# Patient Record
Sex: MALE | Race: WHITE | NOT HISPANIC OR LATINO | Employment: FULL TIME | URBAN - METROPOLITAN AREA
[De-identification: names, ages, dates, MRNs, and addresses within clinical notes are randomized per-mention and may not be internally consistent; named-entity substitution may affect disease eponyms.]

---

## 2017-08-17 ENCOUNTER — APPOINTMENT (OUTPATIENT)
Dept: LAB | Age: 72
End: 2017-08-17
Payer: COMMERCIAL

## 2017-08-17 ENCOUNTER — TRANSCRIBE ORDERS (OUTPATIENT)
Dept: ADMINISTRATIVE | Age: 72
End: 2017-08-17

## 2017-08-17 DIAGNOSIS — N40.0 ENLARGED PROSTATE WITHOUT LOWER URINARY TRACT SYMPTOMS (LUTS): ICD-10-CM

## 2017-08-17 DIAGNOSIS — Z12.5 ENCOUNTER FOR SCREENING FOR MALIGNANT NEOPLASM OF PROSTATE: ICD-10-CM

## 2017-08-17 DIAGNOSIS — E78.5 HYPERLIPIDEMIA: ICD-10-CM

## 2017-08-17 DIAGNOSIS — K21.9 GASTRO-ESOPHAGEAL REFLUX DISEASE WITHOUT ESOPHAGITIS: ICD-10-CM

## 2017-08-17 LAB
ALBUMIN SERPL BCP-MCNC: 3.6 G/DL (ref 3.5–5)
ALP SERPL-CCNC: 56 U/L (ref 46–116)
ALT SERPL W P-5'-P-CCNC: 19 U/L (ref 12–78)
ANION GAP SERPL CALCULATED.3IONS-SCNC: 6 MMOL/L (ref 4–13)
AST SERPL W P-5'-P-CCNC: 11 U/L (ref 5–45)
BASOPHILS # BLD AUTO: 0.06 THOUSANDS/ΜL (ref 0–0.1)
BASOPHILS NFR BLD AUTO: 1 % (ref 0–1)
BILIRUB SERPL-MCNC: 0.54 MG/DL (ref 0.2–1)
BUN SERPL-MCNC: 18 MG/DL (ref 5–25)
CALCIUM SERPL-MCNC: 9 MG/DL (ref 8.3–10.1)
CHLORIDE SERPL-SCNC: 107 MMOL/L (ref 100–108)
CHOLEST SERPL-MCNC: 153 MG/DL (ref 50–200)
CO2 SERPL-SCNC: 29 MMOL/L (ref 21–32)
CREAT SERPL-MCNC: 0.83 MG/DL (ref 0.6–1.3)
EOSINOPHIL # BLD AUTO: 0.48 THOUSAND/ΜL (ref 0–0.61)
EOSINOPHIL NFR BLD AUTO: 7 % (ref 0–6)
ERYTHROCYTE [DISTWIDTH] IN BLOOD BY AUTOMATED COUNT: 14.3 % (ref 11.6–15.1)
GFR SERPL CREATININE-BSD FRML MDRD: 89 ML/MIN/1.73SQ M
GLUCOSE P FAST SERPL-MCNC: 94 MG/DL (ref 65–99)
HCT VFR BLD AUTO: 44.4 % (ref 36.5–49.3)
HDLC SERPL-MCNC: 60 MG/DL (ref 40–60)
HGB BLD-MCNC: 15.1 G/DL (ref 12–17)
LDLC SERPL DIRECT ASSAY-MCNC: 79 MG/DL (ref 0–100)
LYMPHOCYTES # BLD AUTO: 1.81 THOUSANDS/ΜL (ref 0.6–4.47)
LYMPHOCYTES NFR BLD AUTO: 25 % (ref 14–44)
MCH RBC QN AUTO: 31.5 PG (ref 26.8–34.3)
MCHC RBC AUTO-ENTMCNC: 34 G/DL (ref 31.4–37.4)
MCV RBC AUTO: 93 FL (ref 82–98)
MONOCYTES # BLD AUTO: 0.57 THOUSAND/ΜL (ref 0.17–1.22)
MONOCYTES NFR BLD AUTO: 8 % (ref 4–12)
NEUTROPHILS # BLD AUTO: 4.43 THOUSANDS/ΜL (ref 1.85–7.62)
NEUTS SEG NFR BLD AUTO: 59 % (ref 43–75)
NRBC BLD AUTO-RTO: 0 /100 WBCS
PLATELET # BLD AUTO: 200 THOUSANDS/UL (ref 149–390)
PMV BLD AUTO: 10.6 FL (ref 8.9–12.7)
POTASSIUM SERPL-SCNC: 4 MMOL/L (ref 3.5–5.3)
PROT SERPL-MCNC: 6.4 G/DL (ref 6.4–8.2)
PSA SERPL-MCNC: 3.3 NG/ML (ref 0–4)
RBC # BLD AUTO: 4.79 MILLION/UL (ref 3.88–5.62)
SODIUM SERPL-SCNC: 142 MMOL/L (ref 136–145)
TRIGL SERPL-MCNC: 65 MG/DL
WBC # BLD AUTO: 7.36 THOUSAND/UL (ref 4.31–10.16)

## 2017-08-17 PROCEDURE — 83721 ASSAY OF BLOOD LIPOPROTEIN: CPT

## 2017-08-17 PROCEDURE — 80053 COMPREHEN METABOLIC PANEL: CPT

## 2017-08-17 PROCEDURE — 85025 COMPLETE CBC W/AUTO DIFF WBC: CPT

## 2017-08-17 PROCEDURE — 36415 COLL VENOUS BLD VENIPUNCTURE: CPT

## 2017-08-17 PROCEDURE — G0103 PSA SCREENING: HCPCS

## 2017-08-17 PROCEDURE — 80061 LIPID PANEL: CPT

## 2017-08-28 ENCOUNTER — ALLSCRIPTS OFFICE VISIT (OUTPATIENT)
Dept: OTHER | Facility: OTHER | Age: 72
End: 2017-08-28

## 2017-09-20 ENCOUNTER — GENERIC CONVERSION - ENCOUNTER (OUTPATIENT)
Dept: OTHER | Facility: OTHER | Age: 72
End: 2017-09-20

## 2017-10-24 NOTE — PROGRESS NOTES
Assessment   1  Esophageal reflux (530 81) (K21 9)  2  Benign enlargement of prostate (600 00) (N40 0)  3  Lower urinary tract symptoms (LUTS) (788 99) (R39 9)  4  Skin lesion (709 9) (L98 9)  5  Laryngopharyngeal reflux (478 79) (K21 9)  6  Colon cancer screening (V76 51) (Z12 11)  7  Cyst of kidney, acquired (593 2) (N28 1)  8  Diverticulosis (562 10) (K57 90)       #1 health maintenance-given pneumococcal 23 vaccine today     #2 health maintenance-he defers on influenza vaccine     #3 BPH with lower urinary tract symptoms-he will avoid excess fluids after dinner  He defers on medical therapy at present and will call he wants to proceed with treatment with an alpha-blocker     #4 skin lesion the right leg-probable seborrheic keratosis-rule out squamous cell-referral made to dermatology for removal     #5 esophageal reflux-endoscopy December 2016 shows grade 1 esophagitis and hiatal hernia  H  pylori at [facility] we are tracking down the results of this -Endoscopy a decade ago showed one niacin antral erosions felt related to NSAID use  At this point he we will be using Pepcid 20 mg over-the-counter twice a day when necessary and follow appropriate diet     #6 general care-had colonoscopy done in [facility] asked me about follow-up due in 10 years as a study showed no polyp  He has no family history of cancer of the colon or personal history of polyps  He did have diverticular disease which is asymptomatic     #7 renal cyst-had an MRI showing an abnormal area the kidney  Ultrasound on the [facility]'s in December read as benign cyst with additional studies not needed     #8 status post rotator cuff surgery of the right shoulder     #9 prior symptom of feeling poorly-felt to be from postop state with some depressive symptoms plus related to narcotics-all resolved  All labs stable at time     #10 left lower quadrant pain-prior CAT scan showed diverticular disease but otherwise benign   Colonoscopy showed diverticular disease  Local colorectal physician wanted to consider sigmoid resection  He went to [place] and had a repeat scan showing a hernia and had successful surgery with pain resolved     #11 LP are-has occasional symptoms          All other problems as per note of August 2014          MEDICAL REGIMEN: Pepcid 20 mg twice a day when necessary          Appointment in 12 months with prior labs  Addendum-track down the results of biopsy done at [facility] biopsy shows mild chronic inflammation and staining negative for H  pylori  EG junction shows no pathologic change  - ADDENDUM-patient called the office in January this 19 with sore throat with persistent cough with worsening  Symptoms present for nearly 2 weeks  Placed on doxycycline 100 milligrams b i d  for 1 week                Pneumococcal 23 vaccine given use over-the-counter Pepcid 20 mg as needed for reflux symptoms  Avoid excess fluids after dinner  Do not eat right before bed  History of Present Illness   HPI: Reviewed multiple issues talked about his reflux  He has occasional symptoms but not frequent  Endoscopy done in December 2016 at [facility] showing grade 1 esophagitis but otherwise benign  He had a hiatal hernia  He was tested for H  pylori  He has occasional symptoms for which he uses Tums  Said he felt poorly on a PPI in the past  We reviewed the recent literature regarding PPIs long-term use  He will use over-the-counter Pepcid 20 mg as needed  denies frequent hoarseness  He denies difficulty swallowing   has BPH with some lower urinary tract symptoms  This patient has a known history of benign prostatic hypertrophy  He was counseled on various medications for treatment of this including alpha reductase inhibitors and alpha blockers  He denies dysuria, hematuria  He has some degree of urinary frequency as before  He has some nocturia as before   His declining forcefulness of his stream is similar to prior visit  He understands that this can be exacerbated by use of medications with anticholinergic side effects  At this point he wants to continue current therapy for this  I made a drawing of the anatomy today  He said he usually gets up once per night but occasionally it is moreâespecially ninth of alcohol  He says at this point does not want any medicine    had a follow-up ultrasound the kidney done in December which was read as a normal study by radiology  Specifically he has a cyst in the mid pole of the right kidney and a simple cyst of the left kidney  He was told additional radiographic studies are not needed  denies any hematuria  This patient denies any systemic symptoms  Specifically there has been no evidence of fever, night sweats, significant weight loss or significant decrease in appetite  full skin examination was done today with the special dermatologic light  There was evidence of several benign keratoses  There were no worrisome lesions  I did not see anything to suggest basal cell or squamous cell carcinoma  There was no evidence of malignant melanoma  I reviewed the concept of benign hemangiomas of the skin  Skin lesion of the right legâprobable seborrheic keratosisârule out squamous cell carcinomaâreferral made to dermatology for removal   testing done prior to this visit shows normal CBC and SMA  PSA 3 3  Cholesterol profile normal with a cholesterol 153, triglycerides 65 and an LDL 79  Chemistry profile normal      Review of Systems   Complete-Male:      Constitutional: feeling poorly-- and-- feeling tired, but-- no fever-- and-- no chills  Eyes: No complaints of eye pain, no red eyes, no discharge from eyes, no itchy eyes        ENT: Itching of the ears, but-- no complaints of earache, no hearing loss, no nosebleeds, no nasal discharge, no sore throat, no hoarseness,-- no earache,-- no nosebleeds,-- no sore throat,-- no hearing loss-- and-- no nasal discharge  Cardiovascular: No complaints of slow heart rate, no fast heart rate, no chest pain, no palpitations, no leg claudication, no lower extremity  Respiratory: cough, but-- No complaints of shortness of breath, no wheezing, no cough, no SOB on exertion, no orthopnea or PND,-- no shortness of breath,-- no orthopnea,-- no wheezing,-- no shortness of breath during exertion-- and-- no PND  Gastrointestinal: constipation, but-- no abdominal pain,-- no nausea,-- no vomiting,-- no diarrhea-- and-- no blood in stools  Genitourinary: urinary hesitancy-- and-- nocturia, but-- no dysuria-- and-- no incontinence  Musculoskeletal: No complaints of arthralgia, no myalgias, no joint swelling or stiffness, no limb pain or swelling  Integumentary: No complaints of skin rash or skin lesions, no itching, no skin wound, no dry skin  Neurological: No compliants of headache, no confusion, no convulsions, no numbness or tingling, no dizziness or fainting, no limb weakness, no difficulty walking  Psychiatric: Is not suicidal, no sleep disturbances, no anxiety or depression, no change in personality, no emotional problems  Endocrine: No complaints of proptosis, no hot flashes, no muscle weakness, no erectile dysfunction, no deepening of the voice, no feelings of weakness  Hematologic/Lymphatic: No complaints of swollen glands, no swollen glands in the neck, does not bleed easily, no easy bruising  Active Problems   1  Abdominal pain (789 00) (R10 9)  2  Abnormal ultrasound (793 99) (R93 8)  3  Arthritis (716 90) (M19 90)  4  Benign enlargement of prostate (600 00) (N40 0)  5  Cervical radiculopathy (723 4) (M54 12)  6  Colon cancer screening (V76 51) (Z12 11)  7  Constipation (564 00) (K59 00)  8  Cough (786 2) (R05)  9  Cyst of kidney, acquired (593 2) (N28 1)  10  Esophageal reflux (530 81) (K21 9)  11  Fatigue (780 79) (R53 83)  12  Headache (784 0) (R51)  13  Hoarseness (784 42) (R49 0)  14  Kidney cyst, acquired (593 2) (N28 1)  15  Laryngopharyngeal reflux (478 79) (K21 9)  16  Left knee pain (719 46) (M25 562)  17  Lower back pain (724 2) (M54 5)  18  Lower urinary tract symptoms (LUTS) (788 99) (R39 9)  19  Shoulder pain (719 41) (M25 519)  20  Skin lesion (709 9) (L98 9)  21  Thrombosed external hemorrhoids (455 4) (K64 5)    Past Medical History   1  History of Atrophy of prostate (602 2) (N42 89)  2  History of Basal cell carcinoma of shoulder (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) (C44 611)  3  History of acute pharyngitis (V12 69) (Z87 09)  4  History of depression (V11 8) (Z86 59)  5  History of Skin rash (782 1) (R21)  6  History of Splinter of foot, left, initial encounter (917 6) (S12 611S)  7  History of Subconjunctival hemorrhage of left eye (372 72) (H11 32)  Active Problems And Past Medical History Reviewed: The active problems and past medical history were reviewed and updated today  Surgical History   1  History of Hernia Repair  Surgical History Reviewed: The surgical history was reviewed and updated today  Family History   Mother   1  Family history of cardiac disorder (V17 49) (Z82 49)  2  Family history of kidney disease (V18 69) (Z84 1)  Father   3  Family history of Prostate cancer    Social History    · Being A Social Drinker   · Denied: History of Drug Use   · Former smoker (B55 75) (T46 373)  Social History Reviewed: The social history was reviewed and updated today  The social history was reviewed and is unchanged  Current Meds   1  Acetaminophen 500 MG Oral Tablet; TAKE 1 TABLET EVERY 4 TO 6 HOURS AS NEEDED; Therapy: 28SBG1535 to Recorded  2  Citrucel Oral Powder; USE AS DIRECTED; Therapy: 94GIX0395 to Recorded  3  Colace 100 MG Oral Capsule; take 1 capsule daily; Therapy: 84QAO9450 to Recorded  4  MiraLax Oral Powder (Polyethylene Glycol 3350); use as directed; Therapy: 60SRW2180 to Recorded  Medication List Reviewed:     The medication list was reviewed and updated today  Allergies   1  No Known Drug Allergies    Vitals   Vital Signs    Recorded: 28Aug2017 08:19AM Recorded: 88Pnx0785 07:58AM   Heart Rate 72    Respiration 14    Systolic 757    Diastolic 76    Height  5 ft 9 in   Weight  174 lb 2 oz   BMI Calculated  25 71   BSA Calculated  1 95     Physical Exam        Constitutional      General appearance: No acute distress, well appearing and well nourished  Head and Face      Head and face: Normal        Palpation of the face and sinuses: No sinus tenderness  Eyes      Conjunctiva and lids: No erythema, swelling or discharge  Pupils and irises: Equal, round, reactive to light  Ears, Nose, Mouth, and Throat      External inspection of ears and nose: Normal        Otoscopic examination: Tympanic membranes translucent with normal light reflex  Canals patent without erythema  Hearing: Normal        Nasal mucosa, septum, and turbinates: Normal without edema or erythema  Lips, teeth, and gums: Normal, good dentition  Oropharynx: Abnormal  -- Prominent erythematous oropharynx with questional exudate  Neck      Neck: Supple, symmetric, trachea midline, no masses  Thyroid: Normal, no thyromegaly  Pulmonary      Respiratory effort: No increased work of breathing or signs of respiratory distress  Percussion of chest: Normal        Palpation of chest: Normal        Auscultation of lungs: Clear to auscultation  Cardiovascular      Palpation of heart: Normal PMI, no thrills  Auscultation of heart: Normal rate and rhythm, normal S1 and S2, no murmurs  Carotid pulses: 2+ bilaterally  Abdominal aorta: Normal        Femoral pulses: 2+ bilaterally  Pedal pulses: 2+ bilaterally         Peripheral vascular exam: Normal        Examination of extremities for edema and/or varicosities: Normal        Chest      Breasts: Normal, no dimpling or skin changes appreciated  Palpation of breasts and axillae: Normal, no masses palpated  Chest: Normal        Abdomen      Abdomen: Non-tender, no masses  Liver and spleen: No hepatomegaly or splenomegaly  Examination for hernias: No hernias appreciated  Anus, perineum, and rectum: Normal sphincter tone, no masses, no prolapse  Stool sample for occult blood: Negative  Genitourinary      Scrotal contents: Normal testes, no masses  Penis: Normal, no lesions  Digital rectal exam of prostate: Abnormal  -- Enlarged prostate without nodules  Lymphatic      Palpation of lymph nodes in neck: No lymphadenopathy  Palpation of lymph nodes in axillae: No lymphadenopathy  Palpation of lymph nodes in groin: No lymphadenopathy  Palpation of lymph nodes in other areas: No lymphadenopathy  Musculoskeletal      Gait and station: Normal        Inspection/palpation of digits and nails: Abnormal  -- Pain on range of motion of right shoulder  Inspection/palpation of joints, bones, and muscles: Normal        Range of motion: Normal        Stability: Normal        Muscle strength/tone: Normal        Skin      Skin and subcutaneous tissue: Normal without rashes or lesions  Examination of the skin for lesions: Abnormal  -- Benign keratosesânodular-like area in the right leg  Palpation of skin and subcutaneous tissue: Normal turgor  Neurologic      Cranial nerves: Cranial nerves 2-12 intact  Reflexes: 2+ and symmetric  Sensation: No sensory loss  Psychiatric      Judgment and insight: Normal        Orientation to person, place and time: Normal        Recent and remote memory: Intact  Mood and affect: Normal        Health Management   Colon cancer screening   COLONOSCOPY; every 10 years; Last 40TXX6599; Next Due: 53Wiz6525;  Active    Signatures    Electronically signed by : YEE Castro ; Aug 28 2017  8:31AM EST (Author)     Electronically signed by : YEE Nugent ; Aug 31 2017 12:58PM EST                       (Author)     Electronically signed by : YEE Nugent ; Jan 18 2018  1:28PM EST                       (Author)

## 2017-10-26 ENCOUNTER — ALLSCRIPTS OFFICE VISIT (OUTPATIENT)
Dept: OTHER | Facility: OTHER | Age: 72
End: 2017-10-26

## 2017-10-27 NOTE — PROGRESS NOTES
Assessment  1  Elbow pain, unspecified laterality (429 42) (M26 004)    1  Health maintenance-patient given influenza vaccine today  2  Elbow pain-clinically at this point suggest low-grade cellulitis-rule out other-placed on cephalexin 500 milligrams t i d  for 1 week  He will call in several days regarding status  If he notes swelling of the area worsening he will call may need repeat evaluation and potential x-ray/MRI of the joint but at this point clinically appears to be a cellulitis    All other problems as per prior records-  This patient will be seen at previously scheduled appointment with previously scheduled labs  Plan  Cephalexin 500 milligrams t i d  for 1 week     History of Present Illness  HPI: patient is seen today as an emergency appointment  Began with elbow pain over the past 2-3 days  At 1 point he said he was trying to open a jar and had some difficulty doing that  He denies any definite trauma to the area  On exam he is erythema with a questionable entrance site  He denies fever chills  He did not have any ecchymosis  He denies any trauma to the area  vaccine recommended in the patient received today  We had a discussion about influenza vaccine      Review of Systems  Complete-Male:   Constitutional: feeling poorly-- and-- feeling tired, but-- no fever-- and-- no chills  Eyes: No complaints of eye pain, no red eyes, no discharge from eyes, no itchy eyes  ENT: Itching of the ears, but-- no complaints of earache, no hearing loss, no nosebleeds, no nasal discharge, no sore throat, no hoarseness,-- no earache,-- no nosebleeds,-- no sore throat,-- no hearing loss-- and-- no nasal discharge  Cardiovascular: No complaints of slow heart rate, no fast heart rate, no chest pain, no palpitations, no leg claudication, no lower extremity     Respiratory: No complaints of shortness of breath, no wheezing, no cough, no SOB on exertion, no orthopnea or PND,-- no shortness of breath,-- no cough,-- no orthopnea,-- no wheezing,-- no shortness of breath during exertion-- and-- no PND  Gastrointestinal: No complaints of abdominal pain, no constipation, no nausea or vomiting, no diarrhea or bloody stools,-- no abdominal pain,-- no nausea,-- no vomiting,-- no constipation,-- no diarrhea-- and-- no blood in stools  Genitourinary: urinary hesitancy-- and-- nocturia, but-- no dysuria-- and-- no incontinence  Musculoskeletal: limb pain-- and-- Right elbow pain, but-- no arthralgias,-- no joint swelling,-- no myalgias,-- no joint stiffness-- and-- no limb swelling  Integumentary: No complaints of skin rash or skin lesions, no itching, no skin wound, no dry skin  Neurological: No compliants of headache, no confusion, no convulsions, no numbness or tingling, no dizziness or fainting, no limb weakness, no difficulty walking  Psychiatric: Is not suicidal, no sleep disturbances, no anxiety or depression, no change in personality, no emotional problems  Endocrine: No complaints of proptosis, no hot flashes, no muscle weakness, no erectile dysfunction, no deepening of the voice, no feelings of weakness  Hematologic/Lymphatic: No complaints of swollen glands, no swollen glands in the neck, does not bleed easily, no easy bruising  Active Problems  1  Abdominal pain (789 00) (R10 9)   2  Abnormal ultrasound (793 99) (R93 8)   3  Arthritis (716 90) (M19 90)   4  Benign enlargement of prostate (600 00) (N40 0)   5  Cervical radiculopathy (723 4) (M54 12)   6  Colon cancer screening (V76 51) (Z12 11)   7  Constipation (564 00) (K59 00)   8  Cough (786 2) (R05)   9  Cyst of kidney, acquired (593 2) (N28 1)   10  Diverticulosis (562 10) (K57 90)   11  Encounter for prostate cancer screening (V76 44) (Z12 5)   12  Esophageal reflux (530 81) (K21 9)   13  Fatigue (780 79) (R53 83)   14  Headache (784 0) (R51)   15  Hoarseness (784 42) (R49 0)   16  Kidney cyst, acquired (593 2) (N28 1)   17  Laryngopharyngeal reflux (478 79) (K21 9)   18  Left knee pain (719 46) (M25 562)   19  Lower back pain (724 2) (M54 5)   20  Lower urinary tract symptoms (LUTS) (788 99) (R39 9)   21  Need for pneumococcal vaccination (V03 82) (Z23)   22  Shoulder pain (719 41) (M25 519)   23  Skin lesion (709 9) (L98 9)   24  Thrombosed external hemorrhoids (455 4) (K64 5)    Past Medical History  1  History of Atrophy of prostate (602 2) (N42 89)   2  History of Basal cell carcinoma of shoulder (173 61) (C44 611)   3  History of acute pharyngitis (V12 69) (Z87 09)   4  History of depression (V11 8) (Z86 59)   5  History of Skin rash (782 1) (R21)   6  History of Splinter of foot, left, initial encounter (917 6) (B12 983X)   7  History of Subconjunctival hemorrhage of left eye (372 72) (H11 32)  Active Problems And Past Medical History Reviewed: The active problems and past medical history were reviewed and updated today  Surgical History  1  History of Hernia Repair    Family History  Mother    1  Family history of cardiac disorder (V17 49) (Z82 49)   2  Family history of kidney disease (V18 69) (Z84 1)  Father    3  Family history of Prostate cancer    Social History   · Being A Social Drinker   · Denied: History of Drug Use   · Former smoker (O41 69) (G19 349)  Social History Reviewed: The social history was reviewed and updated today  The social history was reviewed and is unchanged  Current Meds   1  Acetaminophen 500 MG Oral Tablet; TAKE 1 TABLET EVERY 4 TO 6 HOURS AS NEEDED; Therapy: 23GPE1103 to Recorded   2  Citrucel Oral Powder; USE AS DIRECTED; Therapy: 44QEL0566 to Recorded   3  Colace 100 MG Oral Capsule; take 1 capsule daily; Therapy: 83VRK8423 to Recorded   4  MiraLax Oral Powder (Polyethylene Glycol 3350); use as directed; Therapy: 00VNN2571 to Recorded  Medication List Reviewed: The medication list was reviewed and updated today  Allergies  1   No Known Drug Allergies    Vitals  Vital Signs    Recorded: 42BIW2758 06:58AM Recorded: 42EZD6030 06:50AM   Heart Rate 72    Respiration 14    Systolic 221, RUE    Diastolic 80, RUE    Height  5 ft 9 in   Weight  179 lb    BMI Calculated  26 43   BSA Calculated  1 97     Physical Exam    Constitutional   General appearance: No acute distress, well appearing and well nourished  Lymphatic   Palpation of lymph nodes in neck: No lymphadenopathy  Musculoskeletal   Gait and station: Normal     Digits and nails: Normal without clubbing or cyanosis  Inspection/palpation of joints, bones, and muscles: Normal     Skin   Skin and subcutaneous tissue: Abnormal  -- Erythema over the right elbow with questionable entrance site  Psychiatric   Orientation to person, place and time: Normal     Mood and affect: Normal          Health Management  Colon cancer screening   COLONOSCOPY; every 10 years; Last 76LUF0579; Next Due: 49Euj3882; Active    Future Appointments    Date/Time Provider Specialty Site   08/28/2018 08:00 AM YEE Neves   Internal Medicine Cornelius Lin MD     Signatures   Electronically signed by : YEE Marin ; Oct 26 2017  7:02AM EST                       (Author)

## 2018-01-13 VITALS
SYSTOLIC BLOOD PRESSURE: 120 MMHG | RESPIRATION RATE: 14 BRPM | BODY MASS INDEX: 25.79 KG/M2 | HEIGHT: 69 IN | DIASTOLIC BLOOD PRESSURE: 76 MMHG | WEIGHT: 174.13 LBS | HEART RATE: 72 BPM

## 2018-01-14 VITALS
HEART RATE: 72 BPM | DIASTOLIC BLOOD PRESSURE: 80 MMHG | RESPIRATION RATE: 14 BRPM | SYSTOLIC BLOOD PRESSURE: 130 MMHG | WEIGHT: 179 LBS | BODY MASS INDEX: 26.51 KG/M2 | HEIGHT: 69 IN

## 2018-05-24 ENCOUNTER — TELEPHONE (OUTPATIENT)
Dept: INTERNAL MEDICINE CLINIC | Facility: CLINIC | Age: 73
End: 2018-05-24

## 2018-05-24 ENCOUNTER — OFFICE VISIT (OUTPATIENT)
Dept: INTERNAL MEDICINE CLINIC | Facility: CLINIC | Age: 73
End: 2018-05-24
Payer: COMMERCIAL

## 2018-05-24 VITALS
DIASTOLIC BLOOD PRESSURE: 68 MMHG | OXYGEN SATURATION: 96 % | BODY MASS INDEX: 25.75 KG/M2 | HEART RATE: 83 BPM | SYSTOLIC BLOOD PRESSURE: 114 MMHG | TEMPERATURE: 98.7 F | WEIGHT: 174.4 LBS

## 2018-05-24 DIAGNOSIS — M17.12 PRIMARY OSTEOARTHRITIS OF LEFT KNEE: ICD-10-CM

## 2018-05-24 DIAGNOSIS — M67.441 DIGITAL MUCOUS CYST OF FINGER OF RIGHT HAND: Primary | ICD-10-CM

## 2018-05-24 PROCEDURE — 99214 OFFICE O/P EST MOD 30 MIN: CPT | Performed by: INTERNAL MEDICINE

## 2018-05-24 NOTE — PROGRESS NOTES
Assessment/Plan:       Diagnoses and all orders for this visit:    Digital mucous cyst of finger of right hand  Comments:  suspect new onset of symptoms/pain, swelling/lesion is mroe c/w cyst of finger  advised on hand eval and x-ray prior to appt  Orders:  -     Ambulatory referral to Orthopedic Surgery; Future  -     XR finger right third digit-middle; Future    Primary osteoarthritis of left knee  Comments:  topical nsaid for pain prn, prefer to avoid oral nsaids in pt's >70 y/o due to side effects  Orders:  -     diclofenac sodium (VOLTAREN) 1 %; Apply 2 g topically 2 (two) times a day as needed (knee pain)          Subjective:      Patient ID: Edy Holliday is a 72 y o  male  HPI    New to me, here with c/o painful finger swelling/lesion for last 10 days & left knee intermittent pain  No prior finger swelling or problem before 10 days ago and no injury to finger  Is avid swimmer and takes no medication on regular basis except of OTC nsaids for left knee pain prn  Had x-rays of left knee in 2014 which showed advanced osteoarthritis  Tylenol ineffective for knee pain and pt does get more reflux with oral nsaids(also age>64 y/o)  No knee swelling/injury or other complaints    Past Medical History:   Diagnosis Date    Atrophy of prostate     last assessed: 08/26/2014    Basal cell carcinoma of shoulder     last assessed: 08/28/2014    Depression     last assessed: 08/26/2015    Subconjunctival hemorrhage of left eye     last assessed: 01/14/2016     Vitals:    05/24/18 1409   BP: 114/68   BP Location: Left arm   Patient Position: Sitting   Cuff Size: Large   Pulse: 83   Temp: 98 7 °F (37 1 °C)   TempSrc: Tympanic   SpO2: 96%   Weight: 79 1 kg (174 lb 6 4 oz)     Body mass index is 25 75 kg/m²      Current Outpatient Prescriptions:     diclofenac sodium (VOLTAREN) 1 %, Apply 2 g topically 2 (two) times a day as needed (knee pain), Disp: 1 Tube, Rfl: 0  No Known Allergies      Review of Systems Constitutional: Negative for fever  HENT: Negative for congestion  Eyes: Negative for visual disturbance  Respiratory: Negative for shortness of breath  Cardiovascular: Negative for chest pain  Gastrointestinal: Negative for abdominal distention  Genitourinary: Negative for difficulty urinating  Musculoskeletal: Positive for arthralgias  Negative for joint swelling  Neurological: Negative for headaches  Psychiatric/Behavioral: Negative for dysphoric mood  Objective:      /68 (BP Location: Left arm, Patient Position: Sitting, Cuff Size: Large)   Pulse 83   Temp 98 7 °F (37 1 °C) (Tympanic)   Wt 79 1 kg (174 lb 6 4 oz)   SpO2 96%   BMI 25 75 kg/m²          Physical Exam   Constitutional: He is oriented to person, place, and time  He appears well-developed and well-nourished  HENT:   Head: Normocephalic and atraumatic  Mouth/Throat: Oropharynx is clear and moist    Eyes: Conjunctivae are normal  Pupils are equal, round, and reactive to light  Cardiovascular: Normal rate, regular rhythm and normal heart sounds  No murmur heard  Pulmonary/Chest: Effort normal and breath sounds normal  He has no wheezes  He has no rales  Abdominal: Soft  Bowel sounds are normal  There is no tenderness  Musculoskeletal: He exhibits no edema  Right 3rd finger with small 2mm, raised/subq skin lesion on PIP with pain on flexion of finger  Left knee with mild crepitus on passive ROM but lachman's test negative and no swelling or tenderness   Neurological: He is alert and oriented to person, place, and time  Psychiatric: He has a normal mood and affect  His behavior is normal    Vitals reviewed

## 2018-05-24 NOTE — PATIENT INSTRUCTIONS
1  Hand specialist appointment  2  Consider shingrix (SHINGLES) vaccine  3  voltaren topical as needed for knee pain  4   Continue low impact exercises

## 2018-05-24 NOTE — TELEPHONE ENCOUNTER
PT STATED THAT FOR THE PAST 10 DAYS HE'S HAD A WART OR CORN ON HIS MIDDLE FINGER THAT'S HARD IN THE MIDDLE  HE REQUESTED TO BE SEEN   I GAVE HIM AN APPT WITH DR DASILVA FOR TODAY AT 2PM, HE STATED THAT HE'LL BE HERE

## 2018-06-02 ENCOUNTER — HOSPITAL ENCOUNTER (EMERGENCY)
Facility: HOSPITAL | Age: 73
Discharge: HOME | End: 2018-06-02
Attending: EMERGENCY MEDICINE | Admitting: EMERGENCY MEDICINE
Payer: COMMERCIAL

## 2018-06-02 ENCOUNTER — APPOINTMENT (EMERGENCY)
Dept: RADIOLOGY | Facility: HOSPITAL | Age: 73
End: 2018-06-02
Payer: COMMERCIAL

## 2018-06-02 VITALS
DIASTOLIC BLOOD PRESSURE: 63 MMHG | TEMPERATURE: 98.6 F | BODY MASS INDEX: 25.84 KG/M2 | HEART RATE: 78 BPM | RESPIRATION RATE: 18 BRPM | SYSTOLIC BLOOD PRESSURE: 109 MMHG | OXYGEN SATURATION: 97 % | WEIGHT: 175 LBS

## 2018-06-02 DIAGNOSIS — S82.891A CLOSED FRACTURE OF RIGHT ANKLE, INITIAL ENCOUNTER: Primary | ICD-10-CM

## 2018-06-02 PROCEDURE — 73610 X-RAY EXAM OF ANKLE: CPT

## 2018-06-02 PROCEDURE — 99283 EMERGENCY DEPT VISIT LOW MDM: CPT

## 2018-06-02 RX ORDER — OXYCODONE HYDROCHLORIDE AND ACETAMINOPHEN 5; 325 MG/1; MG/1
1 TABLET ORAL EVERY 6 HOURS PRN
Qty: 12 TABLET | Refills: 0 | Status: SHIPPED | OUTPATIENT
Start: 2018-06-02 | End: 2018-06-02

## 2018-06-02 RX ORDER — OXYCODONE HYDROCHLORIDE 5 MG/1
5 TABLET ORAL EVERY 6 HOURS PRN
Qty: 12 TABLET | Refills: 0 | Status: SHIPPED | OUTPATIENT
Start: 2018-06-02 | End: 2018-06-05

## 2018-06-02 NOTE — ED PROVIDER NOTES
History  Chief Complaint   Patient presents with   [employer] Fall     pt presents s/p fall states fell off a ladder approx 8 feet  denies any other pain or injury besides right ankle       History provided by:  Patient  Fall   Mechanism of injury: fall    Injury location:  Leg  Leg injury location:  R ankle  Incident location:  Home  Time since incident:  1 hour  Arrived directly from scene: yes    Fall:     Fall occurred:  From a ladder    Height of fall:  7 ft    Impact surface:  Grass    Point of impact:  Feet    Entrapped after fall: no    Suspicion of alcohol use: no    Suspicion of drug use: no    Tetanus status:  Up to date  Associated symptoms: no abdominal pain, no back pain, no blindness, no chest pain, no difficulty breathing, no headaches, no hearing loss, no loss of consciousness, no nausea, no neck pain, no seizures and no vomiting    Risk factors: no AICD, no anticoagulation therapy, no CABG, no CAD, no COPD, no diabetes, no hemophilia, no pacemaker and no past MI        Prior to Admission Medications   Prescriptions Last Dose Informant Patient Reported? Taking?   diclofenac sodium (VOLTAREN) 1 %   No No   Sig: Apply 2 g topically 2 (two) times a day as needed (knee pain)      Facility-Administered Medications: None       Past Medical History:   Diagnosis Date    Atrophy of prostate     last assessed: 08/26/2014    Basal cell carcinoma of shoulder     last assessed: 08/28/2014    Depression     last assessed: 08/26/2015    Subconjunctival hemorrhage of left eye     last assessed: 01/14/2016       Past Surgical History:   Procedure Laterality Date    HERNIA REPAIR         Family History   Problem Relation Age of Onset    Heart disease Mother      cardiac disorder    Kidney disease Mother     Prostate cancer Father      I have reviewed and agree with the history as documented      Social History   Substance Use Topics    Smoking status: Former Smoker    Smokeless tobacco: Never Used      Comment: years ago per patient, not any more    Alcohol use Yes      Comment: Social        Review of Systems   Constitutional: Negative for activity change, appetite change, chills, fatigue and fever  HENT: Negative for ear pain, hearing loss, sneezing and sore throat  Eyes: Negative for blindness, pain and visual disturbance  Respiratory: Negative for cough and shortness of breath  Cardiovascular: Negative for chest pain and palpitations  Gastrointestinal: Negative for abdominal pain, blood in stool, constipation, diarrhea, nausea and vomiting  Genitourinary: Negative for dysuria and hematuria  Musculoskeletal: Positive for arthralgias, gait problem and joint swelling  Negative for back pain, neck pain and neck stiffness  Skin: Negative for rash and wound  Neurological: Negative for dizziness, seizures, loss of consciousness, weakness, light-headedness, numbness and headaches  All other systems reviewed and are negative  Physical Exam  Physical Exam   Constitutional: He is oriented to person, place, and time  He appears well-developed and well-nourished  No distress  HENT:   Head: Normocephalic and atraumatic  Right Ear: External ear normal    Left Ear: External ear normal    Nose: Nose normal    Mouth/Throat: Oropharynx is clear and moist    Negative hemotympanum bilaterally  No khan signs or raccoons eyes   Eyes: Conjunctivae and EOM are normal  Pupils are equal, round, and reactive to light  Neck: Normal range of motion  Neck supple  Cardiovascular: Normal rate, regular rhythm, normal heart sounds and intact distal pulses  Exam reveals no gallop and no friction rub  No murmur heard  Pulmonary/Chest: Effort normal and breath sounds normal  No respiratory distress  He has no wheezes  He has no rales  Abdominal: Soft  He exhibits no distension  There is no tenderness  No tenderness to light and deep palpation  Musculoskeletal: Normal range of motion  He exhibits tenderness  He exhibits no edema or deformity  Tenderness to palpation of right medial malleolus  Limited ROM secondary to pain  No proximal fibular tenderness  No other upper or lower extremity tenderness  No tenderness to anterior/posterior torso  No C/T/L spine tenderness  No pelvic instability  Full sensation distal to injury  2+ pulses and cap refill <2 seconds   Lymphadenopathy:     He has no cervical adenopathy  Neurological: He is alert and oriented to person, place, and time  No cranial nerve deficit or sensory deficit  He exhibits normal muscle tone  Coordination normal    Skin: Skin is warm and dry  Capillary refill takes less than 2 seconds  He is not diaphoretic  No erythema  No pallor  Nursing note and vitals reviewed  Vital Signs  ED Triage Vitals [06/02/18 1453]   Temperature Pulse Respirations Blood Pressure SpO2   98 6 °F (37 °C) 78 18 109/63 97 %      Temp Source Heart Rate Source Patient Position - Orthostatic VS BP Location FiO2 (%)   Oral Monitor Sitting Left arm --      Pain Score       5           Vitals:    06/02/18 1453   BP: 109/63   Pulse: 78   Patient Position - Orthostatic VS: Sitting       Visual Acuity      ED Medications  Medications - No data to display    Diagnostic Studies  Results Reviewed     None                 XR ankle 3+ vw right   Final Result by Kang Villar MD (06/02 1546)      There is an acute medial malleolus fracture  Workstation performed: [id number]                    Procedures  Static Splint Application  Date/Time: 6/2/2018 4:54 PM  Performed by: Misti Liz by: Virginia Cheema     Patient location:  ED  Procedure performed by emergency physician: No    Procedure performed by consultant: Nitish Rosales CRNA      Consent:     Consent obtained:  Verbal    Consent given by:  Patient    Risks discussed:  Numbness, pain and swelling  Universal protocol:     Imaging studies available: yes      Patient identity confirmed:  Verbally with patient  Indication:     Indications: fracture    Pre-procedure details:     Sensation:  Normal  Procedure details:     Laterality:  Right    Location:  Ankle    Ankle:  R ankle    Strapping: no      Splint type:  Short leg and sugar tong    Supplies:  Ortho-Glass and cotton padding  Post-procedure details:     Pain:  Improved    Sensation:  Normal    Neurovascular Exam: skin pink      Patient tolerance of procedure: Tolerated well, no immediate complications           Phone Contacts  ED Phone Contact    ED Course                               MDM  Number of Diagnoses or Management Options  Closed fracture of right ankle, initial encounter:   Diagnosis management comments: Patient is otherwise healthy 79-year-old male presents to the emergency department for evaluation of ankle injury  Patient states that he was trimming his headache is when he believes his ladder fell from being on a hill, states he fell onto his right ankle that to his right side  He states he is able to get up immediately  No head injury or loss of consciousness  No other complaints of pain  Patient does mostly complained of right ankle pain  Was able to ambulate initially but then has since been able to not bear weight  Patient denies any other pain in his hip, upper lower extremities, torso, abdomen, chest wall  On exam patient is in no acute distress  He does have tenderness to swollen right medial malleolus  Flexor range of motion of bilateral lower extremities otherwise  No tenderness abdominal palpation or chest wall palpation  No spinal tenderness  No obvious signs of injury anywhere else  Cranial nerves 2-12 fully intact no focal deficits  Patient airways pain, bilateral breath sounds with full chest wall expansion, 2+ pulses throughout, GCS 15 with no focal findings, no other traumatic findings on full exposure  Fracture to right medial malleolus on x-ray    Plan will be to splint nonweightbearing until patient can be seen by Orthopedics/Podiatry  Amount and/or Complexity of Data Reviewed  Tests in the radiology section of CPT®: ordered and reviewed  Independent visualization of images, tracings, or specimens: yes    Risk of Complications, Morbidity, and/or Mortality  Presenting problems: low  Diagnostic procedures: low  Management options: moderate    Patient Progress  Patient progress: improved    CritCare Time    Disposition  Final diagnoses:   Closed fracture of right ankle, initial encounter - medial malleolus     Time reflects when diagnosis was documented in both MDM as applicable and the Disposition within this note     Time User Action Codes Description Comment    6/2/2018  4:48 PM Anjali Medrano Add [Y19 [id number]] Closed fracture of right ankle, initial encounter     6/2/2018  4:48 PM Delpome, Cherlynn Lefort Modify [P63 130Q] Closed fracture of right ankle, initial encounter medial malleolus      ED Disposition     ED Disposition Condition Comment    Discharge  Margarita Davis discharge to home/self care  Condition at discharge: Stable        Follow-up Information     Follow up With Specialties Details Why Contact Info Additional [address] [facility] Orthopedic Surgery Schedule an appointment as soon as possible for a visit in 3 days ankle fracture follow up Jer[facility] [address] [place] [id number]  [phone]     David West DPM Podiatry Schedule an appointment as soon as possible for a visit As needed [address] Emergency Department Emergency Medicine  If symptoms worsen [address] [facility] [address] [phone] [facility] ED,  [address], [place], [place], [address]          Patient's Medications   Discharge Prescriptions    OXYCODONE (ROXICODONE) 5 MG IMMEDIATE RELEASE TABLET    Take 1 tablet (5 mg total) by mouth every 6 (six) hours as needed for moderate pain for up to 3 days Max Daily Amount: 20 mg       Start Date: 6/2/2018  End Date: 6/5/2018       Order Dose: 5 mg       Quantity: 12 tablet    Refills: 0     No discharge procedures on file      ED Provider  Electronically Signed by           Gabe Saldivar PA-C  06/02/18 0086

## 2018-06-02 NOTE — ED NOTES
Patient already has crutches at home, and feels comfortable using them        Gonzalez Calero  06/02/18 3999

## 2018-06-02 NOTE — DISCHARGE INSTRUCTIONS
Ankle Fracture   WHAT YOU NEED TO KNOW:   An ankle fracture is a break in 1 or more of the bones in your ankle  DISCHARGE INSTRUCTIONS:   Call 911 for any of the following:   · You feel lightheaded, short of breath, and have chest pain  · You cough up blood  Return to the emergency department if:   · Your leg feels warm, tender, and painful  It may look swollen and red  · Blood soaks through your bandage  · You have severe pain in your ankle  · Your cast feels too tight  · Your foot or toes are cold or numb  · Your foot or toenails turn blue or gray  Contact your healthcare provider if:   · Your splint feels too tight  · Your swelling has increased or returned  · You have a fever  · Your pain does not go away, even after treatment  · You have questions or concerns about your condition or care  Medicines: You may need any of the following:  · Acetaminophen  decreases pain and fever  It is available without a doctor's order  Ask how much to take and how often to take it  Follow directions  Acetaminophen can cause liver damage if not taken correctly  · NSAIDs , such as ibuprofen, help decrease swelling, pain, and fever  This medicine is available with or without a doctor's order  NSAIDs can cause stomach bleeding or kidney problems in certain people  If you take blood thinner medicine, always ask your healthcare provider if NSAIDs are safe for you  Always read the medicine label and follow directions  · Prescription pain medicine  may be given  Ask your healthcare provider how to take this medicine safely  · Take your medicine as directed  Contact your healthcare provider if you think your medicine is not helping or if you have side effects  Tell him or her if you are allergic to any medicine  Keep a list of the medicines, vitamins, and herbs you take  Include the amounts, and when and why you take them  Bring the list or the pill bottles to follow-up visits   Carry your medicine list with you in case of an emergency  Follow up with your healthcare provider in 1 to 2 days: Your fracture may need to be reduced (bones pushed back into place) or you may need surgery  Write down your questions so you remember to ask them during your visits  Support devices: You will be given a brace, cast, or splint to limit your movement and protect your ankle  You may need to use crutches to protect your ankle and decrease your pain as you move around  Do not remove your device and do not put weight on your injured ankle  Splint and cast care:  Cover the splint or cast before you bathe so it does not get wet  Tape 2 plastic trash bags to your skin above the cast  Try to keep your ankle out of the water as much as possible  Rest:  Rest your ankle so that it can heal  Return to normal activities as directed  Ice:  Apply ice on your ankle for 15 to 20 minutes every hour or as directed  Use an ice pack, or put crushed ice in a plastic bag  Cover it with a towel  Ice helps prevent tissue damage and decreases swelling and pain  Elevate:  Elevate your ankle above the level of your heart as often as you can  This will help decrease swelling and pain  Prop your ankle on pillows or blankets to keep it elevated comfortably  © 2017 [employer]  Information is for End User's use only and may not be sold, redistributed or otherwise used for commercial purposes  All illustrations and images included in CareNotes® are the copyrighted property of A D A M , Inc  or Kemar Dudley  The above information is an  only  It is not intended as medical advice for individual conditions or treatments  Talk to your doctor, nurse or pharmacist before following any medical regimen to see if it is safe and effective for you

## 2018-06-05 ENCOUNTER — OFFICE VISIT (OUTPATIENT)
Dept: INTERNAL MEDICINE CLINIC | Facility: CLINIC | Age: 73
End: 2018-06-05
Payer: COMMERCIAL

## 2018-06-05 VITALS
DIASTOLIC BLOOD PRESSURE: 70 MMHG | HEART RATE: 71 BPM | OXYGEN SATURATION: 97 % | TEMPERATURE: 97.7 F | HEIGHT: 70 IN | SYSTOLIC BLOOD PRESSURE: 110 MMHG

## 2018-06-05 DIAGNOSIS — Z01.818 PRE-OP EXAMINATION: Primary | ICD-10-CM

## 2018-06-05 PROCEDURE — 99243 OFF/OP CNSLTJ NEW/EST LOW 30: CPT | Performed by: INTERNAL MEDICINE

## 2018-06-05 NOTE — PROGRESS NOTES
Assessment/Plan:     Diagnoses and all orders for this visit:    Pre-op examination        Subjective:      Patient ID: Charlie Montano is a 72 y o  male  HPI  Pre-Op Evaluation  Charlie Montano is a 72 y o  male who presents to the office today for a preoperative consultation at the request of podiatric surgeon Dr Jame Cedillo who plans on performing ankle fracture repair(ORIF) on June 6  This consultation is requested for the specific conditions prompting preoperative evaluation (i e  because of potential affect on operative risk)  Planned anesthesia is general  The patient has the following known anesthesia issues: none  Patient has a bleeding risk of: no recent abnormal bleeding  Patient has no history of heart or lung disease in past and is non-smoker  He swims regularly and before this fall and ankle fracture occurred would go to pool for 30 mins each time for 7-8 laps total   He denies CP/SOB/HERNANDEZ with walking 4-5 blocks or 2 flights of steps  He has a history of cervical arthritis per records but otherwise no pain outside of right foot/ankle or any other complaints  Past Medical History:   Diagnosis Date    Atrophy of prostate     last assessed: 08/26/2014    Basal cell carcinoma of shoulder     last assessed: 08/28/2014    Depression     last assessed: 08/26/2015    Subconjunctival hemorrhage of left eye     last assessed: 01/14/2016     Vitals:    06/05/18 0952   BP: 110/70   BP Location: Left arm   Patient Position: Sitting   Cuff Size: Adult   Pulse: 71   Temp: 97 7 °F (36 5 °C)   TempSrc: Tympanic   SpO2: 97%   Height: 5' 10" (1 778 m)     There is no height or weight on file to calculate BMI      Current Outpatient Prescriptions:     diclofenac sodium (VOLTAREN) 1 %, Apply 2 g topically 2 (two) times a day as needed (knee pain), Disp: 1 Tube, Rfl: 0    oxyCODONE (ROXICODONE) 5 mg immediate release tablet, Take 1 tablet (5 mg total) by mouth every 6 (six) hours as needed for moderate pain for up to 3 days Max Daily Amount: 20 mg, Disp: 12 tablet, Rfl: 0  No Known Allergies      Review of Systems   Constitutional: Negative for fever  HENT: Negative for congestion  Eyes: Negative for visual disturbance  Respiratory: Negative for shortness of breath  Cardiovascular: Negative for chest pain  Gastrointestinal: Negative for abdominal pain  Genitourinary: Negative for difficulty urinating  Musculoskeletal: Positive for arthralgias  Negative for neck pain  Neurological: Negative for headaches  Hematological: Does not bruise/bleed easily  Psychiatric/Behavioral: Negative for dysphoric mood  Objective:      /70 (BP Location: Left arm, Patient Position: Sitting, Cuff Size: Adult)   Pulse 71   Temp 97 7 °F (36 5 °C) (Tympanic)   Ht 5' 10" (1 778 m)   SpO2 97%          Physical Exam   Constitutional: He is oriented to person, place, and time  He appears well-developed and well-nourished  HENT:   Head: Normocephalic and atraumatic  Mouth/Throat: Oropharynx is clear and moist    Eyes: Conjunctivae are normal  Pupils are equal, round, and reactive to light  Cardiovascular: Normal rate, regular rhythm and normal heart sounds  No murmur heard  Pulmonary/Chest: Effort normal and breath sounds normal  He has no wheezes  He has no rales  Abdominal: Soft  Bowel sounds are normal  There is no tenderness  Musculoskeletal: He exhibits no edema (left foot, right foot in Boot)  Neurological: He is alert and oriented to person, place, and time  Psychiatric: He has a normal mood and affect  His behavior is normal    Vitals reviewed          CBC WITH DIFF (06/04/2018 5:23 PM)  CBC WITH DIFF (06/04/2018 5:23 PM)   Component Value Ref Range   Hemoglobin 15 0 12 5 - 17 0 g/dL   Hematocrit 44 5 37 0 - 48 0 %   WBC 9 3 4 0 - 10 5 thou/cmm   RBC 4 78 4 00 - 5 40 mill/cmm   Platelet Count 921 060 - 350 thou/cmm   MPV 9 0 7 5 - 11 3 fL   MCV 93 80 - 100 fL   MCH 31 5 27 - 36 pg   MCHC 33 8 32 - 37 g/dL   RDW 14 1 12 0 - 16 0 %   Morphology Not performed     Differential Type AUTO     Absolute Neutrophils 6 0 1 8 - 7 9 thou/cmm   Absolute Lymphocytes 2 1 0 6 - 4 2 thou/cmm   Absolute Monocytes 0 7 0 2 - 1 4 thou/cmm   Absolute Eosinophils 0 3 0 - 0 7 thou/cmm   Absolute Basophils 0 1 0 - 0 2 thou/cmm   Neutrophils 64 45 - 75 %   Lymphocytes 23 15 - 40 %   Monocytes 8 5 - 13 %   Eosinophils 3 0 - 7 %   Basophils 2 0 - 2 %       BASIC METABOLIC PANEL (91/47/1058 5:23 PM)  BASIC METABOLIC PANEL (44/90/9983 5:23 PM)   Component Value Ref Range   Glucose 79 65 - 99 mg/dL   BUN 19 7 - 28 mg/dL   Creatinine 1 07 0 53 - 1 30 mg/dL   Sodium 141 135 - 145 mmol/L   Potassium 4 6 3 5 - 5 2 mmol/L   Chloride 105 100 - 109 mmol/L   Carbon Dioxide 32 (H) 23 - 31 mmol/L   Calcium 9 1 8 5 - 10 1 mg/dL   Anion Gap 4 3 - 11   GFR, Calculated 69  Comment:   mL/min per 1 73 square meters                                            Normal Function or Mild Renal    Disease (if clinically at risk):  >or=60  Moderately Decreased:                30-59  Severely Decreased:                  15-29  Renal Failure:                         <15                                            -American GFR: multiply reported GFR by 1 16    Please note that the eGFR is based on the CKD-EPI calculation, and is not intended to be used for drug dosing                                             Note: Calculated GFR may not be an accurate indicator of renal function if the patient's renal function is not in a steady state  >60 mL/min/1 73m2           EKG(6/4/2018 from [facility] & already reviewed by their physician on duty, poor copy sent via fax): normal sinus rhythm at 67 bpm & no acute ST-T segment changes  Reviewed blood work, EKG & patient is a low to moderate risk for moderate risk surgery  No further cardiac testing is recommended at this time  There is No contraindication to upcoming surgery

## 2018-08-01 DIAGNOSIS — K21.9 GASTRO-ESOPHAGEAL REFLUX DISEASE WITHOUT ESOPHAGITIS: ICD-10-CM

## 2018-08-01 DIAGNOSIS — Z12.5 ENCOUNTER FOR SCREENING FOR MALIGNANT NEOPLASM OF PROSTATE: ICD-10-CM

## 2018-08-01 DIAGNOSIS — E78.5 HYPERLIPIDEMIA: ICD-10-CM

## 2018-08-01 DIAGNOSIS — N40.0 ENLARGED PROSTATE WITHOUT LOWER URINARY TRACT SYMPTOMS (LUTS): ICD-10-CM

## 2018-08-23 ENCOUNTER — APPOINTMENT (OUTPATIENT)
Dept: LAB | Age: 73
End: 2018-08-23
Payer: COMMERCIAL

## 2018-08-23 DIAGNOSIS — N40.0 ENLARGED PROSTATE WITHOUT LOWER URINARY TRACT SYMPTOMS (LUTS): ICD-10-CM

## 2018-08-23 DIAGNOSIS — K21.9 GASTRO-ESOPHAGEAL REFLUX DISEASE WITHOUT ESOPHAGITIS: ICD-10-CM

## 2018-08-23 DIAGNOSIS — Z12.5 ENCOUNTER FOR SCREENING FOR MALIGNANT NEOPLASM OF PROSTATE: ICD-10-CM

## 2018-08-23 DIAGNOSIS — E78.5 HYPERLIPIDEMIA: ICD-10-CM

## 2018-08-23 LAB
ALBUMIN SERPL BCP-MCNC: 3.5 G/DL (ref 3.5–5)
ALP SERPL-CCNC: 64 U/L (ref 46–116)
ALT SERPL W P-5'-P-CCNC: 18 U/L (ref 12–78)
ANION GAP SERPL CALCULATED.3IONS-SCNC: 3 MMOL/L (ref 4–13)
AST SERPL W P-5'-P-CCNC: 15 U/L (ref 5–45)
BASOPHILS # BLD AUTO: 0.07 THOUSANDS/ΜL (ref 0–0.1)
BASOPHILS NFR BLD AUTO: 1 % (ref 0–1)
BILIRUB SERPL-MCNC: 0.88 MG/DL (ref 0.2–1)
BUN SERPL-MCNC: 17 MG/DL (ref 5–25)
CALCIUM SERPL-MCNC: 8.7 MG/DL (ref 8.3–10.1)
CHLORIDE SERPL-SCNC: 106 MMOL/L (ref 100–108)
CHOLEST SERPL-MCNC: 163 MG/DL (ref 50–200)
CO2 SERPL-SCNC: 31 MMOL/L (ref 21–32)
CREAT SERPL-MCNC: 0.97 MG/DL (ref 0.6–1.3)
EOSINOPHIL # BLD AUTO: 0.38 THOUSAND/ΜL (ref 0–0.61)
EOSINOPHIL NFR BLD AUTO: 6 % (ref 0–6)
ERYTHROCYTE [DISTWIDTH] IN BLOOD BY AUTOMATED COUNT: 13.5 % (ref 11.6–15.1)
GFR SERPL CREATININE-BSD FRML MDRD: 78 ML/MIN/1.73SQ M
GLUCOSE P FAST SERPL-MCNC: 94 MG/DL (ref 65–99)
HCT VFR BLD AUTO: 45.5 % (ref 36.5–49.3)
HDLC SERPL-MCNC: 52 MG/DL (ref 40–60)
HGB BLD-MCNC: 14.6 G/DL (ref 12–17)
IMM GRANULOCYTES # BLD AUTO: 0.01 THOUSAND/UL (ref 0–0.2)
IMM GRANULOCYTES NFR BLD AUTO: 0 % (ref 0–2)
LDLC SERPL DIRECT ASSAY-MCNC: 91 MG/DL (ref 0–100)
LYMPHOCYTES # BLD AUTO: 1.67 THOUSANDS/ΜL (ref 0.6–4.47)
LYMPHOCYTES NFR BLD AUTO: 27 % (ref 14–44)
MCH RBC QN AUTO: 30.7 PG (ref 26.8–34.3)
MCHC RBC AUTO-ENTMCNC: 32.1 G/DL (ref 31.4–37.4)
MCV RBC AUTO: 96 FL (ref 82–98)
MONOCYTES # BLD AUTO: 0.48 THOUSAND/ΜL (ref 0.17–1.22)
MONOCYTES NFR BLD AUTO: 8 % (ref 4–12)
NEUTROPHILS # BLD AUTO: 3.65 THOUSANDS/ΜL (ref 1.85–7.62)
NEUTS SEG NFR BLD AUTO: 58 % (ref 43–75)
NRBC BLD AUTO-RTO: 0 /100 WBCS
PLATELET # BLD AUTO: 206 THOUSANDS/UL (ref 149–390)
PMV BLD AUTO: 10.2 FL (ref 8.9–12.7)
POTASSIUM SERPL-SCNC: 3.8 MMOL/L (ref 3.5–5.3)
PROT SERPL-MCNC: 6.6 G/DL (ref 6.4–8.2)
PSA SERPL-MCNC: 3.1 NG/ML (ref 0–4)
RBC # BLD AUTO: 4.76 MILLION/UL (ref 3.88–5.62)
SODIUM SERPL-SCNC: 140 MMOL/L (ref 136–145)
TRIGL SERPL-MCNC: 96 MG/DL
WBC # BLD AUTO: 6.26 THOUSAND/UL (ref 4.31–10.16)

## 2018-08-23 PROCEDURE — 80061 LIPID PANEL: CPT

## 2018-08-23 PROCEDURE — 83721 ASSAY OF BLOOD LIPOPROTEIN: CPT

## 2018-08-23 PROCEDURE — 36415 COLL VENOUS BLD VENIPUNCTURE: CPT

## 2018-08-23 PROCEDURE — 80053 COMPREHEN METABOLIC PANEL: CPT

## 2018-08-23 PROCEDURE — G0103 PSA SCREENING: HCPCS

## 2018-08-23 PROCEDURE — 85025 COMPLETE CBC W/AUTO DIFF WBC: CPT

## 2018-08-28 ENCOUNTER — OFFICE VISIT (OUTPATIENT)
Dept: INTERNAL MEDICINE CLINIC | Facility: CLINIC | Age: 73
End: 2018-08-28
Payer: COMMERCIAL

## 2018-08-28 VITALS
SYSTOLIC BLOOD PRESSURE: 120 MMHG | BODY MASS INDEX: 24.79 KG/M2 | DIASTOLIC BLOOD PRESSURE: 70 MMHG | WEIGHT: 173.2 LBS | HEIGHT: 70 IN | RESPIRATION RATE: 14 BRPM | HEART RATE: 78 BPM

## 2018-08-28 DIAGNOSIS — S82.54XD CLOSED NONDISPLACED FRACTURE OF MEDIAL MALLEOLUS OF RIGHT TIBIA WITH ROUTINE HEALING, SUBSEQUENT ENCOUNTER: Chronic | ICD-10-CM

## 2018-08-28 DIAGNOSIS — N40.0 BENIGN PROSTATIC HYPERPLASIA WITHOUT LOWER URINARY TRACT SYMPTOMS: Primary | ICD-10-CM

## 2018-08-28 DIAGNOSIS — E78.01 FAMILIAL HYPERCHOLESTEROLEMIA: ICD-10-CM

## 2018-08-28 PROBLEM — S82.51XA CLOSED FRACTURE OF MEDIAL MALLEOLUS OF RIGHT TIBIA: Chronic | Status: ACTIVE | Noted: 2018-08-28

## 2018-08-28 PROBLEM — S82.51XA CLOSED FRACTURE OF MEDIAL MALLEOLUS OF RIGHT TIBIA: Status: ACTIVE | Noted: 2018-08-28

## 2018-08-28 PROCEDURE — 99214 OFFICE O/P EST MOD 30 MIN: CPT | Performed by: INTERNAL MEDICINE

## 2018-08-28 PROCEDURE — 3008F BODY MASS INDEX DOCD: CPT | Performed by: INTERNAL MEDICINE

## 2018-08-28 RX ORDER — FAMOTIDINE 20 MG/1
20 TABLET, FILM COATED ORAL AS NEEDED
COMMUNITY
End: 2019-08-07

## 2018-08-28 NOTE — PROGRESS NOTES
Assessment/Plan:   1  Health maintenance-given prescription for Shingrix vaccine  2  Status post fall with fracture of right medial malleolus-underwent successful surgery    #3 BPH with lower urinary tract symptoms-he will avoid excess fluids after dinner  He defers on medical therapy at present and will call he wants to proceed with treatment with an alpha-blocker -as noted exam today unchanged-monitor    #4 skin lesion the right leg-probable seborrheic keratosis-rule out squamous cell-referral made to dermatology for removal     #5 esophageal reflux-endoscopy December 2016 shows grade 1 esophagitis and hiatal hernia  H  pylori at [facility] we are tracking down the results of this -Endoscopy a decade ago showed one niacin antral erosions felt related to NSAID use  At this point he we will be using Pepcid 20 mg over-the-counter twice a day when necessary and follow appropriate diet -has infrequent use of Pepcid at this point    #6 general care-had colonoscopy done in [facility] - [facility] asked me about follow-up due in 10 years as a study showed no polyp  He has no family history of cancer of the colon or personal history of polyps  He did have diverticular disease which is asymptomatic     #7 renal cyst-had an MRI showing an abnormal area the kidney  Ultrasound on the [facility] in December read as benign cyst with additional studies not needed     #8 status post rotator cuff surgery of the right shoulder     #9 prior symptom of feeling poorly-felt to be from postop state with some depressive symptoms plus related to narcotics-all resolved  All labs stable at time     #10 left lower quadrant pain-prior CAT scan showed diverticular disease but otherwise benign  Colonoscopy showed diverticular disease  Local colorectal physician wanted to consider sigmoid resection   He went to [place] and had a repeat scan showing a hernia and had successful surgery with pain resolved     #11 LPR-asymptomatic at present All other problems as per note of August 2014          MEDICAL REGIMEN: Pepcid 20 mg twice a day when necessary          Appointment in 12 months with prior labs  No problem-specific Assessment & Plan notes found for this encounter  Diagnoses and all orders for this visit:    Benign prostatic hyperplasia without lower urinary tract symptoms  -     PSA Total, Diagnostic; Future    Familial hypercholesterolemia  -     CBC and differential; Future  -     Comprehensive metabolic panel; Future  -     Cholesterol, total; Future  -     Triglycerides; Future  -     HDL cholesterol; Future  -     LDL cholesterol, direct; Future    Closed nondisplaced fracture of medial malleolus of right tibia with routine healing, subsequent encounter    Other orders  -     famotidine (PEPCID) 20 mg tablet; Take 20 mg by mouth as needed for heartburn          Subjective:      Patient ID: Alma Damon is a 72 y o  male  Fall off a ladder and sustained fracture of the right medial malleolus  He underwent surgery successfully and is recovering from that  He is completing physical therapy  He still has some pain but is overall improved  His summer was obviously limited by the injury and is required treatments  Previous noted elbow pain is not been a major issue  Labs done prior to this visit show PSA 3 1,  HDL 52, triglycerides 96, total cholesterol 163  Chemistry profile normal with BUN 17 and creatinine 0 97  CBC is normal     We reviewed the new herpes zoster vaccine  He was given a prescription this  He understands this 2 part vaccine  He understands that this has greater efficacy than prior vaccine  He understands that there is a higher incidence of side effects    He has known BP H  Rectal exam today shows mildly enlarged prostate without nodules  He has nocturia times 1-2  He does not have any major obstructive symptoms    He has a history of esophageal reflux  Uses intermittent H2 blocker  He says he only has this about twice per month  He denies frequent pyrosis  He denies difficulty swallowing  This patient denies any systemic symptoms  Specifically there has been no evidence of fever, night sweats, significant weight loss or significant decrease in appetite  Health maintenance measures reviewed in reference to prior colonoscopy  New previously noted left lower quadrant abdominal pain is not been an issue  He was also felt to have some symptoms of laryngeal pharyngeal reflux in the past   He denies frequent pyrosis  He denies hoarseness  Denies frequent clearing of his throat      This patient wanted to know their preferred analgesic agent  Because of their various comorbidities I recommended that this be acetaminophen  This patient has no history of chronic liver disease that would put them at greater risk for use of acetaminophen  This patient may use up to 500-650 mg of acetaminophen at a time and no more than 3 g a day total  Nonsteroidal anti-inflammatory agents have the potential to exacerbate hypertension, hypercoagulability, chronic renal failure, congestive heart failure, and various allergic tendencies  Because of his upper tract symptoms we wanted him to use acetaminophen rather than nonsteroidals-if he does need nonsteroidals with his orthopedic injury he should take simultaneous H2 blocker the day T is using the        The following portions of the patient's history were reviewed and updated as appropriate: current medications, past family history, past medical history, past social history, past surgical history and problem list     Review of Systems   Constitutional: Negative  Respiratory: Negative  Cardiovascular: Negative  Gastrointestinal: Negative  Endocrine: Negative  Genitourinary: Negative  Musculoskeletal: Negative  Ankle pain   Neurological: Negative  Hematological: Negative  Psychiatric/Behavioral: Negative  Objective:      /70   Pulse 78   Resp 14   Ht 5' 10" (1 778 m)   Wt 78 6 kg (173 lb 3 2 oz)   BMI 24 85 kg/m²          Physical Exam   Constitutional: He is oriented to person, place, and time  He appears well-developed and well-nourished  No distress  HENT:   Head: Normocephalic and atraumatic  Right Ear: External ear normal    Left Ear: External ear normal    Nose: Nose normal    Mouth/Throat: Oropharynx is clear and moist  No oropharyngeal exudate  Eyes: Conjunctivae and EOM are normal  Pupils are equal, round, and reactive to light  Right eye exhibits no discharge  Left eye exhibits no discharge  No scleral icterus  Neck: No JVD present  No tracheal deviation present  No thyromegaly present  Cardiovascular: Normal rate, regular rhythm, normal heart sounds and intact distal pulses  Exam reveals no gallop and no friction rub  No murmur heard  Pulmonary/Chest: Effort normal and breath sounds normal  No stridor  No respiratory distress  He has no wheezes  He exhibits no tenderness  Abdominal: Soft  Bowel sounds are normal  He exhibits no distension and no mass  There is no tenderness  There is no rebound and no guarding  Genitourinary: Rectum normal and penis normal  Rectal exam shows guaiac negative stool  Genitourinary Comments: Minimal larger of the prostate   Musculoskeletal: Normal range of motion  He exhibits no edema, tenderness or deformity  Evidence of recent right ankle pain   Lymphadenopathy:     He has no cervical adenopathy  Neurological: He is alert and oriented to person, place, and time  He has normal reflexes  No cranial nerve deficit  He exhibits normal muscle tone  Coordination normal    Skin: Skin is warm and dry  No rash noted  He is not diaphoretic  No erythema  No pallor  Psychiatric: He has a normal mood and affect  His behavior is normal  Judgment and thought content normal    Vitals reviewed

## 2018-11-01 ENCOUNTER — TELEPHONE (OUTPATIENT)
Dept: INTERNAL MEDICINE CLINIC | Facility: CLINIC | Age: 73
End: 2018-11-01

## 2018-11-01 ENCOUNTER — OFFICE VISIT (OUTPATIENT)
Dept: INTERNAL MEDICINE CLINIC | Facility: CLINIC | Age: 73
End: 2018-11-01
Payer: COMMERCIAL

## 2018-11-01 DIAGNOSIS — L73.9 ACUTE FOLLICULITIS: Primary | ICD-10-CM

## 2018-11-01 PROCEDURE — 99213 OFFICE O/P EST LOW 20 MIN: CPT | Performed by: INTERNAL MEDICINE

## 2018-11-01 RX ORDER — DOXYCYCLINE HYCLATE 100 MG/1
100 CAPSULE ORAL EVERY 12 HOURS SCHEDULED
Qty: 28 CAPSULE | Refills: 0 | Status: SHIPPED | OUTPATIENT
Start: 2018-11-01 | End: 2018-11-15

## 2018-11-01 NOTE — PROGRESS NOTES
Assessment/Plan:    #Acute Folliculitis  -rash noted on left cheek  -reports presence for 4 days  -has been applying OTC benzoyl perxodie with mild relief as suggested by his dermatologist  -tenderness noted to palpation  -will start on doxycyline 100mg BID for 14 days  -informed patient regarding photosensitivity to dermis while on antibiotic  -patient to call back within 2 weeks if symptoms worsen or do not improve    For Comprehensive medical record please refer to progress note from 08/20/2018    No problem-specific Assessment & Plan notes found for this encounter  There are no diagnoses linked to this encounter  Current Outpatient Prescriptions:     diclofenac sodium (VOLTAREN) 1 %, Apply 2 g topically 2 (two) times a day as needed (knee pain), Disp: 1 Tube, Rfl: 0    famotidine (PEPCID) 20 mg tablet, Take 20 mg by mouth as needed for heartburn, Disp: , Rfl:     Subjective:      Patient ID: Marivel Argueta is a 72 y o  male  HPI     Patient presents today as an acute visit  Reports that for the past 4 days he has been experiencing left cheek pain and cyst formation over the site  States that he has been keeping the area clean and has been using over-the-counter benzoyl peroxide as suggested by his dermatologist   He called the dermatologist however they were unable to see him and he was advised to come to here for evaluation  He states that he has a history of cyst on his fist however they typically will be around his chin area  He states that in the past he has used clindamycin her oxide topical with significant relief  At this time he reports soreness to the touch however denies any drainage or leakage of the lesion  He also denies any itching  We will start him on doxycycline 100 mg b i d  For 14 days informed him that he should be avoiding the sun and to keep covered up if he is outside    He is aware of this and states that he will call back within 2 weeks this is lesions starts change in color or if there is excessive drainage or if pain increases  We will continue to keep the area clean and will avoid shaving until the folliculitis resolves  The following portions of the patient's history were reviewed and updated as appropriate: allergies, current medications, past family history, past medical history, past social history, past surgical history and problem list     Review of Systems   Constitutional: Negative for appetite change, fatigue and fever  HENT: Negative for congestion, ear pain, hearing loss and tinnitus  Eyes: Negative for photophobia, pain and visual disturbance  Respiratory: Negative for shortness of breath and wheezing  Cardiovascular: Negative for chest pain and leg swelling  Gastrointestinal: Negative for abdominal distention, abdominal pain, nausea and vomiting  Musculoskeletal: Negative for arthralgias, back pain and joint swelling  Skin: Positive for rash (left cheek cyst)  Negative for color change, pallor and wound  Neurological: Negative for dizziness and headaches  Objective: There were no vitals taken for this visit  Physical Exam   Constitutional: He is oriented to person, place, and time  He appears well-developed and well-nourished  HENT:   Head: Normocephalic and atraumatic  Right Ear: External ear normal    Left Ear: External ear normal    Mouth/Throat: Oropharynx is clear and moist    Eyes: Conjunctivae are normal    Neck: Normal range of motion  Neck supple  No JVD present  No thyromegaly present  Cardiovascular: Normal rate, regular rhythm and normal heart sounds  No murmur heard  Pulmonary/Chest: Effort normal and breath sounds normal  No respiratory distress  He has no wheezes  Abdominal: Soft  Bowel sounds are normal  He exhibits no distension  There is no tenderness  There is no rebound and no guarding  Musculoskeletal: Normal range of motion  He exhibits no edema, tenderness or deformity  Lymphadenopathy:     He has no cervical adenopathy  Neurological: He is alert and oriented to person, place, and time  He has normal reflexes  Skin: Skin is warm and dry  No rash (left cheek folliculitis) noted  No erythema  Vitals reviewed

## 2018-11-01 NOTE — TELEPHONE ENCOUNTER
Boil on the left side jaw line near the ear   Started 4 days ago with swelling   Feels like a hard nodule   Dermatologist cannot see him today but he would like to be seen  Pt tele: [phone]

## 2018-12-28 ENCOUNTER — OFFICE VISIT (OUTPATIENT)
Dept: INTERNAL MEDICINE CLINIC | Facility: CLINIC | Age: 73
End: 2018-12-28
Payer: COMMERCIAL

## 2018-12-28 VITALS
BODY MASS INDEX: 25.71 KG/M2 | RESPIRATION RATE: 14 BRPM | DIASTOLIC BLOOD PRESSURE: 78 MMHG | SYSTOLIC BLOOD PRESSURE: 128 MMHG | HEART RATE: 72 BPM | HEIGHT: 70 IN | WEIGHT: 179.6 LBS

## 2018-12-28 DIAGNOSIS — Z01.818 PREOPERATIVE EXAMINATION: ICD-10-CM

## 2018-12-28 DIAGNOSIS — S82.54XD CLOSED NONDISPLACED FRACTURE OF MEDIAL MALLEOLUS OF RIGHT TIBIA WITH ROUTINE HEALING, SUBSEQUENT ENCOUNTER: Chronic | ICD-10-CM

## 2018-12-28 DIAGNOSIS — K21.9 GASTROESOPHAGEAL REFLUX DISEASE, ESOPHAGITIS PRESENCE NOT SPECIFIED: Primary | Chronic | ICD-10-CM

## 2018-12-28 PROCEDURE — 1160F RVW MEDS BY RX/DR IN RCRD: CPT | Performed by: INTERNAL MEDICINE

## 2018-12-28 PROCEDURE — 1036F TOBACCO NON-USER: CPT | Performed by: INTERNAL MEDICINE

## 2018-12-28 PROCEDURE — 99214 OFFICE O/P EST MOD 30 MIN: CPT | Performed by: INTERNAL MEDICINE

## 2018-12-28 NOTE — PROGRESS NOTES
Assessment/Plan:  1  General care-medically clear for surgery pending labs  He knows to hold nonsteroidals for up to week before surgery  2  Prior fall with fracture right medial malleolus  Had successful surgery but now has issues with hardware her scheduled for hardware removal, pre surgery CT scan as well as potential intraoperative can review  3   Esophageal reflux-endoscopy in December 2016 shows grade 1 esophagitis and hilar hernia  Endoscopy a decade ago showed some antral erosions felt related to NS AI use  At this point getting by with Pepcid 20 milligrams b i d  P r n  [facility] -we are attempting to obtain records from when he had his endoscopy regarding H pylori status and biopsy report   4  Health maintenance-given prescription for Shingrix vaccine    #5 BPH with lower urinary tract symptoms-he will avoid excess fluids after dinner  He defers on medical therapy at present and will call he wants to proceed with treatment with an alpha-blocker -watch for exacerbation postoperatively    #6 general care-had colonoscopy done in [facility] [place] asked me about follow-up due in 10 years as a study showed no polyp  He has no family history of cancer of the colon or personal history of polyps  He did have diverticular disease which is asymptomatic     #7 renal cyst-had an MRI showing an abnormal area the kidney  Ultrasound on the [facility] in December read as benign cyst with additional studies not needed     #8 status post rotator cuff surgery of the right shoulder     #9 prior symptom of feeling poorly-felt to be from postop state with some depressive symptoms plus related to narcotics-all resolved  All labs stable at time     #10 left lower quadrant pain-prior CAT scan showed diverticular disease but otherwise benign  Colonoscopy showed diverticular disease  Local colorectal physician wanted to consider sigmoid resection   He went to [place] and had a repeat scan showing a hernia and had successful surgery with pain resolved     #11 LPR-asymptomatic at present       All other problems as per note of August 2014        MEDICAL REGIMEN: Pepcid 20 mg twice a day when necessary        This patient will be seen at previously scheduled appointment with previously scheduled labs  Addendum-obtained biopsy report from when he had his endoscopy he was negative for H pylori and stomach read as gastric antral mucosa with mild chronic inflammation  Esophageal biopsy was negative-also read as cardiac mucosa with mild chronic inflammation negative for intestinal metaplasia or dysplasia-she therefore does not require additional endoscopies at this point    Addendum- seen by covering physician in August 2019 with rash felt to be from plan poison -was treated with oral corticosteroids           No problem-specific Assessment & Plan notes found for this encounter  Diagnoses and all orders for this visit:    Gastroesophageal reflux disease, esophagitis presence not specified    Closed nondisplaced fracture of medial malleolus of right tibia with routine healing, subsequent encounter    Preoperative examination          Subjective:      Patient ID: Main Pinto is a 72 y o  male  He is requiring additional surgery on his right ankle  He has prominent protrusion of 1 screw is scheduled for removal of hardware  He has a preoperative CT scan scheduled was told he may need additional surgery based on that  There is discussion of intraoperative can review  He is having a large amount of pain  He notes numbness of the medial foot and was told by orthopedic physician and maybe nerve compression  He denies pain starting the back and radiating down the leg  He denies chest pain or pressure with activity  He denies calf pain with walking  He is able to exercise without difficulty other than the discomfort associated with his right ankle  This patient denies any systemic symptoms   Specifically there has been no evidence of fever, night sweats, significant weight loss or significant decrease in appetite  He did not have any issues with anesthesia with his prior surgery  He did not have any difficulty with intubation and denied any urinary retention postoperatively  He feels however it takes him longer to recover from surgery with his orthopedic issues  He has yet to have his preadmission testing    He denies any episodes of weakness of 1 arm and leg compared to the other  Denies any episodes of numbness of 1 arm and leg compared to the other  He has known esophageal reflux and he uses Pepcid 20 milligrams b i d  p r n   Labs done earlier this year had shown a BUN of 17 with a creatinine of 0 97 with a hemoglobin of 14 6  He knows to discontinue any nonsteroidals a week before surgery        The following portions of the patient's history were reviewed and updated as appropriate: allergies, current medications, past family history, past medical history, past social history, past surgical history and problem list     Review of Systems   Constitutional: Negative  Respiratory: Negative  Cardiovascular: Negative  Gastrointestinal: Negative  Endocrine: Negative  Genitourinary: Negative  Musculoskeletal: Negative  Right ankle pain   Neurological: Negative  Hematological: Negative  Psychiatric/Behavioral: Negative  Objective:      Ht 5' 10" (1 778 m)   Wt 81 5 kg (179 lb 9 6 oz)   BMI 25 77 kg/m²          Physical Exam   Constitutional: He is oriented to person, place, and time  He appears well-developed and well-nourished  No distress  HENT:   Head: Normocephalic and atraumatic  Right Ear: External ear normal    Left Ear: External ear normal    Nose: Nose normal    Mouth/Throat: Oropharynx is clear and moist  No oropharyngeal exudate  Eyes: Pupils are equal, round, and reactive to light  Conjunctivae and EOM are normal  Right eye exhibits no discharge   Left eye exhibits no discharge  No scleral icterus  Neck: No JVD present  No tracheal deviation present  No thyromegaly present  Cardiovascular: Normal rate, regular rhythm, normal heart sounds and intact distal pulses  Exam reveals no gallop and no friction rub  No murmur heard  Pulmonary/Chest: Effort normal and breath sounds normal  No stridor  No respiratory distress  He has no wheezes  He exhibits no tenderness  Abdominal: Bowel sounds are normal  He exhibits no distension and no mass  There is no tenderness  There is no rebound and no guarding  Genitourinary: Rectum normal, prostate normal and penis normal  Rectal exam shows guaiac negative stool  Musculoskeletal: Normal range of motion  He exhibits no edema, tenderness or deformity  Tenderness of the right ankle with protruding screw   Lymphadenopathy:     He has no cervical adenopathy  Neurological: He is alert and oriented to person, place, and time  He has normal reflexes  No cranial nerve deficit  He exhibits normal muscle tone  Coordination normal    Skin: Skin is warm and dry  No rash noted  He is not diaphoretic  No erythema  No pallor  Psychiatric: He has a normal mood and affect   His behavior is normal  Judgment and thought content normal

## 2018-12-31 ENCOUNTER — TELEPHONE (OUTPATIENT)
Dept: INTERNAL MEDICINE CLINIC | Facility: CLINIC | Age: 73
End: 2018-12-31

## 2018-12-31 NOTE — TELEPHONE ENCOUNTER
He only needs labs  If these are being ordered by surgery-from my viewpoint the not need additional labs-she should call the surgical office and has a if they want to go for any labs for his surgery-if they do please make sure I received a copy of the

## 2018-12-31 NOTE — TELEPHONE ENCOUNTER
Pt's surgery is on Wednesday and he had his pre-op appt with you and your note said he was cleared pending labs  Pt was calling to ask if there was bloodwork he needed to get done that was a requirement for surgery? I saw labs marked active for future from back in august but I wasn't sure if that's what you wanted  Please advise

## 2019-08-14 ENCOUNTER — OFFICE VISIT (OUTPATIENT)
Dept: INTERNAL MEDICINE CLINIC | Facility: CLINIC | Age: 74
End: 2019-08-14
Payer: MEDICARE

## 2019-08-14 VITALS
OXYGEN SATURATION: 97 % | DIASTOLIC BLOOD PRESSURE: 78 MMHG | HEIGHT: 70 IN | SYSTOLIC BLOOD PRESSURE: 126 MMHG | HEART RATE: 65 BPM | WEIGHT: 173.4 LBS | BODY MASS INDEX: 24.82 KG/M2 | TEMPERATURE: 97.9 F

## 2019-08-14 DIAGNOSIS — L23.9 ALLERGIC DERMATITIS: Primary | ICD-10-CM

## 2019-08-14 DIAGNOSIS — L90.5 PAINFUL CUTANEOUS SCAR: ICD-10-CM

## 2019-08-14 DIAGNOSIS — R52 PAINFUL CUTANEOUS SCAR: ICD-10-CM

## 2019-08-14 PROCEDURE — 99214 OFFICE O/P EST MOD 30 MIN: CPT | Performed by: INTERNAL MEDICINE

## 2019-08-14 RX ORDER — PREDNISONE 10 MG/1
TABLET ORAL
Qty: 19 TABLET | Refills: 0 | Status: SHIPPED | OUTPATIENT
Start: 2019-08-14 | End: 2020-12-10

## 2019-08-14 NOTE — PROGRESS NOTES
Assessment/Plan:     Diagnoses and all orders for this visit:    Allergic dermatitis  Comments:  suspect related to poison ivy or similar plant exposure  prednisone taper ordered and d/w pt SE of med(reflux, sun sensitivity, increased appetite)  Orders:  -     predniSONE 10 mg tablet; Take 4 tablets daily for 2 days, then 3 tablets daily for 2 days, then 2 tablets daily for 2 days, then 1 tablet for 1 day, then stop    Painful cutaneous scar  Comments:  no personal or fhx skin cancer  Pt requests skin lesion be removed 2nd to pain and tingling/numbness sx  they will contact their dermatologist for this          Subjective:      Patient ID: Fadumo Alvarez is a 69 y o  male  HPI    Here for follow up and c/o itchy rash on legs and left arm for last few days  Was doing yard work over weekend, pulling weeds, etc  Arabella Yost Noticed itching and rash subsequently after this  No hx of eczema or psoriasis and no rash elsewhere  No fever & no pain with rash & otherwise feeling well  Hx of dermatitis from poison ivy in past   No new meds or creams, detergents/soaps  Had right ankle surgery last year with subsequent removal of hardware and developed a painful scar like lesion on medial malleolus  It hasn't healed but not getting bigger  It does occ feel tingling and discomfort and he would like to have this removed  He sees Dr Gordon Artist) in past and will call to make an appt  No other complaints  Past Medical History:   Diagnosis Date    Atrophy of prostate     last assessed: 08/26/2014    Basal cell carcinoma of shoulder     last assessed: 08/28/2014    Depression     last assessed: 08/26/2015    Subconjunctival hemorrhage of left eye     last assessed: 01/14/2016     Vitals:    08/14/19 1209   BP: 126/78   Pulse: 65   Temp: 97 9 °F (36 6 °C)   SpO2: 97%   Weight: 78 7 kg (173 lb 6 4 oz)   Height: 5' 10" (1 778 m)     Body mass index is 24 88 kg/m²      Current Outpatient Medications:     famotidine (PEPCID) 40 MG tablet, Take 1 tablet (40 mg total) by mouth 2 (two) times a day, Disp: 60 tablet, Rfl: 2    diclofenac sodium (VOLTAREN) 1 %, Apply 2 g topically 2 (two) times a day as needed (knee pain) (Patient not taking: Reported on 8/7/2019), Disp: 1 Tube, Rfl: 0    predniSONE 10 mg tablet, Take 4 tablets daily for 2 days, then 3 tablets daily for 2 days, then 2 tablets daily for 2 days, then 1 tablet for 1 day, then stop, Disp: 19 tablet, Rfl: 0  No Known Allergies      Review of Systems   Constitutional: Negative for fever  HENT: Negative for congestion  Eyes: Negative for visual disturbance  Respiratory: Negative for shortness of breath  Cardiovascular: Negative for chest pain  Gastrointestinal: Negative for abdominal pain  Endocrine: Negative for polyuria  Genitourinary: Negative for dysuria  Musculoskeletal: Negative for arthralgias  Skin: Positive for rash  Allergic/Immunologic: Negative for immunocompromised state  Neurological: Negative for headaches  Psychiatric/Behavioral: Negative for dysphoric mood  Objective:      /78   Pulse 65   Temp 97 9 °F (36 6 °C)   Ht 5' 10" (1 778 m)   Wt 78 7 kg (173 lb 6 4 oz)   SpO2 97%   BMI 24 88 kg/m²          Physical Exam   Constitutional: He appears well-developed and well-nourished  HENT:   Head: Normocephalic and atraumatic  Eyes: Pupils are equal, round, and reactive to light  Cardiovascular: Normal rate and regular rhythm  No murmur heard  Pulmonary/Chest: Effort normal and breath sounds normal  He has no wheezes  He has no rales  Abdominal: Soft  Bowel sounds are normal  There is no tenderness  Musculoskeletal: He exhibits no edema  Neurological: He is alert  Skin: Rash (on legs b/l near both knees and left forearm with erythema and several small blisters all non-tender) noted     Right medial malleolus with 3-4mm ovoid scar with local tenderness(located over tip of malleolus) without erythema/warmth(has had for several months unchanged)   Psychiatric: He has a normal mood and affect  His behavior is normal    Vitals reviewed

## 2019-08-14 NOTE — PATIENT INSTRUCTIONS
1  Prednisone taper -> take with food  2  Emollients  3  Dermatology -> [phone]  4  Return as scheduled    Poison Ivy   WHAT YOU NEED TO KNOW:   Poison ivy is a plant that can cause an itchy, uncomfortable rash on your skin  Poison ivy grows as a shrub or vine in woods, fields, and areas of thick [place]  It has 3 bright green leaves on each stem that turn red in stephanie  DISCHARGE INSTRUCTIONS:   Medicines:   · Antiseptic or drying creams or ointments: These medicines may be used to dry out the rash and decrease the itching  These products may be available without a doctor's order  · Steroids: This medicine helps decrease itching and inflammation  It can be given as a cream to apply to your skin or as a pill  · Antihistamines: This medicine may help decrease itching and help you sleep  It is available without a doctor's order  · Take your medicine as directed  Contact your healthcare provider if you think your medicine is not helping or if you have side effects  Tell him of her if you are allergic to any medicine  Keep a list of the medicines, vitamins, and herbs you take  Include the amounts, and when and why you take them  Bring the list or the pill bottles to follow-up visits  Carry your medicine list with you in case of an emergency  Follow up with your healthcare provider as directed:  Write down your questions so you remember to ask them during your visits  How your poison ivy rash spreads: You cannot spread poison ivy by touching your rash or the liquid from your blisters  Poison ivy is spread only if you scratch your skin while it still has oil on it  You may think your rash is spreading because new rashes appear over a number of days  This happens because areas covered by thin skin break out in a rash first  Your face or forearms may develop a rash before thicker areas, such as the palms of your hands  Self-care:   · Keep your rash clean and dry:  Wash it with soap and water  Gently pat it dry with a clean towel  · Try not to scratch or rub your rash: This can cause your skin to become infected  · Use a compress on your rash:  Dip a clean washcloth in cool water  Wring it out and place it on your rash  Leave the washcloth on your skin for 15 minutes  Do this at least 3 times per day  · Take a cornstarch or oatmeal bath: If your rash is too large to cover with wet washcloths, take 3 or 4 cornstarch baths daily  Mix 1 pound of cornstarch with a little water to make a paste  Add the paste to a tub full of water and mix well  You may also use colloidal oatmeal in the bath water  Use lukewarm water  Avoid hot water because it may cause your itching to increase  Prevent a poison ivy rash in the future:   · Wear skin protection:  Wear long pants, a long-sleeved shirt, and gloves  Use a skin block lotion to protect your skin from poison ivy oil  You can find this at a drugstore without a prescription  · Wash clothing after possible exposure: If you think you have been near a poison ivy plant, wash the clothes you were wearing separately from other clothes  Rinse the washing machine well after you take the clothes out  Scrub boots and shoes with warm, soapy water  Dry clean items and clothing that you cannot wash in water  Poison ivy oil is sticky and can stay on surfaces for a long time  It can cause a new rash even years later  · Bathe your pet:  Use warm water and shampoo on your pet's fur  This will prevent the spread of oil to your skin, car, and home  Wear long sleeves, long pants, and gloves while washing pets or any items that may have oil on them  · Reduce exposure to poison ivy:  Do not touch plants that look like poison ivy  Keep your yard free of poison ivy  While protecting your skin, remove the plant and the roots  Place them in a plastic bag and seal the bag tightly  · Do not burn poison ivy plants: This can spread the oil through the air   If you breathe the oil into your lungs, you could have swelling and serious breathing problems  Oil that clings to the fire gin can land on your skin and cause a rash  Contact your healthcare provider if:   · You have pus, soft yellow scabs, or tenderness on the rash  · The itching gets worse or keeps you awake at night  · The rash covers more than 1/4 of your skin or spreads to your eyes, mouth, or genital area  · The rash is not better after 2 to 3 weeks  · You have tender, swollen glands on the sides of your neck  · You have swelling in your arms and legs  · You have questions or concerns about your condition or care  Return to the emergency department if:   · You have a fever  · You have redness, swelling, and tenderness around the rash  · You have trouble breathing  © 2017 [employer]  Information is for End User's use only and may not be sold, redistributed or otherwise used for commercial purposes  All illustrations and images included in CareNotes® are the copyrighted property of [employer]  or Kemar Dudley  The above information is an  only  It is not intended as medical advice for individual conditions or treatments  Talk to your doctor, nurse or pharmacist before following any medical regimen to see if it is safe and effective for you

## 2019-08-22 ENCOUNTER — TRANSCRIBE ORDERS (OUTPATIENT)
Dept: ADMINISTRATIVE | Age: 74
End: 2019-08-22

## 2019-08-22 ENCOUNTER — APPOINTMENT (OUTPATIENT)
Dept: LAB | Age: 74
End: 2019-08-22
Payer: MEDICARE

## 2019-08-22 DIAGNOSIS — E78.01 FAMILIAL HYPERCHOLESTEROLEMIA: ICD-10-CM

## 2019-08-22 DIAGNOSIS — N40.0 BENIGN PROSTATIC HYPERPLASIA WITHOUT LOWER URINARY TRACT SYMPTOMS: ICD-10-CM

## 2019-08-22 LAB
ALBUMIN SERPL BCP-MCNC: 3.5 G/DL (ref 3.5–5)
ALP SERPL-CCNC: 69 U/L (ref 46–116)
ALT SERPL W P-5'-P-CCNC: 18 U/L (ref 12–78)
ANION GAP SERPL CALCULATED.3IONS-SCNC: 5 MMOL/L (ref 4–13)
AST SERPL W P-5'-P-CCNC: 9 U/L (ref 5–45)
BASOPHILS # BLD AUTO: 0.13 THOUSANDS/ΜL (ref 0–0.1)
BASOPHILS NFR BLD AUTO: 1 % (ref 0–1)
BILIRUB SERPL-MCNC: 0.75 MG/DL (ref 0.2–1)
BUN SERPL-MCNC: 19 MG/DL (ref 5–25)
CALCIUM SERPL-MCNC: 8.9 MG/DL (ref 8.3–10.1)
CHLORIDE SERPL-SCNC: 106 MMOL/L (ref 100–108)
CHOLEST SERPL-MCNC: 172 MG/DL (ref 50–200)
CO2 SERPL-SCNC: 29 MMOL/L (ref 21–32)
CREAT SERPL-MCNC: 0.97 MG/DL (ref 0.6–1.3)
EOSINOPHIL # BLD AUTO: 0.38 THOUSAND/ΜL (ref 0–0.61)
EOSINOPHIL NFR BLD AUTO: 4 % (ref 0–6)
ERYTHROCYTE [DISTWIDTH] IN BLOOD BY AUTOMATED COUNT: 13.8 % (ref 11.6–15.1)
GFR SERPL CREATININE-BSD FRML MDRD: 78 ML/MIN/1.73SQ M
GLUCOSE P FAST SERPL-MCNC: 92 MG/DL (ref 65–99)
HCT VFR BLD AUTO: 48.9 % (ref 36.5–49.3)
HDLC SERPL-MCNC: 60 MG/DL (ref 40–60)
HGB BLD-MCNC: 16.3 G/DL (ref 12–17)
IMM GRANULOCYTES # BLD AUTO: 0.12 THOUSAND/UL (ref 0–0.2)
IMM GRANULOCYTES NFR BLD AUTO: 1 % (ref 0–2)
LDLC SERPL DIRECT ASSAY-MCNC: 102 MG/DL (ref 0–100)
LYMPHOCYTES # BLD AUTO: 2.57 THOUSANDS/ΜL (ref 0.6–4.47)
LYMPHOCYTES NFR BLD AUTO: 27 % (ref 14–44)
MCH RBC QN AUTO: 31.7 PG (ref 26.8–34.3)
MCHC RBC AUTO-ENTMCNC: 33.3 G/DL (ref 31.4–37.4)
MCV RBC AUTO: 95 FL (ref 82–98)
MONOCYTES # BLD AUTO: 0.88 THOUSAND/ΜL (ref 0.17–1.22)
MONOCYTES NFR BLD AUTO: 9 % (ref 4–12)
NEUTROPHILS # BLD AUTO: 5.38 THOUSANDS/ΜL (ref 1.85–7.62)
NEUTS SEG NFR BLD AUTO: 58 % (ref 43–75)
NRBC BLD AUTO-RTO: 0 /100 WBCS
PLATELET # BLD AUTO: 220 THOUSANDS/UL (ref 149–390)
PMV BLD AUTO: 9.9 FL (ref 8.9–12.7)
POTASSIUM SERPL-SCNC: 3.8 MMOL/L (ref 3.5–5.3)
PROT SERPL-MCNC: 6.4 G/DL (ref 6.4–8.2)
PSA SERPL-MCNC: 4.7 NG/ML (ref 0–4)
RBC # BLD AUTO: 5.15 MILLION/UL (ref 3.88–5.62)
SODIUM SERPL-SCNC: 140 MMOL/L (ref 136–145)
TRIGL SERPL-MCNC: 122 MG/DL
WBC # BLD AUTO: 9.46 THOUSAND/UL (ref 4.31–10.16)

## 2019-08-22 PROCEDURE — 80053 COMPREHEN METABOLIC PANEL: CPT

## 2019-08-22 PROCEDURE — 80061 LIPID PANEL: CPT

## 2019-08-22 PROCEDURE — 84153 ASSAY OF PSA TOTAL: CPT

## 2019-08-22 PROCEDURE — 85025 COMPLETE CBC W/AUTO DIFF WBC: CPT

## 2019-08-22 PROCEDURE — 36415 COLL VENOUS BLD VENIPUNCTURE: CPT

## 2019-08-22 PROCEDURE — 83721 ASSAY OF BLOOD LIPOPROTEIN: CPT

## 2019-09-04 ENCOUNTER — OFFICE VISIT (OUTPATIENT)
Dept: INTERNAL MEDICINE CLINIC | Facility: CLINIC | Age: 74
End: 2019-09-04
Payer: MEDICARE

## 2019-09-04 VITALS
DIASTOLIC BLOOD PRESSURE: 70 MMHG | WEIGHT: 172.4 LBS | BODY MASS INDEX: 24.68 KG/M2 | HEIGHT: 70 IN | SYSTOLIC BLOOD PRESSURE: 120 MMHG | HEART RATE: 78 BPM | RESPIRATION RATE: 14 BRPM

## 2019-09-04 DIAGNOSIS — R97.20 ELEVATED PSA: ICD-10-CM

## 2019-09-04 DIAGNOSIS — N40.0 BENIGN PROSTATIC HYPERPLASIA WITHOUT LOWER URINARY TRACT SYMPTOMS: Primary | Chronic | ICD-10-CM

## 2019-09-04 PROCEDURE — G0438 PPPS, INITIAL VISIT: HCPCS | Performed by: INTERNAL MEDICINE

## 2019-09-04 PROCEDURE — 99213 OFFICE O/P EST LOW 20 MIN: CPT | Performed by: INTERNAL MEDICINE

## 2019-09-04 NOTE — PROGRESS NOTES
Assessment and Plan:     Problem List Items Addressed This Visit     None         History of Present Illness:     Patient presents for Medicare Annual Wellness visit    Patient Care Team:  Codie Le MD as PCP - General     Problem List:     Patient Active Problem List   Diagnosis    Cervical radiculopathy    Esophageal reflux    Laryngopharyngeal reflux    Thrombosed external hemorrhoids    Benign enlargement of prostate    Closed fracture of medial malleolus of right tibia    Preoperative examination      Past Medical and Surgical History:     Past Medical History:   Diagnosis Date    Atrophy of prostate     last assessed: 08/26/2014    Basal cell carcinoma of shoulder     last assessed: 08/28/2014    Depression     last assessed: 08/26/2015    Subconjunctival hemorrhage of left eye     last assessed: 01/14/2016     Past Surgical History:   Procedure Laterality Date    HERNIA REPAIR        Family History:     Family History   Problem Relation Age of Onset    Heart disease Mother         cardiac disorder    Kidney disease Mother     Prostate cancer Father       Social History:     Social History     Tobacco Use   Smoking Status Former Smoker   Smokeless Tobacco Never Used   Tobacco Comment    years ago per patient, not any more     Social History     Substance and Sexual Activity   Alcohol Use Yes    Comment: Social     Social History     Substance and Sexual Activity   Drug Use No      Medications and Allergies:     Current Outpatient Medications   Medication Sig Dispense Refill    diclofenac sodium (VOLTAREN) 1 % Apply 2 g topically 2 (two) times a day as needed (knee pain) (Patient not taking: Reported on 8/7/2019) 1 Tube 0    famotidine (PEPCID) 40 MG tablet Take 1 tablet (40 mg total) by mouth 2 (two) times a day 60 tablet 2    predniSONE 10 mg tablet Take 4 tablets daily for 2 days, then 3 tablets daily for 2 days, then 2 tablets daily for 2 days, then 1 tablet for 1 day, then stop 19 tablet 0     No current facility-administered medications for this visit  No Known Allergies   Immunizations:     Immunization History   Administered Date(s) Administered    INFLUENZA 10/26/2017, 11/05/2018    Influenza Split High Dose Preservative Free IM 10/26/2017, 11/05/2018    Pneumococcal Conjugate 13-Valent 08/26/2015    Pneumococcal Polysaccharide PPV23 07/01/2012, 08/28/2017    Tdap 09/20/2013, 10/14/2013, 06/28/2016    Tuberculin Skin Test-PPD Intradermal 08/01/2012    Zoster 08/08/2012    Zoster Vaccine Recombinant 02/25/2019, 06/26/2019      Medicare Screening Tests and Risk Assessments:     Teodoro Romo is here for his Initial Wellness visit  Health Risk Assessment:  Patient rates overall health as excellent  Patient feels that their physical health rating is Same  Eyesight was rated as Same  Hearing was rated as Slightly worse  Patient feels that their emotional and mental health rating is Same  Pain experienced by patient in the last 7 days has been Some  Patient's pain rating has been 3/10  Patient states that he has experienced no weight loss or gain in last 6 months  Emotional/Mental Health:  Patient has not been feeling nervous/anxious  PHQ-9 Depression Screening:    Frequency of the following problems over the past two weeks:      1  Little interest or pleasure in doing things: 0 - not at all      2  Feeling down, depressed, or hopeless: 0 - not at all  PHQ-2 Score: 0          Broken Bones/Falls: Fall Risk Assessment:    In the past year, patient has experienced: No history of falling in past year          Bladder/Bowel:  Patient has not leaked urine accidently in the last six months  Patient reports no loss of bowel control  Immunizations:  Patient has had a flu vaccination within the last year  Patient has received a pneumonia shot  Patient has received a shingles shot  Patient has received tetanus/diphtheria shot   Date of tetanus/diphtheria shot: 6/28/2016    Home Safety:  Patient does not have trouble with stairs inside or outside of their home  Patient currently reports that there are no safety hazards present in home, working smoke alarms, working carbon monoxide detectors  Preventative Screenings:   prostate cancer screen performed, colon cancer screen completed, cholesterol screen completed, no glaucoma eye exam completed    Nutrition:  Current diet: Regular and Limited junk food with servings of the following:    Medications:  Patient is currently taking over-the-counter supplements  Patient is not able to manage medications  Lifestyle Choices:  Patient reports no tobacco use  Patient has not smoked or used tobacco in the past   Patient reports no alcohol use  Patient drives a vehicle  Patient wears seat belt  (Additional Comments:   )    Activities of Daily Living:  Can get out of bed by his or her self, able to dress self, able to make own meals, able to do own shopping, able to bathe self, can do own laundry/housekeeping, can manage own money, pay bills and track expenses    Previous Hospitalizations:  Hospitalization or ED visit in past 12 months  Number of hospitalizations within the last year: 1-2  Additional Comments: 6/2/2018 (2 hours)  [facility]     Kelly Agarwal DO   Last attending  Treatment team  Closed fracture of right ankle, initial encounter   Clinical impression  Fall     Chief complaint         Advanced Directives:  Patient has decided on a power of   Patient has spoken to designated power of   Patient has completed advanced directive          Preventative Screening/Counseling:      Cardiovascular:      General: Risks and Benefits Discussed and Screening Current          Diabetes:      General: Risks and Benefits Discussed and Screening Current          Colorectal Cancer:      General: Risks and Benefits Discussed and Screening Current          Prostate Cancer:      General: Risks and Benefits Discussed and Screening Current          Osteoporosis:      General: Risks and Benefits Discussed and Screening Not Indicated          AAA:      General: Risks and Benefits Discussed and Screening Current          Glaucoma:      General: Risks and Benefits Discussed and Screening Current          HIV:      General: Risks and Benefits Discussed and Screening Not Indicated          Hepatitis C:      General: Risks and Benefits Discussed        Advanced Directives:   Patient has living will for healthcare, has durable POA for healthcare, patient has an advanced directive  Information on ACP and/or AD not provided  No 5 wishes given  End of life assessment reviewed with patient  Provider agrees with end of life decisions        Immunizations:      Influenza: Risks & Benefits Discussed and Influenza UTD This Year      Pneumococcal: Risks & Benefits Discussed and Lifetime Vaccine Completed      Shingrix: Risks & Benefits Discussed and Shingrix Vaccine Needed Today      Zostavax: Risks & Benefits Discussed and Zostavax Vaccine UTD      TD: Risks & Benefits Discussed and Td Vaccine UTD      TDAP: Risks & Benefits Discussed and Tdap Vaccine UTD

## 2019-09-04 NOTE — PATIENT INSTRUCTIONS
Obesity   AMBULATORY CARE:   Obesity  is when your body mass index (BMI) is greater than 30  Your healthcare provider will use your height and weight to measure your BMI  The risks of obesity include  many health problems, such as injuries or physical disability  You may need tests to check for the following:  · Diabetes     · High blood pressure or high cholesterol     · Heart disease     · Gallbladder or liver disease     · Cancer of the colon, breast, prostate, liver, or kidney     · Sleep apnea     · Arthritis or gout  Seek care immediately if:   · You have a severe headache, confusion, or difficulty speaking  · You have weakness on one side of your body  · You have chest pain, sweating, or shortness of breath  Contact your healthcare provider if:   · You have symptoms of gallbladder or liver disease, such as pain in your upper abdomen  · You have knee or hip pain and discomfort while walking  · You have symptoms of diabetes, such as intense hunger and thirst, and frequent urination  · You have symptoms of sleep apnea, such as snoring or daytime sleepiness  · You have questions or concerns about your condition or care  Treatment for obesity  focuses on helping you lose weight to improve your health  Even a small decrease in BMI can reduce the risk for many health problems  Your healthcare provider will help you set a weight-loss goal   · Lifestyle changes  are the first step in treating obesity  These include making healthy food choices and getting regular physical activity  Your healthcare provider may suggest a weight-loss program that involves coaching, education, and therapy  · Medicine  may help you lose weight when it is used with a healthy diet and physical activity  · Surgery  can help you lose weight if you are very obese and have other health problems  There are several types of weight-loss surgery  Ask your healthcare provider for more information    Be successful losing weight:   · Set small, realistic goals  An example of a small goal is to walk for 20 minutes 5 days a week  Anther goal is to lose 5% of your body weight  · Tell friends, family members, and coworkers about your goals  and ask for their support  Ask a friend to lose weight with you, or join a weight-loss support group  · Identify foods or triggers that may cause you to overeat , and find ways to avoid them  Remove tempting high-calorie foods from your home and workplace  Place a bowl of fresh fruit on your kitchen counter  If stress causes you to eat, then find other ways to cope with stress  · Keep a diary to track what you eat and drink  Also write down how many minutes of physical activity you do each day  Weigh yourself once a week and record it in your diary  Eating changes: You will need to eat 500 to 1,000 fewer calories each day than you currently eat to lose 1 to 2 pounds a week  The following changes will help you cut calories:  · Eat smaller portions  Use small plates, no larger than 9 inches in diameter  Fill your plate half full of fruits and vegetables  Measure your food using measuring cups until you know what a serving size looks like  · Eat 3 meals and 1 or 2 snacks each day  Plan your meals in advance  [facility] and eat at home most of the time  Eat slowly  · Eat fruits and vegetables at every meal   They are low in calories and high in fiber, which makes you feel full  Do not add butter, margarine, or cream sauce to vegetables  Use herbs to season steamed vegetables  · Eat less fat and fewer fried foods  Eat more baked or grilled chicken and fish  These protein sources are lower in calories and fat than red meat  Limit fast food  Dress your salads with olive oil and vinegar instead of bottled dressing  · Limit the amount of sugar you eat  Do not drink sugary beverages  Limit alcohol  Activity changes:  Physical activity is good for your body in many ways   It helps you burn calories and build strong muscles  It decreases stress and depression, and improves your mood  It can also help you sleep better  Talk to your healthcare provider before you begin an exercise program   · Exercise for at least 30 minutes 5 days a week  Start slowly  Set aside time each day for physical activity that you enjoy and that is convenient for you  It is best to do both weight training and an activity that increases your heart rate, such as walking, bicycling, or swimming  · Find ways to be more active  Do yard work and housecleaning  Walk up the stairs instead of using elevators  Spend your leisure time going to events that require walking, such as outdoor festivals or fairs  This extra physical activity can help you lose weight and keep it off  Follow up with your healthcare provider as directed: You may need to meet with a dietitian  Write down your questions so you remember to ask them during your visits  © 2017 [address] [employer] Information is for End User's use only and may not be sold, redistributed or otherwise used for commercial purposes  All illustrations and images included in CareNotes® are the copyrighted property of [employer]  or Kemar Dudley  The above information is an  only  It is not intended as medical advice for individual conditions or treatments  Talk to your doctor, nurse or pharmacist before following any medical regimen to see if it is safe and effective for you  Urinary Incontinence   WHAT YOU NEED TO KNOW:   What is urinary incontinence? Urinary incontinence (UI) is when you lose control of your bladder  What causes UI? UI occurs because your bladder cannot store or empty urine properly  The following are the most common types of UI:  · Stress incontinence  is when you leak urine due to increased bladder pressure  This may happen when you cough, sneeze, or exercise       · Urge incontinence  is when you feel the need to urinate right away and leak urine accidentally  · Mixed incontinence  is when you have both stress and urge UI  What are the signs and symptoms of UI?   · You feel like your bladder does not empty completely when you urinate  · You urinate often and need to urinate immediately  · You leak urine when you sleep, or you wake up with the urge to urinate  · You leak urine when you cough, sneeze, exercise, or laugh  How is UI diagnosed? Your healthcare provider will ask how often you leak urine and whether you have stress or urge symptoms  Tell him which medicines you take, how often you urinate, and how much liquid you drink each day  You may need any of the following tests:  · Urine tests  may show infection or kidney function  · A pelvic exam  may be done to check for blockages  A pelvic exam will also show if your bladder, uterus, or other organs have moved out of place  · An x-ray, ultrasound, or CT  may show problems with parts of your urinary system  You may be given contrast liquid to help your organs show up better in the pictures  Tell the healthcare provider if you have ever had an allergic reaction to contrast liquid  Do not enter the MRI room with anything metal  Metal can cause serious injury  Tell the healthcare provider if you have any metal in or on your body  · A bladder scan  will show how much urine is left in your bladder after you urinate  You will be asked to urinate and then healthcare providers will use a small ultrasound machine to check the urine left in your bladder  · Cystometry  is used to check the function of your urinary system  Your healthcare provider checks the pressure in your bladder while filling it with fluid  Your bladder pressure may also be tested when your bladder is full and while you urinate  How is UI treated? · Medicines  can help strengthen your bladder control      · Electrical stimulation  is used to send a small amount of electrical energy to your pelvic floor muscles  This helps control your bladder function  Electrodes may be placed outside your body or in your rectum  For women, the electrodes may be placed in the vagina  · A bulking agent  may be injected into the wall of your urethra to make it thicker  This helps keep your urethra closed and decreases urine leakage  · Devices  such as a clamp, pessary, or tampon may help stop urine leaks  Ask your healthcare provider for more information about these and other devices  · Surgery  may be needed if other treatments do not work  Several types of surgery can help improve your bladder control  Ask your healthcare provider for more information about the surgery you may need  How can I manage my symptoms? · Do pelvic muscle exercises often  Your pelvic muscles help you stop urinating  Squeeze these muscles tight for 5 seconds, then relax for 5 seconds  Gradually work up to squeezing for 10 seconds  Do 3 sets of 15 repetitions a day, or as directed  This will help strengthen your pelvic muscles and improve bladder control  · A catheter  may be used to help empty your bladder  A catheter is a tiny, plastic tube that is put into your bladder to drain your urine  Your healthcare provider may tell you to use a catheter to prevent your bladder from getting too full and leaking urine  · Keep a UI record  Write down how often you leak urine and how much you leak  Make a note of what you were doing when you leaked urine  · Train your bladder  Go to the bathroom at set times, such as every 2 hours, even if you do not feel the urge to go  You can also try to hold your urine when you feel the urge to go  For example, hold your urine for 5 minutes when you feel the urge to go  As that becomes easier, hold your urine for 10 minutes  · Drink liquids as directed  Ask your healthcare provider how much liquid to drink each day and which liquids are best for you   You may need to limit the amount of liquid you drink to help control your urine leakage  Limit or do not have drinks that contain caffeine or alcohol  Do not drink any liquid right before you go to bed  · Prevent constipation  Eat a variety of high-fiber foods  Good examples are high-fiber cereals, beans, vegetables, and whole-grain breads  Prune juice may help make your bowel movement softer  Walking is the best way to trigger your intestines to have a bowel movement  · Exercise regularly and maintain a healthy weight  Ask your healthcare provider how much you should weigh and about the best exercise plan for you  Weight loss and exercise will decrease pressure on your bladder and help you control your leakage  Ask him to help you create a weight loss plan if you are overweight  When should I seek immediate care? · You have severe pain  · You are confused or cannot think clearly  When should I contact my healthcare provider? · You have a fever  · You see blood in your urine  · You have pain when you urinate  · You have new or worse pain, even after treatment  · Your mouth feels dry or you have vision changes  · Your urine is cloudy or smells bad  · You have questions or concerns about your condition or care  CARE AGREEMENT:   You have the right to help plan your care  Learn about your health condition and how it may be treated  Discuss treatment options with your caregivers to decide what care you want to receive  You always have the right to refuse treatment  The above information is an  only  It is not intended as medical advice for individual conditions or treatments  Talk to your doctor, nurse or pharmacist before following any medical regimen to see if it is safe and effective for you  © 2017 [address] [employer] Information is for End User's use only and may not be sold, redistributed or otherwise used for commercial purposes   All illustrations and images included in CareNotes® are the copyrighted property of [employer]  or Kemar Dudley  Cigarette Smoking and Your Health   AMBULATORY CARE:   Risks to your health if you smoke:  Nicotine and other chemicals found in tobacco damage every cell in your body  Even if you are a light smoker, you have an increased risk for cancer, heart disease, and lung disease  If you are pregnant or have diabetes, smoking increases your risk for complications  Benefits to your health if you stop smoking:   · You decrease respiratory symptoms such as coughing, wheezing, and shortness of breath  · You reduce your risk for cancers of the lung, mouth, throat, kidney, bladder, pancreas, stomach, and cervix  If you already have cancer, you increase the benefits of chemotherapy  You also reduce your risk for cancer returning or a second cancer from developing  · You reduce your risk for heart disease, blood clots, heart attack, and stroke  · You reduce your risk for lung infections, and diseases such as pneumonia, asthma, chronic bronchitis, and emphysema  · Your circulation improves  More oxygen can be delivered to your body  If you have diabetes, you lower your risk for complications, such as kidney, artery, and eye diseases  You also lower your risk for nerve damage  Nerve damage can lead to amputations, poor vision, and blindness  · You improve your body's ability to heal and to fight infections  Benefits to the health of others if you stop smoking:  Tobacco is harmful to nonsmokers who breathe in your secondhand smoke  The following are ways the health of others around you may improve when you stop smoking:  · You lower the risks for lung cancer and heart disease in nonsmoking adults  · If you are pregnant, you lower the risk for miscarriage, early delivery, low birth weight, and stillbirth  You also lower your baby's risk for SIDS, obesity, developmental delay, and neurobehavioral problems, such as ADHD  · If you have children, you lower their risk for ear infections, colds, pneumonia, bronchitis, and asthma  For more information and support to stop smoking:   · Smokefree  gov  Phone: 2- 097 - 876-7000  Web Address: www smokefree  gov  Follow up with your healthcare provider as directed:  Write down your questions so you remember to ask them during your visits  © 2017 [address] [employer] Information is for End User's use only and may not be sold, redistributed or otherwise used for commercial purposes  All illustrations and images included in CareNotes® are the copyrighted property of [employer]  or Kemar Dudley  The above information is an  only  It is not intended as medical advice for individual conditions or treatments  Talk to your doctor, nurse or pharmacist before following any medical regimen to see if it is safe and effective for you  Fall Prevention   WHAT YOU NEED TO KNOW:   What is fall prevention? Fall prevention includes ways to make your home and other areas safer  It also includes ways you can move more carefully to prevent a fall  What increases my risk for falls? · Lack of vitamin D    · Not getting enough sleep each night    · Trouble walking or keeping your balance, or foot problems    · Health conditions that cause changes in your blood pressure, vision, or muscle strength and coordination    · Medicines that make you dizzy, weak, or sleepy    · Problems seeing clearly    · Shoes that have high heels or are not supportive    · Tripping hazards, such as items left on the floor, no handrails on the stairs, or broken steps  How can I help protect myself from falls? · Stand or sit up slowly  This may help you keep your balance and prevent falls  If you need to get up during the night, sit up first  Be sure you are fully awake before you stand  Turn on the light before you start walking  Go slowly in case you are still sleepy   Make sure you will not trip over any pets sleeping in the bedroom  · Use assistive devices as directed  Your healthcare provider may suggest that you use a cane or walker to help you keep your balance  You may need to have grab bars put in your bathroom near the toilet or in the shower  · Wear shoes that fit well and have soles that   Wear shoes both inside and outside  Use slippers with good   Do not wear shoes with high heels  · Wear a personal alarm  This is a device that allows you to call 911 if you fall and need help  Ask your healthcare provider for more information  · Stay active  Exercise can help strengthen your muscles and improve your balance  Your healthcare provider may recommend water aerobics or walking  He or she may also recommend physical therapy to improve your coordination  Never start an exercise program without talking to your healthcare provider first      · Manage medical conditions  Keep all appointments with your healthcare providers  Visit your eye doctor as directed  How can I make my home safer? · Add items to prevent falls in the bathroom  Put nonslip strips on your bath or shower floor to prevent you from slipping  Use a bath mat if you do not have carpet in the bathroom  This will prevent you from falling when you step out of the bath or shower  Use a shower seat so you do not need to stand while you shower  Sit on the toilet or a chair in your bathroom to dry yourself and put on clothing  This will prevent you from losing your balance from drying or dressing yourself while you are standing  · Keep paths clear  Remove books, shoes, and other objects from walkways and stairs  Place cords for telephones and lamps out of the way so that you do not need to walk over them  Tape them down if you cannot move them  Remove small rugs  If you cannot remove a rug, secure it with double-sided tape  This will prevent you from tripping  · Install bright lights in your home  Use night lights to help light paths to the bathroom or kitchen  Always turn on the light before you start walking  · Keep items you use often on shelves within reach  Do not use a step stool to help you reach an item  · Paint or place reflective tape on the edges of your stairs  This will help you see the stairs better  Call 911 or have someone else call if:   · You have fallen and are unconscious  · You have fallen and cannot move part of your body  Contact your healthcare provider if:   · You have fallen and have pain or a headache  · You have questions or concerns about your condition or care  CARE AGREEMENT:   You have the right to help plan your care  Learn about your health condition and how it may be treated  Discuss treatment options with your caregivers to decide what care you want to receive  You always have the right to refuse treatment  The above information is an  only  It is not intended as medical advice for individual conditions or treatments  Talk to your doctor, nurse or pharmacist before following any medical regimen to see if it is safe and effective for you  © 2017 [address] [facility] Information is for End User's use only and may not be sold, redistributed or otherwise used for commercial purposes  All illustrations and images included in CareNotes® are the copyrighted property of [employer]  or Kemar Dudley  Advance Directives   WHAT YOU NEED TO KNOW:   What are advance directives? Advance directives are legal documents that state your wishes and plans for medical care  These plans are made ahead of time in case you lose your ability to make decisions for yourself  Advance directives can apply to any medical decision, such as the treatments you want, and if you want to donate organs  What are the types of advance directives? There are many types of advance directives, and each state has rules about how to use them   You may choose a combination of any of the following:  · Living will: This is a written record of the treatment you want  You can also choose which treatments you do not want, which to limit, and which to stop at a certain time  This includes surgery, medicine, IV fluid, and tube feedings  · Durable power of  for healthcare [facility]): This is a written record that states who you want to make healthcare choices for you when you are unable to make them for yourself  This person, called a proxy, is usually a family member or a friend  You may choose more than 1 proxy  · Do not resuscitate (DNR) order:  A DNR order is used in case your heart stops beating or you stop breathing  It is a request not to have certain forms of treatment, such as CPR  A DNR order may be included in other types of advance directives  · Medical directive: This covers the care that you want if you are in a coma, near death, or unable to make decisions for yourself  You can list the treatments you want for each condition  Treatment may include pain medicine, surgery, blood transfusions, dialysis, IV or tube feedings, and a ventilator (breathing machine)  · Values history: This document has questions about your views, beliefs, and how you feel and think about life  This information can help others choose the care that you would choose  Why are advance directives important? An advance directive helps you control your care  Although spoken wishes may be used, it is better to have your wishes written down  Spoken wishes can be misunderstood, or not followed  Treatments may be given even if you do not want them  An advance directive may make it easier for your family to make difficult choices about your care  How do I decide what to put in my advance directives? · Make informed decisions:  Make sure you fully understand treatments or care you may receive   Think about the benefits and problems your decisions could cause for you or your family  Talk to healthcare providers if you have concerns or questions before you write down your wishes  You may also want to talk with your Mosque or , or a   Check your state laws to make sure that what you put in your advance directive is legal      · Sign all forms:  Sign and date your advance directive when you have finished  You may also need 2 witnesses to sign the forms  Witnesses cannot be your doctor or his staff, your spouse, heirs or beneficiaries, people you owe money to, or your chosen proxy  Talk to your family, proxy, and healthcare providers about your advance directive  Give each person a copy, and keep one for yourself in a place you can get to easily  Do not keep it hidden or locked away  · Review and revise your plans: You can revise your advance directive at any time, as long as you are able to make decisions  Review your plan every year, and when there are changes in your life, or your health  When you make changes, let your family, proxy, and healthcare providers know  Give each a new copy  Where can I find more information? · [employer]  [address] , [address]   Phone: 2- 302 - 279-3639  Phone: 1- 326 - 272-1421  Web Address: http://www  aafp org  · [address] [facility])  [address] [facility] [place] [address]  Phone: 3- 500 - [phone]  Phone: [phone]  Web Address: Shanel maguire  CARE AGREEMENT:   You have the right to help plan your care  To help with this plan, you must learn about your health condition and treatment options  You must also learn about advance directives and how they are used  Work with your healthcare providers to decide what care will be used to treat you  You always have the right to refuse treatment  The above information is an  only   It is not intended as medical advice for individual conditions or treatments  Talk to your doctor, nurse or pharmacist before following any medical regimen to see if it is safe and effective for you  © 2017 [address] [employer] Information is for End User's use only and may not be sold, redistributed or otherwise used for commercial purposes  All illustrations and images included in CareNotes® are the copyrighted property of [employer]  or Kemar Dudley

## 2019-09-04 NOTE — PROGRESS NOTES
Assessment/Plan:   1  Health maintenance -given prescription for Shingrix vaccine  2  Elevated PSA - 4 7-previously under 3  Father had prostate CA in his [de-identified]  I recommended repeat in 2 months and prior to his next visit in 6 months  If continues to climb he is aware he may need to see Urology  As noted he does have BPH with symptoms an enlarged gland on exam without nodules  3  Status post surgery of the right ankle -had prior fall with fracture of right medial malleolus  Had successful surgery but then had issues with hardware and had hardware removal   Has some residual numbness associated with surgery  There was a callused area at the site where there was prior screw protrusion and he will file this area down apply skin softener  4  esophageal reflux -endoscopy December 2016 showed grade 1 esophagitis with hilar hernia -negative for H pylori and biopsy read as mild chronic inflammation  Endoscopy decade ago showed some antral erosions felt related to nonsteroidals  This point getting by with Pepcid 20 milligrams b i d  P r n   5  BPH with lower urinary tract symptoms -as above -he defers an alpha blocker at present  6  General care - subsequent ostomy due 2021   7 Renal cyst-MRI showed abnormal area the kidney-ultrasound read as benign cyst with additional studies not needed  8 prior symptoms of feeling poorly -felt to be postop some depressive symptoms plus related to narcotic analgesic postop -all resolved  9  Left lower quadrant pain -prior CT scan showed diverticular disease but otherwise benign  Colonoscopy showed only diverticular disease  Local Colorectal physician wanted to consider sigmoid resection  He went to [place] at repeat scan showing a hernia and had successful surgery with pain resolved  10  LPR-asymptomatic  11  History of low serum IgM- labs done several years ago showed normal immunoglobulin levels but an IgM of 31 with normal  Above 40 -230    He has not had frequent infections  He had seen a hematologist in the past who did not feel this was significant  -IgM was originally found when undergoing evaluation for recurrent oral candidiasis  12  Cervical radiculopathy -prior MRI C-spine showed left-sided disc herniation at C5-6 which responded to conservative therapy  13  Seborrheic dermatitis  14  Occasional low back pain -has underlying DJD  MRI the past showed tiny central disc protrusion at L4-5 with small left paracentral protrusion at L5-S1-had physical therapy in the past  15  Prior complain of arthralgias -felt to be DJD variant -all labs including rheumatoid factor, LORENZO and sed rate normal  16  Remote history of recurrent oral candidiasis -full workup negative other than decreased IgM not felt significant as above  HIV negative  17  Acne -had used Accutane in the past  18  History of episodes of chest tightness -likely an asthma variant-asymptomatic times months        MEDICAL REGIMEN:      Pepcid 20 milligrams b i d  P r n  Scheduled for PSA in 2 months and prior to  visit in 6 months- if PSA stabilize he go back to being seen yearly    Addendum-PSA done in November 2019 has decreased from 4 7 down to 4 1-at this point will monitor- for repeat PSA prior to next visit  Addendum-seen by covering physician  On November 22nd 2019 for URTI -felt to be viral -treated with Flonase and promethazine    Addendum -patient had a fall landing against the left side of his chest and requested x-ray -x-ray of chest and ribs order-if significant abnormalities he will need formal appointment -chest x-ray shows no acute issues and x-ray of ribs negative for fracture  No problem-specific Assessment & Plan notes found for this encounter  Diagnoses and all orders for this visit:    Benign prostatic hyperplasia without lower urinary tract symptoms  -     PSA Total, Diagnostic; Future  -     PSA Total, Diagnostic; Future    Elevated PSA  -     PSA Total, Diagnostic;  Future  - PSA Total, Diagnostic; Future          Subjective:      Patient ID: Shanique Mcnamara is a 69 y o  male  He was here for his Medicare wellness wanted to review his recent labs showing elevated PSA  Records reviewed in all prior PSAs under 4  In the year 2015 was 3 2  A year ago under 4  Now 4 7  He was not riding a bike or did not objectively within 48 hours of the PSA  He has some symptoms of BPH with prostatism  On exam he has an enlarged gland without nodules  We reviewed the differential for an elevated PSA  This point we will repeat in 2 months and have another visit in 6 months with further therapy based on results  He had ankle surgery with removal of hardware  Has a callus  Has some residual numbness in realizes may have had some nerve damage associated with the surgery  Pain is not severe and he wants to monitor  This patient denies any systemic symptoms  Specifically there has been no evidence of fever, night sweats, significant weight loss or significant decrease in appetite  The following portions of the patient's history were reviewed and updated as appropriate: allergies, current medications, past family history, past medical history, past social history, past surgical history and problem list     Review of Systems   Constitutional: Negative  Respiratory: Negative  Cardiovascular: Negative  Gastrointestinal: Negative  Endocrine: Negative  Genitourinary: Positive for difficulty urinating  Musculoskeletal: Negative  Neurological: Positive for numbness  Hematological: Negative  Psychiatric/Behavioral: Negative  Objective:      Ht 5' 10" (1 778 m)   Wt 78 2 kg (172 lb 6 4 oz)   BMI 24 74 kg/m²          Physical Exam   Constitutional: He is oriented to person, place, and time  He appears well-developed and well-nourished  No distress  HENT:   Head: Normocephalic and atraumatic     Right Ear: External ear normal    Left Ear: External ear normal    Nose: Nose normal    Mouth/Throat: Oropharynx is clear and moist  No oropharyngeal exudate  Eyes: Pupils are equal, round, and reactive to light  Conjunctivae and EOM are normal  Right eye exhibits no discharge  Left eye exhibits no discharge  No scleral icterus  Neck: No JVD present  No tracheal deviation present  No thyromegaly present  Cardiovascular: Normal rate, regular rhythm, normal heart sounds and intact distal pulses  Exam reveals no gallop and no friction rub  No murmur heard  Pulmonary/Chest: Effort normal and breath sounds normal  No stridor  No respiratory distress  He has no wheezes  He exhibits no tenderness  Abdominal: Bowel sounds are normal  He exhibits no distension and no mass  There is no tenderness  There is no rebound and no guarding  Genitourinary: Rectum normal and penis normal  Rectal exam shows guaiac negative stool  Genitourinary Comments:  Enlarged gland without nodules   Musculoskeletal: Normal range of motion  He exhibits no edema, tenderness or deformity  Evidence of prior right ankle surgery   Lymphadenopathy:     He has no cervical adenopathy  Neurological: He is alert and oriented to person, place, and time  He has normal reflexes  He displays normal reflexes  No cranial nerve deficit  He exhibits normal muscle tone  Coordination normal    Skin: Skin is warm and dry  No rash noted  He is not diaphoretic  No erythema  No pallor  Psychiatric: He has a normal mood and affect   His behavior is normal  Judgment and thought content normal

## 2019-10-05 ENCOUNTER — CLINICAL SUPPORT (OUTPATIENT)
Dept: INTERNAL MEDICINE CLINIC | Facility: CLINIC | Age: 74
End: 2019-10-05
Payer: MEDICARE

## 2019-10-05 DIAGNOSIS — Z23 ENCOUNTER FOR IMMUNIZATION: ICD-10-CM

## 2019-10-05 PROCEDURE — 90662 IIV NO PRSV INCREASED AG IM: CPT

## 2019-10-05 PROCEDURE — G0008 ADMIN INFLUENZA VIRUS VAC: HCPCS

## 2019-11-06 ENCOUNTER — TRANSCRIBE ORDERS (OUTPATIENT)
Dept: ADMINISTRATIVE | Age: 74
End: 2019-11-06

## 2019-11-06 ENCOUNTER — APPOINTMENT (OUTPATIENT)
Dept: LAB | Age: 74
End: 2019-11-06
Payer: MEDICARE

## 2019-11-06 DIAGNOSIS — R49.0 DYSPHONIA: ICD-10-CM

## 2019-11-06 DIAGNOSIS — R13.10 DYSPHAGIA, UNSPECIFIED TYPE: ICD-10-CM

## 2019-11-06 DIAGNOSIS — R49.0 DYSPHONIA: Primary | ICD-10-CM

## 2019-11-06 DIAGNOSIS — K90.41 GLUTEN INTOLERANCE: ICD-10-CM

## 2019-11-06 DIAGNOSIS — R97.20 ELEVATED PSA: ICD-10-CM

## 2019-11-06 DIAGNOSIS — N40.0 BENIGN PROSTATIC HYPERPLASIA WITHOUT LOWER URINARY TRACT SYMPTOMS: Chronic | ICD-10-CM

## 2019-11-06 DIAGNOSIS — J38.01 PARALYSIS OF LEFT VOCAL FOLD: ICD-10-CM

## 2019-11-06 LAB
PSA SERPL-MCNC: 4.1 NG/ML (ref 0–4)
T4 FREE SERPL-MCNC: 0.88 NG/DL (ref 0.76–1.46)
TSH SERPL DL<=0.05 MIU/L-ACNC: 2.53 UIU/ML (ref 0.36–3.74)

## 2019-11-06 PROCEDURE — 36415 COLL VENOUS BLD VENIPUNCTURE: CPT

## 2019-11-06 PROCEDURE — 84439 ASSAY OF FREE THYROXINE: CPT

## 2019-11-06 PROCEDURE — 86003 ALLG SPEC IGE CRUDE XTRC EA: CPT

## 2019-11-06 PROCEDURE — 84153 ASSAY OF PSA TOTAL: CPT

## 2019-11-06 PROCEDURE — 86618 LYME DISEASE ANTIBODY: CPT

## 2019-11-06 PROCEDURE — 83516 IMMUNOASSAY NONANTIBODY: CPT

## 2019-11-06 PROCEDURE — 86430 RHEUMATOID FACTOR TEST QUAL: CPT

## 2019-11-06 PROCEDURE — 84443 ASSAY THYROID STIM HORMONE: CPT

## 2019-11-06 PROCEDURE — 86038 ANTINUCLEAR ANTIBODIES: CPT

## 2019-11-07 ENCOUNTER — TELEPHONE (OUTPATIENT)
Dept: INTERNAL MEDICINE CLINIC | Facility: CLINIC | Age: 74
End: 2019-11-07

## 2019-11-07 LAB
B BURGDOR IGG+IGM SER-ACNC: <0.91 ISR (ref 0–0.9)
RHEUMATOID FACT SER QL LA: NEGATIVE

## 2019-11-07 NOTE — TELEPHONE ENCOUNTER
----- Message from Sohan Mae MD sent at 11/7/2019  5:17 AM EST -----   Please call patient-PSA has improved from 4 7 down to 4 1-Good News- we will await results of repeat PSA before his next visit-please make sure on his labs prior to next visit he has PSA ordered with diagnosis of elevated PSA

## 2019-11-08 LAB — RYE IGE QN: NEGATIVE

## 2019-11-09 PROBLEM — R49.0 DYSPHONIA: Status: ACTIVE | Noted: 2019-11-09

## 2019-11-09 PROBLEM — R09.A0 SENSATION OF FOREIGN BODY: Status: ACTIVE | Noted: 2019-11-09

## 2019-11-09 PROBLEM — R49.0 MUSCLE TENSION DYSPHONIA: Status: ACTIVE | Noted: 2019-11-09

## 2019-11-09 PROBLEM — K44.9 HIATAL HERNIA: Status: ACTIVE | Noted: 2019-11-09

## 2019-11-09 PROBLEM — K21.9 REFLUX LARYNGITIS: Status: ACTIVE | Noted: 2019-11-09

## 2019-11-09 PROBLEM — R68.89 SENSATION OF FOREIGN BODY: Status: ACTIVE | Noted: 2019-11-09

## 2019-11-09 PROBLEM — J04.0 REFLUX LARYNGITIS: Status: ACTIVE | Noted: 2019-11-09

## 2019-11-09 PROBLEM — J38.01 PARESIS OF LEFT VOCAL FOLD: Status: ACTIVE | Noted: 2019-11-09

## 2019-11-09 PROBLEM — J38.3 GLOTTIC INSUFFICIENCY: Status: ACTIVE | Noted: 2019-11-09

## 2019-11-14 LAB — MISCELLANEOUS LAB TEST RESULT: NORMAL

## 2019-11-22 ENCOUNTER — OFFICE VISIT (OUTPATIENT)
Dept: INTERNAL MEDICINE CLINIC | Facility: CLINIC | Age: 74
End: 2019-11-22
Payer: MEDICARE

## 2019-11-22 ENCOUNTER — TELEPHONE (OUTPATIENT)
Dept: INTERNAL MEDICINE CLINIC | Facility: CLINIC | Age: 74
End: 2019-11-22

## 2019-11-22 VITALS
RESPIRATION RATE: 14 BRPM | HEART RATE: 87 BPM | TEMPERATURE: 98.4 F | WEIGHT: 172 LBS | HEIGHT: 70 IN | BODY MASS INDEX: 24.62 KG/M2 | OXYGEN SATURATION: 97 %

## 2019-11-22 DIAGNOSIS — J06.9 VIRAL UPPER RESPIRATORY TRACT INFECTION: Primary | ICD-10-CM

## 2019-11-22 DIAGNOSIS — B37.0 CANDIDA INFECTION OF MOUTH: ICD-10-CM

## 2019-11-22 PROCEDURE — 99213 OFFICE O/P EST LOW 20 MIN: CPT | Performed by: INTERNAL MEDICINE

## 2019-11-22 RX ORDER — FLUTICASONE PROPIONATE 50 MCG
1 SPRAY, SUSPENSION (ML) NASAL DAILY
Qty: 1 BOTTLE | Refills: 0 | Status: SHIPPED | OUTPATIENT
Start: 2019-11-22 | End: 2020-12-10

## 2019-11-22 RX ORDER — PROMETHAZINE HYDROCHLORIDE AND CODEINE PHOSPHATE 6.25; 1 MG/5ML; MG/5ML
5 SYRUP ORAL EVERY 6 HOURS PRN
Qty: 120 ML | Refills: 0 | Status: SHIPPED | OUTPATIENT
Start: 2019-11-22 | End: 2020-12-10

## 2019-11-22 NOTE — TELEPHONE ENCOUNTER
Patient called with c/o not feeling well since Sunday   Symp : coughing congestion stuffy nose    Denies any fever  OTC : Nothing    Pharm - [facility]   Cb : Cell

## 2019-11-22 NOTE — PROGRESS NOTES
Assessment/Plan:    #URI  -reports sore throat since Sunday with nasal congestion, post nasal drip, dry cough  -denies muscle ache, fever, chills, changes in appetite, nausea/vomiting/diarrhea, sick contacts at home  -has tried taking airborne without relief  -lungs clear, vitals stable  -will start on flonase and promethazine-codeine  -flu vaccine up to date    For comprehensive progress note refer to 9/4/19    Addendum 12/30/2019 patient reports that he continues to have persistent dry cough  Denies any dyspnea on exertion as well as heart palpitations or edema in his legs  He has tried taking for monitoring without any relief  He has taken promethazine with codeine as well as Flonase without any significant relief as well  He denies any fevers or chest pain  Chest x-ray was unremarkable  At this time we are concerned for possible cardiac etiology and we will obtain an echo  Patient also complaining of candida in his mouth, will start on nystatin  No problem-specific Assessment & Plan notes found for this encounter  Diagnoses and all orders for this visit:    Viral upper respiratory tract infection  -     promethazine-codeine (PHENERGAN WITH CODEINE) 6 25-10 mg/5 mL syrup;  Take 5 mL by mouth every 6 (six) hours as needed for cough  -     fluticasone (FLONASE) 50 mcg/act nasal spray; 1 spray into each nostril daily            Current Outpatient Medications:     diclofenac sodium (VOLTAREN) 1 %, Apply 2 g topically 2 (two) times a day as needed (knee pain) (Patient not taking: Reported on 8/7/2019), Disp: 1 Tube, Rfl: 0    famotidine (PEPCID) 40 MG tablet, Take 1 tablet (40 mg total) by mouth 2 (two) times a day, Disp: 60 tablet, Rfl: 2    fluticasone (FLONASE) 50 mcg/act nasal spray, 1 spray into each nostril daily, Disp: 1 Bottle, Rfl: 0    predniSONE 10 mg tablet, Take 4 tablets daily for 2 days, then 3 tablets daily for 2 days, then 2 tablets daily for 2 days, then 1 tablet for 1 day, then stop, Disp: 19 tablet, Rfl: 0    promethazine-codeine (PHENERGAN WITH CODEINE) 6 25-10 mg/5 mL syrup, Take 5 mL by mouth every 6 (six) hours as needed for cough, Disp: 120 mL, Rfl: 0    Subjective:      Patient ID: Anurag Marr is a 69 y o  male  HPI     Patient presents as an acute visit  Reports that he has been having a sore throat since Sunday  States that he is then to soon developed some sinus congestion with runny nose and a dry cough over the past couple days  He denies any mucus production  He states that the nighttime is worse with frequent flare-ups of his cough  He states that it is difficult for him to sleep  He denies any fevers or appetite changes  He denies any sick contacts at home  He states that he is up-to-date on his flu vaccine  He denies any muscle aches or fatigue or nausea or vomiting or diarrhea  He has tried taking airborne without any relief  At this time his symptoms are likely viral we will start him on supportive care with promethazine and codeine and Flonase as needed  Will call back within 2 weeks if symptoms persist or do not improve  The following portions of the patient's history were reviewed and updated as appropriate: allergies, current medications, past family history, past medical history, past social history, past surgical history and problem list     Review of Systems   Constitutional: Negative for activity change, appetite change, fatigue and fever  HENT: Positive for congestion, postnasal drip and sore throat  Negative for ear pain, hearing loss, rhinorrhea, sinus pressure, sinus pain and tinnitus  Eyes: Negative for photophobia, pain and visual disturbance  Respiratory: Positive for cough  Negative for shortness of breath and wheezing  Cardiovascular: Negative for chest pain, palpitations and leg swelling  Gastrointestinal: Negative for abdominal distention, abdominal pain, constipation, diarrhea, nausea and vomiting     Genitourinary: Negative for difficulty urinating, frequency and hematuria  Musculoskeletal: Negative for arthralgias, back pain, gait problem, joint swelling, myalgias, neck pain and neck stiffness  Skin: Negative for color change, pallor, rash and wound  Neurological: Negative for dizziness, tremors, weakness, light-headedness, numbness and headaches  Hematological: Does not bruise/bleed easily  Objective:      Pulse 87   Temp 98 4 °F (36 9 °C) (Oral)   Resp 14   Ht 5' 10" (1 778 m)   Wt 78 kg (172 lb)   SpO2 97%   BMI 24 68 kg/m²          Physical Exam   Constitutional: He is oriented to person, place, and time  He appears well-developed and well-nourished  HENT:   Head: Normocephalic and atraumatic  Right Ear: External ear normal    Left Ear: External ear normal    Nose: Nose normal    Mouth/Throat: Oropharynx is clear and moist  No oropharyngeal exudate  Eyes: Pupils are equal, round, and reactive to light  Conjunctivae and EOM are normal  Right eye exhibits no discharge  Left eye exhibits no discharge  No scleral icterus  Neck: Normal range of motion  Neck supple  No JVD present  No thyromegaly present  Cardiovascular: Normal rate, regular rhythm, normal heart sounds and intact distal pulses  No murmur heard  Pulmonary/Chest: Effort normal and breath sounds normal  No respiratory distress  He has no wheezes  He has no rales  He exhibits no tenderness  Abdominal: Soft  Bowel sounds are normal  He exhibits no distension and no mass  There is no tenderness  There is no rebound and no guarding  Musculoskeletal: Normal range of motion  He exhibits no edema, tenderness or deformity  Lymphadenopathy:     He has no cervical adenopathy  Neurological: He is alert and oriented to person, place, and time  He has normal reflexes  Skin: Skin is warm and dry  No rash noted  No erythema  Vitals reviewed

## 2019-11-26 ENCOUNTER — TELEPHONE (OUTPATIENT)
Dept: INTERNAL MEDICINE CLINIC | Facility: CLINIC | Age: 74
End: 2019-11-26

## 2019-11-26 DIAGNOSIS — J06.9 UPPER RESPIRATORY TRACT INFECTION, UNSPECIFIED TYPE: Primary | ICD-10-CM

## 2019-11-26 RX ORDER — DOXYCYCLINE HYCLATE 100 MG
100 TABLET ORAL 2 TIMES DAILY
Qty: 14 TABLET | Refills: 0 | Status: SHIPPED | OUTPATIENT
Start: 2019-11-26 | End: 2019-12-03

## 2019-11-26 NOTE — TELEPHONE ENCOUNTER
Patient called back ,he states the medicine given hasn't helped with his cough, he states its no improvement  Congested sinuses haven't gotten better either  Patient states he has a little bit of a sore throat      Patient is asking if he should have an antibitoic    Pharmacy: [address]

## 2019-12-17 ENCOUNTER — TELEPHONE (OUTPATIENT)
Dept: INTERNAL MEDICINE CLINIC | Facility: CLINIC | Age: 74
End: 2019-12-17

## 2019-12-17 NOTE — TELEPHONE ENCOUNTER
Brett Mcnair- he saw Dr Darinel Flores for evaluation regarding this issue- please forward this question to him

## 2019-12-17 NOTE — TELEPHONE ENCOUNTER
Pt saw you ten days ago for cold symptoms and still has a residual dry cough  Pt doesn't feel too bad but he thought it should have cleared up by now  Please advise

## 2019-12-27 ENCOUNTER — APPOINTMENT (OUTPATIENT)
Dept: RADIOLOGY | Age: 74
End: 2019-12-27
Payer: MEDICARE

## 2019-12-27 DIAGNOSIS — J06.9 UPPER RESPIRATORY TRACT INFECTION, UNSPECIFIED TYPE: ICD-10-CM

## 2019-12-27 DIAGNOSIS — J06.9 UPPER RESPIRATORY TRACT INFECTION, UNSPECIFIED TYPE: Primary | ICD-10-CM

## 2019-12-27 PROCEDURE — 71046 X-RAY EXAM CHEST 2 VIEWS: CPT

## 2019-12-30 DIAGNOSIS — R05.8 COUGH PRESENT FOR GREATER THAN 3 WEEKS: Primary | ICD-10-CM

## 2020-01-08 ENCOUNTER — TELEPHONE (OUTPATIENT)
Dept: INTERNAL MEDICINE CLINIC | Facility: CLINIC | Age: 75
End: 2020-01-08

## 2020-01-08 DIAGNOSIS — R07.9 CHEST PAIN, UNSPECIFIED TYPE: Primary | ICD-10-CM

## 2020-01-08 NOTE — TELEPHONE ENCOUNTER
Patient called stating he fell against his railing about 6 days ago  He has a lot of pain on his Left side/rib area  Denies any swelling or discoloration to area   He wants to know if you can order a xray    Please advise  MARIELA [phone]

## 2020-01-10 ENCOUNTER — APPOINTMENT (OUTPATIENT)
Dept: RADIOLOGY | Age: 75
End: 2020-01-10
Payer: MEDICARE

## 2020-01-10 DIAGNOSIS — R07.9 CHEST PAIN, UNSPECIFIED TYPE: ICD-10-CM

## 2020-01-10 PROCEDURE — 71100 X-RAY EXAM RIBS UNI 2 VIEWS: CPT

## 2020-01-10 PROCEDURE — 71046 X-RAY EXAM CHEST 2 VIEWS: CPT

## 2020-01-13 ENCOUNTER — TELEPHONE (OUTPATIENT)
Dept: INTERNAL MEDICINE CLINIC | Facility: CLINIC | Age: 75
End: 2020-01-13

## 2020-01-13 NOTE — TELEPHONE ENCOUNTER
----- Message from Love Aschoff, MD sent at 1/13/2020  5:19 AM EST -----  Please call the patient regarding his Chest x-ray -there is no evidence of rib fracture and lungs are normal

## 2020-01-13 NOTE — RESULT ENCOUNTER NOTE
Please call the patient regarding his Chest x-ray -there is no evidence of rib fracture and lungs are normal

## 2020-02-24 ENCOUNTER — APPOINTMENT (OUTPATIENT)
Dept: LAB | Age: 75
End: 2020-02-24
Payer: MEDICARE

## 2020-02-24 DIAGNOSIS — N40.0 BENIGN PROSTATIC HYPERPLASIA WITHOUT LOWER URINARY TRACT SYMPTOMS: Chronic | ICD-10-CM

## 2020-02-24 DIAGNOSIS — R97.20 ELEVATED PSA: ICD-10-CM

## 2020-02-24 LAB — PSA SERPL-MCNC: 4.4 NG/ML (ref 0–4)

## 2020-02-24 PROCEDURE — 84153 ASSAY OF PSA TOTAL: CPT

## 2020-03-04 ENCOUNTER — OFFICE VISIT (OUTPATIENT)
Dept: INTERNAL MEDICINE CLINIC | Facility: CLINIC | Age: 75
End: 2020-03-04
Payer: MEDICARE

## 2020-03-04 VITALS
BODY MASS INDEX: 25.51 KG/M2 | SYSTOLIC BLOOD PRESSURE: 120 MMHG | HEART RATE: 68 BPM | HEIGHT: 70 IN | DIASTOLIC BLOOD PRESSURE: 72 MMHG | RESPIRATION RATE: 14 BRPM | WEIGHT: 178.2 LBS

## 2020-03-04 DIAGNOSIS — E78.5 HYPERLIPIDEMIA, UNSPECIFIED HYPERLIPIDEMIA TYPE: ICD-10-CM

## 2020-03-04 DIAGNOSIS — N40.0 BENIGN PROSTATIC HYPERPLASIA WITHOUT LOWER URINARY TRACT SYMPTOMS: Chronic | ICD-10-CM

## 2020-03-04 DIAGNOSIS — M19.90 ARTHRITIS: ICD-10-CM

## 2020-03-04 DIAGNOSIS — R97.20 ELEVATED PSA: Primary | ICD-10-CM

## 2020-03-04 PROCEDURE — 99213 OFFICE O/P EST LOW 20 MIN: CPT | Performed by: INTERNAL MEDICINE

## 2020-03-04 PROCEDURE — 1036F TOBACCO NON-USER: CPT | Performed by: INTERNAL MEDICINE

## 2020-03-04 PROCEDURE — 1160F RVW MEDS BY RX/DR IN RCRD: CPT | Performed by: INTERNAL MEDICINE

## 2020-03-04 PROCEDURE — 4040F PNEUMOC VAC/ADMIN/RCVD: CPT | Performed by: INTERNAL MEDICINE

## 2020-03-04 PROCEDURE — 3008F BODY MASS INDEX DOCD: CPT | Performed by: INTERNAL MEDICINE

## 2020-03-04 NOTE — PROGRESS NOTES
Assessment/Plan:   1  Elevated PSA- previously in the 3 range-prior to his appointment is September 2019 climb to 4 7- repeated in November 2019 at 4 1-now 4 4  Has an enlarged gland on exam which could clearly be the cause  He is worried about prostate cancer as his father had this in his [de-identified]  At this point I recommended formal urology evaluation with further therapy based on results  2  BPH with lower urinary tract symptoms - she defers an alpha-blocker previously -monitoring    All other problems as per note of September 2019     Await opinion of Urology    Addendum -patient seen by urology in December- they talked about therapy medical therapy versus surgical therapy for his lower urinary tract symptoms- they elected to start a trial Flomax 0 4 milligrams  No problem-specific Assessment & Plan notes found for this encounter  Diagnoses and all orders for this visit:    Elevated PSA    Benign prostatic hyperplasia without lower urinary tract symptoms          Subjective:      Patient ID: Niko Raygoza is a 69 y o  male  He returns for follow-up in reference to his elevated PSA  PSA had climbed up to 4 7- was repeated in November 4 0 1 in this now 4 4  He clearly has enlarged gland on exam with some lower urinary tract symptoms  We reviewed the differential for an elevated PSA  He did not ejaculate a right a bike for 48 hours before the lab  He has nocturia x2  He has some slowing down of his stream   Father had prostate CA in his [de-identified]  He has a brother without prostate cancer       This patient denies any systemic symptoms  Specifically there has been no evidence of fever, night sweats, significant weight loss or significant decrease in appetite        At this point we elected to have formal urology referral   Letter dictated      The following portions of the patient's history were reviewed and updated as appropriate: allergies, current medications, past family history, past medical history, past social history, past surgical history and problem list     Review of Systems   Constitutional: Negative  Respiratory: Negative  Cardiovascular: Negative  Gastrointestinal: Negative  Endocrine: Negative  Genitourinary: Positive for difficulty urinating  Nocturia x2   Musculoskeletal: Negative  Neurological: Negative  Hematological: Negative  Psychiatric/Behavioral: Negative  Objective:      Ht 5' 10" (1 778 m)   Wt 80 8 kg (178 lb 3 2 oz)   BMI 25 57 kg/m²          Physical Exam   Constitutional: He is oriented to person, place, and time  He appears well-developed and well-nourished  No distress  HENT:   Head: Normocephalic and atraumatic  Right Ear: External ear normal    Left Ear: External ear normal    Nose: Nose normal    Mouth/Throat: Oropharynx is clear and moist  No oropharyngeal exudate  Eyes: Pupils are equal, round, and reactive to light  Conjunctivae and EOM are normal  Right eye exhibits no discharge  Left eye exhibits no discharge  No scleral icterus  Neck: No JVD present  No tracheal deviation present  No thyromegaly present  Cardiovascular: Normal rate, regular rhythm, normal heart sounds and intact distal pulses  Exam reveals no gallop and no friction rub  No murmur heard  Pulmonary/Chest: Effort normal and breath sounds normal  No stridor  No respiratory distress  He has no wheezes  He exhibits no tenderness  Abdominal: Bowel sounds are normal  He exhibits no distension and no mass  There is no tenderness  There is no rebound and no guarding  Genitourinary: Rectum normal and penis normal  Rectal exam shows guaiac negative stool  Genitourinary Comments:  Enlarged prostate without nodules   Musculoskeletal: Normal range of motion  He exhibits no edema, tenderness or deformity  Lymphadenopathy:     He has no cervical adenopathy  Neurological: He is alert and oriented to person, place, and time  He has normal reflexes   He displays normal reflexes  No cranial nerve deficit  He exhibits normal muscle tone  Coordination normal    Skin: Skin is warm and dry  No rash noted  He is not diaphoretic  No erythema  No pallor  Psychiatric: He has a normal mood and affect   His behavior is normal  Judgment and thought content normal

## 2020-03-25 ENCOUNTER — TELEMEDICINE (OUTPATIENT)
Dept: INTERNAL MEDICINE CLINIC | Facility: CLINIC | Age: 75
End: 2020-03-25
Payer: MEDICARE

## 2020-03-25 ENCOUNTER — TELEPHONE (OUTPATIENT)
Dept: INTERNAL MEDICINE CLINIC | Facility: CLINIC | Age: 75
End: 2020-03-25

## 2020-03-25 DIAGNOSIS — Z20.828 EXPOSURE TO SARS-ASSOCIATED CORONAVIRUS: ICD-10-CM

## 2020-03-25 DIAGNOSIS — Z20.828 EXPOSURE TO SARS-ASSOCIATED CORONAVIRUS: Primary | ICD-10-CM

## 2020-03-25 PROCEDURE — G2012 BRIEF CHECK IN BY MD/QHP: HCPCS | Performed by: INTERNAL MEDICINE

## 2020-03-25 PROCEDURE — 87635 SARS-COV-2 COVID-19 AMP PRB: CPT

## 2020-03-25 NOTE — TELEPHONE ENCOUNTER
Pt called in  Pt stated this started Sunday  Pt has sore throat, cough, runny nose  Pt stated he has no fever  Pt report he been out of state in [place] in the last couple days

## 2020-03-25 NOTE — PROGRESS NOTES
COVID-19 Virtual Visit     This virtual check-in was done via telephone  Encounter provider Paola Dick DO    Provider located at [address] [place] [address] 9  [employer] [employer] [address] [place] [address]  [phone]    Recent Visits  No visits were found meeting these conditions  Showing recent visits within past 7 days and meeting all other requirements     Today's Visits  Date Type Provider Dept   03/25/20 Telephone [address] Internal Med   Showing today's visits and meeting all other requirements     Future Appointments  No visits were found meeting these conditions  Showing future appointments within next 150 days and meeting all other requirements        Patient agrees to participate in a virtual check in via telephone or video visit instead of presenting to the office to address urgent/immediate medical needs  Patient is aware this is a billable service  After connecting through [employer], the patient was identified by name and date of birth  Jama Vee was informed that this was a telemedicine visit and that the exam was being conducted confidentially over secure lines  My office door was closed  No one else was in the room  Jama Vee acknowledged consent and understanding of privacy and security of the telemedicine visit  I informed the patient that I have reviewed his record in Epic and presented the opportunity for him to ask any questions regarding the visit today  The patient agreed to participate  Jama Vee is a 70 y o  male who is concerned about COVID-19  He reports cough  He has travelled to [place]  He has not had contact with a person who is under investigation for or who is positive for COVID-19 within the last 14 days  He has not been hospitalized recently for fever and/or lower respiratory symptoms      Past Medical History:   Diagnosis Date    Atrophy of prostate     last assessed: 08/26/2014    Basal cell carcinoma of shoulder     last assessed: 08/28/2014    Depression     last assessed: 08/26/2015    Subconjunctival hemorrhage of left eye     last assessed: 01/14/2016       Past Surgical History:   Procedure Laterality Date    HERNIA REPAIR         Current Outpatient Medications   Medication Sig Dispense Refill    diclofenac sodium (VOLTAREN) 1 % Apply 2 g topically 2 (two) times a day as needed (knee pain) (Patient not taking: Reported on 8/7/2019) 1 Tube 0    famotidine (PEPCID) 40 MG tablet TAKE 1 TABLET BY MOUTH TWICE DAILY 60 tablet 6    fluticasone (FLONASE) 50 mcg/act nasal spray 1 spray into each nostril daily 1 Bottle 0    pantoprazole (PROTONIX) 40 mg tablet Take 1 tablet (40 mg total) by mouth every morning 30 minutes prior to breakfast 30 tablet 6    predniSONE 10 mg tablet Take 4 tablets daily for 2 days, then 3 tablets daily for 2 days, then 2 tablets daily for 2 days, then 1 tablet for 1 day, then stop (Patient not taking: Reported on 1/5/2020) 19 tablet 0    promethazine-codeine (PHENERGAN WITH CODEINE) 6 25-10 mg/5 mL syrup Take 5 mL by mouth every 6 (six) hours as needed for cough 120 mL 0     No current facility-administered medications for this visit  No Known Allergies    Video Exam    [employer] appears alert, cooperative  Disposition:      I referred Eladio to one of our centralized sites for a COVID-19 swab  I spent 15 minutes with the patient during this virtual check-in visit    Virtual Regular Visit    Problem List Items Addressed This Visit     None      Visit Diagnoses     Exposure to SARS-associated coronavirus    -  Primary    Relevant Orders    MISC COVID-19 TEST- Collected at [facility] or [facility]               Reason for visit is URI    Encounter provider Santhosh Bruno DO    Provider located at [facility]  [id number]  [phone]      Recent Visits  No visits were found meeting these conditions  Showing recent visits within past 7 days and meeting all other requirements     Today's Visits  Date Type Provider Dept   03/25/20 Telephone [address] Internal Med   Showing today's visits and meeting all other requirements     Future Appointments  No visits were found meeting these conditions  Showing future appointments within next 150 days and meeting all other requirements        After connecting through [employer], the patient was identified by name and date of birth  Anurag Marr was informed that this is a telemedicine visit and that the visit is being conducted through telephone which may not be secure and therefore, might not be HIPAA-compliant  My office door was closed  No one else was in the room  He acknowledged consent and understanding of privacy and security of the video platform  The patient has agreed to participate and understands they can discontinue the visit at any time  Lee Thakkar is a 70 y o  male who called in reporting URI since Sunday  States that he took his dog to a  in [place] on for a curbside evaluation last week  States that he did not have any contact with any known individuals with COVID-19  States he went there and came straight home  Currently has sore throat, stuffy nose, dry cough  Denies SOB  Denies headache, seasonal allergy, itchy throat, itchy eyes  Will refer to mobile unit for testing        Past Medical History:   Diagnosis Date    Atrophy of prostate     last assessed: 08/26/2014    Basal cell carcinoma of shoulder     last assessed: 08/28/2014    Depression     last assessed: 08/26/2015    Subconjunctival hemorrhage of left eye     last assessed: 01/14/2016       Past Surgical History:   Procedure Laterality Date    HERNIA REPAIR         Current Outpatient Medications   Medication Sig Dispense Refill    diclofenac sodium (VOLTAREN) 1 % Apply 2 g topically 2 (two) times a day as needed (knee pain) (Patient not taking: Reported on 8/7/2019) 1 Tube 0    famotidine (PEPCID) 40 MG tablet TAKE 1 TABLET BY MOUTH TWICE DAILY 60 tablet 6    fluticasone (FLONASE) 50 mcg/act nasal spray 1 spray into each nostril daily 1 Bottle 0    pantoprazole (PROTONIX) 40 mg tablet Take 1 tablet (40 mg total) by mouth every morning 30 minutes prior to breakfast 30 tablet 6    predniSONE 10 mg tablet Take 4 tablets daily for 2 days, then 3 tablets daily for 2 days, then 2 tablets daily for 2 days, then 1 tablet for 1 day, then stop (Patient not taking: Reported on 1/5/2020) 19 tablet 0    promethazine-codeine (PHENERGAN WITH CODEINE) 6 25-10 mg/5 mL syrup Take 5 mL by mouth every 6 (six) hours as needed for cough 120 mL 0     No current facility-administered medications for this visit  No Known Allergies    Review of Systems  Review of Systems - History obtained from the patient  General ROS:  negative for - fatigue, fever or night sweats  ENT ROS: positive for - nasal congestion and sore throat  negative for - headaches, nasal discharge, sneezing or visual changes  Allergy and Immunology ROS:   negative for - hives, itchy/watery eyes, latex or seasonal allergies  Respiratory ROS: positive for - cough  negative for - orthopnea, shortness of breath, tachypnea or wheezing  Cardiovascular ROS: negative for - chest pain, edema, irregular heartbeat, palpitations, rapid heart rate or shortness of breath  Gastrointestinal ROS: no abdominal pain, change in bowel habits, or black or bloody stools  Musculoskeletal ROS: negative for - joint pain or muscle pain  Dermatological ROS: negative for dry skin, pruritus and rash      I spent 15 minutes with the patient during this visit  Addendum 3/30/20 COVID testing negative  Patient reports feeling better at this time

## 2020-03-30 ENCOUNTER — TELEPHONE (OUTPATIENT)
Dept: UROLOGY | Facility: AMBULATORY SURGERY CENTER | Age: 75
End: 2020-03-30

## 2020-03-30 LAB — SARS-COV-2 RNA SPEC QL NAA+PROBE: NOT DETECTED

## 2020-03-30 NOTE — TELEPHONE ENCOUNTER
Called patient to transfer 04/2 visit from office visit to a virtual visit   Patient stated that he has and iphone but he will also download the [employer] dennys   Informed patient  will be calling him first to check him in the will be getting call from provider

## 2020-04-02 ENCOUNTER — TELEMEDICINE (OUTPATIENT)
Dept: UROLOGY | Facility: AMBULATORY SURGERY CENTER | Age: 75
End: 2020-04-02
Payer: MEDICARE

## 2020-04-02 DIAGNOSIS — R97.20 ELEVATED PSA: Primary | ICD-10-CM

## 2020-04-02 PROCEDURE — 99212 OFFICE O/P EST SF 10 MIN: CPT | Performed by: NURSE PRACTITIONER

## 2020-09-21 ENCOUNTER — APPOINTMENT (OUTPATIENT)
Dept: LAB | Age: 75
End: 2020-09-21
Payer: MEDICARE

## 2020-09-21 DIAGNOSIS — E78.5 HYPERLIPIDEMIA, UNSPECIFIED HYPERLIPIDEMIA TYPE: ICD-10-CM

## 2020-09-21 DIAGNOSIS — R97.20 ELEVATED PSA: ICD-10-CM

## 2020-09-21 DIAGNOSIS — M19.90 ARTHRITIS: ICD-10-CM

## 2020-09-21 LAB
ALBUMIN SERPL BCP-MCNC: 4 G/DL (ref 3.5–5)
ALP SERPL-CCNC: 63 U/L (ref 46–116)
ALT SERPL W P-5'-P-CCNC: 21 U/L (ref 12–78)
ANION GAP SERPL CALCULATED.3IONS-SCNC: 3 MMOL/L (ref 4–13)
AST SERPL W P-5'-P-CCNC: 15 U/L (ref 5–45)
BASOPHILS # BLD AUTO: 0.08 THOUSANDS/ΜL (ref 0–0.1)
BASOPHILS NFR BLD AUTO: 1 % (ref 0–1)
BILIRUB SERPL-MCNC: 0.85 MG/DL (ref 0.2–1)
BUN SERPL-MCNC: 17 MG/DL (ref 5–25)
CALCIUM SERPL-MCNC: 9.8 MG/DL (ref 8.3–10.1)
CHLORIDE SERPL-SCNC: 106 MMOL/L (ref 100–108)
CHOLEST SERPL-MCNC: 188 MG/DL (ref 50–200)
CO2 SERPL-SCNC: 31 MMOL/L (ref 21–32)
CREAT SERPL-MCNC: 0.96 MG/DL (ref 0.6–1.3)
EOSINOPHIL # BLD AUTO: 0.18 THOUSAND/ΜL (ref 0–0.61)
EOSINOPHIL NFR BLD AUTO: 3 % (ref 0–6)
ERYTHROCYTE [DISTWIDTH] IN BLOOD BY AUTOMATED COUNT: 13.7 % (ref 11.6–15.1)
GFR SERPL CREATININE-BSD FRML MDRD: 78 ML/MIN/1.73SQ M
GLUCOSE P FAST SERPL-MCNC: 96 MG/DL (ref 65–99)
HCT VFR BLD AUTO: 47.2 % (ref 36.5–49.3)
HDLC SERPL-MCNC: 71 MG/DL
HGB BLD-MCNC: 15.8 G/DL (ref 12–17)
IMM GRANULOCYTES # BLD AUTO: 0.02 THOUSAND/UL (ref 0–0.2)
IMM GRANULOCYTES NFR BLD AUTO: 0 % (ref 0–2)
LDLC SERPL DIRECT ASSAY-MCNC: 100 MG/DL (ref 0–100)
LYMPHOCYTES # BLD AUTO: 1.51 THOUSANDS/ΜL (ref 0.6–4.47)
LYMPHOCYTES NFR BLD AUTO: 23 % (ref 14–44)
MCH RBC QN AUTO: 31.9 PG (ref 26.8–34.3)
MCHC RBC AUTO-ENTMCNC: 33.5 G/DL (ref 31.4–37.4)
MCV RBC AUTO: 95 FL (ref 82–98)
MONOCYTES # BLD AUTO: 0.52 THOUSAND/ΜL (ref 0.17–1.22)
MONOCYTES NFR BLD AUTO: 8 % (ref 4–12)
NEUTROPHILS # BLD AUTO: 4.31 THOUSANDS/ΜL (ref 1.85–7.62)
NEUTS SEG NFR BLD AUTO: 65 % (ref 43–75)
NRBC BLD AUTO-RTO: 0 /100 WBCS
PLATELET # BLD AUTO: 209 THOUSANDS/UL (ref 149–390)
PMV BLD AUTO: 10.1 FL (ref 8.9–12.7)
POTASSIUM SERPL-SCNC: 4.4 MMOL/L (ref 3.5–5.3)
PROT SERPL-MCNC: 7 G/DL (ref 6.4–8.2)
PSA SERPL-MCNC: 5.2 NG/ML (ref 0–4)
RBC # BLD AUTO: 4.95 MILLION/UL (ref 3.88–5.62)
SODIUM SERPL-SCNC: 140 MMOL/L (ref 136–145)
TRIGL SERPL-MCNC: 70 MG/DL
WBC # BLD AUTO: 6.62 THOUSAND/UL (ref 4.31–10.16)

## 2020-09-21 PROCEDURE — 83721 ASSAY OF BLOOD LIPOPROTEIN: CPT

## 2020-09-21 PROCEDURE — 80061 LIPID PANEL: CPT

## 2020-09-21 PROCEDURE — 85025 COMPLETE CBC W/AUTO DIFF WBC: CPT

## 2020-09-21 PROCEDURE — 80053 COMPREHEN METABOLIC PANEL: CPT

## 2020-09-21 PROCEDURE — 84153 ASSAY OF PSA TOTAL: CPT

## 2020-09-21 PROCEDURE — 36415 COLL VENOUS BLD VENIPUNCTURE: CPT

## 2020-09-24 ENCOUNTER — TELEPHONE (OUTPATIENT)
Dept: INTERNAL MEDICINE CLINIC | Facility: CLINIC | Age: 75
End: 2020-09-24

## 2020-09-24 DIAGNOSIS — N40.0 BENIGN PROSTATIC HYPERPLASIA WITHOUT LOWER URINARY TRACT SYMPTOMS: Primary | ICD-10-CM

## 2020-09-24 DIAGNOSIS — R97.20 ELEVATED PSA: ICD-10-CM

## 2020-09-24 DIAGNOSIS — R73.9 HYPERGLYCEMIA, UNSPECIFIED: ICD-10-CM

## 2020-09-24 DIAGNOSIS — Z13.1 SCREENING FOR DIABETES MELLITUS: ICD-10-CM

## 2020-09-24 DIAGNOSIS — E78.5 HYPERLIPIDEMIA, UNSPECIFIED HYPERLIPIDEMIA TYPE: ICD-10-CM

## 2020-09-24 DIAGNOSIS — Z13.29 SCREENING FOR THYROID DISORDER: ICD-10-CM

## 2020-09-24 DIAGNOSIS — K21.9 GASTROESOPHAGEAL REFLUX DISEASE, ESOPHAGITIS PRESENCE NOT SPECIFIED: ICD-10-CM

## 2020-09-24 NOTE — TELEPHONE ENCOUNTER
----- Message from Dulce Nguyen DO sent at 9/24/2020  7:48 AM EDT -----  Please let patient know his labs all look good except PSA is elevated at 5 2, previously it was 4 4  he will need to continue to see urology  His cholesterol was fine  We will repeat his labs in March

## 2020-10-02 ENCOUNTER — OFFICE VISIT (OUTPATIENT)
Dept: UROLOGY | Facility: CLINIC | Age: 75
End: 2020-10-02
Payer: MEDICARE

## 2020-10-02 VITALS
DIASTOLIC BLOOD PRESSURE: 70 MMHG | HEART RATE: 68 BPM | BODY MASS INDEX: 24.48 KG/M2 | SYSTOLIC BLOOD PRESSURE: 130 MMHG | HEIGHT: 70 IN | RESPIRATION RATE: 18 BRPM | TEMPERATURE: 97 F | WEIGHT: 171 LBS

## 2020-10-02 DIAGNOSIS — R97.20 ELEVATED PSA: Primary | ICD-10-CM

## 2020-10-02 PROCEDURE — 99213 OFFICE O/P EST LOW 20 MIN: CPT | Performed by: PHYSICIAN ASSISTANT

## 2020-10-02 RX ORDER — CIPROFLOXACIN 500 MG/1
500 TABLET, FILM COATED ORAL 2 TIMES DAILY
Qty: 2 TABLET | Refills: 0 | Status: SHIPPED | OUTPATIENT
Start: 2020-10-02 | End: 2020-10-03

## 2020-10-14 ENCOUNTER — CLINICAL SUPPORT (OUTPATIENT)
Dept: INTERNAL MEDICINE CLINIC | Facility: CLINIC | Age: 75
End: 2020-10-14
Payer: MEDICARE

## 2020-10-14 DIAGNOSIS — Z23 NEEDS FLU SHOT: Primary | ICD-10-CM

## 2020-10-14 PROCEDURE — 90662 IIV NO PRSV INCREASED AG IM: CPT

## 2020-10-14 PROCEDURE — G0008 ADMIN INFLUENZA VIRUS VAC: HCPCS

## 2020-11-03 ENCOUNTER — TELEPHONE (OUTPATIENT)
Dept: INTERNAL MEDICINE CLINIC | Facility: CLINIC | Age: 75
End: 2020-11-03

## 2020-11-03 DIAGNOSIS — R29.898 NECK TIGHTNESS: Primary | ICD-10-CM

## 2020-12-10 RX ORDER — CEFTRIAXONE 1 G/1
1000 INJECTION, POWDER, FOR SOLUTION INTRAMUSCULAR; INTRAVENOUS ONCE
Status: COMPLETED | OUTPATIENT
Start: 2020-12-14 | End: 2020-12-14

## 2020-12-11 ENCOUNTER — CONSULT (OUTPATIENT)
Dept: OBGYN CLINIC | Facility: CLINIC | Age: 75
End: 2020-12-11
Payer: MEDICARE

## 2020-12-11 ENCOUNTER — APPOINTMENT (OUTPATIENT)
Dept: RADIOLOGY | Facility: AMBULARY SURGERY CENTER | Age: 75
End: 2020-12-11
Payer: MEDICARE

## 2020-12-11 VITALS
HEIGHT: 70 IN | WEIGHT: 175.6 LBS | SYSTOLIC BLOOD PRESSURE: 118 MMHG | DIASTOLIC BLOOD PRESSURE: 82 MMHG | HEART RATE: 73 BPM | BODY MASS INDEX: 25.14 KG/M2

## 2020-12-11 DIAGNOSIS — M54.12 CERVICAL RADICULOPATHY: Chronic | ICD-10-CM

## 2020-12-11 DIAGNOSIS — R29.898 NECK TIGHTNESS: ICD-10-CM

## 2020-12-11 DIAGNOSIS — R29.898 NECK TIGHTNESS: Primary | ICD-10-CM

## 2020-12-11 PROCEDURE — 72040 X-RAY EXAM NECK SPINE 2-3 VW: CPT

## 2020-12-11 PROCEDURE — 99204 OFFICE O/P NEW MOD 45 MIN: CPT | Performed by: PHYSICAL MEDICINE & REHABILITATION

## 2020-12-11 RX ORDER — NAPROXEN 375 MG/1
375 TABLET ORAL 2 TIMES DAILY PRN
Qty: 60 TABLET | Refills: 0 | Status: SHIPPED | OUTPATIENT
Start: 2020-12-11 | End: 2021-01-06

## 2020-12-14 ENCOUNTER — PROCEDURE VISIT (OUTPATIENT)
Dept: UROLOGY | Facility: CLINIC | Age: 75
End: 2020-12-14
Payer: MEDICARE

## 2020-12-14 VITALS
HEART RATE: 77 BPM | WEIGHT: 175.4 LBS | SYSTOLIC BLOOD PRESSURE: 138 MMHG | BODY MASS INDEX: 25.11 KG/M2 | DIASTOLIC BLOOD PRESSURE: 68 MMHG | HEIGHT: 70 IN

## 2020-12-14 DIAGNOSIS — Z80.42 FAMILY HISTORY OF PROSTATE CANCER IN FATHER: ICD-10-CM

## 2020-12-14 DIAGNOSIS — R97.20 ELEVATED PSA: Chronic | ICD-10-CM

## 2020-12-14 DIAGNOSIS — N40.1 BENIGN LOCALIZED PROSTATIC HYPERPLASIA WITH LOWER URINARY TRACT SYMPTOMS (LUTS): ICD-10-CM

## 2020-12-14 DIAGNOSIS — R97.20 ELEVATED PSA, LESS THAN 10 NG/ML: Primary | ICD-10-CM

## 2020-12-14 PROCEDURE — G0416 PROSTATE BIOPSY, ANY MTHD: HCPCS | Performed by: PATHOLOGY

## 2020-12-14 PROCEDURE — 99214 OFFICE O/P EST MOD 30 MIN: CPT | Performed by: UROLOGY

## 2020-12-14 PROCEDURE — 88344 IMHCHEM/IMCYTCHM EA MLT ANTB: CPT | Performed by: PATHOLOGY

## 2020-12-14 PROCEDURE — 55700 PR BIOPSY OF PROSTATE,NEEDLE/PUNCH: CPT | Performed by: UROLOGY

## 2020-12-14 PROCEDURE — 96372 THER/PROPH/DIAG INJ SC/IM: CPT

## 2020-12-14 PROCEDURE — 76942 ECHO GUIDE FOR BIOPSY: CPT | Performed by: UROLOGY

## 2020-12-14 RX ORDER — TAMSULOSIN HYDROCHLORIDE 0.4 MG/1
0.4 CAPSULE ORAL
Qty: 90 CAPSULE | Refills: 3 | Status: SHIPPED | OUTPATIENT
Start: 2020-12-14 | End: 2021-07-06 | Stop reason: SDUPTHER

## 2020-12-14 RX ADMIN — CEFTRIAXONE 1000 MG: 1 INJECTION, POWDER, FOR SOLUTION INTRAMUSCULAR; INTRAVENOUS at 09:18

## 2020-12-16 ENCOUNTER — TELEPHONE (OUTPATIENT)
Dept: UROLOGY | Facility: MEDICAL CENTER | Age: 75
End: 2020-12-16

## 2021-01-04 NOTE — PROGRESS NOTES
Problem List Items Addressed This Visit        Genitourinary    Benign localized prostatic hyperplasia with lower urinary tract symptoms (LUTS)    Atypical small acinar proliferation of prostate      Other Visit Diagnoses     Elevated PSA    -  Primary    Relevant Orders    PSA Total, Diagnostic    MRI prostate multiparametric wo w contrast    Basic metabolic panel    History of needle biopsy of prostate with negative result        Relevant Orders    MRI prostate multiparametric wo w contrast    Abnormal findings on diagnostic imaging of other abdominal regions, including retroperitoneum         Relevant Orders    MRI prostate multiparametric wo w contrast            Discussion:      Gerson Howell did well after his biopsy  He is seen to have 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, as well as 1 core of atypical small acinar proliferation  I did tell him that the latter can be a precancerous lesion and the recommendation would be for not only PSA follow-up, but for a repeat biopsy at some point in the future, likely 6-12 months from now  I have ordered a multiparametric MRI for targeting purposes, given elevated PSA and a previous transrectal ultrasound biopsy of the prostate being negative for malignancy      He will see us back in 6 months with a PSA prior, and with an MRI prior, if this is abnormal, he will be set up for an MRI targeted biopsy, if normal MRI, we will repeat a systemic prostate biopsy as per the recommendations      Assessment and plan:       Please see problem oriented charting for the assessment plan of today's urological complaints  ]  Violet Haddad MD      Chief Complaint     Chief Complaint   Patient presents with    Other     Discuss Biopsy results         History of Present Illness     Lance Albarado is a 70 y o  man with a history of elevated PSA, he is now status post prostate biopsy which is negative for prostate cancer but does show some atypical small acinar proliferation  In terms of post biopsy issues he states that he did well, and without issue  He was started on tamsulosin, in terms of how well this is working he states that he is voiding well  The following portions of the patient's history were reviewed and updated as appropriate: allergies, current medications, past family history, past medical history, past social history, past surgical history and problem list       Detailed Urologic History     - please refer to HPI    Review of Systems     Review of Systems   Constitutional: Negative  HENT: Negative  Eyes: Negative  Respiratory: Negative  Cardiovascular: Negative  Gastrointestinal: Negative  Endocrine: Negative  Genitourinary: Negative  Musculoskeletal: Negative  Skin: Negative  Allergic/Immunologic: Negative  Neurological: Negative  Hematological: Negative  Psychiatric/Behavioral: Negative  Allergies     No Known Allergies    Physical Exam     Physical Exam  Vitals signs reviewed  Constitutional:       General: He is not in acute distress  Appearance: He is not ill-appearing, toxic-appearing or diaphoretic  HENT:      Head: Normocephalic and atraumatic  Eyes:      General: No scleral icterus  Right eye: No discharge  Left eye: No discharge  Cardiovascular:      Pulses: Normal pulses  Pulmonary:      Effort: Pulmonary effort is normal    Abdominal:      General: There is no distension  Palpations: There is no mass  Tenderness: There is no abdominal tenderness  Hernia: No hernia is present  Musculoskeletal: Normal range of motion  Skin:     General: Skin is warm  Neurological:      General: No focal deficit present  Mental Status: He is alert  Psychiatric:         Mood and Affect: Mood normal          Behavior: Behavior normal          Thought Content:  Thought content normal          Judgment: Judgment normal              Vital Signs  Vitals:    01/06/21 1054   BP: 128/70   BP Location: Left arm   Patient Position: Sitting   Cuff Size: Adult   Pulse: 64   Weight: 79 4 kg (175 lb)   Height: 5' 10" (1 778 m)         Current Medications       Current Outpatient Medications:     Diclofenac Sodium (VOLTAREN) 1 %, Apply 2 g topically 3 (three) times a day as needed (Pain), Disp: 1 Tube, Rfl: 1    famotidine (PEPCID) 40 MG tablet, TAKE 1 TABLET BY MOUTH TWICE DAILY, Disp: 60 tablet, Rfl: 6    tamsulosin (FLOMAX) 0 4 mg, Take 1 capsule (0 4 mg total) by mouth daily at bedtime, Disp: 90 capsule, Rfl: 3    bisacodyl (FLEET) 10 MG/30ML ENEM, Insert 30 mL (10 mg total) into the rectum once for 1 dose Perform the day of biopsy, Disp: 30 mL, Rfl: 0    pantoprazole (PROTONIX) 40 mg tablet, Take 1 tablet (40 mg total) by mouth every morning 30 minutes prior to breakfast, Disp: 30 tablet, Rfl: 6      Active Problems     Patient Active Problem List   Diagnosis    Cervical radiculopathy    Gastroesophageal reflux disease with esophagitis    Laryngopharyngeal reflux    Thrombosed external hemorrhoids    Closed fracture of medial malleolus of right tibia    Preoperative examination    Elevated PSA, less than 10 ng/ml    Dysphonia    Muscle tension dysphonia    Glottic insufficiency    Reflux laryngitis    Sensation of foreign body    Paresis of left vocal fold    Hiatal hernia    Family history of prostate cancer in father    Benign localized prostatic hyperplasia with lower urinary tract symptoms (LUTS)    Atypical small acinar proliferation of prostate         Past Medical History     Past Medical History:   Diagnosis Date    Basal cell carcinoma of shoulder     last assessed: 08/28/2014    Depression     last assessed: 08/26/2015    Subconjunctival hemorrhage of left eye     last assessed: 01/14/2016         Surgical History     Past Surgical History:   Procedure Laterality Date    HERNIA REPAIR           Family History     Family History   Problem Relation Age of Onset    Heart disease Mother         cardiac disorder    Kidney disease Mother     Prostate cancer Father          Social History     Social History     Social History     Tobacco Use   Smoking Status Former Smoker   Smokeless Tobacco Never Used   Tobacco Comment    years ago per patient, not any more         Pertinent Lab Values     Lab Results   Component Value Date    CREATININE 0 96 09/21/2020       Lab Results   Component Value Date    PSA 5 2 (H) 09/21/2020    PSA 4 4 (H) 02/24/2020    PSA 4 1 (H) 11/06/2019     Final Diagnosis   A  Prostate, Left Lateral Base, Biopsy:  - Benign prostatic tissue       B  Prostate, Left Lateral Mid, Biopsy:  - Benign prostatic tissue        C  Prostate, Left Lateral Lawtons, Biopsy:  - Benign prostatic tissue        D  Prostate, Left Base, Biopsy:  - Benign prostatic tissue with focal acute inflammation       E  Prostate, Left Mid, Biopsy:  - Benign prostatic tissue with focal acute inflammation       F  Prostate, Left [place], Biopsy:  - Atypical small acinar proliferation (ASAP), suspicious but not diagnostic for malignancy      G  Prostate, Right Lateral Base, Biopsy:  - Benign prostatic tissue        H  Prostate, Right Lateral Mid, Biopsy:  - Focal high-grade prostatic intraepithelial neoplasia (HGPIN)      I  Prostate, Right Lateral [place], Biopsy:  - Benign prostatic tissue with focal acute inflammation       J  Prostate, Right Mid, Biopsy:  - Benign prostatic tissue        K  Prostate, Right Base, Biopsy:  - Benign prostatic tissue        L  Prostate, Right Lawtons, Biopsy:  - Benign prostatic tissue       Electronically signed by Lynsey Oleary MD on 12/16/2020 at  1:30 PM             Pertinent Imaging      no new imaging for my review

## 2021-01-04 NOTE — PATIENT INSTRUCTIONS
PROSTATE CANCER SCREENING OVERVIEW    Prostate cancer screening involves testing for prostate cancer in men who have no symptoms of the disease  This testing can find cancer at an early stage  However, medical experts agree that prostate cancer screening should not be routinely ordered for all men and that screening can lead to both benefits and harms  This article is designed to review the advantages and disadvantages of prostate cancer screening  You should talk with your health care provider to decide what is best in your individual situation  WHAT IS PROSTATE CANCER? Prostate cancer is a cancer of the prostate, a small gland in men that is located below the bladder and in front of the rectum (figure 1)  The prostate produces fluid that helps carry sperm during ejaculation  Although many men are diagnosed with prostate cancer, most of them do not die from their cancer  Prostate cancer often grows so slowly that many men die of other causes before they even develop symptoms of prostate cancer  PROSTATE CANCER RISK FACTORS  Age [de-identified] All men are at risk for prostate cancer, but the risk greatly increases with older age  Prostate cancer is rarely found in men younger than 48years old  Ethnic background [de-identified]  men develop prostate cancer more often than white and  men   men also are more likely to die of prostate cancer than white or  men  Family medical history  Men who have a first-degree relative (a father or brother) with prostate cancer are more likely to develop the disease  Men with female relatives with breast cancer related to the breast cancer gene (BRCA) may also be more likely to develop prostate cancer  Diet  A diet high in animal fat or low in vegetables may increase a man's risk of prostate cancer      PROSTATE CANCER SCREENING TESTS  Prostate cancer screening involves blood test that measures prostate-specific antigen (PSA)  Prostate-specific antigen (PSA)  PSA is a protein produced by the prostate  The PSA test measures the amount of PSA in a sample of blood  Although many men with prostate cancer have an elevated PSA concentration, a high level does not necessarily mean there is a cancer  The most common cause for an elevated PSA is benign prostatic hyperplasia (BPH), a noncancerous enlargement of the prostate  Other causes include prostate infection (prostatitis), trauma (bicycle riding), and sexual activity  You should avoid ejaculating or riding a bike for at least 48 hours before having a PSA test  (See "Patient education: Benign prostatic hyperplasia (BPH) (Beyond the Basics)"  )    Rectal examination  A rectal examination is sometimes recommended, along with measurement of the PSA, to screen for prostate cancer  However, studies have not shown that rectal examination is an effective screening test for prostate cancer when used alone  If the PSA test is positive  A positive PSA test is not a reason to panic; noncancerous conditions are the most common causes for an abnormal test, particularly for PSA tests  On the other hand, a positive test should not be ignored  The first step in evaluating an elevated PSA is usually to repeat the test   You should avoid ejaculating and riding a bike for at least 48 hours before repeating the test  If the PSA remains elevated, a prostate biopsy or other testing is usually recommended  Prostate biopsy  A prostate biopsy involves having a rectal ultrasound and use of a needle to obtain tissue samples from the prostate gland  The biopsy is usually performed in the office by a urologist (a doctor who specializes in treatment of urinary, bladder, and prostate issues)  After the procedure, most men feel sore and you may see some blood in the urine or semen, this can last for up to 4-6 weeks  Biopsies can rarely cause serious infections   Sometimes biopsies are guided by magnetic resonance imaging (MRI)  PROS AND CONS OF PROSTATE CANCER SCREENING    There are a number of arguments for and against prostate cancer screening  Arguments for screening  Experts in favor of prostate cancer screening cite the following arguments:    ?Results from a large  study of prostate cancer screening found that men who had prostate-specific antigen (PSA) testing had a 20 percent lower chance of dying from prostate cancer after 13 years compared with men who did not have prostate cancer screening [1]  Men who had PSA testing also had a 30 percent lower chance of developing metastatic disease (cancer that has spread to other parts of the body) [2]  In another  study of prostate cancer screening, the mortality benefit was even larger to prostate cancer screening  (the [place] trial)    ? A substantial number of men die from prostate cancer every year and many more suffer from the complications of advanced disease  ? For men with an aggressive prostate cancer, the best chance for curing it is by finding it at an early stage and then treating it with surgery or radiation  Studies have shown that men who have prostate cancer detected by PSA screening tend to have earlier-stage cancer than men who have a cancer detected by other means  (See "Patient education: Treatment for advanced prostate cancer (Beyond the Basics)" and "Patient education: Prostate cancer treatment; stage I to III cancer (Beyond the Basics)" )    ? The five-year survival for men who have prostate cancer confined to the prostate gland (early stage) is nearly 100 percent; this drops to 27 percent for men whose cancer has spread to other areas of the body  However, many early-stage cancers are not aggressive, and the five-year survival for those will be nearly 100 percent even without any treatment [3]  ?The available screening tests are not perfect, but they are easy to perform and have fair accuracy    Arguments against screening  Other arguments have also been made against screening:    ?Even though the  study found a benefit of prostate cancer screening, PSA testing prevented only about one prostate cancer death for every 1000 screened men after 13 years [1]  Furthermore, 76 percent of men with an abnormal PSA who had a prostate biopsy did not have prostate cancer  However, in the [place] study, the number needed to screen and treat was much lower indicating that prostate cancer screening is ineffective screening measure which decreases the morbidity and mortality of prostate cancer  ?Many prostate cancers detected with screening are unlikely to cause death or disability  Thus, a number of men will be diagnosed with cancer and potentially suffer the side effects of cancer treatment for cancers that never would have been found without prostate cancer screening  In other words, even if screening finds a cancer early, it is not clear in all cases that the cancer must be treated  IS PROSTATE CANCER SCREENING RIGHT FOR ME? The answer to this question is not the same for everyone  The table includes some questions you can ask yourself when weighing the potential risk and benefits of screening (table 1)  Professional organizations  Major medical associations and societies, including the [employer] Task Force [4], American Cancer Society [5], American Urological Association [6], and many  cancer societies, agree that men should discuss screening with their health care providers  Men should be informed about the benefits and risks of prostate cancer screening and treatment and make decisions that best reflect their personal values and preferences  Age to first consider screening  Screening discussions should begin at age 48 years for men at average risk for developing prostate cancer   Men with risk factors for prostate cancer (such as black men or men with a father or brother who had prostate cancer) may want to begin screening discussions at age 36 to 39 years  How often to perform screening  Once screening begins, it should occur every two to four years (if continued testing is desired) and should include a PSA blood test   Age to stop screening  Guidelines suggest stopping screening after age 71, though some experts would continue offering screening to very healthy men beyond that age  Screening not recommended  Screening is generally not recommended for men whose life expectancy, or ability to undergo curative treatment, is limited by serious health problems  In these situations, the potential benefits of screening are outweighed by the likely harms  WHERE TO GET MORE INFORMATION  Your healthcare provider is the best source of information for questions and concerns related to your medical problem  This article will be updated as needed on our web site (www [employer]/patients)  Related topics for patients, as well as selected articles written for healthcare professionals, are also available  Some of the most relevant are listed below  Patient level information  [employer] offers two types of patient education materials  The Basics  The Basics patient education pieces answer the four or five key questions a patient might have about a given condition  These articles are best for patients who want a general overview and who prefer short, easy-to-read materials  Patient education: Prostate cancer screening (PSA tests) (The Basics)  Patient education: Prostate cancer (The Basics)  Patient education: Cancer screening (The Basics)  Patient education: Choosing treatment for low-risk localized prostate cancer (The Basics)  Beyond the Basics  Beyond the Basics patient education pieces are longer, more sophisticated, and more detailed  These articles are best for patients who want in-depth information and are comfortable with some medical jargon    Patient education: Treatment for advanced prostate cancer (Beyond the Basics)  Patient education: Prostate cancer treatment; stage I to III cancer (Beyond the Basics)  Patient education: Benign prostatic hyperplasia (BPH) (Beyond the Basics)  Professional level information  Professional level articles are designed to keep doctors and other health professionals up-to-date on the latest medical findings  These articles are thorough, long, and complex, and they contain multiple references to the research on which they are based  Professional level articles are best for people who are comfortable with a lot of medical terminology and who want to read the same materials their doctors are reading  Measurement of prostate-specific antigen  Screening for prostate cancer  The following organizations also provide reliable health information  ? [employer] [place]  9-499-0-CANCER  (www cancer gov/types/prostate)  ? American Society for Clinical Oncology (patient information website)  (www cancer  net)  ? [address] [place] [address] (patient and caregiver information website)  (www nccn org/patients)  ? [address]  0-956-TGJ-2345  ([employer])  ? [employer]  (www [employer] gov/healthtopics  html)  ? [employer] Prostate Cancer Education and Support  (www ustoo  org/Detection-PSA-And-AMELIA)

## 2021-01-06 ENCOUNTER — EVALUATION (OUTPATIENT)
Dept: PHYSICAL THERAPY | Facility: CLINIC | Age: 76
End: 2021-01-06
Payer: MEDICARE

## 2021-01-06 ENCOUNTER — OFFICE VISIT (OUTPATIENT)
Dept: UROLOGY | Facility: CLINIC | Age: 76
End: 2021-01-06
Payer: MEDICARE

## 2021-01-06 VITALS
BODY MASS INDEX: 25.05 KG/M2 | WEIGHT: 175 LBS | SYSTOLIC BLOOD PRESSURE: 128 MMHG | HEIGHT: 70 IN | HEART RATE: 64 BPM | DIASTOLIC BLOOD PRESSURE: 70 MMHG

## 2021-01-06 DIAGNOSIS — Z98.890 HISTORY OF NEEDLE BIOPSY OF PROSTATE WITH NEGATIVE RESULT: ICD-10-CM

## 2021-01-06 DIAGNOSIS — R93.5 ABNORMAL FINDINGS ON DIAGNOSTIC IMAGING OF OTHER ABDOMINAL REGIONS, INCLUDING RETROPERITONEUM: ICD-10-CM

## 2021-01-06 DIAGNOSIS — R97.20 ELEVATED PSA: Primary | ICD-10-CM

## 2021-01-06 DIAGNOSIS — R29.898 NECK TIGHTNESS: ICD-10-CM

## 2021-01-06 DIAGNOSIS — N42.32 ATYPICAL SMALL ACINAR PROLIFERATION OF PROSTATE: ICD-10-CM

## 2021-01-06 DIAGNOSIS — M54.12 CERVICAL RADICULOPATHY: Primary | Chronic | ICD-10-CM

## 2021-01-06 DIAGNOSIS — N40.1 BENIGN LOCALIZED PROSTATIC HYPERPLASIA WITH LOWER URINARY TRACT SYMPTOMS (LUTS): ICD-10-CM

## 2021-01-06 PROCEDURE — 99214 OFFICE O/P EST MOD 30 MIN: CPT | Performed by: UROLOGY

## 2021-01-06 PROCEDURE — 97110 THERAPEUTIC EXERCISES: CPT | Performed by: PHYSICAL THERAPIST

## 2021-01-06 PROCEDURE — 97112 NEUROMUSCULAR REEDUCATION: CPT | Performed by: PHYSICAL THERAPIST

## 2021-01-06 PROCEDURE — 97162 PT EVAL MOD COMPLEX 30 MIN: CPT | Performed by: PHYSICAL THERAPIST

## 2021-01-06 NOTE — PROGRESS NOTES
PT Evaluation     Today's date: 2021  Patient name: Aden Marie  : 1945  MRN: [id number]  Referring provider: Cas Connolly DO  Dx:   Encounter Diagnosis     ICD-10-CM    1  Cervical radiculopathy  M54 12 Ambulatory referral to Physical Therapy   2  Neck tightness  R29 898 Ambulatory referral to Physical Therapy                  Assessment  Assessment details: Pt presents with signs and symptoms synonymous of admitting diagnosis as well as mechanical diagnosis of Ext preference  Pt presents with pain, decreased strength, decreased range, decreased joint mobility, flexibility, as well as tolerance to activity and postural awareness  Pt would benefit skilled PT intervention in order to address these impairments in order to be able to perform all desired activities with minimal to nil symptom exacerbation  If they fail to find improvement over the next 3-4 weeks they are already scheduled for follow up with Dr Temple    Thank you very much for this referral      Impairments: abnormal or restricted ROM, activity intolerance, impaired physical strength, lacks appropriate home exercise program, pain with function, poor posture  and poor body mechanics  Understanding of Dx/Px/POC: good   Prognosis: good    Goals  STG 4 Weeks:  Decrease pain at worst to 3  Improve range to improve all planes by 5 CS  Improve strength to DNF 20", shoulder to 4 or better on R  Independent with HEP  LTG 8 Weeks:  Decrease pain at worst to 1  Improve range to by 10 all planes CS  Improve strength to DNF 30"  Able to perform all desired activities with minimal to nil symptom exacerbation      Plan  Patient would benefit from: skilled physical therapy  Planned modality interventions: cryotherapy, TENS and thermotherapy: hydrocollator packs  Planned therapy interventions: abdominal trunk stabilization, joint mobilization, manual therapy, neuromuscular re-education, patient education, postural training, strengthening, stretching, therapeutic activities, therapeutic exercise, transfer training, home exercise program, graded motor, graded exercise, graded activity, gait training, flexibility, functional ROM exercises and fine motor coordination training  Frequency: 2x week  Duration in weeks: 10  Treatment plan discussed with: patient        Subjective Evaluation    History of Present Illness  Date of onset: 2021  Mechanism of injury: Pt is an RHD who presents today stating that has had a long history of neck pain, but it has significantly enhanced over the last year or so  Pt reports it began in the R side of his neck and down to the top of his R shoulder blade  Pt reports that it does travel beyond this  He denies numbness or tingling into his hands  Pt denies change in bowel or bladder  Pt reports that it can hinder going to sleep  He states looking to the L will cause most grief, if he is side or belly sleeping looking R, he is okay, but belly looking L is impaired  Pt reports that it has awakened him at night  Pt reports that there periods of time at a base line 1-2 annoyance, but at worst can get up to a 5/10 ache, and this is usually occurring with looking L, but can occur with R, this impedes his golf game as well  Pt reports meeting with Dr Malika Benz on 2020, who took imagery which evident of compression of spine as well as degenerative properties  Indicated that trial of PT was an option as well as medicine which pt denies using  Pt reports goals are to alleviate pain, improve range in order to play golf, drive and sleep more comfortably  Follows up with 2021     Quality of life: good    Pain  Current pain ratin  At worst pain ratin  Quality: dull ache and sharp  Relieving factors: change in position and rest  Aggravating factors: overhead activity and lifting (turning head  )  Progression: worsening      Diagnostic Tests  X-ray: normal  Treatments  Previous treatment: medication  Patient Goals  Patient goals for therapy: decreased pain, increased motion, increased strength and return to sport/leisure activities          Objective     Active Range of Motion   Cervical/Thoracic Spine       Cervical    Flexion: 50 degrees   Extension: 35 degrees     with pain  Left lateral flexion: 30 degrees     with pain  Right lateral flexion: 20 degrees     with pain  Left rotation: 65 degrees with pain  Right rotation: 60 degrees    with pain    Additional Active Range of Motion Details  Forward head, rounded shoulders  B/L Sensation intact to light touch C3,4,5,6,7,8,T1,T2  Postural correction R neck, same  Shoulder strength  L Flex 5 Abd 5 ER 5 IR 5  R Flex 4-, Abd 5 ER 4- IR 5   UE screen beyond shoulder strong and painless  Repeated motion   Flex  Retraction R neck  R side better, L side Worse  Same  Same  CS retract OP  R better, L same  Better   (all planes improved by 5 or more)  Ext  SB R  SB L  Joint Mobility: G1 C2-4, G1 C5-7  Palpation: pain with R C3-4  STs  DN11"  Sharp Maria T (-) Alar Lig intact  Precautions: Hx of R shoulder Skin CA  Manuals 1/6            PA C2-7 G2-4                                                    Neuro Re-Ed             Postural Ed 10 min            SA Punch             Tband Row + ext   GTB          Tband Postural    RTB          Scaption with Postural Awareness                                       Ther Ex             CS retraction  10 x 4            CS retract OP 10 x             CS op progression? Scap Add             Supine Retraction             Snags (towel) B                                       Ther Activity                          Golf Putting Review             Gait Training                                       Modalities             HP/CP prn to Neck

## 2021-01-07 ENCOUNTER — TELEPHONE (OUTPATIENT)
Dept: OBGYN CLINIC | Facility: HOSPITAL | Age: 76
End: 2021-01-07

## 2021-01-07 ENCOUNTER — OFFICE VISIT (OUTPATIENT)
Dept: PHYSICAL THERAPY | Facility: CLINIC | Age: 76
End: 2021-01-07
Payer: MEDICARE

## 2021-01-07 DIAGNOSIS — M54.12 CERVICAL RADICULOPATHY: Primary | ICD-10-CM

## 2021-01-07 DIAGNOSIS — R29.898 NECK TIGHTNESS: ICD-10-CM

## 2021-01-07 PROCEDURE — 97110 THERAPEUTIC EXERCISES: CPT | Performed by: PHYSICAL THERAPIST

## 2021-01-07 PROCEDURE — 97112 NEUROMUSCULAR REEDUCATION: CPT | Performed by: PHYSICAL THERAPIST

## 2021-01-07 PROCEDURE — 97140 MANUAL THERAPY 1/> REGIONS: CPT | Performed by: PHYSICAL THERAPIST

## 2021-01-07 NOTE — TELEPHONE ENCOUNTER
Patient called and left a voicemail to reschedule his 1/22 [place] Kuldeep Moreira   [facility] [place]

## 2021-01-12 ENCOUNTER — OFFICE VISIT (OUTPATIENT)
Dept: PHYSICAL THERAPY | Facility: CLINIC | Age: 76
End: 2021-01-12
Payer: MEDICARE

## 2021-01-12 DIAGNOSIS — M54.12 CERVICAL RADICULOPATHY: Primary | ICD-10-CM

## 2021-01-12 DIAGNOSIS — R29.898 NECK TIGHTNESS: ICD-10-CM

## 2021-01-12 PROCEDURE — 97140 MANUAL THERAPY 1/> REGIONS: CPT | Performed by: PHYSICAL THERAPIST

## 2021-01-12 PROCEDURE — 97112 NEUROMUSCULAR REEDUCATION: CPT

## 2021-01-12 PROCEDURE — 97110 THERAPEUTIC EXERCISES: CPT

## 2021-01-12 NOTE — PROGRESS NOTES
Daily Note     Today's date: 2021  Patient name: Romana Just  : 1945  MRN: [id number]  Referring provider: Erik Nicholson DO  Dx:   Encounter Diagnosis     ICD-10-CM    1  Cervical radiculopathy  M54 12    2  Neck tightness  R29 898                   Subjective: Patient denies radiating symptoms into R shoulder blade however tightness remains  Objective: See treatment diary below      Assessment: Tolerated treatment fair  Patient exhibited good technique with therapeutic exercises      Plan: Continue per plan of care  Precautions: Hx of R shoulder Skin CA  Manuals           PA C2-7 G2-4  10  10 - TS                                                 Neuro Re-Ed             Postural Ed 10 min            SA Punch  2 x 10 2 x 10           Tband Row + ext   GTB 2 x 10           Tband Postural    RTB 2 x 10          Scaption with Postural Awareness  2 x 10                                      Ther Ex             CS retraction  10 x 4 10 x 4 2 x 10           CS retract OP 10 x  10 x 1 1 x 10           CS op progression? Scap Add  10" x 10 10"x  10          Supine Retraction  2 x 10 2 x 10           Snags (towel) B  6" x 10 6" x 10                                     Ther Activity                          Golf Putting Review             Gait Training                                       Modalities             HP/CP prn to Neck     HP x 10 min to CS declined

## 2021-01-14 ENCOUNTER — OFFICE VISIT (OUTPATIENT)
Dept: PHYSICAL THERAPY | Facility: CLINIC | Age: 76
End: 2021-01-14
Payer: MEDICARE

## 2021-01-14 DIAGNOSIS — M54.12 CERVICAL RADICULOPATHY: Primary | ICD-10-CM

## 2021-01-14 DIAGNOSIS — R29.898 NECK TIGHTNESS: ICD-10-CM

## 2021-01-14 PROCEDURE — 97110 THERAPEUTIC EXERCISES: CPT | Performed by: PHYSICAL THERAPIST

## 2021-01-14 PROCEDURE — 97140 MANUAL THERAPY 1/> REGIONS: CPT | Performed by: PHYSICAL THERAPIST

## 2021-01-14 PROCEDURE — 97530 THERAPEUTIC ACTIVITIES: CPT | Performed by: PHYSICAL THERAPIST

## 2021-01-14 NOTE — PROGRESS NOTES
Daily Note     Today's date: 2021  Patient name: Kevin Ruelas  : 1945  MRN: [id number]  Referring provider: Charito Wang DO  Dx:   Encounter Diagnosis     ICD-10-CM    1  Cervical radiculopathy  M54 12    2  Neck tightness  R29 898                   Subjective: Pt presents today stating he isn't 100% sure if he is improving, but today with putting trial looking L is improving  Objective: See treatment diary below      Assessment: Able to demonstrate golf putting with retractions and it assisted during task  Enhancing resistance without pain or challenge  Continue to progress as able  Plan: Continue per plan of care  Precautions: Hx of R shoulder Skin CA  Manuals          PA C2-7 G2-4  10  10 - TS 10 min                                                Neuro Re-Ed             Postural Ed 10 min            SA Punch  2 x 10 2 x 10  2 x 10         Tband Row + ext   GTB 2 x 10  Blue 2 x 10         Tband Postural    RTB 2 x 10 GTB 2 x10         Scaption with Postural Awareness  2 x 10   2 x 10                                   Ther Ex             CS retraction  10 x 4 10 x 4 2 x 10  2 x 10         CS retract OP 10 x  10 x 1 1 x 10  1 x 10         CS op progression? Scap Add  10" x 10 10"x  10 10" x 10         Supine Retraction  2 x 10 2 x 10  2 x 10         Snags (towel) B  6" x 10 6" x 10  6" x 10                                   Ther Activity                          Golf Putting Review    5 x 10 retraction pre         Gait Training                                       Modalities             HP/CP prn to Neck     HP x 10 min to CS declined

## 2021-01-19 ENCOUNTER — OFFICE VISIT (OUTPATIENT)
Dept: PHYSICAL THERAPY | Facility: CLINIC | Age: 76
End: 2021-01-19
Payer: MEDICARE

## 2021-01-19 DIAGNOSIS — M54.12 CERVICAL RADICULOPATHY: Primary | ICD-10-CM

## 2021-01-19 DIAGNOSIS — R29.898 NECK TIGHTNESS: ICD-10-CM

## 2021-01-19 PROCEDURE — 97140 MANUAL THERAPY 1/> REGIONS: CPT | Performed by: PHYSICAL THERAPIST

## 2021-01-19 PROCEDURE — 97110 THERAPEUTIC EXERCISES: CPT

## 2021-01-19 NOTE — PROGRESS NOTES
Daily Note     Today's date: 2021  Patient name: Rafael Solis  : 1945  MRN: [id number]  Referring provider: Frank Guevara DO  Dx:   Encounter Diagnosis     ICD-10-CM    1  Cervical radiculopathy  M54 12    2  Neck tightness  R29 898                   Subjective: patient states that he has noticed an increase in pain and some difficulty sleeping over the last few days  Altered HEP to hold towel SNAGS due to pain   ROM has improved per TS, DPT since IE  Objective: See treatment diary below      Assessment: Tolerated treatment fair  Patient held several exercises during today's treatment until symptoms return to base line and will resume as able  Plan: Continue per plan of care  Precautions: Hx of R shoulder Skin CA  Manuals         PA C2-7 G2-4  10  10 - TS 10 min no        L SG C2-7 G2-4     6- TS        Distraction G4     4- TS                     Neuro Re-Ed             Postural Ed 10 min            SA Punch  2 x 10 2 x 10  2 x 10 2 x 10         Tband Row + ext   GTB 2 x 10  Blue 2 x 10 BTB 2 x 10         Tband Postural    RTB 2 x 10 GTB 2 x10 NV        Scaption with Postural Awareness  2 x 10   2 x 10 NV                                  Ther Ex             CS retraction  10 x 4 10 x 4 2 x 10  2 x 10 2x10        CS retract OP 10 x  10 x 1 1 x 10  1 x 10 NV        CS op progression? Scap Add  10" x 10 10"x  10 10" x 10 10"x10        Supine Retraction  2 x 10 2 x 10  2 x 10 NV        Snags (towel) B  6" x 10 6" x 10  6" x 10 NV                                  Ther Activity                          Golf Putting Review    5 x 10 retraction pre held        Gait Training                                       Modalities             HP/CP prn to Neck     HP x 10 min to CS declined  10

## 2021-01-21 ENCOUNTER — APPOINTMENT (OUTPATIENT)
Dept: PHYSICAL THERAPY | Facility: CLINIC | Age: 76
End: 2021-01-21
Payer: MEDICARE

## 2021-01-22 ENCOUNTER — OFFICE VISIT (OUTPATIENT)
Dept: PHYSICAL THERAPY | Facility: CLINIC | Age: 76
End: 2021-01-22
Payer: MEDICARE

## 2021-01-22 DIAGNOSIS — R29.898 NECK TIGHTNESS: ICD-10-CM

## 2021-01-22 DIAGNOSIS — M54.12 CERVICAL RADICULOPATHY: Primary | ICD-10-CM

## 2021-01-22 PROCEDURE — 97112 NEUROMUSCULAR REEDUCATION: CPT

## 2021-01-22 PROCEDURE — 97140 MANUAL THERAPY 1/> REGIONS: CPT

## 2021-01-22 PROCEDURE — 97110 THERAPEUTIC EXERCISES: CPT

## 2021-01-22 NOTE — PROGRESS NOTES
Daily Note     Today's date: 2021  Patient name: Gaurav Zeng  : 1945  MRN: [id number]  Referring provider: Jostin Moreno DO  Dx:   Encounter Diagnosis     ICD-10-CM    1  Cervical radiculopathy  M54 12    2  Neck tightness  R29 898        Start Time: 930  Stop Time: 1015  Total time in clinic (min): 45 minutes    Subjective: Pt reports he has seen improvement since adjusting his HEP  Objective: See treatment diary below      Assessment: Tolerated treatment fair  Patient tolerated exercises well, was given tband for HEP  No complaints post session  Plan: Continue per plan of care  Precautions: Hx of R shoulder Skin CA  Manuals        PA C2-7 G2-4  10  10 - TS 10 min no        L SG C2-7 G2-4     6- TS 6' RH       Distraction G4     4- TS 4' RH                    Neuro Re-Ed             Postural Ed 10 min            SA Punch  2 x 10 2 x 10  2 x 10 2 x 10  2x10       Tband Row + ext   GTB 2 x 10  Blue 2 x 10 BTB 2 x 10  BTB 2 x 10        Tband Postural    RTB 2 x 10 GTB 2 x10 NV GTB 2x10       Scaption with Postural Awareness  2 x 10   2 x 10 NV 2x10                                 Ther Ex             CS retraction  10 x 4 10 x 4 2 x 10  2 x 10 2x10 2x10       CS retract OP 10 x  10 x 1 1 x 10  1 x 10 NV 10x       CS op progression? Scap Add  10" x 10 10"x  10 10" x 10 10"x10 10"x10       Supine Retraction  2 x 10 2 x 10  2 x 10 NV 2x10  10x w/ lift       Snags (towel) B  6" x 10 6" x 10  6" x 10 NV hold                                 Ther Activity                          Golf Putting Review    5 x 10 retraction pre held        Gait Training                                       Modalities             HP/CP prn to Neck     HP x 10 min to CS declined  10  10 seated CS

## 2021-01-26 ENCOUNTER — OFFICE VISIT (OUTPATIENT)
Dept: PHYSICAL THERAPY | Facility: CLINIC | Age: 76
End: 2021-01-26
Payer: MEDICARE

## 2021-01-26 DIAGNOSIS — R29.898 NECK TIGHTNESS: ICD-10-CM

## 2021-01-26 DIAGNOSIS — M54.12 CERVICAL RADICULOPATHY: Primary | ICD-10-CM

## 2021-01-26 PROCEDURE — 97110 THERAPEUTIC EXERCISES: CPT

## 2021-01-26 PROCEDURE — 97112 NEUROMUSCULAR REEDUCATION: CPT

## 2021-01-26 PROCEDURE — 97140 MANUAL THERAPY 1/> REGIONS: CPT | Performed by: PHYSICAL THERAPIST

## 2021-01-26 NOTE — PROGRESS NOTES
Daily Note     Today's date: 2021  Patient name: Romana Just  : 1945  MRN: [id number]  Referring provider: Erik Nicholson,   Dx:   Encounter Diagnosis     ICD-10-CM    1  Cervical radiculopathy  M54 12    2  Neck tightness  R29 898        Start Time: 0900  Stop Time: 1003  Total time in clinic (min): 63 minutes  TS manuals [id number]      Subjective: Pt denies radicular symptoms prior to session  Pt notes that he did not get sore after last PT session  He notices "a little tightness" in his R c-spine before session  Objective: See treatment diary below      Assessment: Tolerated treatment well  No increase in symptoms throughout session  Does find relief post manuals  Plan: Continue per plan of care  Precautions: Hx of R shoulder Skin CA  Manuals       PA C2-7 G2-4  10  10 - TS 10 min no        L SG C2-7 G2-4     6- TS 6' RH TS      Distraction G4     4- TS 4' RH TS                   Neuro Re-Ed             Postural Ed 10 min            SA Punch  2 x 10 2 x 10  2 x 10 2 x 10  2x10 4x10      Tband Row + ext   GTB 2 x 10  Blue 2 x 10 BTB 2 x 10  BTB 2 x 10  BTB 2x10      Tband Postural    RTB 2 x 10 GTB 2 x10 NV GTB 2x10 GTB 2x10       Scaption with Postural Awareness  2 x 10   2 x 10 NV 2x10 2x10                                Ther Ex             CS retraction  10 x 4 10 x 4 2 x 10  2 x 10 2x10 2x10 2x10       CS retract OP 10 x  10 x 1 1 x 10  1 x 10 NV 10x 2x10       CS op progression? Scap Add  10" x 10 10"x  10 10" x 10 10"x10 10"x10 10x10"      Supine Retraction  2 x 10 2 x 10  2 x 10 NV 2x10  10x w/ lift 2x10  10x w/ lift      Snags (towel) B  6" x 10 6" x 10  6" x 10 NV hold                                 Ther Activity                          Golf Putting Review    5 x 10 retraction pre held        Gait Training                                       Modalities             HP/CP prn to Neck     HP x 10 min to CS declined  10  10 seated CS 10' seated

## 2021-01-28 ENCOUNTER — EVALUATION (OUTPATIENT)
Dept: PHYSICAL THERAPY | Facility: CLINIC | Age: 76
End: 2021-01-28
Payer: MEDICARE

## 2021-01-28 DIAGNOSIS — M54.12 CERVICAL RADICULOPATHY: Primary | ICD-10-CM

## 2021-01-28 DIAGNOSIS — R29.898 NECK TIGHTNESS: ICD-10-CM

## 2021-01-28 PROCEDURE — 97110 THERAPEUTIC EXERCISES: CPT | Performed by: PHYSICAL THERAPIST

## 2021-01-28 PROCEDURE — 97140 MANUAL THERAPY 1/> REGIONS: CPT | Performed by: PHYSICAL THERAPIST

## 2021-01-28 PROCEDURE — 97112 NEUROMUSCULAR REEDUCATION: CPT | Performed by: PHYSICAL THERAPIST

## 2021-01-28 NOTE — PROGRESS NOTES
PT Evaluation     Today's date: 2021  Patient name: Lance Albarado  : 1945  MRN: [id number]  Referring provider: Adrienne Frankel DO  Dx:   Encounter Diagnosis     ICD-10-CM    1  Cervical radiculopathy  M54 12    2  Neck tightness  R29 898                   Assessment  Assessment details: Pt is progressing fairly well at this time  They have demonstrated improvement in pain frequency, strength, range, flexibility as well as tolerance to activty  They have achieved all STGs sought out for them as well as by them with exception of pain, but this is less frequent than prior  They will benefit continued Skilled PT intervention in order to achieve all LTGs sought out for them as well as by them in order to perform all desired activities with minimal to nil symptom exacerbation    Thank you very much for this kind and motivated referral         Impairments: abnormal or restricted ROM, activity intolerance, impaired physical strength, lacks appropriate home exercise program, pain with function, poor posture  and poor body mechanics  Understanding of Dx/Px/POC: good   Prognosis: good    Goals  STG 4 Weeks:  Decrease pain at worst to 3 -not  Improve range to improve all planes by 5 CS -met  Improve strength to DNF 20", shoulder to 4 or better on R -met  Independent with HEP -met  LTG 8 Weeks:  Decrease pain at worst to 1  Improve range to by 10 all planes CS  Improve strength to DNF 30"  Able to perform all desired activities with minimal to nil symptom exacerbation      Plan  Patient would benefit from: skilled physical therapy  Planned modality interventions: cryotherapy, TENS and thermotherapy: hydrocollator packs  Planned therapy interventions: abdominal trunk stabilization, joint mobilization, manual therapy, neuromuscular re-education, patient education, postural training, strengthening, stretching, therapeutic activities, therapeutic exercise, transfer training, home exercise program, graded motor, graded exercise, graded activity, gait training, flexibility, functional ROM exercises and fine motor coordination training  Frequency: 2x week  Duration in weeks: 10  Treatment plan discussed with: patient        Subjective Evaluation    History of Present Illness  Date of onset: 2021  Mechanism of injury: Pt reports that as a whole he seems like he is getting better  Pt reports that he is improving his stiffness but at worst still in the AM, but once he is warmed up, perform HEP, and shower he feels as though his range is improving  Pt reports that he still is waking up at night due to stiffness/pain but still not having difficulty falling asleep  Pt reports driving has improved with looking behind, but still compensating a bit with [place]  Pt reports there is still a 1/10 baseline soreness  He reports at worst is a 5-6/10, but less frequent  Pt reports doesn't go out as far into the R shoulder as much either  Pt reports back to Dr Darell Canales on 2021  Pt's main goals are still to be able to monitor putting without pain      Quality of life: good    Pain  Current pain ratin  At worst pain ratin  Quality: dull ache and sharp  Relieving factors: change in position and rest  Aggravating factors: overhead activity and lifting (turning head  )  Progression: worsening      Diagnostic Tests  X-ray: normal  Treatments  Previous treatment: medication  Patient Goals  Patient goals for therapy: decreased pain, increased motion, increased strength and return to sport/leisure activities          Objective     Active Range of Motion   Cervical/Thoracic Spine       Cervical    Flexion: 60 degrees   Extension: 45 degrees      Left lateral flexion: 40 degrees      Right lateral flexion: 35 degrees      Left rotation: 75 degrees with pain  Right rotation: 70 degrees    with pain    Additional Active Range of Motion Details  Forward head, rounded shoulders  B/L Sensation intact to light touch C3,4,5,6,7,8,T1,T2  Postural correction R neck, same  Shoulder strength  L Flex 5 Abd 5 ER 5 IR 5  R Flex 4, Abd 5 ER 5 IR 5   UE screen beyond shoulder strong and painless  Repeated motion   Flex  Retraction R neck  R side better, L side Worse  Same  Same  CS retract OP  R better, L same  Better   (all planes improved by 5 or more)  -remains  Ext  SB R  SB L  Joint Mobility: G2 C2-4, G2 C5-7  Palpation: pain with R C3-4  STs  DNF 20"  Sharp Maria T (-) Alar Lig intact  Precautions: Hx of R shoulder Skin CA  Manuals 1/6 1/7 1/12 1/14 1/19 1/22 01/26 1/28     PA C2-7 G2-4  10  10 - TS 10 min no        L SG C2-7 G2-4     6- TS 6' RH TS 5     Distraction G4     4- TS 4' RH TS 5                   Neuro Re-Ed             Postural Ed 10 min            SA Punch  2 x 10 2 x 10  2 x 10 2 x 10  2x10 4x10 4 x 10     Tband Row + ext   GTB 2 x 10  Blue 2 x 10 BTB 2 x 10  BTB 2 x 10  BTB 2x10 Stevan 3 x 10      Tband Postural    RTB 2 x 10 GTB 2 x10 NV GTB 2x10 GTB 2x10  GTB 3 x 10     Scaption with Postural Awareness  2 x 10   2 x 10 NV 2x10 2x10 2  x10                               Ther Ex             CS retraction  10 x 4 10 x 4 2 x 10  2 x 10 2x10 2x10 2x10  2 x 10      CS retract OP 10 x  10 x 1 1 x 10  1 x 10 NV 10x 2x10  2 x10      CS op progression? Scap Add  10" x 10 10"x  10 10" x 10 10"x10 10"x10 10x10" 10" x 10      Supine Retraction  2 x 10 2 x 10  2 x 10 NV 2x10  10x w/ lift 2x10  10x w/ lift 2 x 10     Snags (towel) B  6" x 10 6" x 10  6" x 10 NV hold                                 Ther Activity                          Golf Putting Review    5 x 10 retraction pre held        Gait Training                                       Modalities             HP/CP prn to Neck     HP x 10 min to CS declined  10  10 seated CS 10' seated  10 min

## 2021-02-02 ENCOUNTER — APPOINTMENT (OUTPATIENT)
Dept: PHYSICAL THERAPY | Facility: CLINIC | Age: 76
End: 2021-02-02
Payer: MEDICARE

## 2021-02-04 ENCOUNTER — OFFICE VISIT (OUTPATIENT)
Dept: PHYSICAL THERAPY | Facility: CLINIC | Age: 76
End: 2021-02-04
Payer: MEDICARE

## 2021-02-04 DIAGNOSIS — M54.12 CERVICAL RADICULOPATHY: Primary | ICD-10-CM

## 2021-02-04 DIAGNOSIS — R29.898 NECK TIGHTNESS: ICD-10-CM

## 2021-02-04 PROCEDURE — 97140 MANUAL THERAPY 1/> REGIONS: CPT | Performed by: PHYSICAL THERAPIST

## 2021-02-04 PROCEDURE — 97110 THERAPEUTIC EXERCISES: CPT | Performed by: PHYSICAL THERAPIST

## 2021-02-04 NOTE — PROGRESS NOTES
Daily Note     Today's date: 2021  Patient name: Fortunato Limon  : 1945  MRN: [id number]  Referring provider: Jorge Alberts DO  Dx:   Encounter Diagnosis     ICD-10-CM    1  Cervical radiculopathy  M54 12    2  Neck tightness  R29 898                   Subjective: Pt feels ROM but sleeping/driving remain challenging      Objective: See treatment diary below      Assessment: Tolerated treatment well  Patient would benefit from continued PT      Plan: Continue per plan of care  Precautions: Hx of R shoulder Skin CA  5      Manuals /4    PA C2-7 G2-4  10  10 - TS 10 min no        L SG C2-7 G2-4     6- TS 6' RH TS 5 5    Distraction G4     4- TS 4' RH TS 5  5                 Neuro Re-Ed             Postural Ed 10 min            SA Punch  2 x 10 2 x 10  2 x 10 2 x 10  2x10 4x10 4 x 10 40 2#     Tband Row + ext   GTB 2 x 10  Blue 2 x 10 BTB 2 x 10  BTB 2 x 10  BTB 2x10 Stevan 3 x 10  Blue :03 20    Tband Postural    RTB 2 x 10 GTB 2 x10 NV GTB 2x10 GTB 2x10  GTB 3 x 10 Blue :03 20    Scaption with Postural Awareness  2 x 10   2 x 10 NV 2x10 2x10 2  x10 20                              Ther Ex             CS retraction  10 x 4 10 x 4 2 x 10  2 x 10 2x10 2x10 2x10  2 x 10  20    CS retract OP 10 x  10 x 1 1 x 10  1 x 10 NV 10x 2x10  2 x10  20    CS op progression? Ret/ext seated 20    Scap Add  10" x 10 10"x  10 10" x 10 10"x10 10"x10 10x10" 10" x 10  :10/10    Supine Retraction  2 x 10 2 x 10  2 x 10 NV 2x10  10x w/ lift 2x10  10x w/ lift 2 x 10 20x    Snags (towel) B  6" x 10 6" x 10  6" x 10 NV hold                                 Ther Activity                          Golf Putting Review    5 x 10 retraction pre held        Gait Training                                       Modalities             HP/CP prn to Neck     HP x 10 min to CS declined  10  10 seated CS 10' seated  10 min

## 2021-02-09 ENCOUNTER — OFFICE VISIT (OUTPATIENT)
Dept: OBGYN CLINIC | Facility: CLINIC | Age: 76
End: 2021-02-09
Payer: MEDICARE

## 2021-02-09 VITALS
HEART RATE: 86 BPM | DIASTOLIC BLOOD PRESSURE: 69 MMHG | BODY MASS INDEX: 25.05 KG/M2 | SYSTOLIC BLOOD PRESSURE: 114 MMHG | HEIGHT: 70 IN | WEIGHT: 175 LBS

## 2021-02-09 DIAGNOSIS — M54.12 CERVICAL RADICULOPATHY: Primary | Chronic | ICD-10-CM

## 2021-02-09 PROCEDURE — 99214 OFFICE O/P EST MOD 30 MIN: CPT | Performed by: PHYSICAL MEDICINE & REHABILITATION

## 2021-02-09 RX ORDER — METHYLPREDNISOLONE 4 MG/1
TABLET ORAL
Qty: 1 EACH | Refills: 0 | Status: SHIPPED | OUTPATIENT
Start: 2021-02-09 | End: 2021-03-09

## 2021-02-09 NOTE — PROGRESS NOTES
1  Cervical radiculopathy  methylPREDNISolone 4 MG tablet therapy pack    MRI cervical spine wo contrast     Orders Placed This Encounter   Procedures    MRI cervical spine wo contrast        Imaging Studies (I personally reviewed images in PACS and report if it was available):  Cervical spine x-rays that are most recent to this encounter were reviewed  These images show C5-6 disc space narrowing with anterior osteophytosis  There is slight retrolisthesis of C5 on 6  There is slight loss of cervical lordosis  No acute osseous abnormalities      Impression:  The patient's pain is multifactorial and is predominantly due to paraspinal spasticity with cervical spondylosis as above and postural/core instability  There are no concerning neurological deficits      We discussed different treatment options and decided to proceed with naproxen and diclofenac gel  Patient presents in follow up after weeks of PT  He continues to have pain that affects his ADL's  I have prescribed him a medrol dose pack  I will order an MRI  I will see him back afterwards  No follow-ups on file  HPI:  Rick Martínez is a 70 y o  male  who presents in follow up  Here for   Chief Complaint   Patient presents with    Follow-up       Date of injury: Chronic pain for over 30 years  Trajectory of symptoms: Feels 30% improved after going through weeks of physical therapy  Review of Systems   Constitutional: Positive for activity change  Negative for fever  HENT: Negative for trouble swallowing  Eyes: Negative for visual disturbance  Respiratory: Negative for shortness of breath  Cardiovascular: Negative for chest pain  Gastrointestinal: Negative for nausea  Endocrine: Negative for polydipsia  Genitourinary: Negative for difficulty urinating  Musculoskeletal: Positive for arthralgias, neck pain and neck stiffness  Negative for gait problem  Skin: Negative for rash     Allergic/Immunologic: Negative for immunocompromised state  Neurological: Negative for dizziness  Hematological: Does not bruise/bleed easily  Psychiatric/Behavioral: Negative for decreased concentration  Following history reviewed and updated:  Past Medical History:   Diagnosis Date    Basal cell carcinoma of shoulder     last assessed: 08/28/2014    Depression     last assessed: 08/26/2015    Subconjunctival hemorrhage of left eye     last assessed: 01/14/2016     Past Surgical History:   Procedure Laterality Date    HERNIA REPAIR       Social History   Social History     Substance and Sexual Activity   Alcohol Use Yes    Comment: Social     Social History     Substance and Sexual Activity   Drug Use No     Social History     Tobacco Use   Smoking Status Former Smoker   Smokeless Tobacco Never Used   Tobacco Comment    years ago per patient, not any more     Family History   Problem Relation Age of Onset    Heart disease Mother         cardiac disorder    Kidney disease Mother     Prostate cancer Father      No Known Allergies     Constitutional:  /69   Pulse 86   Ht 5' 10" (1 778 m)   Wt 79 4 kg (175 lb)   BMI 25 11 kg/m²    General: NAD  Eyes: Clear sclerae  ENT: No inflammation, lesion, or mass of lips  No tracheal deviation  Musculoskeletal: As mentioned below  Integumentary: No visible rashes or skin lesions  Pulmonary/Chest: Effort normal  No respiratory distress  Neuro: CN's grossly intact, RODRIGUEZ  Psych: Normal affect and judgement  Vascular: WWP  Back Exam     Tenderness   Back tenderness location: Right cervical paraspinals  Range of Motion   Extension: abnormal   Flexion: normal   Lateral bend right: abnormal   Lateral bend left: normal   Rotation right: normal   Rotation left: normal     Muscle Strength   The patient has normal back strength      Other   Sensation: normal  Erythema: no back redness  Scars: absent             Procedures

## 2021-02-13 DIAGNOSIS — Z23 ENCOUNTER FOR IMMUNIZATION: ICD-10-CM

## 2021-02-19 ENCOUNTER — HOSPITAL ENCOUNTER (OUTPATIENT)
Dept: MRI IMAGING | Facility: HOSPITAL | Age: 76
Discharge: HOME | End: 2021-02-19
Attending: PHYSICAL MEDICINE & REHABILITATION
Payer: MEDICARE

## 2021-02-19 DIAGNOSIS — M54.12 CERVICAL RADICULOPATHY: Chronic | ICD-10-CM

## 2021-02-19 PROCEDURE — G1004 CDSM NDSC: HCPCS

## 2021-02-19 PROCEDURE — 72141 MRI NECK SPINE W/O DYE: CPT

## 2021-02-24 NOTE — PROGRESS NOTES
PT Discharge    Today's date: 2021  Patient name: Lala Gallego  : 1945  MRN: [id number]  Referring provider: Jose Bauer DO  Dx:   Encounter Diagnosis     ICD-10-CM    1  Cervical radiculopathy  M54 12    2  Neck tightness  R29 898        Start Time: 930  Stop Time: 1000  Total time in clinic (min): 30 minutes    Assessment/Plan   Pt has not been present since 2021 Pt's chart will be DC in compliance of facility policy as all Charts are DC within 30 days of last scheduled visit        Subjective    Objective

## 2021-03-02 ENCOUNTER — TELEPHONE (OUTPATIENT)
Dept: OBGYN CLINIC | Facility: HOSPITAL | Age: 76
End: 2021-03-02

## 2021-03-02 ENCOUNTER — OFFICE VISIT (OUTPATIENT)
Dept: OBGYN CLINIC | Facility: CLINIC | Age: 76
End: 2021-03-02
Payer: MEDICARE

## 2021-03-02 ENCOUNTER — LAB (OUTPATIENT)
Dept: LAB | Age: 76
End: 2021-03-02
Payer: MEDICARE

## 2021-03-02 VITALS
SYSTOLIC BLOOD PRESSURE: 109 MMHG | WEIGHT: 173.4 LBS | BODY MASS INDEX: 24.82 KG/M2 | HEIGHT: 70 IN | HEART RATE: 69 BPM | DIASTOLIC BLOOD PRESSURE: 71 MMHG

## 2021-03-02 DIAGNOSIS — K21.9 GASTROESOPHAGEAL REFLUX DISEASE: ICD-10-CM

## 2021-03-02 DIAGNOSIS — Z13.1 SCREENING FOR DIABETES MELLITUS: ICD-10-CM

## 2021-03-02 DIAGNOSIS — Z13.29 SCREENING FOR THYROID DISORDER: ICD-10-CM

## 2021-03-02 DIAGNOSIS — E78.5 HYPERLIPIDEMIA, UNSPECIFIED HYPERLIPIDEMIA TYPE: ICD-10-CM

## 2021-03-02 DIAGNOSIS — M79.18 MYOFASCIAL PAIN SYNDROME, CERVICAL: ICD-10-CM

## 2021-03-02 DIAGNOSIS — M47.812 FACET HYPERTROPHY OF CERVICAL REGION: Primary | ICD-10-CM

## 2021-03-02 DIAGNOSIS — R73.9 HYPERGLYCEMIA, UNSPECIFIED: ICD-10-CM

## 2021-03-02 DIAGNOSIS — M54.12 CERVICAL RADICULOPATHY: Chronic | ICD-10-CM

## 2021-03-02 DIAGNOSIS — N40.0 BENIGN PROSTATIC HYPERPLASIA WITHOUT LOWER URINARY TRACT SYMPTOMS: ICD-10-CM

## 2021-03-02 LAB
ALBUMIN SERPL BCP-MCNC: 3.5 G/DL (ref 3.5–5)
ALP SERPL-CCNC: 59 U/L (ref 46–116)
ALT SERPL W P-5'-P-CCNC: 22 U/L (ref 12–78)
ANION GAP SERPL CALCULATED.3IONS-SCNC: 4 MMOL/L (ref 4–13)
AST SERPL W P-5'-P-CCNC: 14 U/L (ref 5–45)
BASOPHILS # BLD AUTO: 0.08 THOUSANDS/ΜL (ref 0–0.1)
BASOPHILS NFR BLD AUTO: 1 % (ref 0–1)
BILIRUB SERPL-MCNC: 0.79 MG/DL (ref 0.2–1)
BUN SERPL-MCNC: 21 MG/DL (ref 5–25)
CALCIUM SERPL-MCNC: 9.4 MG/DL (ref 8.3–10.1)
CHLORIDE SERPL-SCNC: 110 MMOL/L (ref 100–108)
CHOLEST SERPL-MCNC: 181 MG/DL (ref 50–200)
CO2 SERPL-SCNC: 30 MMOL/L (ref 21–32)
CREAT SERPL-MCNC: 1.03 MG/DL (ref 0.6–1.3)
EOSINOPHIL # BLD AUTO: 0.46 THOUSAND/ΜL (ref 0–0.61)
EOSINOPHIL NFR BLD AUTO: 8 % (ref 0–6)
ERYTHROCYTE [DISTWIDTH] IN BLOOD BY AUTOMATED COUNT: 13.5 % (ref 11.6–15.1)
EST. AVERAGE GLUCOSE BLD GHB EST-MCNC: 103 MG/DL
GFR SERPL CREATININE-BSD FRML MDRD: 72 ML/MIN/1.73SQ M
GLUCOSE P FAST SERPL-MCNC: 94 MG/DL (ref 65–99)
HBA1C MFR BLD: 5.2 %
HCT VFR BLD AUTO: 47.6 % (ref 36.5–49.3)
HDLC SERPL-MCNC: 53 MG/DL
HGB BLD-MCNC: 15.4 G/DL (ref 12–17)
IMM GRANULOCYTES # BLD AUTO: 0.02 THOUSAND/UL (ref 0–0.2)
IMM GRANULOCYTES NFR BLD AUTO: 0 % (ref 0–2)
LDLC SERPL CALC-MCNC: 104 MG/DL (ref 0–100)
LYMPHOCYTES # BLD AUTO: 1.64 THOUSANDS/ΜL (ref 0.6–4.47)
LYMPHOCYTES NFR BLD AUTO: 28 % (ref 14–44)
MCH RBC QN AUTO: 30.8 PG (ref 26.8–34.3)
MCHC RBC AUTO-ENTMCNC: 32.4 G/DL (ref 31.4–37.4)
MCV RBC AUTO: 95 FL (ref 82–98)
MONOCYTES # BLD AUTO: 0.53 THOUSAND/ΜL (ref 0.17–1.22)
MONOCYTES NFR BLD AUTO: 9 % (ref 4–12)
NEUTROPHILS # BLD AUTO: 3.12 THOUSANDS/ΜL (ref 1.85–7.62)
NEUTS SEG NFR BLD AUTO: 54 % (ref 43–75)
NRBC BLD AUTO-RTO: 0 /100 WBCS
PLATELET # BLD AUTO: 165 THOUSANDS/UL (ref 149–390)
PMV BLD AUTO: 9.9 FL (ref 8.9–12.7)
POTASSIUM SERPL-SCNC: 4 MMOL/L (ref 3.5–5.3)
PROT SERPL-MCNC: 6.4 G/DL (ref 6.4–8.2)
RBC # BLD AUTO: 5 MILLION/UL (ref 3.88–5.62)
SODIUM SERPL-SCNC: 144 MMOL/L (ref 136–145)
TRIGL SERPL-MCNC: 118 MG/DL
TSH SERPL DL<=0.05 MIU/L-ACNC: 2.84 UIU/ML (ref 0.36–3.74)
WBC # BLD AUTO: 5.85 THOUSAND/UL (ref 4.31–10.16)

## 2021-03-02 PROCEDURE — 85025 COMPLETE CBC W/AUTO DIFF WBC: CPT

## 2021-03-02 PROCEDURE — 80061 LIPID PANEL: CPT

## 2021-03-02 PROCEDURE — 99214 OFFICE O/P EST MOD 30 MIN: CPT | Performed by: PHYSICAL MEDICINE & REHABILITATION

## 2021-03-02 PROCEDURE — 80053 COMPREHEN METABOLIC PANEL: CPT

## 2021-03-02 PROCEDURE — 84443 ASSAY THYROID STIM HORMONE: CPT

## 2021-03-02 PROCEDURE — 36415 COLL VENOUS BLD VENIPUNCTURE: CPT

## 2021-03-02 PROCEDURE — 83036 HEMOGLOBIN GLYCOSYLATED A1C: CPT

## 2021-03-02 RX ORDER — MELOXICAM 15 MG/1
15 TABLET ORAL DAILY PRN
Qty: 60 TABLET | Refills: 0 | Status: SHIPPED | OUTPATIENT
Start: 2021-03-02 | End: 2021-03-26

## 2021-03-02 NOTE — PROGRESS NOTES
1  Facet hypertrophy of cervical region  meloxicam (MOBIC) 15 mg tablet    Ambulatory referral to Pain Management   2  Cervical radiculopathy  meloxicam (MOBIC) 15 mg tablet   3  Myofascial pain syndrome, cervical  meloxicam (MOBIC) 15 mg tablet    Ambulatory referral to Pain Management     Orders Placed This Encounter   Procedures    Ambulatory referral to Pain Management      Impression:  Patient is here in follow up of neck pain that is multifactorial and is predominantly due to paraspinal spasticity with cervical spondylosis as above and postural/core instability   There are no concerning neurological deficits  We reviewed his MRI today  Patient has been through PT, steroids/NSAID's but continues to have neck pain that is consistent with facet hypertrophy and myofascial pain on the right side  We discussed different treatment options and decided to proceed with pain management (possible MBB's/ablation)  We discussed the risks of meloxicam and decided to proceed with it  RTC PRN  No follow-ups on file  HPI:  Kenya Finley is a 70 y o  male  who presents in follow up  Here for   Chief Complaint   Patient presents with    Follow-up     MRI REVIEW     Date of injury: Chronic pain for over 30 years  Trajectory of symptoms: No change  Review of Systems   Constitutional: Positive for activity change  Negative for fever  HENT: Negative for trouble swallowing  Eyes: Negative for visual disturbance  Respiratory: Negative for shortness of breath  Cardiovascular: Negative for chest pain  Gastrointestinal: Negative for nausea  Endocrine: Negative for polydipsia  Genitourinary: Negative for difficulty urinating  Musculoskeletal: Positive for arthralgias, neck pain and neck stiffness  Negative for gait problem  Skin: Negative for rash  Allergic/Immunologic: Negative for immunocompromised state  Neurological: Negative for dizziness  Hematological: Does not bruise/bleed easily  Psychiatric/Behavioral: Negative for decreased concentration  Following history reviewed and updated:  Past Medical History:   Diagnosis Date    Basal cell carcinoma of shoulder     last assessed: 08/28/2014    Depression     last assessed: 08/26/2015    Subconjunctival hemorrhage of left eye     last assessed: 01/14/2016     Past Surgical History:   Procedure Laterality Date    HERNIA REPAIR       Social History   Social History     Substance and Sexual Activity   Alcohol Use Yes    Comment: Social     Social History     Substance and Sexual Activity   Drug Use No     Social History     Tobacco Use   Smoking Status Former Smoker   Smokeless Tobacco Never Used   Tobacco Comment    years ago per patient, not any more     Family History   Problem Relation Age of Onset    Heart disease Mother         cardiac disorder    Kidney disease Mother     Prostate cancer Father      No Known Allergies     Constitutional:  /71   Pulse 69   Ht 5' 10" (1 778 m)   Wt 78 7 kg (173 lb 6 4 oz)   BMI 24 88 kg/m²    General: NAD  Eyes: Clear sclerae  ENT: No inflammation, lesion, or mass of lips  No tracheal deviation  Musculoskeletal: As mentioned below  Integumentary: No visible rashes or skin lesions  Pulmonary/Chest: Effort normal  No respiratory distress  Neuro: CN's grossly intact, RODRIGUEZ  Psych: Normal affect and judgement  Vascular: WWP  Back Exam     Tenderness   The patient is experiencing tenderness in the cervical     Range of Motion   Extension: abnormal   Flexion: normal     Muscle Strength   The patient has normal back strength  Comments:  Positive for pain with facet loading  Procedures    Imaging Studies (I personally reviewed images in PACS and report):  Cervical spine x-rays that are most recent to this encounter were reviewed  These images show C5-6 disc space narrowing with anterior osteophytosis  There is slight retrolisthesis of C5 on 6   There is slight loss of cervical lordosis  No acute osseous abnormalities  MRI of the cervical spine dated February 19, 2021:   "ALIGNMENT:  Normal alignment of the cervical spine  No compression fracture  No subluxation  No scoliosis      MARROW SIGNAL:  Mild Modic type I endplate degenerative changes at C3-4  No suspicious marrow signal abnormality      CERVICAL AND VISUALIZED THORACIC CORD:  Normal signal within the visualized cord      PREVERTEBRAL AND PARASPINAL SOFT TISSUES:  Normal      VISUALIZED POSTERIOR FOSSA:  The visualized posterior fossa demonstrates no abnormal signal      CERVICAL DISC SPACES:     C2-C3:  No disc herniation, canal or foraminal stenosis       C3-C4:  Small disc osteophyte complex  Facet and uncovertebral hypertrophy right worse than left  Mild canal and mild right foraminal stenosis  No left foraminal stenosis      C4-C5:  No disc herniation, canal or right foraminal stenosis  Mild left foraminal stenosis due to facet hypertrophy      C5-C6:  Disc osteophyte complex with facet and uncovertebral hypertrophy  Mild canal stenosis  Mild right and severe left foraminal stenosis      C6-C7:  Small disc bulge and facet arthropathy  No canal or right foraminal stenosis   Mild left foraminal stenosis      C7-T1:  No disc herniation, canal or foraminal stenosis      UPPER THORACIC DISC SPACES:  Normal      IMPRESSION:      Degenerative spondylosis as described most pronounced at C5-6 with mild canal stenosis and severe left foraminal stenosis "

## 2021-03-02 NOTE — TELEPHONE ENCOUNTER
Dr Velia Trivedi    [phone]    Patient is calling to let you know it was Naproxen 375 mg that was prescribed to him previously  Please advise

## 2021-03-02 NOTE — TELEPHONE ENCOUNTER
Spoke to patient  He stated he was calling to let us know what he had at home because he was told to let us know  Patient was prescribed Meloxicam today  Patient has been advised that he cannot take both medications at the same time or even in the same day  Advised that he is only to take Meloxicam if that is what he has been ordered most recently  Patient verbalized understanding  Patient stated he has no other questions

## 2021-03-09 ENCOUNTER — OFFICE VISIT (OUTPATIENT)
Dept: INTERNAL MEDICINE CLINIC | Facility: CLINIC | Age: 76
End: 2021-03-09
Payer: MEDICARE

## 2021-03-09 VITALS
HEIGHT: 70 IN | BODY MASS INDEX: 25.31 KG/M2 | HEART RATE: 82 BPM | DIASTOLIC BLOOD PRESSURE: 70 MMHG | WEIGHT: 176.8 LBS | SYSTOLIC BLOOD PRESSURE: 110 MMHG | RESPIRATION RATE: 16 BRPM | OXYGEN SATURATION: 98 %

## 2021-03-09 DIAGNOSIS — Z13.29 SCREENING FOR THYROID DISORDER: ICD-10-CM

## 2021-03-09 DIAGNOSIS — R73.9 HYPERGLYCEMIA, UNSPECIFIED: ICD-10-CM

## 2021-03-09 DIAGNOSIS — R97.20 ELEVATED PSA: ICD-10-CM

## 2021-03-09 DIAGNOSIS — R06.7 SNEEZING: ICD-10-CM

## 2021-03-09 DIAGNOSIS — Z13.1 SCREENING FOR DIABETES MELLITUS: ICD-10-CM

## 2021-03-09 DIAGNOSIS — E78.5 HYPERLIPIDEMIA, UNSPECIFIED HYPERLIPIDEMIA TYPE: ICD-10-CM

## 2021-03-09 DIAGNOSIS — N40.0 BENIGN PROSTATIC HYPERPLASIA WITHOUT LOWER URINARY TRACT SYMPTOMS: Primary | ICD-10-CM

## 2021-03-09 DIAGNOSIS — K21.9 LARYNGOPHARYNGEAL REFLUX: Chronic | ICD-10-CM

## 2021-03-09 DIAGNOSIS — E55.9 VITAMIN D DEFICIENCY: ICD-10-CM

## 2021-03-09 DIAGNOSIS — H91.93 BILATERAL HEARING LOSS, UNSPECIFIED HEARING LOSS TYPE: ICD-10-CM

## 2021-03-09 DIAGNOSIS — Z01.00 ENCOUNTER FOR COMPLETE EYE EXAM: ICD-10-CM

## 2021-03-09 DIAGNOSIS — Z00.00 MEDICARE ANNUAL WELLNESS VISIT, SUBSEQUENT: ICD-10-CM

## 2021-03-09 PROCEDURE — 99214 OFFICE O/P EST MOD 30 MIN: CPT | Performed by: INTERNAL MEDICINE

## 2021-03-09 PROCEDURE — G0439 PPPS, SUBSEQ VISIT: HCPCS | Performed by: INTERNAL MEDICINE

## 2021-03-09 PROCEDURE — 1123F ACP DISCUSS/DSCN MKR DOCD: CPT | Performed by: INTERNAL MEDICINE

## 2021-03-09 RX ORDER — FAMOTIDINE 40 MG/1
40 TABLET, FILM COATED ORAL 2 TIMES DAILY PRN
Qty: 180 TABLET | Refills: 3 | Status: SHIPPED | OUTPATIENT
Start: 2021-03-09 | End: 2021-03-26

## 2021-03-09 RX ORDER — FLUTICASONE PROPIONATE 50 MCG
1 SPRAY, SUSPENSION (ML) NASAL DAILY
Qty: 1 BOTTLE | Refills: 6 | Status: SHIPPED | OUTPATIENT
Start: 2021-03-09 | End: 2022-03-10

## 2021-03-09 NOTE — PROGRESS NOTES
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  -repeat PSA, MRI and bx in the future    #BPH  -on flomax    #LPR  -EGD 2016 with esophagitis  -encouraged pepcid use    #Sneezing  -has nasal congestion  -saw ENT and underwent nasal scope without issues  -likely secondary to acid reflux    #HLD  - HDL 53  -continue to diet and exercise    #Renal Cyst  -noted on US and no further studies needed    #Lower Back Pain  -DJD with protrusion  -MRI shows tiny central disc protrusion at L4-5 with small left paracentral protrusion L5-S1  -treated with meloxicam prn    #Restless Leg  -reports occurrence once a week  -defers starting on pramipexole    #Hearing Loss  -chronic and worsening  -refer to audiology    #Right Ankle Fracture  -previous pin placement and removal  -now has numbness and tingling over the area  -defers starting on gabapentin    #Health Maintenance  -routine labs and followup 1 year  -to obtain colonoscopy next visit  -COVID vaccine up to date 2021    BMI Counseling: Body mass index is 25 37 kg/m²  Discussed the patient's BMI with him  The BMI is above normal  No BMI follow-up plan is appropriate  Patient is 74 years old and weight reduction/weight gain would further complicate their underlying nutritional deficiency (vitamin or mineral deficiency)  No problem-specific Assessment & Plan notes found for this encounter  Diagnoses and all orders for this visit:    Benign prostatic hyperplasia without lower urinary tract symptoms  -     CBC and differential; Future  -     Comprehensive metabolic panel; Future    Laryngopharyngeal reflux  -     famotidine (PEPCID) 40 MG tablet; Take 1 tablet (40 mg total) by mouth 2 (two) times a day as needed for heartburn  -     CBC and differential; Future  -     Comprehensive metabolic panel;  Future    Elevated PSA  -     CBC and differential; Future  -     Comprehensive metabolic panel; Future    Hyperlipidemia, unspecified hyperlipidemia type  -     CBC and differential; Future  -     Comprehensive metabolic panel; Future  -     Lipid Panel With Direct LDL; Future    Sneezing  -     fluticasone (FLONASE) 50 mcg/act nasal spray; 1 spray into each nostril daily  -     CBC and differential; Future  -     Comprehensive metabolic panel; Future    Encounter for complete eye exam  -     Ambulatory Referral to Ophthalmology; Future  -     CBC and differential; Future  -     Comprehensive metabolic panel; Future    Bilateral hearing loss, unspecified hearing loss type  -     Ambulatory referral to Audiology; Future  -     CBC and differential; Future  -     Comprehensive metabolic panel; Future    Screening for diabetes mellitus  -     CBC and differential; Future  -     Comprehensive metabolic panel; Future    Hyperglycemia, unspecified   -     CBC and differential; Future  -     Comprehensive metabolic panel; Future    Screening for thyroid disorder  -     TSH, 3rd generation with Free T4 reflex; Future    Vitamin D deficiency  -     Vitamin D 25 hydroxy; Future    Medicare annual wellness visit, subsequent    Other orders  -     Cancel: Ambulatory referral for colonoscopy;  Future            Current Outpatient Medications:     famotidine (PEPCID) 40 MG tablet, Take 1 tablet (40 mg total) by mouth 2 (two) times a day as needed for heartburn, Disp: 180 tablet, Rfl: 3    meloxicam (MOBIC) 15 mg tablet, Take 1 tablet (15 mg total) by mouth daily as needed for moderate pain, Disp: 60 tablet, Rfl: 0    pantoprazole (PROTONIX) 40 mg tablet, Take 1 tablet (40 mg total) by mouth every morning 30 minutes prior to breakfast, Disp: 30 tablet, Rfl: 6    tamsulosin (FLOMAX) 0 4 mg, Take 1 capsule (0 4 mg total) by mouth daily at bedtime, Disp: 90 capsule, Rfl: 3    bisacodyl (FLEET) 10 MG/30ML ENEM, Insert 30 mL (10 mg total) into the rectum once for 1 dose Perform the day of biopsy, Disp: 30 mL, Rfl: 0    fluticasone (FLONASE) 50 mcg/act nasal spray, 1 spray into each nostril daily, Disp: 1 Bottle, Rfl: 6    Subjective:      Patient ID: Amado Patino is a 70 y o  male  HPI    Patient presents for routine visit  Denies any recent hospitalizations or surgeries  States that he has been experiencing some minor acid reflux  He states that he has not taken anything for relief  He underwent EGD  In the past revealing esophagitis with a hiatal hernia  Recommended that he start taking from monitoring for relief  Patient is also up-to-date on his colonoscopy and will need a repeat in 2026  Patient reports that he has tinnitus in his ears and has been having increased bouts of hearing loss  We will refer him to audiology for hearing exam   He also reports that he has been sneezing often  He states that there is minor postnasal drip  We will start him on Flonase to see if this will provide him with relief  He also reports that he has been experiencing occasional restless leg syndrome approximately once a week however he defers starting on treatment  He is following up with Ophthalmology for annual eye exam we gave him the referral for that  Patient also reports that he has been having right ankle numbness and tingling over the medial aspect  He states that he had surgery in the past   He also has an elevated PSA up to 5 2  He is currently following up with urology for evaluation  He will undergo repeat biopsy as well as MRI imaging  Patient will return to care in 12 months with labs  The following portions of the patient's history were reviewed and updated as appropriate: allergies, current medications, past family history, past medical history, past social history, past surgical history and problem list     Review of Systems   Constitutional: Negative for activity change, appetite change, fatigue and fever     HENT: Positive for hearing loss, postnasal drip, rhinorrhea and sneezing  Negative for congestion, ear pain, sore throat and tinnitus  Eyes: Negative for photophobia, pain and visual disturbance  Respiratory: Negative for cough, shortness of breath and wheezing  Cardiovascular: Negative for chest pain and leg swelling  Gastrointestinal: Negative for abdominal distention, abdominal pain, nausea and vomiting  Acid reflux   Genitourinary: Negative for difficulty urinating, frequency and hematuria  Musculoskeletal: Negative for arthralgias, back pain, joint swelling, neck pain and neck stiffness  Skin: Negative for color change, pallor, rash and wound  Neurological: Positive for numbness (right ankle)  Negative for dizziness, tremors, weakness and headaches  Hematological: Does not bruise/bleed easily  Objective:      /70   Pulse 82   Resp 16   Ht 5' 10" (1 778 m)   Wt 80 2 kg (176 lb 12 8 oz)   SpO2 98%   BMI 25 37 kg/m²          Physical Exam  Vitals signs reviewed  Constitutional:       Appearance: Normal appearance  He is well-developed  HENT:      Head: Normocephalic and atraumatic  Right Ear: Tympanic membrane, ear canal and external ear normal  There is no impacted cerumen  Left Ear: Tympanic membrane, ear canal and external ear normal  There is no impacted cerumen  Eyes:      Conjunctiva/sclera: Conjunctivae normal       Pupils: Pupils are equal, round, and reactive to light  Neck:      Musculoskeletal: Normal range of motion and neck supple  Thyroid: No thyromegaly  Vascular: No JVD  Cardiovascular:      Rate and Rhythm: Normal rate and regular rhythm  Heart sounds: Normal heart sounds  No murmur  Pulmonary:      Effort: Pulmonary effort is normal  No respiratory distress  Breath sounds: Normal breath sounds  No stridor  No wheezing, rhonchi or rales  Abdominal:      General: Bowel sounds are normal  There is no distension        Palpations: Abdomen is soft  There is no mass  Tenderness: There is no abdominal tenderness  There is no right CVA tenderness, left CVA tenderness, guarding or rebound  Hernia: No hernia (previous healed left inguinal hernia repair scar) is present  Musculoskeletal: Normal range of motion  General: No tenderness or deformity  Right lower leg: No edema  Left lower leg: No edema  Lymphadenopathy:      Cervical: No cervical adenopathy  Skin:     General: Skin is warm and dry  Findings: No erythema or rash  Neurological:      Mental Status: He is alert and oriented to person, place, and time  Sensory: Sensory deficit (medial right malleolus) present  Deep Tendon Reflexes: Reflexes are normal and symmetric  This note was completed in part utilizing [employer] direct voice recognition software  Grammatical errors, random word insertion, spelling mistakes, and incomplete sentences may be an occasional consequence of the system secondary to software limitations, ambient noise and hardware issues  At the time of dictation, efforts were made to edit, clarify and /or correct errors  Please read the chart carefully and recognize, using context, where substitutions have occurred  If you have any questions or concerns about the context, text or information contained within the body of this dictation, please contact myself, the provider, for further clarification

## 2021-03-09 NOTE — PATIENT INSTRUCTIONS

## 2021-03-09 NOTE — PROGRESS NOTES
Assessment and Plan:     Problem List Items Addressed This Visit        Digestive    Laryngopharyngeal reflux (Chronic)    Relevant Medications    famotidine (PEPCID) 40 MG tablet    Other Relevant Orders    CBC and differential    Comprehensive metabolic panel      Other Visit Diagnoses     Benign prostatic hyperplasia without lower urinary tract symptoms    -  Primary    Relevant Orders    CBC and differential    Comprehensive metabolic panel    Elevated PSA        Relevant Orders    CBC and differential    Comprehensive metabolic panel    Hyperlipidemia, unspecified hyperlipidemia type        Relevant Orders    CBC and differential    Comprehensive metabolic panel    Lipid Panel With Direct LDL    Sneezing        Relevant Medications    fluticasone (FLONASE) 50 mcg/act nasal spray    Other Relevant Orders    CBC and differential    Comprehensive metabolic panel    Encounter for complete eye exam        Relevant Orders    Ambulatory Referral to Ophthalmology    CBC and differential    Comprehensive metabolic panel    Bilateral hearing loss, unspecified hearing loss type        Relevant Orders    Ambulatory referral to Audiology    CBC and differential    Comprehensive metabolic panel    Screening for diabetes mellitus        Relevant Orders    CBC and differential    Comprehensive metabolic panel    Hyperglycemia, unspecified         Relevant Orders    CBC and differential    Comprehensive metabolic panel    Screening for thyroid disorder        Relevant Orders    TSH, 3rd generation with Free T4 reflex    Vitamin D deficiency        Relevant Orders    Vitamin D 25 hydroxy    Medicare annual wellness visit, subsequent               Preventive health issues were discussed with patient, and age appropriate screening tests were ordered as noted in patient's After Visit Summary    Personalized health advice and appropriate referrals for health education or preventive services given if needed, as noted in patient's After Visit Summary       History of Present Illness:     Patient presents for Medicare Annual Wellness visit    Patient Care Team:  Anastasia Barbour DO as PCP - General (Internal Medicine)     Problem List:     Patient Active Problem List   Diagnosis    Cervical radiculopathy    Gastroesophageal reflux disease with esophagitis    Laryngopharyngeal reflux    Thrombosed external hemorrhoids    Closed fracture of medial malleolus of right tibia    Preoperative examination    Elevated PSA, less than 10 ng/ml    Dysphonia    Muscle tension dysphonia    Glottic insufficiency    Reflux laryngitis    Sensation of foreign body    Paresis of left vocal fold    Hiatal hernia    Family history of prostate cancer in father   Gary Lester Benign localized prostatic hyperplasia with lower urinary tract symptoms (LUTS)    Atypical small acinar proliferation of prostate      Past Medical and Surgical History:     Past Medical History:   Diagnosis Date    Basal cell carcinoma of shoulder     last assessed: 08/28/2014    Depression     last assessed: 08/26/2015    Subconjunctival hemorrhage of left eye     last assessed: 01/14/2016     Past Surgical History:   Procedure Laterality Date    HERNIA REPAIR        Family History:     Family History   Problem Relation Age of Onset    Heart disease Mother         cardiac disorder    Kidney disease Mother     Prostate cancer Father       Social History:     E-Cigarette/Vaping    E-Cigarette Use Never User      E-Cigarette/Vaping Substances    Nicotine No     THC No     CBD No     Flavoring No     Other No     Unknown No      Social History     Socioeconomic History    Marital status: /Civil Union     Spouse name: None    Number of children: None    Years of education: None    Highest education level: None   Occupational History    None   Social Needs    Financial resource strain: None    Food insecurity     Worry: None     Inability: None    Transportation needs Medical: None     Non-medical: None   Tobacco Use    Smoking status: Former Smoker    Smokeless tobacco: Never Used    Tobacco comment: years ago per patient, not any more   Substance and Sexual Activity    Alcohol use: Yes     Comment: Social    Drug use: No    Sexual activity: None   Lifestyle    Physical activity     Days per week: None     Minutes per session: None    Stress: None   Relationships    Social connections     Talks on phone: None     Gets together: None     Attends Mu-ism service: None     Active member of club or organization: None     Attends meetings of clubs or organizations: None     Relationship status: None    Intimate partner violence     Fear of current or ex partner: None     Emotionally abused: None     Physically abused: None     Forced sexual activity: None   Other Topics Concern    None   Social History Narrative    None      Medications and Allergies:     Current Outpatient Medications   Medication Sig Dispense Refill    famotidine (PEPCID) 40 MG tablet Take 1 tablet (40 mg total) by mouth 2 (two) times a day as needed for heartburn 180 tablet 3    meloxicam (MOBIC) 15 mg tablet Take 1 tablet (15 mg total) by mouth daily as needed for moderate pain 60 tablet 0    pantoprazole (PROTONIX) 40 mg tablet Take 1 tablet (40 mg total) by mouth every morning 30 minutes prior to breakfast 30 tablet 6    tamsulosin (FLOMAX) 0 4 mg Take 1 capsule (0 4 mg total) by mouth daily at bedtime 90 capsule 3    bisacodyl (FLEET) 10 MG/30ML ENEM Insert 30 mL (10 mg total) into the rectum once for 1 dose Perform the day of biopsy 30 mL 0    fluticasone (FLONASE) 50 mcg/act nasal spray 1 spray into each nostril daily 1 Bottle 6     No current facility-administered medications for this visit        No Known Allergies   Immunizations:     Immunization History   Administered Date(s) Administered    INFLUENZA 10/26/2017, 11/05/2018    Influenza Split High Dose Preservative Free IM 10/26/2017, 11/05/2018    Influenza, high dose seasonal 0 7 mL 10/05/2019, 10/14/2020    Pneumococcal Conjugate 13-Valent 08/26/2015    Pneumococcal Polysaccharide PPV23 07/01/2012, 08/28/2017    Tdap 09/20/2013, 10/14/2013, 06/28/2016    Tuberculin Skin Test-PPD Intradermal 08/01/2012    Zoster 08/08/2012    Zoster Vaccine Recombinant 02/25/2019, 06/26/2019      Health Maintenance:         Topic Date Due    Hepatitis C Screening  1945    Colorectal Cancer Screening  05/20/2019         Topic Date Due    COVID-19 Vaccine (1 of 2) [birth year]22/1961      Medicare Health Risk Assessment:     /70   Pulse 82   Resp 16   Ht 5' 10" (1 778 m)   Wt 80 2 kg (176 lb 12 8 oz)   SpO2 98%   BMI 25 37 kg/m²      Shell Penaloza is here for his Subsequent Wellness visit  Last Medicare Wellness visit information reviewed, patient interviewed and updates made to the record today  Health Risk Assessment:   Patient rates overall health as excellent  Patient feels that their physical health rating is same  Eyesight was rated as same  Hearing was rated as slightly worse  Patient feels that their emotional and mental health rating is same  Pain experienced in the last 7 days has been some  Patient's pain rating has been 2/10  Patient states that he has experienced no weight loss or gain in last 6 months  Depression Screening:   PHQ-2 Score: 2      Fall Risk Screening: In the past year, patient has experienced: no history of falling in past year      Home Safety:  Patient does not have trouble with stairs inside or outside of their home  Patient has working smoke alarms and has working carbon monoxide detector  Home safety hazards include: none  Nutrition:   Current diet is Regular and Limited junk food  Medications:   Patient is not currently taking any over-the-counter supplements  Patient is able to manage medications       Activities of Daily Living (ADLs)/Instrumental Activities of Daily Living (IADLs): Walk and transfer into and out of bed and chair?: Yes  Dress and groom yourself?: Yes    Bathe or shower yourself?: Yes    Feed yourself? Yes  Do your laundry/housekeeping?: Yes  Manage your money, pay your bills and track your expenses?: Yes  Make your own meals?: Yes    Do your own shopping?: Yes    Previous Hospitalizations:   Any hospitalizations or ED visits within the last 12 months?: No      Advance Care Planning:   Living will: Yes    Durable POA for healthcare:  Yes    Advanced directive: Yes    Advanced directive counseling given: Yes    Five wishes given: Yes    End of Life Decisions reviewed with patient: Yes    Provider agrees with end of life decisions: Yes      PREVENTIVE SCREENINGS      Cardiovascular Screening:    General: Screening Not Indicated and History Lipid Disorder      Diabetes Screening:     General: Screening Current      Prostate Cancer Screening:    General: Screening Current      Abdominal Aortic Aneurysm (AAA) Screening:    Risk factors include: age between 73-69 yo and tobacco use        Lung Cancer Screening:     General: Screening Not Indicated      Samra Boston DO

## 2021-03-26 ENCOUNTER — CONSULT (OUTPATIENT)
Dept: PAIN MEDICINE | Facility: CLINIC | Age: 76
End: 2021-03-26
Payer: MEDICARE

## 2021-03-26 ENCOUNTER — TRANSCRIBE ORDERS (OUTPATIENT)
Dept: PAIN MEDICINE | Facility: CLINIC | Age: 76
End: 2021-03-26

## 2021-03-26 VITALS — DIASTOLIC BLOOD PRESSURE: 71 MMHG | HEART RATE: 71 BPM | SYSTOLIC BLOOD PRESSURE: 104 MMHG

## 2021-03-26 DIAGNOSIS — M47.812 FACET HYPERTROPHY OF CERVICAL REGION: ICD-10-CM

## 2021-03-26 DIAGNOSIS — M79.18 MYOFASCIAL PAIN SYNDROME, CERVICAL: ICD-10-CM

## 2021-03-26 PROCEDURE — 99204 OFFICE O/P NEW MOD 45 MIN: CPT | Performed by: ANESTHESIOLOGY

## 2021-03-26 RX ORDER — METHOCARBAMOL 500 MG/1
500 TABLET, FILM COATED ORAL
Qty: 30 TABLET | Refills: 1 | Status: SHIPPED | OUTPATIENT
Start: 2021-03-26 | End: 2021-08-31

## 2021-03-26 NOTE — PATIENT INSTRUCTIONS
Methocarbamol (By mouth)   Methocarbamol (meth-oh-RANDA-ba-mol)  Treats muscle pain and spasms  Brand Name(s): Robaxin, [id number]   There may be other brand names for this medicine  When This Medicine Should Not Be Used: This medicine is not right for everyone  Do not use it if you had an allergic reaction to methocarbamol  How to Use This Medicine:   Tablet  · Take your medicine as directed  Your dose may need to be changed several times to find what works best for you  · Missed dose: Take a dose as soon as you remember  If it is almost time for your next dose, wait until then and take a regular dose  Do not take extra medicine to make up for a missed dose  · Store the medicine in a closed container at room temperature, away from heat, moisture, and direct light  Drugs and Foods to Avoid:   Ask your doctor or pharmacist before using any other medicine, including over-the-counter medicines, vitamins, and herbal products  · Some medicines can affect how methocarbamol works  Tell your doctor if you are using pyridostigmine  · Do not drink alcohol while you are using this medicine  · Tell your doctor if you use anything else that makes you sleepy  Some examples are allergy medicine, narcotic pain medicine, and alcohol  Warnings While Using This Medicine:   · Tell your doctor if you are pregnant or breastfeeding, or if you have kidney disease, liver disease, or myasthenia gravis  · This medicine may make you dizzy or drowsy  Do not drive or do anything that could be dangerous until you know how this medicine affects you  · Tell any doctor or dentist who treats you that you are using this medicine  This medicine may affect certain medical test results  · Your doctor will check your progress and the effects of this medicine at regular visits  Keep all appointments  · Keep all medicine out of the reach of children  Never share your medicine with anyone    Possible Side Effects While Using This Medicine: Call your doctor right away if you notice any of these side effects:  · Allergic reaction: Itching or hives, swelling in your face or hands, swelling or tingling in your mouth or throat, chest tightness, trouble breathing  · Blurred vision, redness, pain, or swelling of the eyes  · Dark urine or pale stools, nausea, vomiting, loss of appetite, stomach pain, yellow skin or eyes  · Fever  · Lightheadedness, dizziness, fainting  · Seizures  · Slow heartbeat  · Unusual bleeding, bruising, or weakness  If you notice these less serious side effects, talk with your doctor:   · Confusion, trouble sleeping  · Headache  · Warmth or redness in your face, neck, arms, or upper chest  If you notice other side effects that you think are caused by this medicine, tell your doctor  Call your doctor for medical advice about side effects  You may report side effects to FDA at 0-605-FDA-8182  © Copyright [address] Information is for End User's use only and may not be sold, redistributed or otherwise used for commercial purposes  The above information is an  only  It is not intended as medical advice for individual conditions or treatments  Talk to your doctor, nurse or pharmacist before following any medical regimen to see if it is safe and effective for you

## 2021-03-26 NOTE — PROGRESS NOTES
Assessment  1  Facet hypertrophy of cervical region    2  Myofascial pain syndrome, cervical        Plan  The patient's symptoms, history / physical are consistent with pain that is multifactorial in origin but predominantly the result of underlying cervical spondylosis as well as muscle spasms  At this time, I discussed treatment that will be multimodal in approach  I will start him on methocarbamol 500 mg at bedtime to help with the spasms  He was instructed to take the for the back 2 weeks  He was apprised of the most common side effects including sleepiness and dizziness  He will call with an update in 1 5 weeks on how he is doing  If he is not symptomatic we better, I discussed performing trigger point injections in the office  If he does not get symptomatic relief following the trigger points then he would be a candidate for medial branch blocks to diagnose facet mediated pain in anticipation of radiofrequency ablation  My impressions and treatment recommendations were discussed in detail with the patient who verbalized understanding and had no further questions  Discharge instructions were provided  I personally saw and examined the patient and I agree with the above discussed plan of care  No orders of the defined types were placed in this encounter  New Medications Ordered This Visit   Medications    methocarbamol (ROBAXIN) 500 mg tablet     Sig: Take 1 tablet (500 mg total) by mouth daily at bedtime as needed for muscle spasms     Dispense:  30 tablet     Refill:  1       History of Present Illness    Stephany Acosta is a 78 y o  male referred by Dr Oliva Mills who presents for consultation in regards to right-sided neck and shoulder pain  Symptoms have been present for over 40 years but it began progressively worse over the last year  Pain is located in the right-sided neck reported as moderate rated 3/10 on numeric rating scale and felt occasionally  Symptoms are sharp at times  Pain is aggravated with turning his head, bending, exercise, coughing and decreased with lying down  There is no change with sneezing or bowel movements  Treatment history has included physical therapy which provided no relief  Oral steroids provided no relief  Use of over-the-counter analgesics provided no relief  I have personally reviewed and/or updated the patient's past medical history, past surgical history, family history, social history, current medications, allergies, and vital signs today  Review of Systems   Constitutional: Negative for fever and unexpected weight change  HENT: Negative for trouble swallowing  Eyes: Negative for visual disturbance  Respiratory: Negative for shortness of breath and wheezing  Cardiovascular: Negative for chest pain and palpitations  Gastrointestinal: Negative for constipation, diarrhea, nausea and vomiting  Endocrine: Negative for cold intolerance, heat intolerance and polydipsia  Genitourinary: Negative for difficulty urinating and frequency  Musculoskeletal: Positive for neck pain  Negative for arthralgias, gait problem, joint swelling and myalgias  Skin: Negative for rash  Neurological: Negative for dizziness, seizures, syncope, weakness and headaches  Hematological: Does not bruise/bleed easily  Psychiatric/Behavioral: Negative for dysphoric mood  All other systems reviewed and are negative        Patient Active Problem List   Diagnosis    Cervical radiculopathy    Gastroesophageal reflux disease with esophagitis    Laryngopharyngeal reflux    Thrombosed external hemorrhoids    Closed fracture of medial malleolus of right tibia    Preoperative examination    Elevated PSA, less than 10 ng/ml    Dysphonia    Muscle tension dysphonia    Glottic insufficiency    Reflux laryngitis    Sensation of foreign body    Paresis of left vocal fold    Hiatal hernia    Family history of prostate cancer in father    Benign localized prostatic hyperplasia with lower urinary tract symptoms (LUTS)    Atypical small acinar proliferation of prostate       Past Medical History:   Diagnosis Date    Basal cell carcinoma of shoulder     last assessed: 08/28/2014    Depression     last assessed: 08/26/2015    Subconjunctival hemorrhage of left eye     last assessed: 01/14/2016       Past Surgical History:   Procedure Laterality Date    HERNIA REPAIR         Family History   Problem Relation Age of Onset    Heart disease Mother         cardiac disorder    Kidney disease Mother     Prostate cancer Father        Social History     Occupational History    Not on file   Tobacco Use    Smoking status: Former Smoker    Smokeless tobacco: Never Used    Tobacco comment: years ago per patient, not any more   Substance and Sexual Activity    Alcohol use: Yes     Comment: Social    Drug use: No    Sexual activity: Not on file       Current Outpatient Medications on File Prior to Visit   Medication Sig    fluticasone (FLONASE) 50 mcg/act nasal spray 1 spray into each nostril daily    tamsulosin (FLOMAX) 0 4 mg Take 1 capsule (0 4 mg total) by mouth daily at bedtime    [DISCONTINUED] bisacodyl (FLEET) 10 MG/30ML ENEM Insert 30 mL (10 mg total) into the rectum once for 1 dose Perform the day of biopsy    [DISCONTINUED] famotidine (PEPCID) 40 MG tablet Take 1 tablet (40 mg total) by mouth 2 (two) times a day as needed for heartburn    [DISCONTINUED] meloxicam (MOBIC) 15 mg tablet Take 1 tablet (15 mg total) by mouth daily as needed for moderate pain    [DISCONTINUED] pantoprazole (PROTONIX) 40 mg tablet Take 1 tablet (40 mg total) by mouth every morning 30 minutes prior to breakfast     No current facility-administered medications on file prior to visit          No Known Allergies    Physical Exam    /71   Pulse 71     Constitutional: normal, well developed, well nourished, alert, in no distress and non-toxic and no overt pain behavior  Eyes: anicteric  HEENT: grossly intact  Neck: supple, symmetric, trachea midline and no masses   Pulmonary:even and unlabored  Cardiovascular:No edema or pitting edema present  Skin:Normal without rashes or lesions and well hydrated  Psychiatric:Mood and affect appropriate  Neurologic:Cranial Nerves II-XII grossly intact  Musculoskeletal:normal     Cervical Spine Exam  Appearance:  Normal lordosis  Palpation/Tenderness:  right cervical paraspinal tenderness  right trapezium tenderness  Right-sided cervical facet tenderness at C3-4, C4-5, C5-6 with positive facet loading  Range of Motion:  Flexion:  Minimally limited  with pain  Extension:  Minimally limited  with pain  Lateral Flexion - Left:  No limitation  without pain  Lateral Flexion - Right:  Minimally limited  with pain  Rotation - Left:  No limitation  without pain  Rotation - Right:  Moderately limited  with pain  Motor Strength:  Left Arm Flexion  5/5  Left Arm Extension  5/5  Right Arm Flexion  5/5  Right Arm Extension  5/5  Left Wrist Flexion  5/5  Left Wrist Extension  5/5  Left Finger Abduction  5/5  Right Finger Abduction  5/5   Right Wrist Flexion 5/5  Right Wrist Extension 5/5  Reflexes:  Left Biceps:  2+   Right Biceps:  2+   Left Triceps:  2+   Right Triceps:  2+   Special Tests:  Left Spurlings:  negative  Right Spurlings  negative        Imaging        MRI CERVICAL SPINE WITHOUT CONTRAST (2/19/2021)     INDICATION: M54 12: Radiculopathy, cervical region     COMPARISON:    X-rays dated 12/11/2020     TECHNIQUE:  Sagittal T1, sagittal T2, sagittal inversion recovery, axial T2, axial  2D merge     IMAGE QUALITY:  Diagnostic     FINDINGS:     ALIGNMENT:  Normal alignment of the cervical spine  No compression fracture  No subluxation  No scoliosis      MARROW SIGNAL:  Mild Modic type I endplate degenerative changes at C3-4   No suspicious marrow signal abnormality      CERVICAL AND VISUALIZED THORACIC CORD:  Normal signal within the visualized cord      PREVERTEBRAL AND PARASPINAL SOFT TISSUES:  Normal      VISUALIZED POSTERIOR FOSSA:  The visualized posterior fossa demonstrates no abnormal signal      CERVICAL DISC SPACES:     C2-C3:  No disc herniation, canal or foraminal stenosis       C3-C4:  Small disc osteophyte complex  Facet and uncovertebral hypertrophy right worse than left  Mild canal and mild right foraminal stenosis  No left foraminal stenosis      C4-C5:  No disc herniation, canal or right foraminal stenosis  Mild left foraminal stenosis due to facet hypertrophy      C5-C6:  Disc osteophyte complex with facet and uncovertebral hypertrophy  Mild canal stenosis  Mild right and severe left foraminal stenosis      C6-C7:  Small disc bulge and facet arthropathy  No canal or right foraminal stenosis  Mild left foraminal stenosis      C7-T1:  No disc herniation, canal or foraminal stenosis      UPPER THORACIC DISC SPACES:  Normal      IMPRESSION:     Degenerative spondylosis as described most pronounced at C5-6 with mild canal stenosis and severe left foraminal stenosis

## 2021-06-28 ENCOUNTER — HOSPITAL ENCOUNTER (OUTPATIENT)
Dept: RADIOLOGY | Age: 76
Discharge: HOME | End: 2021-06-28
Payer: MEDICARE

## 2021-06-28 DIAGNOSIS — R97.20 ELEVATED PSA: ICD-10-CM

## 2021-06-28 DIAGNOSIS — R93.5 ABNORMAL FINDINGS ON DIAGNOSTIC IMAGING OF OTHER ABDOMINAL REGIONS, INCLUDING RETROPERITONEUM: ICD-10-CM

## 2021-06-28 DIAGNOSIS — Z98.890 HISTORY OF NEEDLE BIOPSY OF PROSTATE WITH NEGATIVE RESULT: ICD-10-CM

## 2021-06-28 PROCEDURE — 72197 MRI PELVIS W/O & W/DYE: CPT

## 2021-06-28 PROCEDURE — 76377 3D RENDER W/INTRP POSTPROCES: CPT

## 2021-06-28 PROCEDURE — A9585 GADOBUTROL INJECTION: HCPCS | Performed by: UROLOGY

## 2021-06-28 RX ADMIN — GADOBUTROL 8 ML: 604.72 INJECTION INTRAVENOUS at 09:19

## 2021-06-30 ENCOUNTER — APPOINTMENT (OUTPATIENT)
Dept: LAB | Age: 76
End: 2021-06-30
Payer: MEDICARE

## 2021-06-30 DIAGNOSIS — R97.20 ELEVATED PSA: ICD-10-CM

## 2021-06-30 LAB
ANION GAP SERPL CALCULATED.3IONS-SCNC: 0 MMOL/L (ref 4–13)
BUN SERPL-MCNC: 19 MG/DL (ref 5–25)
CALCIUM SERPL-MCNC: 9.4 MG/DL (ref 8.3–10.1)
CHLORIDE SERPL-SCNC: 109 MMOL/L (ref 100–108)
CO2 SERPL-SCNC: 32 MMOL/L (ref 21–32)
CREAT SERPL-MCNC: 0.87 MG/DL (ref 0.6–1.3)
GFR SERPL CREATININE-BSD FRML MDRD: 85 ML/MIN/1.73SQ M
GLUCOSE P FAST SERPL-MCNC: 92 MG/DL (ref 65–99)
POTASSIUM SERPL-SCNC: 4.4 MMOL/L (ref 3.5–5.3)
PSA SERPL-MCNC: 4.8 NG/ML (ref 0–4)
SODIUM SERPL-SCNC: 141 MMOL/L (ref 136–145)

## 2021-06-30 PROCEDURE — 84153 ASSAY OF PSA TOTAL: CPT

## 2021-06-30 PROCEDURE — 36415 COLL VENOUS BLD VENIPUNCTURE: CPT

## 2021-06-30 PROCEDURE — 80048 BASIC METABOLIC PNL TOTAL CA: CPT

## 2021-07-06 ENCOUNTER — TELEPHONE (OUTPATIENT)
Dept: UROLOGY | Facility: CLINIC | Age: 76
End: 2021-07-06

## 2021-07-06 ENCOUNTER — OFFICE VISIT (OUTPATIENT)
Dept: UROLOGY | Facility: CLINIC | Age: 76
End: 2021-07-06
Payer: MEDICARE

## 2021-07-06 VITALS
DIASTOLIC BLOOD PRESSURE: 58 MMHG | HEIGHT: 70 IN | WEIGHT: 168 LBS | SYSTOLIC BLOOD PRESSURE: 118 MMHG | HEART RATE: 62 BPM | BODY MASS INDEX: 24.05 KG/M2

## 2021-07-06 DIAGNOSIS — N40.1 BENIGN LOCALIZED PROSTATIC HYPERPLASIA WITH LOWER URINARY TRACT SYMPTOMS (LUTS): ICD-10-CM

## 2021-07-06 PROCEDURE — 99213 OFFICE O/P EST LOW 20 MIN: CPT | Performed by: PHYSICIAN ASSISTANT

## 2021-07-06 RX ORDER — TAMSULOSIN HYDROCHLORIDE 0.4 MG/1
0.4 CAPSULE ORAL
Qty: 90 CAPSULE | Refills: 3 | Status: SHIPPED | OUTPATIENT
Start: 2021-07-06 | End: 2021-10-04

## 2021-07-06 NOTE — TELEPHONE ENCOUNTER
Please inform patient that his prostate MRI has resulted showing PI-RADS category 2 indicating that clinically significant prostate cancer is unlikely to be present  Would recommend proceeding with repeat TRUS prostate biopsy as previously discussed   Please schedule

## 2021-07-06 NOTE — PROGRESS NOTES
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 and 1 core of atypical acinar proliferation    - His most recent PSA from 6/30/21 was 4 8  Previously PSA was 5 2 in 9/2020    - Multiparametric prostate MRI radiology report pending  - AMELIA unremarkable  - If abnormal MRI, proceed with MRI fusion targeted biopsy  - If normal MRI, will set up for repeat systemic prostate biopsy  2  BPH  - Continue Flomax  Rx refilled  History of Present Illness  Yemi Macias is a 78 y o  male here for follow up evaluation of elevated PSA  He is now s/p prostate biopsy in December 2020 showing 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 and 1 core of atypical acinar proliferation  His most recent PSA from 6/30/21 was 4 8  Previously PSA was 5 2 in 9/2020  He reports urinary symptoms have been well controlled on Flomax  Review of Systems   Constitutional: Negative for chills, fever and unexpected weight change  Respiratory: Negative for shortness of breath  Cardiovascular: Negative for chest pain  Gastrointestinal: Negative for abdominal pain, constipation, diarrhea, nausea and vomiting  Genitourinary: Negative for difficulty urinating, dysuria, flank pain, frequency, hematuria and urgency  Neurological: Negative for dizziness                    Past Medical History  Past Medical History:   Diagnosis Date    Basal cell carcinoma of shoulder     last assessed: 08/28/2014    Depression     last assessed: 08/26/2015    Subconjunctival hemorrhage of left eye     last assessed: 01/14/2016       Past Social History  Past Surgical History:   Procedure Laterality Date    HERNIA REPAIR       Social History     Tobacco Use   Smoking Status Former Smoker   Smokeless Tobacco Never Used   Tobacco Comment    years ago per patient, not any more Past Family History  Family History   Problem Relation Age of Onset    Heart disease Mother         cardiac disorder    Kidney disease Mother     Prostate cancer Father        Past Social history  Social History     Socioeconomic History    Marital status: /Civil Union     Spouse name: Not on file    Number of children: Not on file    Years of education: Not on file    Highest education level: Not on file   Occupational History    Not on file   Tobacco Use    Smoking status: Former Smoker    Smokeless tobacco: Never Used    Tobacco comment: years ago per patient, not any more   Vaping Use    Vaping Use: Never used   Substance and Sexual Activity    Alcohol use: Yes     Comment: Social    Drug use: No    Sexual activity: Not on file   Other Topics Concern    Not on file   Social History Narrative    Not on file     Social Determinants of Health     Financial Resource Strain:     Difficulty of Paying Living Expenses:    Food Insecurity:     Worried About [employer] in the Last Year:     [employer] in the Last Year:    Transportation Needs:     Lack of Transportation (Medical):      Lack of Transportation (Non-Medical):    Physical Activity:     Days of Exercise per Week:     Minutes of Exercise per Session:    Stress:     Feeling of Stress :    Social Connections:     Frequency of Communication with Friends and Family:     Frequency of Social Gatherings with Friends and Family:     Attends [employer] Services:     Active Member of Clubs or Organizations:     Attends Club or Organization Meetings:     Marital Status:    Intimate Partner Violence:     Fear of Current or Ex-Partner:     Emotionally Abused:     Physically Abused:     Sexually Abused:        Current Medications  Current Outpatient Medications   Medication Sig Dispense Refill    fluticasone (FLONASE) 50 mcg/act nasal spray 1 spray into each nostril daily 1 Bottle 6    methocarbamol (ROBAXIN) 500 mg tablet Take 1 tablet (500 mg total) by mouth daily at bedtime as needed for muscle spasms 30 tablet 1    tamsulosin (FLOMAX) 0 4 mg Take 1 capsule (0 4 mg total) by mouth daily at bedtime 90 capsule 3     No current facility-administered medications for this visit  Allergies  No Known Allergies      The following portions of the patient's history were reviewed and updated as appropriate: allergies, current medications, past medical history, past social history, past surgical history and problem list       Vitals  Vitals:    07/06/21 0805   BP: 118/58   BP Location: Left arm   Patient Position: Sitting   Cuff Size: Adult   Pulse: 62   Weight: 76 2 kg (168 lb)   Height: 5' 10" (1 778 m)           Physical Exam  Physical Exam  Constitutional:       Appearance: Normal appearance  He is normal weight  HENT:      Head: Normocephalic and atraumatic  Right Ear: External ear normal       Left Ear: External ear normal    Eyes:      General: No scleral icterus  Conjunctiva/sclera: Conjunctivae normal    Cardiovascular:      Pulses: Normal pulses  Pulmonary:      Effort: Pulmonary effort is normal    Genitourinary:     Comments: Prostate approximately 50 g without nodules or tenderness  Musculoskeletal:         General: Normal range of motion  Cervical back: Normal range of motion  Skin:     General: Skin is warm and dry  Neurological:      General: No focal deficit present  Mental Status: He is alert and oriented to person, place, and time  Psychiatric:         Mood and Affect: Mood normal          Behavior: Behavior normal          Thought Content:  Thought content normal          Judgment: Judgment normal            Results  No results found for this or any previous visit (from the past 1 hour(s)) ]  Lab Results   Component Value Date    PSA 4 8 (H) 06/30/2021    PSA 5 2 (H) 09/21/2020    PSA 4 4 (H) 02/24/2020     Lab Results   Component Value Date    GLUCOSE 90 09/03/2015    CALCIUM 9 4 06/30/2021     09/03/2015    K 4 4 06/30/2021    CO2 32 06/30/2021     (H) 06/30/2021    BUN 19 06/30/2021    CREATININE 0 87 06/30/2021     Lab Results   Component Value Date    WBC 5 85 03/02/2021    HGB 15 4 03/02/2021    HCT 47 6 03/02/2021    MCV 95 03/02/2021     03/02/2021           Orders  No orders of the defined types were placed in this encounter         Marino Browne PA-C

## 2021-07-07 NOTE — TELEPHONE ENCOUNTER
Called and left message for return call, number provided  When patient returns call please advise MRI results did come back in the normal category, PIRADs 2 lesion  As discussed in office visit on 7/6/21, repeat prostate biopsy recommended  Can then assist with scheduling patient, next available with Dr Agustin Martinez as well as two week results appt

## 2021-07-12 NOTE — TELEPHONE ENCOUNTER
I spoke to pt and he is scheduled for 8/31  Bx prep mailed to pt  Clinical please call pt  He has questions about the results of his MRI

## 2021-08-13 ENCOUNTER — TELEMEDICINE (OUTPATIENT)
Dept: INTERNAL MEDICINE CLINIC | Facility: CLINIC | Age: 76
End: 2021-08-13
Payer: MEDICARE

## 2021-08-13 ENCOUNTER — TELEPHONE (OUTPATIENT)
Dept: INTERNAL MEDICINE CLINIC | Facility: CLINIC | Age: 76
End: 2021-08-13

## 2021-08-13 DIAGNOSIS — B34.9 VIRAL INFECTION, UNSPECIFIED: Primary | ICD-10-CM

## 2021-08-13 PROCEDURE — 99442 PR PHYS/QHP TELEPHONE EVALUATION 11-20 MIN: CPT | Performed by: INTERNAL MEDICINE

## 2021-08-13 PROCEDURE — 87635 SARS-COV-2 COVID-19 AMP PRB: CPT | Performed by: INTERNAL MEDICINE

## 2021-08-13 NOTE — TELEPHONE ENCOUNTER
Maribell called and said for the past 3 days he has loose bowels, stomach queeziness/ cramping, [place] and pain but a little better today, (-) fever, not eating much and feels lethargic  He is asking if he should get tested        [phone]

## 2021-08-13 NOTE — PROGRESS NOTES
COVID-19 Outpatient Progress Note    Assessment/Plan:    Problem List Items Addressed This Visit     None      Visit Diagnoses     Viral infection, unspecified    -  Primary    Relevant Orders    Novel Coronavirus (Covid-19),PCR [facility] - Collected at [facility] or [facility]        Patient presents for an acute visit  Reports that he has been experiencing stomach aches with queasiness and diarrhea for the past several days  He states that he has a history of reflux however states that does not feel like that  He underwent an EGD 4-5 years ago that was stable  He has cut back on alcohol on coffee without any significant improvement  He denies any recent travel however he reports that his wife was exposed to someone with COVID few days ago  He states that he is up-to-date on his vaccines  We will obtain a COVID swab and encouraged him to remain under quarantine  If his COVID swab is negative we will recommend that he come into the office for further evaluation of his diarrhea and abdominal discomfort  Disposition:     I referred patient to one of our centralized sites for a COVID-19 swab  I have spent 15 minutes directly with the patient  Greater than 50% of this time was spent in counseling/coordination of care regarding: diagnostic results, prognosis, instructions for management, patient and family education, importance of treatment compliance and risk factor reductions  Verification of patient location:    Patient is located in the following state in which I hold an active license [place]    Encounter provider Monae Perry DO    Provider located at [address] [place] [address]  Via [address]  [phone]    Recent Visits  No visits were found meeting these conditions    Showing recent visits within past 7 days and meeting all other requirements  Today's Visits  Date Type Provider Dept   08/13/21 [address] [facility] [facility] Edwige Butt, [facility]   08/13/21 Telephone Sharan Rao, [facility] Internal [facility]   Showing today's visits and meeting all other requirements  Future Appointments  No visits were found meeting these conditions  Showing future appointments within next 150 days and meeting all other requirements       Patient agrees to participate in a virtual check in via telephone or video visit instead of presenting to the office to address urgent/immediate medical needs  Patient is aware this is a billable service  After connecting through Telephone, the patient was identified by name and date of birth  Leonarda Herrera was informed that this was a telemedicine visit and that the exam was being conducted confidentially over secure lines  My office door was closed  No one else was in the room  Leonarda Herrera acknowledged consent and understanding of privacy and security of the telemedicine visit  I informed the patient that I have reviewed his record in Epic and presented the opportunity for him to ask any questions regarding the visit today  The patient agreed to participate  It was my intent to perform this visit via video technology but the patient was not able to do a video connection so the visit was completed via audio telephone only  Subjective:   Leonarda Herrera is a 78 y o  male who is concerned about COVID-19  Patient's symptoms include nausea and diarrhea  Patient denies fever, chills, fatigue, malaise, congestion, rhinorrhea, sore throat, anosmia, loss of taste, cough, shortness of breath, chest tightness, abdominal pain, vomiting, myalgias and headaches       Date of symptom onset: 8/10/2021    Exposure:   Contact with a person who is under investigation (PUI) for or who is positive for COVID-19 within the last 14 days?: Yes    Hospitalized recently for fever and/or lower respiratory symptoms?: No      Currently a healthcare worker that is involved in direct patient care?: No Works in a special setting where the risk of COVID-19 transmission may be high? (this may include long-term care, correctional and senior care facilities; homeless shelters; assisted-living facilities and group homes ): No      Resident in a special setting where the risk of COVID-19 transmission may be high? (this may include long-term care, correctional and senior care facilities; homeless shelters; assisted-living facilities and group homes ): No      Lab Results   Component Value Date    SARSCOV2 Not Detected 03/25/2020     Past Medical History:   Diagnosis Date    Basal cell carcinoma of shoulder     last assessed: 08/28/2014    Depression     last assessed: 08/26/2015    Subconjunctival hemorrhage of left eye     last assessed: 01/14/2016     Past Surgical History:   Procedure Laterality Date    HERNIA REPAIR       Current Outpatient Medications   Medication Sig Dispense Refill    fluticasone (FLONASE) 50 mcg/act nasal spray 1 spray into each nostril daily 1 Bottle 6    methocarbamol (ROBAXIN) 500 mg tablet Take 1 tablet (500 mg total) by mouth daily at bedtime as needed for muscle spasms 30 tablet 1    tamsulosin (FLOMAX) 0 4 mg Take 1 capsule (0 4 mg total) by mouth daily at bedtime 90 capsule 3     No current facility-administered medications for this visit  No Known Allergies    Review of Systems   Constitutional: Negative for chills, fatigue and fever  HENT: Negative for congestion, rhinorrhea and sore throat  Respiratory: Negative for cough, chest tightness and shortness of breath  Gastrointestinal: Positive for diarrhea and nausea  Negative for abdominal pain and vomiting  Musculoskeletal: Negative for myalgias  Neurological: Negative for headaches  Objective: There were no vitals filed for this visit  Physical Exam    VIRTUAL VISIT DISCLAIMER    Trudy Do verbally agrees to participate in [employer]   Pt is aware that Virtual Care Services could be limited without vital signs or the ability to perform a full hands-on physical [address] understands he or the provider may request at any time to terminate the video visit and request the patient to seek care or treatment in person

## 2021-08-17 ENCOUNTER — TELEMEDICINE (OUTPATIENT)
Dept: INTERNAL MEDICINE CLINIC | Facility: CLINIC | Age: 76
End: 2021-08-17
Payer: MEDICARE

## 2021-08-17 DIAGNOSIS — Z20.822 EXPOSURE TO COVID-19 VIRUS: Primary | ICD-10-CM

## 2021-08-17 PROCEDURE — 99441 PR PHYS/QHP TELEPHONE EVALUATION 5-10 MIN: CPT | Performed by: INTERNAL MEDICINE

## 2021-08-17 NOTE — PROGRESS NOTES
COVID-19 Outpatient Progress Note    Assessment/Plan:    Problem List Items Addressed This Visit     None      Visit Diagnoses     Exposure to COVID-19 virus    -  Primary         Disposition:     I have spent 10 minutes directly with the patient  Greater than 50% of this time was spent in counseling/coordination of care regarding: diagnostic results, prognosis, risks and benefits of treatment options, instructions for management and impressions  Patient presents for an acute visit  He continues to deny any symptoms of upper respiratory infection including cough or runny nose  He states that his gastrointestinal upset is somewhat better  States that his stomach still causes him significant pain on occasion  States that he has lost 8-9 lb over the past several weeks  He states that his appetite has decreased as well and he has PAD having hot flashes once a week  His weight went from 175-165  He states that he feels fatigued at times  Patient has wife has tested positive for COVID and we encouraged him to remain quarantine at home  Patient will come into the office on 08/23/2021 for an evaluation of his weight loss      Verification of patient location:    Patient is located in the following state in which I hold an active license [place]    Encounter provider Chana Mark DO    Provider located at [address] [place] [address] [id number] [address]  [phone]    Recent Visits  Date Type Provider Dept   08/13/21 Kourtney Bueno , [address] [facility]   08/13/21 Telephone [facility]   Showing recent visits within past 7 days and meeting all other requirements  Today's Visits  Date Type Provider Dept   08/17/21 Telemedicine [facility]   Showing today's visits and meeting all other requirements  Future Appointments  No visits were found meeting these conditions  Showing future appointments within next 150 days and meeting all other requirements     This virtual check-in was done via phone and patient was informed that this is not a secure, HIPAA-compliant platform  He agrees to proceed  Patient agrees to participate in a virtual check in via telephone or video visit instead of presenting to the office to address urgent/immediate medical needs  Patient is aware this is a billable service  After connecting through [facility], the patient was identified by name and date of birth  Margarita Davis was informed that this was a telemedicine visit and that the exam was being conducted confidentially over secure lines  My office door was closed  Other methods to assure confidentiality were taken: p  No one else was in the room  Margarita Davis acknowledged consent and understanding of privacy and security of the telemedicine visit  I informed the patient that I have reviewed his record in Epic and presented the opportunity for him to ask any questions regarding the visit today  The patient agreed to participate  Subjective:   Margarita Davis is a 78 y o  male who has been screened for COVID-19  Symptom change since last report: improving  Patient's symptoms include abdominal pain and diarrhea  Patient denies fever, chills, fatigue, malaise, congestion, rhinorrhea, sore throat, anosmia, loss of taste, cough, shortness of breath, chest tightness, nausea, vomiting, myalgias and headaches  Date of symptom onset: 8/10/2021  COVID-19 vaccination status: Fully vaccinated    Wesley Dudley has been staying home and has isolated themselves in his home  He is taking care to not share personal items and is cleaning all surfaces that are touched often, like counters, tabletops, and doorknobs using household cleaning sprays or wipes  He is wearing a mask when he leaves his room       Lab Results   Component Value Date    SARSCOV2 Negative 08/13/2021    SARSCOV2 Not Detected 03/25/2020     Past Medical History:   Diagnosis Date    Basal cell carcinoma of shoulder     last assessed: 08/28/2014    Depression     last assessed: 08/26/2015    Subconjunctival hemorrhage of left eye     last assessed: 01/14/2016     Past Surgical History:   Procedure Laterality Date    HERNIA REPAIR       Current Outpatient Medications   Medication Sig Dispense Refill    fluticasone (FLONASE) 50 mcg/act nasal spray 1 spray into each nostril daily 1 Bottle 6    methocarbamol (ROBAXIN) 500 mg tablet Take 1 tablet (500 mg total) by mouth daily at bedtime as needed for muscle spasms 30 tablet 1    tamsulosin (FLOMAX) 0 4 mg Take 1 capsule (0 4 mg total) by mouth daily at bedtime 90 capsule 3     No current facility-administered medications for this visit  No Known Allergies    Review of Systems   Constitutional: Positive for activity change, appetite change and unexpected weight change  Negative for chills, fatigue and fever  HENT: Negative for congestion, rhinorrhea and sore throat  Respiratory: Negative for cough, chest tightness and shortness of breath  Gastrointestinal: Positive for abdominal pain and diarrhea  Negative for nausea and vomiting  Musculoskeletal: Negative for myalgias  Neurological: Negative for headaches  Objective: There were no vitals filed for this visit  Physical Exam    VIRTUAL VISIT DISCLAIMER    Rick Sadlivar verbally agrees to participate in [employer]  Pt is aware that [employer] could be limited without vital signs or the ability to perform a full hands-on physical Buzz Murkate understands he or the provider may request at any time to terminate the video visit and request the patient to seek care or treatment in person

## 2021-08-24 ENCOUNTER — OFFICE VISIT (OUTPATIENT)
Dept: INTERNAL MEDICINE CLINIC | Facility: CLINIC | Age: 76
End: 2021-08-24
Payer: MEDICARE

## 2021-08-24 VITALS
DIASTOLIC BLOOD PRESSURE: 70 MMHG | HEART RATE: 69 BPM | OXYGEN SATURATION: 98 % | BODY MASS INDEX: 23.71 KG/M2 | SYSTOLIC BLOOD PRESSURE: 114 MMHG | WEIGHT: 165.6 LBS | HEIGHT: 70 IN | RESPIRATION RATE: 16 BRPM

## 2021-08-24 DIAGNOSIS — Z12.11 SCREENING FOR COLON CANCER: ICD-10-CM

## 2021-08-24 DIAGNOSIS — R63.4 WEIGHT LOSS, UNINTENTIONAL: Primary | ICD-10-CM

## 2021-08-24 DIAGNOSIS — R97.20 ELEVATED PSA: ICD-10-CM

## 2021-08-24 DIAGNOSIS — K21.00 GASTROESOPHAGEAL REFLUX DISEASE WITH ESOPHAGITIS WITHOUT HEMORRHAGE: ICD-10-CM

## 2021-08-24 DIAGNOSIS — R53.82 CHRONIC FATIGUE: ICD-10-CM

## 2021-08-24 DIAGNOSIS — Z11.59 ENCOUNTER FOR SCREENING FOR OTHER VIRAL DISEASES: ICD-10-CM

## 2021-08-24 PROCEDURE — 99214 OFFICE O/P EST MOD 30 MIN: CPT | Performed by: INTERNAL MEDICINE

## 2021-08-24 RX ORDER — FAMOTIDINE 40 MG/1
40 TABLET, FILM COATED ORAL 2 TIMES DAILY PRN
Qty: 180 TABLET | Refills: 1 | Status: SHIPPED | OUTPATIENT
Start: 2021-08-24

## 2021-08-24 NOTE — PROGRESS NOTES
Assessment/Plan:    #Weight Loss  -unintentional  -gradually loosing weight since past month  -weight in March 2021 was 176lbs and now down to 165lbs without change in activity or decreased food  -has fatigue  -reports decreased appetite  -denies fever, chest pain, SOB, diarrhea, N/V, night sweats  -will check CBC, CMP, ESR, CRP, HIV  -obtain CT Chest/Abdomen/pelvis  -does have elevated PSA and seeing urology, recent MRI prostate was normal    #Gerd  -refer to GI for EGD and colonscopy with Dr Hiren Guerrero at [facility]      No problem-specific [address] [place] [address] notes found for this encounter  Diagnoses and all orders for this visit:    Weight loss, unintentional  -     Ambulatory referral to Gastroenterology; Future  -     CBC and differential  -     Comprehensive metabolic panel  -     TSH, 3rd generation with Free T4 reflex  -     Iron, TIBC and Ferritin Panel  -     Chronic Hepatitis Panel  -     HIV 1/2 Antigen/Antibody (4th Generation) w Reflex [facility]  -     Quantiferon TB Gold Plus  -     Sedimentation rate, automated; Future  -     C-reactive protein; Future  -     CT chest abdomen pelvis w wo contrast; Future    Gastroesophageal reflux disease with esophagitis without hemorrhage  -     Ambulatory referral to Gastroenterology; Future  -     famotidine (PEPCID) 40 MG tablet; Take 1 tablet (40 mg total) by mouth 2 (two) times a day as needed for heartburn    Screening for colon cancer  -     Ambulatory referral to Gastroenterology; Future    Chronic fatigue  -     CBC and differential  -     Comprehensive metabolic panel  -     TSH, 3rd generation with Free T4 reflex  -     Iron, TIBC and Ferritin Panel  -     Chronic Hepatitis Panel  -     HIV 1/2 Antigen/Antibody (4th Generation) w Reflex [facility]  -     Quantiferon TB Gold Plus  -     Sedimentation rate, automated; Future  -     C-reactive protein;  Future  -     CT chest abdomen pelvis w wo contrast; Future    Elevated PSA  -     CBC and differential  - Comprehensive metabolic panel  -     TSH, 3rd generation with Free T4 reflex  -     Iron, TIBC and Ferritin Panel  -     Chronic Hepatitis Panel  -     HIV 1/2 Antigen/Antibody (4th Generation) w Reflex [facility]  -     Quantiferon TB Gold Plus  -     Sedimentation rate, automated; Future  -     C-reactive protein; Future  -     CT chest abdomen pelvis w wo contrast; Future    Encounter for screening for other viral diseases   -     Chronic Hepatitis Panel            Current Outpatient Medications:     famotidine (PEPCID) 40 MG tablet, Take 1 tablet (40 mg total) by mouth 2 (two) times a day as needed for heartburn, Disp: 180 tablet, Rfl: 1    fluticasone (FLONASE) 50 mcg/act nasal spray, 1 spray into each nostril daily, Disp: 1 Bottle, Rfl: 6    methocarbamol (ROBAXIN) 500 mg tablet, Take 1 tablet (500 mg total) by mouth daily at bedtime as needed for muscle spasms, Disp: 30 tablet, Rfl: 1    tamsulosin (FLOMAX) 0 4 mg, Take 1 capsule (0 4 mg total) by mouth daily at bedtime, Disp: 90 capsule, Rfl: 3    Subjective:      Patient ID: Charlie Montano is a 78 y o  male  HPI     Patient presents for an acute visit  Reports that he has been losing weight  His weight used to be  176 lb  His weight today is 165 lb  Patient denies any upper respiratory symptoms including cough or fever or runny nose or any diarrhea or loose stools  He denies any night sweats  He states that his appetite is decreased  He states that food still tastes good  He has been trying to increase his weight without any success  Patient reports that his activity levels remain the same and he has not increased this  He is due for an EGD and a colonoscopy and we gave him the referral for that  We will obtain a CBC, CMP, HIV, ESR, CRP, QuantiFERON  Will obtain CT of the chest, abdomen, pelvis      The following portions of the patient's history were reviewed and updated as appropriate: allergies, current medications, past family history, past medical history, past social history, past surgical history and problem list     Review of Systems   Constitutional: Positive for appetite change, fatigue and unexpected weight change  Negative for activity change and fever  HENT: Negative for congestion, ear pain, hearing loss, sore throat and tinnitus  Eyes: Negative for photophobia, pain and visual disturbance  Respiratory: Negative for cough, shortness of breath and wheezing  Cardiovascular: Negative for chest pain, palpitations and leg swelling  Gastrointestinal: Negative for abdominal distention, abdominal pain, constipation, diarrhea, nausea and vomiting  Genitourinary: Negative for difficulty urinating, frequency and hematuria  Musculoskeletal: Negative for arthralgias, back pain, gait problem, joint swelling, myalgias, neck pain and neck stiffness  Skin: Negative for color change, pallor, rash and wound  Neurological: Negative for dizziness, tremors, numbness and headaches  Hematological: Does not bruise/bleed easily  Objective:      /70   Pulse 69   Resp 16   Ht 5' 10" (1 778 m)   Wt 75 1 kg (165 lb 9 6 oz)   SpO2 98%   BMI 23 76 kg/m²          Physical Exam  Vitals reviewed  Constitutional:       General: He is not in acute distress  Appearance: Normal appearance  He is well-developed  He is not ill-appearing, toxic-appearing or diaphoretic  HENT:      Head: Normocephalic and atraumatic  Right Ear: External ear normal       Left Ear: External ear normal    Eyes:      Extraocular Movements: Extraocular movements intact  Conjunctiva/sclera: Conjunctivae normal       Pupils: Pupils are equal, round, and reactive to light  Neck:      Thyroid: No thyromegaly  Vascular: No JVD  Cardiovascular:      Rate and Rhythm: Normal rate and regular rhythm  Heart sounds: Normal heart sounds  No murmur heard  Pulmonary:      Effort: Pulmonary effort is normal  No respiratory distress  Breath sounds: Normal breath sounds  No stridor  No wheezing, rhonchi or rales  Abdominal:      General: Bowel sounds are normal  There is no distension  Palpations: Abdomen is soft  There is no mass  Tenderness: There is no abdominal tenderness  There is no right CVA tenderness, left CVA tenderness, guarding or rebound  Musculoskeletal:         General: Tenderness (arthritis in lower back) present  No swelling or deformity  Normal range of motion  Cervical back: Normal range of motion and neck supple  Right lower leg: No edema  Left lower leg: No edema  Lymphadenopathy:      Cervical: No cervical adenopathy  Skin:     General: Skin is warm and dry  Findings: No bruising, erythema, lesion or rash  Neurological:      Mental Status: He is alert and oriented to person, place, and time  Deep Tendon Reflexes: Reflexes are normal and symmetric  This note was completed in part utilizing eSoft direct voice recognition software  Grammatical errors, random word insertion, spelling mistakes, and incomplete sentences may be an occasional consequence of the system secondary to software limitations, ambient noise and hardware issues  At the time of dictation, efforts were made to edit, clarify and /or correct errors  Please read the chart carefully and recognize, using context, where substitutions have occurred  If you have any questions or concerns about the context, text or information contained within the body of this dictation, please contact myself, the provider, for further clarification

## 2021-08-25 ENCOUNTER — APPOINTMENT (OUTPATIENT)
Dept: LAB | Age: 76
End: 2021-08-25
Payer: MEDICARE

## 2021-08-25 DIAGNOSIS — R53.82 CHRONIC FATIGUE: ICD-10-CM

## 2021-08-25 DIAGNOSIS — R97.20 ELEVATED PSA: ICD-10-CM

## 2021-08-25 DIAGNOSIS — R63.4 WEIGHT LOSS, UNINTENTIONAL: ICD-10-CM

## 2021-08-25 LAB
ALBUMIN SERPL BCP-MCNC: 3.4 G/DL (ref 3.5–5)
ALP SERPL-CCNC: 68 U/L (ref 46–116)
ALT SERPL W P-5'-P-CCNC: 23 U/L (ref 12–78)
ANION GAP SERPL CALCULATED.3IONS-SCNC: 3 MMOL/L (ref 4–13)
AST SERPL W P-5'-P-CCNC: 18 U/L (ref 5–45)
BASOPHILS # BLD AUTO: 0.11 THOUSANDS/ΜL (ref 0–0.1)
BASOPHILS NFR BLD AUTO: 2 % (ref 0–1)
BILIRUB SERPL-MCNC: 0.68 MG/DL (ref 0.2–1)
BUN SERPL-MCNC: 21 MG/DL (ref 5–25)
CALCIUM ALBUM COR SERPL-MCNC: 9.4 MG/DL (ref 8.3–10.1)
CALCIUM SERPL-MCNC: 8.9 MG/DL (ref 8.3–10.1)
CHLORIDE SERPL-SCNC: 110 MMOL/L (ref 100–108)
CO2 SERPL-SCNC: 27 MMOL/L (ref 21–32)
CREAT SERPL-MCNC: 0.87 MG/DL (ref 0.6–1.3)
CRP SERPL QL: <3 MG/L
EOSINOPHIL # BLD AUTO: 0.53 THOUSAND/ΜL (ref 0–0.61)
EOSINOPHIL NFR BLD AUTO: 9 % (ref 0–6)
ERYTHROCYTE [DISTWIDTH] IN BLOOD BY AUTOMATED COUNT: 13.5 % (ref 11.6–15.1)
ERYTHROCYTE [SEDIMENTATION RATE] IN BLOOD: 2 MM/HOUR (ref 0–19)
FERRITIN SERPL-MCNC: 197 NG/ML (ref 8–388)
GFR SERPL CREATININE-BSD FRML MDRD: 85 ML/MIN/1.73SQ M
GLUCOSE P FAST SERPL-MCNC: 92 MG/DL (ref 65–99)
HBV CORE AB SER QL: NORMAL
HBV CORE IGM SER QL: NORMAL
HBV SURFACE AG SER QL: NORMAL
HCT VFR BLD AUTO: 46.3 % (ref 36.5–49.3)
HCV AB SER QL: NORMAL
HGB BLD-MCNC: 15.3 G/DL (ref 12–17)
IMM GRANULOCYTES # BLD AUTO: 0.01 THOUSAND/UL (ref 0–0.2)
IMM GRANULOCYTES NFR BLD AUTO: 0 % (ref 0–2)
IRON SATN MFR SERPL: 41 %
IRON SERPL-MCNC: 106 UG/DL (ref 65–175)
LYMPHOCYTES # BLD AUTO: 1.62 THOUSANDS/ΜL (ref 0.6–4.47)
LYMPHOCYTES NFR BLD AUTO: 27 % (ref 14–44)
MCH RBC QN AUTO: 31.5 PG (ref 26.8–34.3)
MCHC RBC AUTO-ENTMCNC: 33 G/DL (ref 31.4–37.4)
MCV RBC AUTO: 96 FL (ref 82–98)
MONOCYTES # BLD AUTO: 0.47 THOUSAND/ΜL (ref 0.17–1.22)
MONOCYTES NFR BLD AUTO: 8 % (ref 4–12)
NEUTROPHILS # BLD AUTO: 3.3 THOUSANDS/ΜL (ref 1.85–7.62)
NEUTS SEG NFR BLD AUTO: 54 % (ref 43–75)
NRBC BLD AUTO-RTO: 0 /100 WBCS
PLATELET # BLD AUTO: 202 THOUSANDS/UL (ref 149–390)
PMV BLD AUTO: 10.3 FL (ref 8.9–12.7)
POTASSIUM SERPL-SCNC: 4 MMOL/L (ref 3.5–5.3)
PROT SERPL-MCNC: 6.4 G/DL (ref 6.4–8.2)
RBC # BLD AUTO: 4.85 MILLION/UL (ref 3.88–5.62)
SODIUM SERPL-SCNC: 140 MMOL/L (ref 136–145)
TIBC SERPL-MCNC: 258 UG/DL (ref 250–450)
TSH SERPL DL<=0.05 MIU/L-ACNC: 2.43 UIU/ML (ref 0.36–3.74)
WBC # BLD AUTO: 6.04 THOUSAND/UL (ref 4.31–10.16)

## 2021-08-25 PROCEDURE — 83540 ASSAY OF IRON: CPT

## 2021-08-25 PROCEDURE — 86803 HEPATITIS C AB TEST: CPT | Performed by: INTERNAL MEDICINE

## 2021-08-25 PROCEDURE — 36415 COLL VENOUS BLD VENIPUNCTURE: CPT | Performed by: INTERNAL MEDICINE

## 2021-08-25 PROCEDURE — 86140 C-REACTIVE PROTEIN: CPT

## 2021-08-25 PROCEDURE — 86704 HEP B CORE ANTIBODY TOTAL: CPT | Performed by: INTERNAL MEDICINE

## 2021-08-25 PROCEDURE — 86705 HEP B CORE ANTIBODY IGM: CPT | Performed by: INTERNAL MEDICINE

## 2021-08-25 PROCEDURE — 83550 IRON BINDING TEST: CPT

## 2021-08-25 PROCEDURE — 84443 ASSAY THYROID STIM HORMONE: CPT | Performed by: INTERNAL MEDICINE

## 2021-08-25 PROCEDURE — 86480 TB TEST CELL IMMUN MEASURE: CPT | Performed by: INTERNAL MEDICINE

## 2021-08-25 PROCEDURE — 87340 HEPATITIS B SURFACE AG IA: CPT | Performed by: INTERNAL MEDICINE

## 2021-08-25 PROCEDURE — 82728 ASSAY OF FERRITIN: CPT

## 2021-08-25 PROCEDURE — 80053 COMPREHEN METABOLIC PANEL: CPT | Performed by: INTERNAL MEDICINE

## 2021-08-25 PROCEDURE — 87389 HIV-1 AG W/HIV-1&-2 AB AG IA: CPT | Performed by: INTERNAL MEDICINE

## 2021-08-25 PROCEDURE — 85025 COMPLETE CBC W/AUTO DIFF WBC: CPT | Performed by: INTERNAL MEDICINE

## 2021-08-25 PROCEDURE — 85652 RBC SED RATE AUTOMATED: CPT

## 2021-08-26 LAB
GAMMA INTERFERON BACKGROUND BLD IA-ACNC: 0.04 IU/ML
HIV 1+2 AB+HIV1 P24 AG SERPL QL IA: NORMAL
M TB IFN-G BLD-IMP: NEGATIVE
M TB IFN-G CD4+ BCKGRND COR BLD-ACNC: -0.01 IU/ML
M TB IFN-G CD4+ BCKGRND COR BLD-ACNC: -0.01 IU/ML
MITOGEN IGNF BCKGRD COR BLD-ACNC: >10 IU/ML

## 2021-08-27 ENCOUNTER — TELEPHONE (OUTPATIENT)
Dept: INTERNAL MEDICINE CLINIC | Facility: CLINIC | Age: 76
End: 2021-08-27

## 2021-08-27 RX ORDER — CEFTRIAXONE 1 G/1
1000 INJECTION, POWDER, FOR SOLUTION INTRAMUSCULAR; INTRAVENOUS ONCE
Status: COMPLETED | OUTPATIENT
Start: 2021-08-31 | End: 2021-08-31

## 2021-08-27 NOTE — PROGRESS NOTES
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    Informed consent   The risks, benefits and alternatives to TRUS-guided prostate biopsy were conveyed to the patient prior to performing the procedure  A discussion of the risks of the procedure included, but was not limited to: pain, hematuria, hematochezia, hematospermia, infection, and the possibility of a non-diagnostic biopsy  The patient was given the opportunity to have his questions answered and there was no perceived barrier to education  Antibiotic prophylaxis   The patient received the following antibiotics at least 30 minutes prior to undergoing biopsy: Cipro p o , ceftriaxone IM  The patient was instructed to continue taking the antibiotics as prescribed for a total of 1 days, including the day of biopsy  Rectal cleansing  The patient was instructed to perform an evacuating rectal enema 1-2 hours prior to biopsy  Local anesthesia  Topical 2% lidocaine jelly was applied liberally to the anus and rectum and allowed to dwell for at least 5 min prior to starting the procedure  After insertion of the TRUS probe, 10 mL of 2% lidocaine solution was injected with ultrasound guidance at the  junction of the prostate and seminal vesicles  The anesthetic was allowed to dwell for at least 2 minutes prior to biopsy  Transrectal ultrasonography  The patient was placed in the left lateral decubitus position  After an attentive digital rectal examination, a 7 5 mHz sidefire ultrasound probe was gently inserted into the rectum and biplanar imaging of the prostate was done with the findings noted below  Images were taken of any abnormal findings and also to document prostate size      Bladder  The bladder base appeared normal     Prostate  Digital rectal exam findings:  - Smooth prostate, slightly firm    Ultrasound size measurements:  -Volume:   43 01 cm3    Ultrasound findings:  -Cysts: None  -Masses: None  -Median lobe: absent    Clinical stage (assuming a positive biopsy):   -T1c     TRUS-guided needle biopsy  Using an 18 gauge biopsy needle and ultrasound guidance, the following biopsies were taken:    1 core(s) from the left lateral base  1 core(s) from the left lateral mid-gland  1 core(s) from the right middle base  1 core(s) from the right lateral base  1 core(s) from the left lateral mid-gland  1 core(s) from the left middle mid-gland  1 core(s) from the right middle mid-gland  1 core(s) from the right lateral mid-gland  1 core(s) from the left lateral apex  1 core(s) from the left middle apex  1 core(s) from the right middle apex  1 core(s) from the right lateral apex    Total number of cores: 12                Complications  There were no procedural complications  Disposition  The patient was dismissed to home     Post-procedure instructions: Today he underwent an uncomplicated transrectal ultrasound-guided biopsy of the prostate, following a periprosthetic nerve block  I reviewed the normal postprocedure a course including bleeding per rectum, hematuria, and hematospermia  I instructed him to complete his course of antibiotics as prescribed  Instructed him to call with fever greater than 101, chills, nausea, vomiting, and poorly controlled pain  His followup was scheduled in approximately 2 weeks' time to review the pathology

## 2021-08-27 NOTE — PATIENT INSTRUCTIONS
Prostate Biopsy   WHAT YOU NEED TO KNOW:   A prostate biopsy is a procedure to remove samples of tissue from your prostate gland  The prostate is a male sex gland that makes fluid found in semen  It is located just below the bladder  After the samples are removed, they are sent to a lab and tested for cancer  DISCHARGE INSTRUCTIONS:   Seek care immediately if:   · You have heavy bleeding from your rectum  · You urinate very little or not at all  · You have pain from your procedure that gets worse, even after you take pain medicine  Contact your healthcare provider if:   · You have a fever or chills  · You feel pain or burning when you urinate  · Your urine is cloudy or smells bad  · You have questions or concerns about your condition or care  Medicines:  · Medicines  can help decrease pain  You may need medicine to prevent or treat a bacterial infection  Ask how to take pain medicine safely  · Take your medicine as directed  Contact your healthcare provider if you think your medicine is not helping or if you have side effects  Tell him or her if you are allergic to any medicine  Keep a list of the medicines, vitamins, and herbs you take  Include the amounts, and when and why you take them  Bring the list or the pill bottles to follow-up visits  Carry your medicine list with you in case of an emergency  Follow up with your healthcare provider or urologist as directed: You may need to return for more tests or procedures  Write down your questions so you remember to ask them during your visits  © Copyright [employer] 2021 Information is for End User's use only and may not be sold, redistributed or otherwise used for commercial purposes  All illustrations and images included in CareNotes® are the copyrighted property of [employer]  or Fidel Roland  The above information is an  only   It is not intended as medical advice for individual conditions or treatments  Talk to your doctor, nurse or pharmacist before following any medical regimen to see if it is safe and effective for you

## 2021-08-27 NOTE — TELEPHONE ENCOUNTER
----- Message from Anni Cline DO sent at 8/27/2021  8:43 AM EDT -----    Please let patient know that his labs came back normal   No signs of iron deficiency or any inflammatory disease  His HIV and hepatitis and tuberculosis panels were all negative  We will wait for CT scan of his chest, abdomen and pelvis

## 2021-08-30 ENCOUNTER — HOSPITAL ENCOUNTER (OUTPATIENT)
Dept: RADIOLOGY | Age: 76
Discharge: HOME | End: 2021-08-30
Payer: MEDICARE

## 2021-08-30 ENCOUNTER — TELEPHONE (OUTPATIENT)
Dept: UROLOGY | Facility: MEDICAL CENTER | Age: 76
End: 2021-08-30

## 2021-08-30 DIAGNOSIS — R53.82 CHRONIC FATIGUE: ICD-10-CM

## 2021-08-30 DIAGNOSIS — R63.4 WEIGHT LOSS, UNINTENTIONAL: ICD-10-CM

## 2021-08-30 DIAGNOSIS — R97.20 ELEVATED PSA: Primary | ICD-10-CM

## 2021-08-30 DIAGNOSIS — R97.20 ELEVATED PSA: ICD-10-CM

## 2021-08-30 PROCEDURE — G1004 CDSM NDSC: HCPCS

## 2021-08-30 PROCEDURE — 71260 CT THORAX DX C+: CPT

## 2021-08-30 PROCEDURE — 74177 CT ABD & PELVIS W/CONTRAST: CPT

## 2021-08-30 RX ORDER — CIPROFLOXACIN 500 MG/1
500 TABLET, FILM COATED ORAL EVERY 12 HOURS SCHEDULED
Qty: 2 TABLET | Refills: 0 | Status: SHIPPED | OUTPATIENT
Start: 2021-08-30 | End: 2021-08-31

## 2021-08-30 RX ADMIN — IOHEXOL 100 ML: 350 INJECTION, SOLUTION INTRAVENOUS at 12:14

## 2021-08-30 NOTE — TELEPHONE ENCOUNTER
Received a call from patient   Patient states he is have an in office biopsy tomorrow his instructions state that he needs to take an oral antibiotic prior to his appt  Will check with office to verify and contact patient back

## 2021-08-30 NOTE — TELEPHONE ENCOUNTER
Left voicemail for patient informing him his biopsy prep was sent to [facility]  If you have any questions regarding the directions, please call the office

## 2021-08-31 ENCOUNTER — PROCEDURE VISIT (OUTPATIENT)
Dept: UROLOGY | Facility: CLINIC | Age: 76
End: 2021-08-31
Payer: MEDICARE

## 2021-08-31 VITALS
SYSTOLIC BLOOD PRESSURE: 118 MMHG | HEART RATE: 72 BPM | WEIGHT: 163.6 LBS | BODY MASS INDEX: 23.42 KG/M2 | HEIGHT: 70 IN | DIASTOLIC BLOOD PRESSURE: 70 MMHG

## 2021-08-31 DIAGNOSIS — R97.20 ELEVATED PSA: Primary | ICD-10-CM

## 2021-08-31 DIAGNOSIS — N42.32 ATYPICAL SMALL ACINAR PROLIFERATION OF PROSTATE: ICD-10-CM

## 2021-08-31 PROCEDURE — 55700 PR BIOPSY OF PROSTATE,NEEDLE/PUNCH: CPT | Performed by: UROLOGY

## 2021-08-31 PROCEDURE — G0416 PROSTATE BIOPSY, ANY MTHD: HCPCS | Performed by: PATHOLOGY

## 2021-08-31 PROCEDURE — 88344 IMHCHEM/IMCYTCHM EA MLT ANTB: CPT | Performed by: PATHOLOGY

## 2021-08-31 PROCEDURE — 96372 THER/PROPH/DIAG INJ SC/IM: CPT

## 2021-08-31 PROCEDURE — 76942 ECHO GUIDE FOR BIOPSY: CPT | Performed by: UROLOGY

## 2021-08-31 PROCEDURE — 88342 IMHCHEM/IMCYTCHM 1ST ANTB: CPT | Performed by: PATHOLOGY

## 2021-08-31 RX ADMIN — CEFTRIAXONE 1000 MG: 1 INJECTION, POWDER, FOR SOLUTION INTRAMUSCULAR; INTRAVENOUS at 08:36

## 2021-09-02 ENCOUNTER — TELEPHONE (OUTPATIENT)
Dept: OTHER | Facility: HOSPITAL | Age: 76
End: 2021-09-02

## 2021-09-02 DIAGNOSIS — R97.20 ELEVATED PSA: ICD-10-CM

## 2021-09-02 DIAGNOSIS — Z98.890 HISTORY OF NEEDLE BIOPSY OF PROSTATE WITH NEGATIVE RESULT: Primary | ICD-10-CM

## 2021-09-02 NOTE — TELEPHONE ENCOUNTER
Appointment cancelled with Dr Champagne Hidden on 9/20/21  Left message to schedule patient for a 6 month follow up

## 2021-09-02 NOTE — TELEPHONE ENCOUNTER
The patient's prostate biopsy is negative, I released these to him by way of the online option    Please cancel his follow-up visit and have him see us in 6 months with a PSA prior, PSA was ordered by me just now, thank you

## 2021-09-07 NOTE — TELEPHONE ENCOUNTER
Patient scheduled on 03/9/22  Called patient and confirmed appointment  patient made aware to go for a PSA prior to visit

## 2021-09-08 ENCOUNTER — TELEPHONE (OUTPATIENT)
Dept: INTERNAL MEDICINE CLINIC | Facility: CLINIC | Age: 76
End: 2021-09-08

## 2021-09-08 NOTE — TELEPHONE ENCOUNTER
----- Message from Suraj Vega DO sent at 9/7/2021  8:37 AM EDT -----  Please let patient know his CT scan did not show any abnormalities aside from a 6mm lung nodule   We will repeat a CT of his chest again in about 3-6 months

## 2021-09-09 NOTE — TELEPHONE ENCOUNTER
Dr Myrna Angel, when you are back in the office Regine Corbett would like to discuss his blood results  He saw a few abnormal levels on the cbc  Also he is aware of the CT scan results but would like to check with you if waiting the 3-6 month period to repeat is advisable        [phone]

## 2021-09-10 NOTE — TELEPHONE ENCOUNTER
Have him come in sooner than 9/30 to discuss his labs and imaging however looking at it, there does not appear to be anything concerning

## 2021-09-13 ENCOUNTER — OFFICE VISIT (OUTPATIENT)
Dept: INTERNAL MEDICINE CLINIC | Facility: CLINIC | Age: 76
End: 2021-09-13
Payer: MEDICARE

## 2021-09-13 VITALS — HEIGHT: 70 IN | BODY MASS INDEX: 24.2 KG/M2 | WEIGHT: 169 LBS

## 2021-09-13 DIAGNOSIS — K21.00 GASTROESOPHAGEAL REFLUX DISEASE WITH ESOPHAGITIS WITHOUT HEMORRHAGE: ICD-10-CM

## 2021-09-13 DIAGNOSIS — R63.4 WEIGHT LOSS, UNINTENTIONAL: Primary | ICD-10-CM

## 2021-09-13 DIAGNOSIS — R91.1 PULMONARY NODULE, RIGHT: ICD-10-CM

## 2021-09-13 DIAGNOSIS — Z12.11 SCREENING FOR COLON CANCER: ICD-10-CM

## 2021-09-13 PROCEDURE — 99213 OFFICE O/P EST LOW 20 MIN: CPT | Performed by: INTERNAL MEDICINE

## 2021-09-13 NOTE — PROGRESS NOTES
Assessment/Plan:    #Weight Loss  -unintentional and improving to 169lbs  -CT A/P unremarkable  -labs unrevealing  -reports he has cut back on alcohol and encouraged him to continue to cut back  -is due for EGD and colonoscopy  -prostate bx unremarkable    #Pulmonary Nodule  -seen on CT chest  -will repeat CT in 3 months    #GERD  -due for EGD and colonoscopy, reports that he tried to repeat with Dr Dario Rossi at [facility] but has not been able to schedule, will refer to Dr Paris Solo    No problem-specific Keenan Champion notes found for this encounter  Diagnoses and all orders for this visit:    Weight loss, unintentional  -     Ambulatory referral to Gastroenterology; Future    Gastroesophageal reflux disease with esophagitis without hemorrhage  -     Ambulatory referral to Gastroenterology; Future    Screening for colon cancer  -     Ambulatory referral to Gastroenterology; Future    Pulmonary nodule, right  -     CT chest wo contrast; Future            Current Outpatient Medications:     bisacodyl (FLEET) 10 MG/30ML ENEM, Insert 30 mL (10 mg total) into the rectum once for 1 dose, Disp: 30 mL, Rfl: 0    famotidine (PEPCID) 40 MG tablet, Take 1 tablet (40 mg total) by mouth 2 (two) times a day as needed for heartburn, Disp: 180 tablet, Rfl: 1    fluticasone (FLONASE) 50 mcg/act nasal spray, 1 spray into each nostril daily, Disp: 1 Bottle, Rfl: 6    tamsulosin (FLOMAX) 0 4 mg, Take 1 capsule (0 4 mg total) by mouth daily at bedtime, Disp: 90 capsule, Rfl: 3    Subjective:      Patient ID: Jennifer Serrano is a 78 y o  male  HPI       Patient presents for an acute visit follow-up  He reports that his weight is now increased slightly  He states that his appetite has returned  His weight is up to 169 lb today  CT abdomen pelvis was unremarkable  CT of his chest revealed a 6 mm right lower lung nodule  He states that he was a former smoker but quit over 50 years ago    We will repeat his CT of his chest in 3 months  His CBC, CMP, ESR, CRP, HIV, iron panel, TSH, QuantiFERON were all negative  Patient was encouraged to obtain a upper endoscopy as well as a colonoscopy  He will work on arranging this and notify us if he has any issues with scheduling  The following portions of the patient's history were reviewed and updated as appropriate: allergies, current medications, past family history, past medical history, past social history, past surgical history and problem list     Review of Systems   Constitutional: Positive for unexpected weight change  Negative for activity change, appetite change, fatigue and fever  HENT: Negative for congestion, ear pain, hearing loss, sore throat and tinnitus  Eyes: Negative for photophobia, pain and visual disturbance  Respiratory: Negative for cough, shortness of breath and wheezing  Cardiovascular: Negative for chest pain and leg swelling  Gastrointestinal: Negative for abdominal distention, abdominal pain, nausea and vomiting  Genitourinary: Negative for difficulty urinating, frequency and hematuria  Musculoskeletal: Negative for arthralgias, back pain, joint swelling, neck pain and neck stiffness  Skin: Negative for color change, pallor, rash and wound  Neurological: Negative for dizziness, tremors, numbness and headaches  Hematological: Does not bruise/bleed easily  Objective:      Ht 5' 10" (1 778 m)   Wt 76 7 kg (169 lb)   BMI 24 25 kg/m²          Physical Exam  Constitutional:       General: He is not in acute distress  Appearance: He is well-developed  He is not diaphoretic  HENT:      Head: Normocephalic and atraumatic  Eyes:      Conjunctiva/sclera: Conjunctivae normal       Pupils: Pupils are equal, round, and reactive to light  Neck:      Vascular: No JVD  Trachea: No tracheal deviation  Pulmonary:      Effort: Pulmonary effort is normal  No respiratory distress     Musculoskeletal:         General: No tenderness or deformity  Normal range of motion  Cervical back: Normal range of motion  Skin:     General: Skin is warm and dry  Findings: No erythema or rash  Neurological:      Mental Status: He is alert and oriented to person, place, and time  This note was completed in part utilizing [employer] fluency direct voice recognition software  Grammatical errors, random word insertion, spelling mistakes, and incomplete sentences may be an occasional consequence of the system secondary to software limitations, ambient noise and hardware issues  At the time of dictation, efforts were made to edit, clarify and /or correct errors  Please read the chart carefully and recognize, using context, where substitutions have occurred  If you have any questions or concerns about the context, text or information contained within the body of this dictation, please contact myself, the provider, for further clarification

## 2021-10-06 ENCOUNTER — CLINICAL SUPPORT (OUTPATIENT)
Dept: INTERNAL MEDICINE CLINIC | Facility: CLINIC | Age: 76
End: 2021-10-06
Payer: MEDICARE

## 2021-10-06 DIAGNOSIS — Z23 FLU VACCINE NEED: Primary | ICD-10-CM

## 2021-10-06 PROCEDURE — 90662 IIV NO PRSV INCREASED AG IM: CPT

## 2021-10-06 PROCEDURE — G0008 ADMIN INFLUENZA VIRUS VAC: HCPCS

## 2021-12-22 ENCOUNTER — HOSPITAL ENCOUNTER (OUTPATIENT)
Dept: RADIOLOGY | Age: 76
Discharge: HOME | End: 2021-12-22
Payer: MEDICARE

## 2021-12-22 DIAGNOSIS — R91.1 PULMONARY NODULE, RIGHT: ICD-10-CM

## 2021-12-22 PROCEDURE — G1004 CDSM NDSC: HCPCS

## 2021-12-22 PROCEDURE — 71250 CT THORAX DX C-: CPT

## 2021-12-29 ENCOUNTER — TELEPHONE (OUTPATIENT)
Dept: INTERNAL MEDICINE CLINIC | Facility: CLINIC | Age: 76
End: 2021-12-29

## 2022-02-23 ENCOUNTER — APPOINTMENT (OUTPATIENT)
Dept: LAB | Age: 77
End: 2022-02-23
Payer: MEDICARE

## 2022-02-23 DIAGNOSIS — E78.5 HYPERLIPIDEMIA, UNSPECIFIED HYPERLIPIDEMIA TYPE: ICD-10-CM

## 2022-02-23 DIAGNOSIS — K21.9 LARYNGOPHARYNGEAL REFLUX: Chronic | ICD-10-CM

## 2022-02-23 DIAGNOSIS — N40.0 BENIGN PROSTATIC HYPERPLASIA WITHOUT LOWER URINARY TRACT SYMPTOMS: ICD-10-CM

## 2022-02-23 DIAGNOSIS — Z98.890 HISTORY OF NEEDLE BIOPSY OF PROSTATE WITH NEGATIVE RESULT: ICD-10-CM

## 2022-02-23 DIAGNOSIS — R97.20 ELEVATED PSA: ICD-10-CM

## 2022-02-23 DIAGNOSIS — H91.93 BILATERAL HEARING LOSS, UNSPECIFIED HEARING LOSS TYPE: ICD-10-CM

## 2022-02-23 DIAGNOSIS — Z13.29 SCREENING FOR THYROID DISORDER: ICD-10-CM

## 2022-02-23 DIAGNOSIS — R73.9 HYPERGLYCEMIA, UNSPECIFIED: ICD-10-CM

## 2022-02-23 DIAGNOSIS — R06.7 SNEEZING: ICD-10-CM

## 2022-02-23 DIAGNOSIS — Z01.00 ENCOUNTER FOR COMPLETE EYE EXAM: ICD-10-CM

## 2022-02-23 DIAGNOSIS — Z13.1 SCREENING FOR DIABETES MELLITUS: ICD-10-CM

## 2022-02-23 DIAGNOSIS — E55.9 VITAMIN D DEFICIENCY: ICD-10-CM

## 2022-02-23 LAB
25(OH)D3 SERPL-MCNC: 40.8 NG/ML (ref 30–100)
ALBUMIN SERPL BCP-MCNC: 3.6 G/DL (ref 3.5–5)
ALP SERPL-CCNC: 64 U/L (ref 46–116)
ALT SERPL W P-5'-P-CCNC: 24 U/L (ref 12–78)
ANION GAP SERPL CALCULATED.3IONS-SCNC: 3 MMOL/L (ref 4–13)
AST SERPL W P-5'-P-CCNC: 15 U/L (ref 5–45)
BASOPHILS # BLD AUTO: 0.07 THOUSANDS/ΜL (ref 0–0.1)
BASOPHILS NFR BLD AUTO: 1 % (ref 0–1)
BILIRUB SERPL-MCNC: 0.88 MG/DL (ref 0.2–1)
BUN SERPL-MCNC: 18 MG/DL (ref 5–25)
CALCIUM SERPL-MCNC: 9.6 MG/DL (ref 8.3–10.1)
CHLORIDE SERPL-SCNC: 106 MMOL/L (ref 100–108)
CHOLEST SERPL-MCNC: 180 MG/DL
CO2 SERPL-SCNC: 31 MMOL/L (ref 21–32)
CREAT SERPL-MCNC: 1.05 MG/DL (ref 0.6–1.3)
EOSINOPHIL # BLD AUTO: 0.42 THOUSAND/ΜL (ref 0–0.61)
EOSINOPHIL NFR BLD AUTO: 7 % (ref 0–6)
ERYTHROCYTE [DISTWIDTH] IN BLOOD BY AUTOMATED COUNT: 13.8 % (ref 11.6–15.1)
GFR SERPL CREATININE-BSD FRML MDRD: 69 ML/MIN/1.73SQ M
GLUCOSE P FAST SERPL-MCNC: 103 MG/DL (ref 65–99)
HCT VFR BLD AUTO: 48.9 % (ref 36.5–49.3)
HDLC SERPL-MCNC: 59 MG/DL
HGB BLD-MCNC: 15.9 G/DL (ref 12–17)
IMM GRANULOCYTES # BLD AUTO: 0.01 THOUSAND/UL (ref 0–0.2)
IMM GRANULOCYTES NFR BLD AUTO: 0 % (ref 0–2)
LDLC SERPL CALC-MCNC: 104 MG/DL (ref 0–100)
LDLC SERPL DIRECT ASSAY-MCNC: 95 MG/DL (ref 0–100)
LYMPHOCYTES # BLD AUTO: 1.68 THOUSANDS/ΜL (ref 0.6–4.47)
LYMPHOCYTES NFR BLD AUTO: 30 % (ref 14–44)
MCH RBC QN AUTO: 31.4 PG (ref 26.8–34.3)
MCHC RBC AUTO-ENTMCNC: 32.5 G/DL (ref 31.4–37.4)
MCV RBC AUTO: 96 FL (ref 82–98)
MONOCYTES # BLD AUTO: 0.53 THOUSAND/ΜL (ref 0.17–1.22)
MONOCYTES NFR BLD AUTO: 9 % (ref 4–12)
NEUTROPHILS # BLD AUTO: 2.95 THOUSANDS/ΜL (ref 1.85–7.62)
NEUTS SEG NFR BLD AUTO: 53 % (ref 43–75)
NONHDLC SERPL-MCNC: 121 MG/DL
NRBC BLD AUTO-RTO: 0 /100 WBCS
PLATELET # BLD AUTO: 194 THOUSANDS/UL (ref 149–390)
PMV BLD AUTO: 9.9 FL (ref 8.9–12.7)
POTASSIUM SERPL-SCNC: 4.3 MMOL/L (ref 3.5–5.3)
PROT SERPL-MCNC: 6.7 G/DL (ref 6.4–8.2)
PSA SERPL-MCNC: 7.2 NG/ML (ref 0–4)
RBC # BLD AUTO: 5.07 MILLION/UL (ref 3.88–5.62)
SODIUM SERPL-SCNC: 140 MMOL/L (ref 136–145)
TRIGL SERPL-MCNC: 87 MG/DL
TSH SERPL DL<=0.05 MIU/L-ACNC: 2.63 UIU/ML (ref 0.36–3.74)
WBC # BLD AUTO: 5.66 THOUSAND/UL (ref 4.31–10.16)

## 2022-02-23 PROCEDURE — 85025 COMPLETE CBC W/AUTO DIFF WBC: CPT

## 2022-02-23 PROCEDURE — 80061 LIPID PANEL: CPT

## 2022-02-23 PROCEDURE — 83721 ASSAY OF BLOOD LIPOPROTEIN: CPT

## 2022-02-23 PROCEDURE — 82306 VITAMIN D 25 HYDROXY: CPT

## 2022-02-23 PROCEDURE — 84153 ASSAY OF PSA TOTAL: CPT

## 2022-02-23 PROCEDURE — 36415 COLL VENOUS BLD VENIPUNCTURE: CPT

## 2022-02-23 PROCEDURE — 84443 ASSAY THYROID STIM HORMONE: CPT

## 2022-02-23 PROCEDURE — 80053 COMPREHEN METABOLIC PANEL: CPT

## 2022-03-09 ENCOUNTER — OFFICE VISIT (OUTPATIENT)
Dept: UROLOGY | Facility: CLINIC | Age: 77
End: 2022-03-09
Payer: MEDICARE

## 2022-03-09 VITALS
WEIGHT: 173 LBS | HEART RATE: 76 BPM | HEIGHT: 70 IN | OXYGEN SATURATION: 97 % | RESPIRATION RATE: 16 BRPM | DIASTOLIC BLOOD PRESSURE: 64 MMHG | SYSTOLIC BLOOD PRESSURE: 122 MMHG | BODY MASS INDEX: 24.77 KG/M2

## 2022-03-09 DIAGNOSIS — R97.20 ELEVATED PSA: Primary | ICD-10-CM

## 2022-03-09 PROCEDURE — 99213 OFFICE O/P EST LOW 20 MIN: CPT | Performed by: PHYSICIAN ASSISTANT

## 2022-03-09 RX ORDER — IBUPROFEN 600 MG/1
TABLET ORAL
COMMUNITY
Start: 2021-12-11

## 2022-03-09 RX ORDER — DESOXIMETASONE 2.5 MG/G
CREAM TOPICAL
COMMUNITY
Start: 2022-01-13

## 2022-03-09 NOTE — PROGRESS NOTES
UROLOGY PROGRESS NOTE   Patient Identifiers: Pipe Healy (MRN [id number])  Date of Service: 3/9/2022    Subjective:      59-year-old man history of elevated PSA  He has high PSA was 5 2 in 2020 and 4 8 time of biopsy  Pathology was all benign   Current PSA 7 2  Reason for visit:  Elevated PSA follow-up    Objective:     VITALS:    There were no vitals filed for this visit  LABS:  Lab Results   Component Value Date    HGB 15 9 02/23/2022    HCT 48 9 02/23/2022    WBC 5 66 02/23/2022     02/23/2022   ]    Lab Results   Component Value Date     09/03/2015    K 4 3 02/23/2022     02/23/2022    CO2 31 02/23/2022    BUN 18 02/23/2022    CREATININE 1 05 02/23/2022    CALCIUM 9 6 02/23/2022    GLUCOSE 90 09/03/2015   ]        INPATIENT MEDS:    Current Outpatient Medications:     bisacodyl (FLEET) 10 MG/30ML ENEM, Insert 30 mL (10 mg total) into the rectum once for 1 dose, Disp: 30 mL, Rfl: 0    famotidine (PEPCID) 40 MG tablet, Take 1 tablet (40 mg total) by mouth 2 (two) times a day as needed for heartburn, Disp: 180 tablet, Rfl: 1    fluticasone (FLONASE) 50 mcg/act nasal spray, 1 spray into each nostril daily, Disp: 1 Bottle, Rfl: 6    tamsulosin (FLOMAX) 0 4 mg, Take 1 capsule (0 4 mg total) by mouth daily at bedtime, Disp: 90 capsule, Rfl: 3      Physical Exam:   There were no vitals taken for this visit  GEN: no acute distress    RESP: breathing comfortably with no accessory muscle use    ABD: soft, non-tender, non-distended   INCISION:    EXT: no significant peripheral edema   (Male): Penis circumcised, phallus normal, meatus patent  Testicles descended into scrotum bilaterally without masses nor tenderness  No inguinal hernias bilaterally  AMELIA: Prostate is not enlarged at 45 grams  The prostate is not boggy  The prostate is not tender  No nodules noted      RADIOLOGY:    none     Assessment:    1   Elevated PSA     Plan:   - continue surveillance  - follow-up in 6 months PSA prior to visit  -  -

## 2022-03-10 ENCOUNTER — OFFICE VISIT (OUTPATIENT)
Dept: INTERNAL MEDICINE CLINIC | Facility: CLINIC | Age: 77
End: 2022-03-10
Payer: MEDICARE

## 2022-03-10 VITALS
BODY MASS INDEX: 24.91 KG/M2 | WEIGHT: 174 LBS | OXYGEN SATURATION: 99 % | RESPIRATION RATE: 16 BRPM | SYSTOLIC BLOOD PRESSURE: 124 MMHG | HEIGHT: 70 IN | DIASTOLIC BLOOD PRESSURE: 76 MMHG

## 2022-03-10 DIAGNOSIS — Z82.49 FAMILY HISTORY OF PREMATURE CAD: ICD-10-CM

## 2022-03-10 DIAGNOSIS — G89.29 CHRONIC PAIN OF LEFT KNEE: ICD-10-CM

## 2022-03-10 DIAGNOSIS — R91.1 PULMONARY NODULE: ICD-10-CM

## 2022-03-10 DIAGNOSIS — M25.562 CHRONIC PAIN OF LEFT KNEE: ICD-10-CM

## 2022-03-10 DIAGNOSIS — M54.16 LUMBAR RADICULOPATHY, CHRONIC: ICD-10-CM

## 2022-03-10 DIAGNOSIS — M54.12 CERVICAL RADICULOPATHY: Primary | Chronic | ICD-10-CM

## 2022-03-10 DIAGNOSIS — I67.2 CEREBRAL ATHEROSCLEROSIS: ICD-10-CM

## 2022-03-10 DIAGNOSIS — R73.9 HYPERGLYCEMIA: ICD-10-CM

## 2022-03-10 DIAGNOSIS — R97.20 ELEVATED PSA, LESS THAN 10 NG/ML: ICD-10-CM

## 2022-03-10 DIAGNOSIS — I20.8 OTHER FORMS OF ANGINA PECTORIS (HCC): ICD-10-CM

## 2022-03-10 DIAGNOSIS — Z00.00 MEDICARE ANNUAL WELLNESS VISIT, SUBSEQUENT: ICD-10-CM

## 2022-03-10 DIAGNOSIS — E78.2 MODERATE MIXED HYPERLIPIDEMIA NOT REQUIRING STATIN THERAPY: ICD-10-CM

## 2022-03-10 PROCEDURE — 99214 OFFICE O/P EST MOD 30 MIN: CPT | Performed by: INTERNAL MEDICINE

## 2022-03-10 PROCEDURE — G0439 PPPS, SUBSEQ VISIT: HCPCS | Performed by: INTERNAL MEDICINE

## 2022-03-10 PROCEDURE — 1123F ACP DISCUSS/DSCN MKR DOCD: CPT | Performed by: INTERNAL MEDICINE

## 2022-03-10 NOTE — PROGRESS NOTES
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    #LPR  -using pepcid  -2021 EGD stable    #Sneezing  -continues to persist  -saw ENT and underwent scoping  -encouraged to remain on pepcid    #Renal Cyst  -remains stable without need for further surveillance    #Hearing Loss  -did not see audiology and will consider later    #Lung Nodule  -5mm in RLLL  -repeat CT chest  -found incidentally during CT scan due to weight loss  -weight now stable    #Right Ankle Fracture  -pin placement and removal    #Health Maintenance  -routine labs and followup 1 year  -colonoscopy due 2026  -COVID vaccine up to date  -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    No problem-specific Assessment & Plan notes found for this encounter  Diagnoses and all orders for this visit:    Cervical radiculopathy  -     CBC and differential; Future  -     Comprehensive metabolic panel; Future    Elevated PSA, less than 10 ng/ml  -     CBC and differential; Future  -     Comprehensive metabolic panel; Future    Lumbar radiculopathy, chronic  -     CBC and differential; Future  -     Comprehensive metabolic panel; Future    Chronic pain of left knee  -     CBC and differential; Future  -     Comprehensive metabolic panel; Future    Moderate mixed hyperlipidemia not requiring statin therapy  -     CBC and differential; Future  -     Comprehensive metabolic panel; Future  -     Lipid Panel With Direct LDL; Future    Hyperglycemia  -     Hemoglobin A1C; Future    Family history of premature CAD  -     Stress test only, exercise; Future  -     VAS carotid complete study;  Future    Other forms of angina pectoris [facility])   -     Stress test only, exercise; Future    Cerebral atherosclerosis   -     VAS carotid complete study; Future    Pulmonary nodule  -     CT chest wo contrast; Future    Medicare annual wellness visit, subsequent            Current Outpatient Medications:     desoximetasone (TOPICORT) 0 25 % cream, APPLY TWICE DAILY TO AFFECTED AREA FOR 2-4 WEEKS MAX AS NEEDED, Disp: , Rfl:     famotidine (PEPCID) 40 MG tablet, Take 1 tablet (40 mg total) by mouth 2 (two) times a day as needed for heartburn, Disp: 180 tablet, Rfl: 1    ibuprofen (MOTRIN) 600 mg tablet, , Disp: , Rfl:     tamsulosin (FLOMAX) 0 4 mg, Take 1 capsule (0 4 mg total) by mouth daily at bedtime, Disp: 90 capsule, Rfl: 3    Subjective:      Patient ID: Pa Tian is a 78 y o  male  HPI    Patient presents for routine checkup  Denies any recent hospitalizations or surgeries  She he had an EGD and colonoscopy which revealed a sigmoid polyp that was benign  His weight remains stable  He has not lost any further weight and is currently stable at 174 lb  He attributes this to likely dieting and change in up  We encouraged him to exercise  He reports that he feels much better at this time  CT chest abdomen and pelvis were unrevealing except for pulmonary nodule  We will repeat the CT scan in December  He is due for carotid ultrasound as well as stress test due to family history of coronary artery disease  His LDL was 95 and HDL 59 currently well controlled  Vitamin-D was 40 8  PSA remains elevated at 7 2  He underwent a previous biopsy that was positive for prostatic intraepithelial neoplasia and atypical small acinar proliferation  He continues to follow-up with Urology who recommended surveillance in 6 months  They may consider a repeat MRI and prostate biopsy in the future  He remains on Flomax for BPH  Patient will return to care in his 1 year      The following portions of the patient's history were reviewed and updated as appropriate: allergies, current medications, past family history, past medical history, past social history, past surgical history and problem list     Review of Systems   Constitutional: Negative for activity change, appetite change, fatigue and fever  HENT: Positive for hearing loss and sneezing  Negative for congestion, ear pain, sore throat and tinnitus  Eyes: Negative for photophobia, pain and visual disturbance  Respiratory: Negative for cough, shortness of breath and wheezing  Cardiovascular: Negative for chest pain, palpitations and leg swelling  Gastrointestinal: Negative for abdominal distention, abdominal pain, constipation, diarrhea, nausea and vomiting  Genitourinary: Negative for difficulty urinating, frequency and hematuria  Musculoskeletal: Positive for arthralgias (left knee), back pain and neck pain  Negative for gait problem, joint swelling and neck stiffness  Skin: Negative for color change, pallor, rash and wound  Neurological: Negative for dizziness, tremors, numbness and headaches  Hematological: Does not bruise/bleed easily  Objective:      /78   Resp 16   Ht 5' 10" (1 778 m)   Wt 78 9 kg (174 lb)   SpO2 99%   BMI 24 97 kg/m²          Physical Exam  Vitals reviewed  Constitutional:       Appearance: Normal appearance  He is well-developed  HENT:      Head: Normocephalic and atraumatic  Right Ear: Tympanic membrane, ear canal and external ear normal  There is no impacted cerumen  Left Ear: Tympanic membrane, ear canal and external ear normal  There is no impacted cerumen  Eyes:      Conjunctiva/sclera: Conjunctivae normal       Pupils: Pupils are equal, round, and reactive to light  Neck:      Thyroid: No thyromegaly  Vascular: No JVD  Cardiovascular:      Rate and Rhythm: Normal rate and regular rhythm  Pulses: Normal pulses  Heart sounds: Normal heart sounds  No murmur heard        Pulmonary:      Effort: Pulmonary effort is normal  No respiratory distress  Breath sounds: Normal breath sounds  No stridor  No wheezing, rhonchi or rales  Chest:      Chest wall: No tenderness  Abdominal:      General: Bowel sounds are normal  There is no distension  Palpations: Abdomen is soft  Tenderness: There is no abdominal tenderness  There is no guarding or rebound  Musculoskeletal:         General: Tenderness (left patella) present  No deformity  Normal range of motion  Cervical back: Normal range of motion and neck supple  Tenderness present  Right lower leg: No edema  Left lower leg: No edema  Lymphadenopathy:      Cervical: No cervical adenopathy  Skin:     General: Skin is warm and dry  Findings: No erythema or rash  Neurological:      Mental Status: He is alert and oriented to person, place, and time  Deep Tendon Reflexes: Reflexes are normal and symmetric  Psychiatric:         Mood and Affect: Mood normal          Behavior: Behavior normal          Thought Content: Thought content normal          Judgment: Judgment normal            This note was completed in part utilizing [employer] direct voice recognition software  Grammatical errors, random word insertion, spelling mistakes, and incomplete sentences may be an occasional consequence of the system secondary to software limitations, ambient noise and hardware issues  At the time of dictation, efforts were made to edit, clarify and /or correct errors  Please read the chart carefully and recognize, using context, where substitutions have occurred  If you have any questions or concerns about the context, text or information contained within the body of this dictation, please contact myself, the provider, for further clarification

## 2022-03-10 NOTE — PATIENT INSTRUCTIONS
Medicare Preventive Visit Patient Instructions  Thank you for completing your Welcome to Medicare Visit or Medicare Annual Wellness Visit today  Your next wellness visit will be due in one year (3/11/2023)  The screening/preventive services that you may require over the next 5-10 years are detailed below  Some tests may not apply to you based off risk factors and/or age  Screening tests ordered at today's visit but not completed yet may show as past due  Also, please note that scanned in results may not display below  Preventive Screenings:  Service Recommendations Previous Testing/Comments   Colorectal Cancer Screening  · Colonoscopy    · Fecal Occult Blood Test (FOBT)/Fecal Immunochemical Test (FIT)  · Fecal DNA/Cologuard Test  · Flexible Sigmoidoscopy Age: 54-65 years old   Colonoscopy: every 10 years (May be performed more frequently if at higher risk)  OR  FOBT/FIT: every 1 year  OR  Cologuard: every 3 years  OR  Sigmoidoscopy: every 5 years  Screening may be recommended earlier than age 48 if at higher risk for colorectal cancer  Also, an individualized decision between you and your healthcare provider will decide whether screening between the ages of 74-80 would be appropriate   Colonoscopy: 10/20/2021  FOBT/FIT: Not on file  Cologuard: Not on file  Sigmoidoscopy: Not on file          Prostate Cancer Screening Individualized decision between patient and health care provider in men between ages of 53-78   Medicare will cover every 12 months beginning on the day after your 50th birthday PSA: 7 2 ng/mL     Screening Current     Hepatitis C Screening Once for adults born between 1945 and 1965  More frequently in patients at high risk for Hepatitis C Hep C Antibody: Not on file    Screening Current   Diabetes Screening 1-2 times per year if you're at risk for diabetes or have pre-diabetes Fasting glucose: 103 mg/dL   A1C: 5 2 %    Screening Current   Cholesterol Screening Once every 5 years if you don't have a lipid disorder  May order more often based on risk factors  Lipid panel: 02/23/2022    Screening Not Indicated  History Lipid Disorder      Other Preventive Screenings Covered by Medicare:  1  Abdominal Aortic Aneurysm (AAA) Screening: covered once if your at risk  You're considered to be at risk if you have a family history of AAA or a male between the age of 73-68 who smoking at least 100 cigarettes in your lifetime  2  Lung Cancer Screening: covers low dose CT scan once per year if you meet all of the following conditions: (1) Age 50-69; (2) No signs or symptoms of lung cancer; (3) Current smoker or have quit smoking within the last 15 years; (4) You have a tobacco smoking history of at least 30 pack years (packs per day x number of years you smoked); (5) You get a written order from a healthcare provider  3  Glaucoma Screening: covered annually if you're considered high risk: (1) You have diabetes OR (2) Family history of glaucoma OR (3)  aged 48 and older OR (3)  American aged 72 and older  3  Osteoporosis Screening: covered every 2 years if you meet one of the following conditions: (1) Have a vertebral abnormality; (2) On glucocorticoid therapy for more than 3 months; (3) Have primary hyperparathyroidism; (4) On osteoporosis medications and need to assess response to drug therapy  5  HIV Screening: covered annually if you're between the age of 12-76  Also covered annually if you are younger than 13 and older than 72 with risk factors for HIV infection  For pregnant patients, it is covered up to 3 times per pregnancy      Immunizations:  Immunization Recommendations   Influenza Vaccine Annual influenza vaccination during flu season is recommended for all persons aged >= 6 months who do not have contraindications   Pneumococcal Vaccine (Prevnar and Pneumovax)  * Prevnar = PCV13  * Pneumovax = PPSV23 Adults 25-60 years old: 1-3 doses may be recommended based on certain risk factors  Adults 72 years old: Prevnar (PCV13) vaccine recommended followed by Pneumovax (PPSV23) vaccine  If already received PPSV23 since turning 65, then PCV13 recommended at least one year after PPSV23 dose  Hepatitis B Vaccine 3 dose series if at intermediate or high risk (ex: diabetes, end stage renal disease, liver disease)   Tetanus (Td) Vaccine - COST NOT COVERED BY MEDICARE PART B Following completion of primary series, a booster dose should be given every 10 years to maintain immunity against tetanus  Td may also be given as tetanus wound prophylaxis  Tdap Vaccine - COST NOT COVERED BY MEDICARE PART B Recommended at least once for all adults  For pregnant patients, recommended with each pregnancy  Shingles Vaccine (Shingrix) - COST NOT COVERED BY MEDICARE PART B  2 shot series recommended in those aged 48 and above     Health Maintenance Due:      Topic Date Due    Colorectal Cancer Screening  05/20/2019    Hepatitis C Screening  Completed     Immunizations Due:  There are no preventive care reminders to display for this patient  Advance Directives   What are advance directives? Advance directives are legal documents that state your wishes and plans for medical care  These plans are made ahead of time in case you lose your ability to make decisions for yourself  Advance directives can apply to any medical decision, such as the treatments you want, and if you want to donate organs  What are the types of advance directives? There are many types of advance directives, and each state has rules about how to use them  You may choose a combination of any of the following:  · Living will: This is a written record of the treatment you want  You can also choose which treatments you do not want, which to limit, and which to stop at a certain time  This includes surgery, medicine, IV fluid, and tube feedings  · Durable power of  for healthcare [facility]):   This is a written record that states who you want to make healthcare choices for you when you are unable to make them for yourself  This person, called a proxy, is usually a family member or a friend  You may choose more than 1 proxy  · Do not resuscitate (DNR) order:  A DNR order is used in case your heart stops beating or you stop breathing  It is a request not to have certain forms of treatment, such as CPR  A DNR order may be included in other types of advance directives  · Medical directive: This covers the care that you want if you are in a coma, near death, or unable to make decisions for yourself  You can list the treatments you want for each condition  Treatment may include pain medicine, surgery, blood transfusions, dialysis, IV or tube feedings, and a ventilator (breathing machine)  · Values history: This document has questions about your views, beliefs, and how you feel and think about life  This information can help others choose the care that you would choose  Why are advance directives important? An advance directive helps you control your care  Although spoken wishes may be used, it is better to have your wishes written down  Spoken wishes can be misunderstood, or not followed  Treatments may be given even if you do not want them  An advance directive may make it easier for your family to make difficult choices about your care  Alcohol Use and Your Health    Drinking too much can harm your health  Excessive alcohol use leads to about 88,000 death in the [place] each year, and shortens the life of those who diet by almost 30 years  Further, excessive drinking cost the economy $249 billion in 2010  Most excessive drinkers are not alcohol dependent  Excessive alcohol use has immediate effects that increase the risk of many harmful health conditions  These are most often the result of binge drinking  Over time, excessive alcohol use can lead to the development of chronic diseases and other series health problems      What is considered a "drink"? Excessive alcohol use includes:  · Binge Drinking: For women, 4 or more drinks consumed on one occasion  For men, 5 or more drinks consumed on one occasion  · Heavy Drinking: For women, 8 or more drinks per week  For men, 15 or more drinks per week  · Any alcohol used by pregnant women  · Any alcohol used by those under the age of 21 years    If you choose to drink, do so in moderation:  · Do not drink at all if you are under the age of 24, or if you are or may be pregnant, or have health problems that could be made worse by drinking  · For women, up to 1 drink per day  · For men, up to 2 drinks a day    No one should begin drinking or drink more frequently based on potential health benefits    Short-Term Health Risks:  · Injuries: motor vehicle crashes, falls, drownings, burns  · Violence: homicide, suicide, sexual assault, intimate partner violence  · Alcohol poisoning  · Reproductive health: risky sexual behaviors, unintended prengnacy, sexually transmitted diseases, miscarriage, stillbirth, fetal alcohol syndrome    Long-Term Health Risks:  · Chronic diseases: high blood pressure, heart disease, stroke, liver disease, digestive problems  · Cancers: breast, mouth and throat, liver, colon  · Learning and memory problems: dementia, poor school performance  · Mental health: depression, anxiety, insomnia  · Social problems: lost productivity, family problems, unemployment  · Alcohol dependence    For support and more information:  · Substance Abuse and [facility] [address] [place]  Web Address: https://[employer]/    · Alcoholics Anonymous        Web Address: http://www [employer]/    https://www cdc gov/alcohol/fact-sheets/alcohol-use htm     © Copyright [employer] 2018 Information is for End User's use only and may not be sold, redistributed or otherwise used for commercial purposes   All illustrations and images included in CareNotes® are the copyrighted property of A D A M , Inc  or [address] [place], [place] [address]hilario una

## 2022-03-10 NOTE — PROGRESS NOTES
Assessment and Plan:     Problem List Items Addressed This Visit        Nervous and Auditory    Cervical radiculopathy - Primary (Chronic)    Relevant Orders    CBC and differential    Comprehensive metabolic panel       Other    Elevated PSA, less than 10 ng/ml    Relevant Orders    CBC and differential    Comprehensive metabolic panel      Other Visit Diagnoses     Lumbar radiculopathy, chronic        Relevant Orders    CBC and differential    Comprehensive metabolic panel    Chronic pain of left knee        Relevant Orders    CBC and differential    Comprehensive metabolic panel    Moderate mixed hyperlipidemia not requiring statin therapy        Relevant Orders    CBC and differential    Comprehensive metabolic panel    Lipid Panel With Direct LDL    Hyperglycemia        Relevant Orders    Hemoglobin A1C    Family history of premature CAD        Relevant Orders    Stress test only, exercise    VAS carotid complete study    Other forms of angina pectoris (HCC)         Relevant Orders    Stress test only, exercise    Cerebral atherosclerosis         Relevant Orders    VAS carotid complete study    Pulmonary nodule        Relevant Orders    CT chest wo contrast    Medicare annual wellness visit, subsequent               Preventive health issues were discussed with patient, and age appropriate screening tests were ordered as noted in patient's After Visit Summary  Personalized health advice and appropriate referrals for health education or preventive services given if needed, as noted in patient's After Visit Summary       History of Present Illness:     Patient presents for Medicare Annual Wellness visit    Patient Care Team:  Dulce Nguyen DO as PCP - General (Internal Medicine)     Problem List:     Patient Active Problem List   Diagnosis    Cervical radiculopathy    Gastroesophageal reflux disease with esophagitis    Laryngopharyngeal reflux    Thrombosed external hemorrhoids    Closed fracture of medial malleolus of right tibia    Preoperative examination    Elevated PSA, less than 10 ng/ml    Dysphonia    Muscle tension dysphonia    Glottic insufficiency    Reflux laryngitis    Sensation of foreign body    Paresis of left vocal fold    Hiatal hernia    Family history of prostate cancer in father   Atilio Esteban Benign localized prostatic hyperplasia with lower urinary tract symptoms (LUTS)    Atypical small acinar proliferation of prostate      Past Medical and Surgical History:     Past Medical History:   Diagnosis Date    Arthritis 2010    Basal cell carcinoma of shoulder     last assessed: 2014    Depression     last assessed: 2015    GERD (gastroesophageal reflux disease) 2000    Headache(784 0) 1980    HL (hearing loss) 2010    Subconjunctival hemorrhage of left eye     last assessed: 2016     Past Surgical History:   Procedure Laterality Date    HERNIA REPAIR        Family History:     Family History   Problem Relation Age of Onset    Heart disease Mother         cardiac disorder    Kidney disease Mother     Prostate cancer Father     Hearing loss Sister       Social History:     Social History     Socioeconomic History    Marital status: /Civil Union     Spouse name: None    Number of children: None    Years of education: None    Highest education level: None   Occupational History    None   Tobacco Use    Smoking status: Former Smoker     Packs/day: 1 50     Years: 15 00     Pack years: 22 50     Types: Cigarettes     Start date: 1965     Quit date: 1980     Years since quittin 5    Smokeless tobacco: Never Used    Tobacco comment: Already quit   Vaping Use    Vaping Use: Never used   Substance and Sexual Activity    Alcohol use:  Yes     Alcohol/week: 6 0 standard drinks     Types: 6 Shots of liquor per week     Comment: Social    Drug use: No    Sexual activity: Not Currently     Partners: Female   Other Topics Concern    None Social History Narrative    None     Social Determinants of Health     Financial Resource Strain: Not on file   Food Insecurity: Not on file   Transportation Needs: Not on file   Physical Activity: Not on file   Stress: Not on file   Social Connections: Not on file   Intimate Partner Violence: Not on file   Housing Stability: Not on file      Medications and Allergies:     Current Outpatient Medications   Medication Sig Dispense Refill    desoximetasone (TOPICORT) 0 25 % cream APPLY TWICE DAILY TO AFFECTED AREA FOR 2-4 WEEKS MAX AS NEEDED      famotidine (PEPCID) 40 MG tablet Take 1 tablet (40 mg total) by mouth 2 (two) times a day as needed for heartburn 180 tablet 1    ibuprofen (MOTRIN) 600 mg tablet       tamsulosin (FLOMAX) 0 4 mg Take 1 capsule (0 4 mg total) by mouth daily at bedtime 90 capsule 3     No current facility-administered medications for this visit  No Known Allergies   Immunizations:     Immunization History   Administered Date(s) Administered    COVID-19 MODERNA VACC 0 5 ML IM 02/04/2021, 02/04/2021, 03/04/2021, 03/04/2021, 11/02/2021    INFLUENZA 10/26/2017, 11/05/2018    Influenza Split High Dose Preservative Free IM 10/26/2017, 11/05/2018, 11/05/2018    Influenza, high dose seasonal 0 7 mL 10/05/2019, 10/14/2020, 10/06/2021    Pneumococcal Conjugate 13-Valent 08/26/2015    Pneumococcal Polysaccharide PPV23 07/01/2012, 08/28/2017    Tdap 09/20/2013, 10/14/2013, 10/14/2013, 06/28/2016    Tuberculin Skin Test-PPD Intradermal 08/01/2012    Zoster 08/08/2012    Zoster Vaccine Recombinant 02/25/2019, 02/25/2019, 06/26/2019, 06/26/2019      Health Maintenance:         Topic Date Due    Colorectal Cancer Screening  05/20/2019    Hepatitis C Screening  Completed     There are no preventive care reminders to display for this patient     Medicare Health Risk Assessment:     /76   Resp 16   Ht 5' 10" (1 778 m)   Wt 78 9 kg (174 lb)   SpO2 99%   BMI 24 97 kg/m²      Rylie Abel is here for his Subsequent Wellness visit  Last Medicare Wellness visit information reviewed, patient interviewed and updates made to the record today  Health Risk Assessment:   Patient rates overall health as very good  Patient feels that their physical health rating is same  Patient is very satisfied with their life  Eyesight was rated as same  Hearing was rated as slightly worse  Patient feels that their emotional and mental health rating is same  Patients states they are never, rarely angry  Patient states they are sometimes unusually tired/fatigued  Pain experienced in the last 7 days has been some  Patient's pain rating has been 2/10  Patient states that he has experienced no weight loss or gain in last 6 months  Fall Risk Screening: In the past year, patient has experienced: no history of falling in past year      Home Safety:  Patient does not have trouble with stairs inside or outside of their home  Patient has working smoke alarms and has working carbon monoxide detector  Home safety hazards include: none  Nutrition:   Current diet is Regular  Medications:   Patient is not currently taking any over-the-counter supplements  Patient is able to manage medications  Activities of Daily Living (ADLs)/Instrumental Activities of Daily Living (IADLs):   Walk and transfer into and out of bed and chair?: Yes  Dress and groom yourself?: Yes    Bathe or shower yourself?: Yes    Feed yourself? Yes  Do your laundry/housekeeping?: Yes  Manage your money, pay your bills and track your expenses?: Yes  Make your own meals?: Yes    Do your own shopping?: Yes    Previous Hospitalizations:   Any hospitalizations or ED visits within the last 12 months?: No      Advance Care Planning:   Living will: Yes    Durable POA for healthcare:  Yes    Advanced directive: Yes      PREVENTIVE SCREENINGS      Cardiovascular Screening:    General: Screening Not Indicated and History Lipid Disorder      Diabetes Screening: General: Screening Current      Prostate Cancer Screening:    General: Screening Current      Abdominal Aortic Aneurysm (AAA) Screening:    Risk factors include: age between 73-69 yo and tobacco use        Lung Cancer Screening:     General: Screening Not Indicated      Hepatitis C Screening:    General: Screening Current    Screening, Brief Intervention, and Referral to Treatment (SBIRT)    Screening  Typical number of drinks in a day: 4  Typical number of drinks in a week: 14  Interpretation: Risky drinking behavior  AUDIT-C Screenin) How often did you have a drink containing alcohol in the past year? 2 to 3 times a week  2) How many drinks did you have on a typical day when you were drinking in the past year? 1 to 2  3) How often did you have 6 or more drinks on one occasion in the past year? weekly    AUDIT-C Score: 6  Interpretation: Score 4-12 (male): POSITIVE screen for alcohol misuse    AUDIT Screenin) How often during the last year have you found that you were not able to stop drinking once you had started? 0 - never  5) How often during the last year have you failed to do what was normally expected from you because of drinking? 0 - never  6) How often during the last year have you needed a first drink in the morning to get yourself going after a heavy drinking session? 0 - never  7) How often during the last year have you had a feeling of guilt or remorse after drinking?  2 - monthly  8) How often during the last year have you been unable to remember what happened the night before because you had been drinking? 0 - never  9) Have you or someone else been injured as a result of your drinking? 0 - no  10) Has a relative or friend or a doctor or another health worker been concerned about your drinking or suggested you cut down? 0 - no    AUDIT Score: 8  Interpretation: Harmful or hazardous alcohol consumption    Single Item Drug Screening:  How often have you used an illegal drug (including marijuana) or a prescription medication for non-medical reasons in the past year? never    Single Item Drug Screen Score: 0  Interpretation: Negative screen for possible drug use disorder      Marino Arnold, DO

## 2022-03-15 ENCOUNTER — TELEPHONE (OUTPATIENT)
Dept: ADMINISTRATIVE | Facility: OTHER | Age: 77
End: 2022-03-15

## 2022-03-15 NOTE — TELEPHONE ENCOUNTER
----- Message from Latisha Jade sent at 3/14/2022  8:13 AM EDT -----  Regarding: Colonoscopy IM DR RAYMUNDO  03/14/22 8:14 AM    Hello, our patient Abbe Smith has had CRC: Cologuard completed/performed  Please assist in updating the patient chart by pulling the Care Everywhere (CE) document  The date of service is 10/20/2021       Thank you,  Zoe Culp  [facility] INTERNAL MED

## 2022-03-15 NOTE — TELEPHONE ENCOUNTER
Upon review of the In Basket request we were able to locate, review, and update the patient chart as requested for CRC: Colonoscopy  Any additional questions or concerns should be emailed to the Practice Liaisons via [email or web address] email, please do not reply via In Basket      Thank you  Anita Goes

## 2022-04-08 ENCOUNTER — TELEMEDICINE (OUTPATIENT)
Dept: INTERNAL MEDICINE CLINIC | Facility: CLINIC | Age: 77
End: 2022-04-08
Payer: MEDICARE

## 2022-04-08 DIAGNOSIS — U07.1 COVID-19: Primary | ICD-10-CM

## 2022-04-08 PROCEDURE — 99441 PR PHYS/QHP TELEPHONE EVALUATION 5-10 MIN: CPT | Performed by: INTERNAL MEDICINE

## 2022-04-08 NOTE — PROGRESS NOTES
COVID-19 Outpatient Progress Note    Assessment/Plan:    Problem List Items Addressed This Visit     None      Visit Diagnoses     COVID-19    -  Primary        Patient presents for an acute visit  Reports that he started to develop symptoms yesterday with fatigue and achiness in a sore throat and dry cough with a stuffy nose  His temperature was 99° F  He did at home COVID test that was positive  He states that he was not exposed to anyone that he was aware of however he does report that he went back to the gym this week  He will continue to quarantine for the next 5 days and wear a face mask for the next 5 days  We will start him on Paxlovid secondary to his age  Disposition:     Patient meets criteria for PAXLOVID and they have been counseled appropriately according to EUA documentation released by the FDA  After discussion, patient agrees to treatment  [address] is an investigational medicine used to treat mild-to-moderate COVID-19 in adults and children (15years of age and older weighing at least 80 pounds (40 kg)) with positive results of direct SARS-CoV-2 viral testing, and who are at high risk for progression to severe COVID-19, including hospitalization or death  PAXLOVID is investigational because it is still being studied  There is limited information about the safety and effectiveness of using PAXLOVID to treat people with mild-to-moderate COVID-19  The FDA has authorized the emergency use of PAXLOVID for the treatment of mild-tomoderate COVID-19 in adults and children (15years of age and older weighing at least 80 pounds (40 kg)) with a positive test for the virus that causes COVID-19, and who are at high risk for progression to severe COVID-19, including hospitalization or death, under an EUA  What should I tell my healthcare provider before I take PAXLOVID?     Tell your healthcare provider if you:  - Have any allergies  - Have liver or kidney disease  - Are pregnant or plan to become pregnant  - Are breastfeeding a child  - Have any serious illnesses    Tell your healthcare provider about all the medicines you take, including prescription and over-the-counter medicines, vitamins, and herbal supplements  Some medicines may interact with PAXLOVID and may cause serious side effects  Keep a list of your medicines to show your healthcare provider and pharmacist when you get a new medicine  You can ask your healthcare provider or pharmacist for a list of medicines that interact with PAXLOVID  Do not start taking a new medicine without telling your healthcare provider  Your healthcare provider can tell you if it is safe to take PAXLOVID with other medicines  Tell your healthcare provider if you are taking combined hormonal contraceptive  PAXLOVID may affect how your birth control pills work  Females who are able to become pregnant should use another effective alternative form of contraception or an additional barrier method of contraception  Talk to your healthcare provider if you have any questions about contraceptive methods that might be right for you  How do I take PAXLOVID? PAXLOVID consists of 2 medicines: nirmatrelvir and ritonavir  - Take 2 pink tablets of nirmatrelvir with 1 white tablet of ritonavir by mouth 2 times each day (in the morning and in the evening) for 5 days  For each dose, take all 3 tablets at the same time  - If you have kidney disease, talk to your healthcare provider  You may need a different dose  - Swallow the tablets whole  Do not chew, break, or crush the tablets  - Take PAXLOVID with or without food  - Do not stop taking PAXLOVID without talking to your healthcare provider, even if you feel better  - If you miss a dose of PAXLOVID within 8 hours of the time it is usually taken, take it as soon as you remember  If you miss a dose by more than 8 hours, skip the missed dose and take the next dose at your regular time   Do not take 2 doses of PAXLOVID at the same time  - If you take too much PAXLOVID, call your healthcare provider or go to the nearest hospital emergency room right away  - If you are taking a ritonavir- or cobicistat-containing medicine to treat hepatitis C or Human Immunodeficiency Virus (HIV), you should continue to take your medicine as prescribed by your healthcare provider   - Talk to your healthcare provider if you do not feel better or if you feel worse after 5 days  Who should generally not take PAXLOVID? Do not take PAXLOVID if:  You are allergic to nirmatrelvir, ritonavir, or any of the ingredients in PAXLOVID  You are taking any of the following medicines:  - Alfuzosin  - Pethidine, piroxicam, propoxyphene  - Ranolazine  - Amiodarone, dronedarone, flecainide, propafenone, quinidine  - Colchicine  - Lurasidone, pimozide, clozapine  - Dihydroergotamine, ergotamine, methylergonovine  - Lovastatin, simvastatin  - Sildenafil (Revatio®) for pulmonary arterial hypertension (PAH)  - Triazolam, oral midazolam  - Apalutamide  - Carbamazepine, phenobarbital, phenytoin  - Rifampin  - St  Kuldeeps Wort (hypericum perforatum)    What are the important possible side effects of PAXLOVID? Possible side effects of PAXLOVID are:  - Liver Problems  Tell your healthcare provider right away if you have any of these signs and symptoms of liver problems: loss of appetite, yellowing of your skin and the whites of eyes (jaundice), dark-colored urine, pale colored stools and itchy skin, stomach area (abdominal) pain  - Resistance to HIV Medicines  If you have untreated HIV infection, PAXLOVID may lead to some HIV medicines not working as well in the future  - Other possible side effects include: altered sense of taste, diarrhea, high blood pressure, or muscle aches    These are not all the possible side effects of PAXLOVID  Not many people have taken PAXLOVID  Serious and unexpected side effects may happen   Daija Jeronimo is still being studied, so it is possible that all of the risks are not known at this time  What other treatment choices are there? Like Jerry Thomas may allow for the emergency use of other medicines to treat people with COVID-19  Go to https://Code42/ for information on the emergency use of other medicines that are authorized by FDA to treat people with COVID-19  Your healthcare provider may talk with you about clinical trials for which you may be eligible  It is your choice to be treated or not to be treated with PAXLOVID  Should you decide not to receive it or for your child not to receive it, it will not change your standard medical care  What if I am pregnant or breastfeeding? There is no experience treating pregnant women or breastfeeding mothers with PAXLOVID  For a mother and unborn baby, the benefit of taking PAXLOVID may be greater than the risk from the treatment  If you are pregnant, discuss your options and specific situation with your healthcare provider  It is recommended that you use effective barrier contraception or do not have sexual activity while taking PAXLOVID  If you are breastfeeding, discuss your options and specific situation with your healthcare provider  How do I report side effects with PAXLOVID? Contact your healthcare provider if you have any side effects that bother you or do not go away  Report side effects to FDA MedWatch at www fda gov/medwatch or call 0-330-FUI2240 or you can report side effects to [facility]  at the contact information provided below  Website Fax number Telephone number   [employer] [phone]     How should I store [address]? Store PAXLOVID tablets at room temperature between 68°F to 77°F (20°C to 25°C)      Full fact sheet for patients, parents, and caregivers can be found at: [employer]    I have spent 10 minutes directly with the patient  Greater than 50% of this time was spent in counseling/coordination of care regarding: diagnostic results, prognosis, instructions for management, patient and family education, importance of treatment compliance and risk factor reductions  Encounter provider Jackelyn Simons DO    Provider located at [id number] [place] [address]  Via [facility] [id number]  [phone]    Recent Visits  No visits were found meeting these conditions  Showing recent visits within past 7 days and meeting all other requirements  Future Appointments  No visits were found meeting these conditions  Showing future appointments within next 150 days and meeting all other requirements     This virtual check-in was done via telephone and he agrees to proceed  Patient agrees to participate in a virtual check in via telephone or video visit instead of presenting to the office to address urgent/immediate medical needs  Patient is aware this is a billable service  After connecting through Telephone, the patient was identified by name and date of birth  Vern Graham was informed that this was a telemedicine visit and that the exam was being conducted confidentially over secure lines  My office door was closed  No one else was in the room  Vern Graham acknowledged consent and understanding of privacy and security of the telemedicine visit  I informed the patient that I have reviewed his record in Epic and presented the opportunity for him to ask any questions regarding the visit today  The patient agreed to participate  It was my intent to perform this visit via video technology but the patient was not able to do a video connection so the visit was completed via audio telephone only  - Date of symptom onset: 4/7/2022  - Date of positive COVID-19 test: 4/8/2022   Type of test: Rapid antigen  COVID-19 vaccination status: Fully vaccinated with booster    Lab Results   Component Value Date    SARSCOV2 NOT DETECTED 09/16/2021     Past Medical History:   Diagnosis Date    Arthritis 1/1/2010    Basal cell carcinoma of shoulder     last assessed: 08/28/2014    Depression     last assessed: 08/26/2015    GERD (gastroesophageal reflux disease) 1/1/2000    Headache(784 0) 1/1/1980    HL (hearing loss) 1/1/2010    Subconjunctival hemorrhage of left eye     last assessed: 01/14/2016     Past Surgical History:   Procedure Laterality Date    HERNIA REPAIR       Current Outpatient Medications   Medication Sig Dispense Refill    desoximetasone (TOPICORT) 0 25 % cream APPLY TWICE DAILY TO AFFECTED AREA FOR 2-4 WEEKS MAX AS NEEDED      famotidine (PEPCID) 40 MG tablet Take 1 tablet (40 mg total) by mouth 2 (two) times a day as needed for heartburn 180 tablet 1    ibuprofen (MOTRIN) 600 mg tablet       tamsulosin (FLOMAX) 0 4 mg Take 1 capsule (0 4 mg total) by mouth daily at bedtime 90 capsule 3     No current facility-administered medications for this visit  No Known Allergies    Review of Systems   Constitutional: Positive for fatigue and fever  Negative for chills  HENT: Positive for congestion, rhinorrhea and sore throat  Respiratory: Positive for cough  Negative for chest tightness and shortness of breath  Gastrointestinal: Negative for abdominal pain, diarrhea, nausea and vomiting  Musculoskeletal: Positive for myalgias  Neurological: Negative for headaches  Objective: There were no vitals filed for this visit  Physical Exam    VIRTUAL VISIT DISCLAIMER    Gaurav Zeng verbally agrees to participate in [employer]   Pt is aware that [employer] could be limited without vital signs or the ability to perform a full hands-on physical exam  Elaido Dallas understands he or the provider may request at any time to terminate the video visit and request the patient to seek care or treatment in person

## 2022-04-11 ENCOUNTER — TELEPHONE (OUTPATIENT)
Dept: INTERNAL MEDICINE CLINIC | Facility: CLINIC | Age: 77
End: 2022-04-11

## 2022-04-11 NOTE — TELEPHONE ENCOUNTER
If he is having symptoms he can stop taking it and just monitor   If the covid gets worse with shortness of breath or if he has issues with breathing, he should go to the hospital

## 2022-04-11 NOTE — TELEPHONE ENCOUNTER
PT CALLED, SAID THAT HE'S BEEN TAKING THE MEDICATION YOU PRESCRIBED FOR COVID BUT IT'S "GIVING HIM A THRUSH LIKE FEELING AND A BAD TASTE IN HIS MOUTH"    HE'S ASKING IF HE SHOULD CONTINUE TO TAKE IT    PLEASE ADVISE

## 2022-04-18 ENCOUNTER — TELEMEDICINE (OUTPATIENT)
Dept: INTERNAL MEDICINE CLINIC | Facility: CLINIC | Age: 77
End: 2022-04-18
Payer: MEDICARE

## 2022-04-18 DIAGNOSIS — U07.1 COVID-19: Primary | ICD-10-CM

## 2022-04-18 PROCEDURE — 99441 PR PHYS/QHP TELEPHONE EVALUATION 5-10 MIN: CPT | Performed by: INTERNAL MEDICINE

## 2022-04-18 NOTE — PROGRESS NOTES
COVID-19 Outpatient Progress Note    Assessment/Plan:    Problem List Items Addressed This Visit     None      Visit Diagnoses     [id number]    -  Primary         Patient presents for an acute visit follow-up  He tested positive for COVID on a home test on 04/08/2022  He had symptoms of a dry cough with upper respiratory runny nose  He quarantine for 5 days and then were noted mask for an additional 5 days  He continues to have symptoms at this time  He states that they have return  He states he has a stuffy nose with occasional green mucus production and a dry cough  He has been taking TheraFlu and dull some  Encouraged him to start taking a multivitamin vitamin-D and zinc and start on Sudafed instead of TheraFlu and delsym  He will continue with Cepacol  Encouraged him to continue to quarantine until his symptoms have improved  Disposition:     Patient has COVID-19 infection  Based off CDC guidelines, they were recommended to isolate for 5 days from the date of the positive test  If they remain asymptomatic, isolation may be ended followed by 5 days of wearing a mask when around othes to minimize risk of infecting others  If they have a fever, continue to stay home until fever resolves for at least 24 hours  I recommended continued isolation until at least 24 hours have passed since recovery defined as resolution of fever without the use of fever-reducing medications AND improvement in COVID symptoms AND 10 days have passed since onset of symptoms (or 10 days have passed since date of first positive viral diagnostic test for asymptomatic patients)  Discussed vitamin D, vitamin C, and/or zinc supplementation with patient  I have spent 10 minutes directly with the patient  Greater than 50% of this time was spent in counseling/coordination of care regarding: diagnostic results, prognosis, risks and benefits of treatment options, instructions for management and impressions        Encounter provider Avril Ahuja DO    Provider located at [address] [place] [address]  [address] [place] [id number]  [phone]    Recent Visits  Date Type Provider Dept   04/11/22 Telephone [address] Internal Med   Showing recent visits within past 7 days and meeting all other requirements  Today's Visits  Date Type Provider Dept   04/18/22 [address] Internal Med   Showing today's visits and meeting all other requirements  Future Appointments  No visits were found meeting these conditions  Showing future appointments within next 150 days and meeting all other requirements     Subjective:   Simeon Miner is a 78 y o  male who has been screened for COVID-19  Symptom change since last report: improving  Patient's symptoms include nasal congestion, rhinorrhea and cough  Patient denies fever, chills, fatigue, malaise, sore throat, anosmia, loss of taste, shortness of breath, chest tightness, abdominal pain, nausea, vomiting, diarrhea, myalgias and headaches  COVID-19 vaccination status: Fully vaccinated with booster    Amira Abbott has been staying home and has isolated themselves in his home  He is taking care to not share personal items and is cleaning all surfaces that are touched often, like counters, tabletops, and doorknobs using household cleaning sprays or wipes  He is wearing a mask when he leaves his room       Lab Results   Component Value Date    SARSCOV2 NOT DETECTED 09/16/2021     Past Medical History:   Diagnosis Date    Arthritis 1/1/2010    Basal cell carcinoma of shoulder     last assessed: 08/28/2014    Depression     last assessed: 08/26/2015    GERD (gastroesophageal reflux disease) 1/1/2000    Headache(784 0) 1/1/1980    HL (hearing loss) 1/1/2010    Subconjunctival hemorrhage of left eye     last assessed: 01/14/2016     Past Surgical History:   Procedure Laterality Date    HERNIA REPAIR       Current Outpatient Medications   Medication Sig Dispense Refill    desoximetasone (TOPICORT) 0 25 % cream APPLY TWICE DAILY TO AFFECTED AREA FOR 2-4 WEEKS MAX AS NEEDED      famotidine (PEPCID) 40 MG tablet Take 1 tablet (40 mg total) by mouth 2 (two) times a day as needed for heartburn 180 tablet 1    ibuprofen (MOTRIN) 600 mg tablet       tamsulosin (FLOMAX) 0 4 mg Take 1 capsule (0 4 mg total) by mouth daily at bedtime 90 capsule 3     No current facility-administered medications for this visit  No Known Allergies    Review of Systems   Constitutional: Negative for chills, fatigue and fever  HENT: Positive for congestion and rhinorrhea  Negative for sore throat  Respiratory: Positive for cough  Negative for chest tightness and shortness of breath  Gastrointestinal: Negative for abdominal pain, diarrhea, nausea and vomiting  Musculoskeletal: Negative for myalgias  Neurological: Negative for headaches  Objective: There were no vitals filed for this visit  Physical Exam    VIRTUAL VISIT DISCLAIMER    Rafael Irma verbally agrees to participate in [employer]  Pt is aware that [employer] could be limited without vital signs or the ability to perform a full hands-on physical Macario Gonzalez understands he or the provider may request at any time to terminate the video visit and request the patient to seek care or treatment in person

## 2022-04-28 ENCOUNTER — TELEMEDICINE (OUTPATIENT)
Dept: INTERNAL MEDICINE CLINIC | Facility: CLINIC | Age: 77
End: 2022-04-28
Payer: MEDICARE

## 2022-04-28 DIAGNOSIS — U07.1 COVID-19: Primary | ICD-10-CM

## 2022-04-28 DIAGNOSIS — J01.10 ACUTE NON-RECURRENT FRONTAL SINUSITIS: ICD-10-CM

## 2022-04-28 PROCEDURE — 99213 OFFICE O/P EST LOW 20 MIN: CPT | Performed by: INTERNAL MEDICINE

## 2022-04-28 RX ORDER — AMOXICILLIN AND CLAVULANATE POTASSIUM 875; 125 MG/1; MG/1
1 TABLET, FILM COATED ORAL EVERY 12 HOURS SCHEDULED
Qty: 14 TABLET | Refills: 0 | Status: SHIPPED | OUTPATIENT
Start: 2022-04-28 | End: 2022-05-05

## 2022-04-28 NOTE — PROGRESS NOTES
Virtual Regular Visit    Verification of patient location:    Patient is located in the following state in which I hold an active license [place]      Assessment/Plan:    Problem List Items Addressed This Visit     None      Visit Diagnoses     COVID-19    -  Primary    Relevant Orders    XR chest pa & lateral    Acute non-recurrent frontal sinusitis        Relevant Medications    amoxicillin-clavulanate (Augmentin) 875-125 mg per tablet        #Sinusitis  -has nasal congestion with green mucus production, getting winded  -has scratchy throat  -taking mucinex without much relief  -will start on flonase and augmentin    #COVID  -positive on home test 4/8/22  -treated with paxlovid  -had an episode of metallic taste but completed the course  -no longer having fevers       Reason for visit is   Chief Complaint   Patient presents with    Virtual Regular Visit        Encounter provider Sravanthi Carrillo DO    Provider located at [address] [place] [address]  [address]ia [address] [address] [phone]      Recent Visits  No visits were found meeting these conditions  Showing recent visits within past 7 days and meeting all other requirements  Today's Visits  Date Type Provider Dept   04/28/22 Kourtney   96 , [facility] Internal Med   Showing today's visits and meeting all other requirements  Future Appointments  No visits were found meeting these conditions  Showing future appointments within next 150 days and meeting all other requirements       The patient was identified by name and date of birth  Rajni Hutson was informed that this is a telemedicine visit and that the visit is being conducted through [address] [place] [address] Now and patient was informed that this is a secure, HIPAA-compliant platform  He agrees to proceed     My office door was closed  No one else was in the room    He acknowledged consent and understanding of privacy and security of the video platform  The patient has agreed to participate and understands they can discontinue the visit at any time  Patient is aware this is a billable service  Neha Shetty is a 78 y o  male presents for an acute visit  He has recovered from [place] however he has nasal congestion with the scratchy throat and feeling winded  He has green mucus discharge  He denies any wheezing or body aches or fevers  He has been taking Mucinex without significant relief  We will start him on Augmentin  We will obtain a chest x-ray  He will notify us if any of his symptoms change  HPI     Past Medical History:   Diagnosis Date    Arthritis 1/1/2010    Basal cell carcinoma of shoulder     last assessed: 08/28/2014    Depression     last assessed: 08/26/2015    GERD (gastroesophageal reflux disease) 1/1/2000    Headache(784 0) 1/1/1980    HL (hearing loss) 1/1/2010    Subconjunctival hemorrhage of left eye     last assessed: 01/14/2016       Past Surgical History:   Procedure Laterality Date    HERNIA REPAIR         Current Outpatient Medications   Medication Sig Dispense Refill    amoxicillin-clavulanate (Augmentin) 875-125 mg per tablet Take 1 tablet by mouth every 12 (twelve) hours for 7 days 14 tablet 0    desoximetasone (TOPICORT) 0 25 % cream APPLY TWICE DAILY TO AFFECTED AREA FOR 2-4 WEEKS MAX AS NEEDED      famotidine (PEPCID) 40 MG tablet Take 1 tablet (40 mg total) by mouth 2 (two) times a day as needed for heartburn 180 tablet 1    ibuprofen (MOTRIN) 600 mg tablet       tamsulosin (FLOMAX) 0 4 mg Take 1 capsule (0 4 mg total) by mouth daily at bedtime 90 capsule 3     No current facility-administered medications for this visit  No Known Allergies    Review of Systems    Video Exam    There were no vitals filed for this visit      Physical Exam     I spent 15 minutes directly with the patient during this visit    [address] [place] [address] verbally agrees to participate in [employer]  Pt is aware that [employer] could be limited without vital signs or the ability to perform a full hands-on physical Jacobo De Leon understands he or the provider may request at any time to terminate the video visit and request the patient to seek care or treatment in person

## 2022-04-29 ENCOUNTER — APPOINTMENT (OUTPATIENT)
Dept: RADIOLOGY | Age: 77
End: 2022-04-29
Payer: MEDICARE

## 2022-04-29 DIAGNOSIS — U07.1 COVID-19: ICD-10-CM

## 2022-04-29 PROCEDURE — 71046 X-RAY EXAM CHEST 2 VIEWS: CPT

## 2022-05-02 ENCOUNTER — TELEPHONE (OUTPATIENT)
Dept: INTERNAL MEDICINE CLINIC | Facility: CLINIC | Age: 77
End: 2022-05-02

## 2022-05-02 NOTE — TELEPHONE ENCOUNTER
----- Message from Aroldo Ackerman DO sent at 5/2/2022  7:21 AM EDT -----  Please let pateint know his CXR came back normal and to check on how he is doing
LEFT MESSAGE INFORMING PT OF HIS RESULTS   TOLD HIM TO CALL BACK IF HE'S NOT BETTER OR HAS QUESTIONS
complains of pain/discomfort

## 2022-05-10 ENCOUNTER — HOSPITAL ENCOUNTER (OUTPATIENT)
Dept: NON INVASIVE DIAGNOSTICS | Facility: CLINIC | Age: 77
Discharge: HOME | End: 2022-05-10
Payer: MEDICARE

## 2022-05-10 VITALS
BODY MASS INDEX: 24.62 KG/M2 | HEART RATE: 65 BPM | OXYGEN SATURATION: 97 % | HEIGHT: 70 IN | SYSTOLIC BLOOD PRESSURE: 124 MMHG | DIASTOLIC BLOOD PRESSURE: 52 MMHG | WEIGHT: 172 LBS

## 2022-05-10 DIAGNOSIS — Z82.49 FAMILY HISTORY OF PREMATURE CAD: ICD-10-CM

## 2022-05-10 DIAGNOSIS — I20.8 OTHER FORMS OF ANGINA PECTORIS (HCC): ICD-10-CM

## 2022-05-10 DIAGNOSIS — I67.2 CEREBRAL ATHEROSCLEROSIS: ICD-10-CM

## 2022-05-10 LAB
ARRHY DURING EX: NORMAL
CHEST PAIN STATEMENT: NORMAL
MAX DIASTOLIC BP: 58 MMHG
MAX HEART RATE: 137 BPM
MAX HR PERCENT: 94 %
MAX PREDICTED HEART RATE: 145 BPM
MAX. SYSTOLIC BP: 144 MMHG
PROTOCOL NAME: NORMAL
RATE PRESSURE PRODUCT: NORMAL
SL CV STRESS RECOVERY BP: NORMAL MMHG
SL CV STRESS RECOVERY HR: 86 BPM
SL CV STRESS RECOVERY O2 SAT: 99 %
SL CV STRESS STAGE REACHED: 3
STRESS ANGINA INDEX: 0
STRESS BASELINE BP: NORMAL MMHG
STRESS BASELINE HR: 65 BPM
STRESS DUKE TREADMILL SCORE: 9
STRESS O2 SAT REST: 97 %
STRESS PEAK HR: 137 BPM
STRESS PERCENT HR: 94 %
STRESS POST ESTIMATED WORKLOAD: 10.1 METS
STRESS POST EXERCISE DUR MIN: 9 MIN
STRESS POST EXERCISE DUR SEC: 0 SEC
STRESS POST O2 SAT PEAK: 99 %
STRESS POST PEAK BP: 144 MMHG
STRESS ST DEPRESSION: 0 MM
STRESS TARGET HR: 137 BPM
TARGET HR FORMULA: NORMAL
TEST INDICATION: NORMAL
TIME IN EXERCISE PHASE: NORMAL

## 2022-05-10 PROCEDURE — 93017 CV STRESS TEST TRACING ONLY: CPT

## 2022-05-10 PROCEDURE — 93018 CV STRESS TEST I&R ONLY: CPT | Performed by: INTERNAL MEDICINE

## 2022-05-10 PROCEDURE — 93016 CV STRESS TEST SUPVJ ONLY: CPT | Performed by: INTERNAL MEDICINE

## 2022-05-10 PROCEDURE — 93880 EXTRACRANIAL BILAT STUDY: CPT

## 2022-05-10 PROCEDURE — 93880 EXTRACRANIAL BILAT STUDY: CPT | Performed by: SURGERY

## 2022-09-06 ENCOUNTER — APPOINTMENT (OUTPATIENT)
Dept: LAB | Age: 77
End: 2022-09-06
Payer: MEDICARE

## 2022-09-06 DIAGNOSIS — R97.20 ELEVATED PSA: ICD-10-CM

## 2022-09-06 PROCEDURE — 84153 ASSAY OF PSA TOTAL: CPT

## 2022-09-06 PROCEDURE — 84154 ASSAY OF PSA FREE: CPT

## 2022-09-06 PROCEDURE — 36415 COLL VENOUS BLD VENIPUNCTURE: CPT

## 2022-09-07 LAB
PSA FREE MFR SERPL: 17.6 %
PSA FREE SERPL-MCNC: 0.9 NG/ML
PSA SERPL-MCNC: 5.1 NG/ML (ref 0–4)

## 2022-09-12 NOTE — PROGRESS NOTES
UROLOGY PROGRESS NOTE   Patient Identifiers: Maira Asif (MRN [id number])  Date of Service: 9/12/2022    Subjective:    68-year-old man history of elevated PSA  His high PSA was 5 2  He had a biopsy in 2020  Pathology was benign  Current PSA is 5 1  No significant outlet complaints  He is comfortable on Flomax  Reason for visit:  Elevated PSA follow-up    Objective:     VITALS:    There were no vitals filed for this visit  LABS:  Lab Results   Component Value Date    HGB 15 9 02/23/2022    HCT 48 9 02/23/2022    WBC 5 66 02/23/2022     02/23/2022   ]    Lab Results   Component Value Date     09/03/2015    K 4 3 02/23/2022     02/23/2022    CO2 31 02/23/2022    BUN 18 02/23/2022    CREATININE 1 05 02/23/2022    CALCIUM 9 6 02/23/2022    GLUCOSE 90 09/03/2015   ]        INPATIENT MEDS:    Current Outpatient Medications:     desoximetasone (TOPICORT) 0 25 % cream, APPLY TWICE DAILY TO AFFECTED AREA FOR 2-4 WEEKS MAX AS NEEDED, Disp: , Rfl:     famotidine (PEPCID) 40 MG tablet, Take 1 tablet (40 mg total) by mouth 2 (two) times a day as needed for heartburn, Disp: 180 tablet, Rfl: 1    ibuprofen (MOTRIN) 600 mg tablet, , Disp: , Rfl:     tamsulosin (FLOMAX) 0 4 mg, Take 1 capsule (0 4 mg total) by mouth daily at bedtime, Disp: 90 capsule, Rfl: 3      Physical Exam:   There were no vitals taken for this visit  GEN: no acute distress    RESP: breathing comfortably with no accessory muscle use    ABD: soft, non-tender, non-distended   INCISION:    EXT: no significant peripheral edema   (Male): Penis circumcised, phallus normal, meatus patent  Testicles descended into scrotum bilaterally without masses nor tenderness  No inguinal hernias bilaterally  AMELIA: Prostate is enlarged at 35 grams  The prostate is not boggy  The prostate is not tender  No nodules noted      RADIOLOGY:   None     Assessment:   1   Elevated PSA     Plan:   -follow-up 6 months with PSA prior to visit  -  -  -

## 2022-09-13 ENCOUNTER — OFFICE VISIT (OUTPATIENT)
Dept: UROLOGY | Facility: CLINIC | Age: 77
End: 2022-09-13
Payer: MEDICARE

## 2022-09-13 VITALS
BODY MASS INDEX: 24.34 KG/M2 | SYSTOLIC BLOOD PRESSURE: 118 MMHG | DIASTOLIC BLOOD PRESSURE: 72 MMHG | HEIGHT: 70 IN | WEIGHT: 170 LBS

## 2022-09-13 DIAGNOSIS — R97.20 ELEVATED PSA: Primary | ICD-10-CM

## 2022-09-13 PROCEDURE — 99213 OFFICE O/P EST LOW 20 MIN: CPT | Performed by: PHYSICIAN ASSISTANT

## 2022-10-06 ENCOUNTER — OFFICE VISIT (OUTPATIENT)
Dept: OBGYN CLINIC | Facility: OTHER | Age: 77
End: 2022-10-06
Payer: MEDICARE

## 2022-10-06 ENCOUNTER — APPOINTMENT (OUTPATIENT)
Dept: RADIOLOGY | Facility: OTHER | Age: 77
End: 2022-10-06
Payer: MEDICARE

## 2022-10-06 VITALS
WEIGHT: 170 LBS | SYSTOLIC BLOOD PRESSURE: 106 MMHG | BODY MASS INDEX: 24.34 KG/M2 | HEART RATE: 78 BPM | HEIGHT: 70 IN | DIASTOLIC BLOOD PRESSURE: 71 MMHG

## 2022-10-06 DIAGNOSIS — M24.812 INTERNAL DERANGEMENT OF LEFT SHOULDER: Primary | ICD-10-CM

## 2022-10-06 DIAGNOSIS — M25.512 ACUTE PAIN OF LEFT SHOULDER: ICD-10-CM

## 2022-10-06 PROCEDURE — 73030 X-RAY EXAM OF SHOULDER: CPT

## 2022-10-06 PROCEDURE — 99204 OFFICE O/P NEW MOD 45 MIN: CPT | Performed by: ORTHOPAEDIC SURGERY

## 2022-10-06 NOTE — PROGRESS NOTES
Assessment  Diagnoses and all orders for this visit:    Internal derangement of left shoulder  -     XR shoulder 2+ vw left; Future  -     MRI shoulder left wo contrast; Future      Discussion and Plan:    - Findings today are consistent with acute left shoulder pain with possible acute, traumatic rotator cuff injury  Imaging and prognosis was reviewed with the patient today  - Discussed with patient that since this is an acute injury, has an exam worrisome for rotator cuff dysfunction and he has history of a partial rotator cuff injury an MRI will be obtained to better delineate the pathology and help me understand treatment options  - Referral for left shoulder MRI was provided and the patient will follow-up after the study has been obtained, he was encouraged to continue to maintain his range of motion as we wait the results of the MRI   - Continue with activities as tolerated    Subjective:   Patient ID: Gordo Prince is a 78 y o  male      HPI  The patient presents with a chief complaint of left shoulder pain  The pain began 5 month(s) ago and is associated with an acute injury  Patient states that back in June he was playing golf and swung and his club and hit the ground with the club - patient notes pain was present immediately  The patient describes the pain as sharp in intensity,  intermittent in timing, and localizes the pain to the  left Anterior shoulder  The pain is worse with movement and raising arm over head and relieved by rest, medication: Aleve, Ibuprofen used and beneficial   The pain is not associated with numbness and tingling  The pain is not associated with constitutional symptoms  The patient is awoken at night by the pain  The patient has had any treatments  Patient notes that around 10 years ago he saw Dr Lana Ho for his left shoulder and was diagnosed with a small rotator cuff tear             The following portions of the patient's history were reviewed and updated as appropriate: allergies, current medications, past family history, past medical history, past social history, past surgical history and problem list     Review of Systems   Constitutional: Negative for chills and fever  HENT: Negative for ear pain and sore throat  Eyes: Negative for pain and visual disturbance  Respiratory: Negative for cough and shortness of breath  Cardiovascular: Negative for chest pain and palpitations  Gastrointestinal: Negative for abdominal pain and vomiting  Genitourinary: Negative for dysuria and hematuria  Musculoskeletal: Positive for arthralgias (Left shoulder)  Negative for back pain  Skin: Negative for color change and rash  Neurological: Negative for seizures and syncope  All other systems reviewed and are negative  Objective:  /71 (BP Location: Left arm, Patient Position: Sitting, Cuff Size: Standard)   Pulse 78   Ht 5' 10" (1 778 m)   Wt 77 1 kg (170 lb)   BMI 24 39 kg/m²       Left Shoulder Exam     Tenderness   The patient is experiencing tenderness in the acromioclavicular joint  Range of Motion   Active abduction: normal   Forward flexion: normal     Muscle Strength   Abduction: 3/5   Supraspinatus: 3/5     Tests   Apprehension: negative  Cross arm: negative  Drop arm: positive    Other   Erythema: absent  Scars: absent  Sensation: normal  Pulse: present             Physical Exam  Eyes:      Pupils: Pupils are equal, round, and reactive to light  Pulmonary:      Effort: Pulmonary effort is normal       Breath sounds: Normal breath sounds  Skin:     General: Skin is warm and dry  Neurological:      Mental Status: He is alert and oriented to person, place, and time  Psychiatric:         Behavior: Behavior normal          Thought Content: Thought content normal          Judgment: Judgment normal            I have personally reviewed pertinent films in PACS and my interpretation is as follows    XR of left shoulder: no acute osseous abnormalities    Scribe Attestation    I,:  Vinod Bob am acting as a scribe while in the presence of the attending physician :       I,:  Sury Garcia MD personally performed the services described in this documentation    as scribed in my presence :

## 2022-10-08 ENCOUNTER — HOSPITAL ENCOUNTER (OUTPATIENT)
Dept: MRI IMAGING | Facility: HOSPITAL | Age: 77
Discharge: HOME | End: 2022-10-08
Attending: ORTHOPAEDIC SURGERY
Payer: MEDICARE

## 2022-10-08 DIAGNOSIS — M24.812 INTERNAL DERANGEMENT OF LEFT SHOULDER: ICD-10-CM

## 2022-10-08 PROCEDURE — G1004 CDSM NDSC: HCPCS

## 2022-10-08 PROCEDURE — 73221 MRI JOINT UPR EXTREM W/O DYE: CPT

## 2022-10-12 ENCOUNTER — CLINICAL SUPPORT (OUTPATIENT)
Dept: INTERNAL MEDICINE CLINIC | Facility: CLINIC | Age: 77
End: 2022-10-12
Payer: MEDICARE

## 2022-10-12 DIAGNOSIS — Z23 FLU VACCINE NEED: Primary | ICD-10-CM

## 2022-10-12 PROCEDURE — G0008 ADMIN INFLUENZA VIRUS VAC: HCPCS | Performed by: INTERNAL MEDICINE

## 2022-10-12 PROCEDURE — 90662 IIV NO PRSV INCREASED AG IM: CPT | Performed by: INTERNAL MEDICINE

## 2022-10-17 ENCOUNTER — OFFICE VISIT (OUTPATIENT)
Dept: OBGYN CLINIC | Facility: OTHER | Age: 77
End: 2022-10-17
Payer: MEDICARE

## 2022-10-17 VITALS
DIASTOLIC BLOOD PRESSURE: 73 MMHG | WEIGHT: 170 LBS | HEART RATE: 81 BPM | SYSTOLIC BLOOD PRESSURE: 111 MMHG | BODY MASS INDEX: 24.34 KG/M2 | HEIGHT: 70 IN

## 2022-10-17 DIAGNOSIS — M75.112 INCOMPLETE TEAR OF LEFT ROTATOR CUFF, UNSPECIFIED WHETHER TRAUMATIC: Primary | ICD-10-CM

## 2022-10-17 PROCEDURE — 99214 OFFICE O/P EST MOD 30 MIN: CPT | Performed by: ORTHOPAEDIC SURGERY

## 2022-10-17 NOTE — PROGRESS NOTES
Assessment  Diagnoses and all orders for this visit:    Partial thickness tear of left supraspinatus tendon, unspecified whether traumatic    Discussion and Plan:    · Explained to the patient that his MRI does not reveal a full thickness tear of his rotator cuff following his acute injury  There is only small interstitial tearing of the supraspinatus tendon and these findings were present on his previous MRI 10 years ago so I do not feel they indicate an acute new injury  He understood and all questions were answered  · He will be referred to physical therapy/HEP for this diagnosis  · If symptoms persist despite PT/HEP then we could perform a diagnostic arthroscopic procedure  · Follow up in 6-8 weeks for re evaluation of symptoms  Subjective:   Patient ID: Jessica Justice is a 78 y o  male    79 y/o male who presents today for a follow up visit for his left shoulder as well as to discuss MRI results  Patient was injured after playing golf in June  He states that he swung and missed the ball and the club hit the ground, jarring his left shoulder  Patient reports continued intermittent pain about the shoulder  Pain is worse with overhead and repetitive motions  He states that the pain does not occur on a daily basis  No numbness or tingling  No fevers or chills  The following portions of the patient's history were reviewed and updated as appropriate: allergies, current medications, past family history, past medical history, past social history, past surgical history and problem list     Objective:  /73 (BP Location: Right arm, Patient Position: Sitting, Cuff Size: Adult)   Pulse 81   Ht 5' 10" (1 778 m)   Wt 77 1 kg (170 lb)   BMI 24 39 kg/m²       Left Shoulder Exam     Tenderness   The patient is experiencing no tenderness  Range of Motion   The patient has normal left shoulder ROM      Muscle Strength   Abduction: 4/5   External rotation: 4/5     Other   Erythema: absent  Sensation: normal  Pulse: present             Physical Exam  Constitutional:       General: He is not in acute distress  Appearance: He is well-developed  Eyes:      Conjunctiva/sclera: Conjunctivae normal       Pupils: Pupils are equal, round, and reactive to light  Cardiovascular:      Rate and Rhythm: Normal rate and regular rhythm  Pulmonary:      Effort: Pulmonary effort is normal       Breath sounds: Normal breath sounds  Abdominal:      General: Bowel sounds are normal       Palpations: Abdomen is soft  Musculoskeletal:      Cervical back: Normal range of motion and neck supple  Skin:     General: Skin is warm and dry  Findings: No erythema or rash  Neurological:      Mental Status: He is alert and oriented to person, place, and time  Deep Tendon Reflexes: Reflexes are normal and symmetric  Psychiatric:         Behavior: Behavior normal            I have personally reviewed pertinent films in PACS and my interpretation is as follows  MRI Left Shoulder 10/8/22: Supraspinatus indertional tendinosis and insterstitial tearing but no evidence of full thickness tear      Scribe Attestation    I,:  Ken Garcia am acting as a scribe while in the presence of the attending physician :       I,:  Dulce Hernandez MD personally performed the services described in this documentation    as scribed in my presence : no Consent 1/Introductory Paragraph: The rationale for Mohs was explained to the patient and consent was obtained. The risks, benefits and alternatives to therapy were discussed in detail. Specifically, the risks of infection, scarring, pain, bleeding, prolonged wound healing, incomplete removal, allergy to anesthesia, nerve injury, recurrence and the possible need of delayed or additional reconstruction were addressed. Prior to the procedure, the treatment site was clearly identified and confirmed by the patient. All components of Universal Protocol/PAUSE Rule completed.

## 2022-10-25 ENCOUNTER — EVALUATION (OUTPATIENT)
Dept: PHYSICAL THERAPY | Facility: CLINIC | Age: 77
End: 2022-10-25
Payer: MEDICARE

## 2022-10-25 DIAGNOSIS — M75.112 NONTRAUMATIC INCOMPLETE TEAR OF LEFT ROTATOR CUFF: Primary | ICD-10-CM

## 2022-10-25 PROCEDURE — 97110 THERAPEUTIC EXERCISES: CPT | Performed by: PHYSICAL THERAPIST

## 2022-10-25 PROCEDURE — 97161 PT EVAL LOW COMPLEX 20 MIN: CPT | Performed by: PHYSICAL THERAPIST

## 2022-10-25 NOTE — PROGRESS NOTES
PT Evaluation     Today's date: 10/25/2022  Patient name: Kalia Quinonez  : 1945  MRN: [id number]  Referring provider: Anam Rob  Dx:   Encounter Diagnosis     ICD-10-CM    1  Nontraumatic incomplete tear of left rotator cuff  M75 112                   Assessment  Assessment details: Kalia Quinonez is a 78 y o  male who presents with pain, decreased strength, and decreased ROM  Due to these impairments, patient has difficulty performing a/iadls and recreational activities  Patient's clinical presentation is consistent with their referring diagnosis of incomplete tear of left rotator cuff  Patient also demonstrates signs and symptoms consistent with impingement syndrome of the left shoulder  Patient would benefit from skilled physical therapy to address their aforementioned impairments, improve their level of function and to improve their overall quality of life  Impairments: abnormal or restricted ROM, impaired physical strength and pain with function  Understanding of Dx/Px/POC: good   Prognosis: good    Goals  Short term goals - to be achieved in 4 weeks:    Decrease pain 20-50%  Increase strength by 1/2 grade  Improve range of motion by 25%  Long term goals - to be achieved by discharge:    Overhead reaching is improved to maximal level of function  Lifting is improved to maximal level of function  IADL performance in related activities is improved to maximal level of function  Performance in related recreational activities is improved to maximal level of function       Plan  Planned therapy interventions: manual therapy, neuromuscular re-education, patient education, postural training, strengthening, stretching, therapeutic activities, therapeutic exercise and home exercise program  Frequency: 2x week  Duration in visits: 12  Duration in weeks: 6  Plan of Care beginning date: 10/25/2022  Plan of Care expiration date: 2022  Treatment plan discussed with: patient        Subjective Evaluation    History of Present Illness  Mechanism of injury: Patient refers to PT with c/o pain in his left shoulder; states chronic pain for multiple years which was aggravated approximately three months previous when playing golf  Patient received an MRI of his left shoulder on 10/8/22 which revealed prominent subacromial spur with supraspinatus/infraspinatus insertional tendinosis and longitudinal interstitial tearing but no evidence of full-thickness component rotator cuff tear  Patient denies numbness and tingling in his left UE  Patient states pain with overhead reaching, reaching behind his back, and reaching behind his head  Patient also c/o pain reaching across his body  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  Pain  Current pain rating: 3  At best pain ratin  At worst pain ratin  Quality: sharp  Relieving factors: medications  Aggravating factors: overhead activity and lifting (Reaching behind head/back)  Progression: no change    Patient Goals  Patient goals for therapy: decreased pain          Objective     Static Posture     Shoulders  Rounded  Active Range of Motion   Left Shoulder   Flexion: 155 degrees   Abduction: 155 degrees   External rotation BTH: T2   Internal rotation BTB: T7     Passive Range of Motion   Left Shoulder   Flexion: 160 degrees   Abduction: 160 degrees   External rotation 90°: 85 degrees   Internal rotation 90°: 70 degrees     Strength/Myotome Testing     Left Shoulder     Planes of Motion   Flexion: 4 (pain)   Abduction: 4- (pain)   External rotation at 0°: 4 (pain)   Internal rotation at 0°: 4+     Isolated Muscles   Lower trapezius: 3+ (pain)   Middle trapezius: 4-     Tests     Left Shoulder   Positive empty can and Hawkin's  Negative drop arm                Precautions: GERD, Hx of Basal cell carcinoma, Right RTC repair - 2017      Manuals 10/25            Left shoulder PROM Neuro Re-Ed             Prone T's HEP            Prone Y's             Prone I's             Ball rolls on wall             Scap ABC's             Iso scap ret   HEP                         Ther Ex             UBE             Pulleys             TB rows             TB ER             TB IR             Sup wand flex HEP                                      Ther Activity                                       Gait Training                                       Modalities                                           HEP access code: [email or web address]

## 2022-11-01 ENCOUNTER — OFFICE VISIT (OUTPATIENT)
Dept: PHYSICAL THERAPY | Facility: CLINIC | Age: 77
End: 2022-11-01

## 2022-11-01 DIAGNOSIS — M75.112 NONTRAUMATIC INCOMPLETE TEAR OF LEFT ROTATOR CUFF: Primary | ICD-10-CM

## 2022-11-01 NOTE — PROGRESS NOTES
Daily Note     Today's date: 2022  Patient name: Maxx Branham  : 1945  MRN: [id number]  Referring provider: Baltazar Huff DO  Dx:   Encounter Diagnosis     ICD-10-CM    1  Nontraumatic incomplete tear of left rotator cuff  M75 112                   Subjective: Pt states he has no real changes since is IE  Pt states he has been doing his exercises at home  Objective: See treatment diary below      Assessment: Tolerated treatment well as he was able to add tband exercises to his program  Pt required VC with prone exercises to improve scapular control  Pt HEP was updated and tbands given  Patient would benefit from continued PT      Plan: Continue per plan of care  Precautions: GERD, Hx of Basal cell carcinoma, Right RTC repair - 2017      Manuals 10/25 11/1           Left shoulder PROM  CF                                                  Neuro Re-Ed             Prone T's HEP 10x            Prone Y's  10x            Prone I's  10x            Ball rolls on wall  GMB 10x ea CW/CCW           Scap ABC's             Iso scap ret   HEP HEP                         Ther Ex             UBE             Pulleys  5 mins           TB rows  GTB 2 x 10            TB ext  RTB 2 x 10            TB ER  RTB   2  x10            TB IR  RTB   2 x 10            Sup wand flex HEP 10 x10"                                      Ther Activity                                       Gait Training                                       Modalities

## 2022-11-03 ENCOUNTER — OFFICE VISIT (OUTPATIENT)
Dept: PHYSICAL THERAPY | Facility: CLINIC | Age: 77
End: 2022-11-03

## 2022-11-03 DIAGNOSIS — M75.112 NONTRAUMATIC INCOMPLETE TEAR OF LEFT ROTATOR CUFF: Primary | ICD-10-CM

## 2022-11-03 NOTE — PROGRESS NOTES
Daily Note     Today's date: 11/3/2022  Patient name: Dulce Song  : 1945  MRN: [id number]  Referring provider: Ellis Alejandra  Dx:   Encounter Diagnosis     ICD-10-CM    1  Nontraumatic incomplete tear of left rotator cuff  M75 112                   Subjective: Pt reports that he continues to feel about the same as last session  Notes that his discomfort is mostly over the front and on the side of his L shoulder  Objective: See treatment diary below      Assessment: Tolerated treatment well  Continued to focus on scapular strengthening this session  Overall his ROM is doing well with a tight end feel at end range  Patient would benefit from continued PT      Plan: Continue per plan of care  Precautions: GERD, Hx of Basal cell carcinoma, Right RTC repair - 2017      Manuals 10/25 11/1 11/3          Left shoulder PROM  CF MM                                                 Neuro Re-Ed             Prone T's HEP 10x  2x10          Prone Y's  10x  2x10          Prone I's  10x  2x10          Ball rolls on wall  GMB 10x ea CW/CCW GMB 20x ea CW/CCW          Scap ABC's   2# 1x          Iso scap ret   HEP HEP                         Ther Ex             UBE             Pulleys  5 mins 5 mins          TB rows  GTB 2 x 10  GTB 2x10          TB ext  RTB 2 x 10  RTB 2x10          TB ER  RTB   2  x10  RTB 2x10          TB IR  RTB   2 x 10  RTB 2x10          Sup wand flex HEP 10 x10"  10x10"                                    Ther Activity                                       Gait Training                                       Modalities

## 2022-11-08 ENCOUNTER — OFFICE VISIT (OUTPATIENT)
Dept: PHYSICAL THERAPY | Facility: CLINIC | Age: 77
End: 2022-11-08

## 2022-11-08 DIAGNOSIS — M75.112 NONTRAUMATIC INCOMPLETE TEAR OF LEFT ROTATOR CUFF: Primary | ICD-10-CM

## 2022-11-08 NOTE — PROGRESS NOTES
Daily Note     Today's date: 2022  Patient name: Inocente White  : 1945  MRN: [id number]  Referring provider: Evie Zapien  Dx:   Encounter Diagnosis     ICD-10-CM    1  Nontraumatic incomplete tear of left rotator cuff  M75 112        Start Time: 830  Stop Time: 915  Total time in clinic (min): 45 minutes    Subjective: Patient       Objective: See treatment diary below      Assessment: Tolerated treatment well  Patient would benefit from continued PT      Plan: Continue per plan of care  Precautions: GERD, Hx of Basal cell carcinoma, Right RTC repair - 2017      Manuals 10/25 11/1 11/3 11/8         Left shoulder PROM  CF MM dc                                                Neuro Re-Ed             Prone T's HEP 10x  2x10 2 x 10         Prone Y's  10x  2x10 2x10         Prone I's  10x  2x10 2# 2 x 10         Ball rolls on wall  GMB 10x ea CW/CCW GMB 20x ea CW/CCW x20         Scap ABC's -  supine   2# 1x 2# X 1 set         Iso scap ret   HEP HEP                         Ther Ex             UBE             Pulleys  5 mins 5 mins 5'         TB rows  GTB 2 x 10  GTB 2x10 GTB 2 x 10         TB ext  RTB 2 x 10  RTB 2x10 GTB 2 x 10         TB ER  RTB   2  x10  RTB 2x10 RTB 2 x 10         TB IR  RTB   2 x 10  RTB 2x10 RTB 2 x 10         Sup wand flex HEP 10 x10"  10x10"          S/l ER    2# 2 x 10         S/L ABD    nv         Ther Activity             Standing flexion/scaption    2# 2 x 10         Cabinet taps    nv         Gait Training                                       Modalities

## 2022-11-10 ENCOUNTER — OFFICE VISIT (OUTPATIENT)
Dept: PHYSICAL THERAPY | Facility: CLINIC | Age: 77
End: 2022-11-10

## 2022-11-10 DIAGNOSIS — M75.112 NONTRAUMATIC INCOMPLETE TEAR OF LEFT ROTATOR CUFF: Primary | ICD-10-CM

## 2022-11-10 NOTE — PROGRESS NOTES
Daily Note     Today's date: 11/10/2022  Patient name: Sudarshan Menendez  : 1945  MRN: [id number]  Referring provider: Jonathan Rock  Dx: No diagnosis found  Subjective: Patient denies change in status since last treatment session  Objective: See treatment diary below      Assessment: Tolerated treatment well  Patient exhibited good technique with therapeutic exercises      Plan: Continue per plan of care  Precautions: GERD, Hx of Basal cell carcinoma, Right RTC repair - 2017      Manuals 10/25 11/1 11/3 11/8 11/10        Left shoulder PROM  CF MM dc                                                Neuro Re-Ed             Prone T's HEP 10x  2x10 2 x 10 2# 2 x 10         Prone extension     2# 2 x 10         Prone rows     2# 2 x 10         Prone Y's  10x  2x10 2x10 np        Prone I's  10x  2x10 2# 2 x 10         Ball rolls on wall  GMB 10x ea CW/CCW GMB 20x ea CW/CCW x20         Scap ABC's -  supine     2# 1x 2# X 1 set         Iso scap ret   HEP HEP                         Ther Ex             UBE             Pulleys  5 mins 5 mins 5' 5 min           TB rows  GTB 2 x 10  GTB 2x10 GTB 2 x 10 GTBx3 x 10         TB ext  RTB 2 x 10  RTB 2x10 GTB 2 x 10 GTB 3 x 10        TB ER  RTB   2  x10  RTB 2x10 RTB 2 x 10 RTB 3 x 10         TB IR  RTB   2 x 10  RTB 2x10 RTB 2 x 10 GTB 3 x 10         Sup wand flex HEP 10 x10"  10x10"          S/l ER    2# 2 x 10 2# 2 x 0         S/L ABD    nv 1 x 10         Ther Activity             Standing flexion/scaption    2# 2 x 10 2# 10         Cabinet taps    nv 2# 20         Gait Training                                         Modalities

## 2022-11-15 ENCOUNTER — OFFICE VISIT (OUTPATIENT)
Dept: PHYSICAL THERAPY | Facility: CLINIC | Age: 77
End: 2022-11-15

## 2022-11-15 DIAGNOSIS — M75.112 NONTRAUMATIC INCOMPLETE TEAR OF LEFT ROTATOR CUFF: Primary | ICD-10-CM

## 2022-11-15 DIAGNOSIS — N40.1 BENIGN LOCALIZED PROSTATIC HYPERPLASIA WITH LOWER URINARY TRACT SYMPTOMS (LUTS): ICD-10-CM

## 2022-11-15 RX ORDER — TAMSULOSIN HYDROCHLORIDE 0.4 MG/1
0.4 CAPSULE ORAL
Qty: 90 CAPSULE | Refills: 0 | Status: SHIPPED | OUTPATIENT
Start: 2022-11-15 | End: 2023-02-13

## 2022-11-15 NOTE — PROGRESS NOTES
Daily Note     Today's date: 11/15/2022  Patient name: Geremias Berrios  : 1945  MRN: [id number]  Referring provider: Latisha Valdivia  Dx:   Encounter Diagnosis     ICD-10-CM    1  Nontraumatic incomplete tear of left rotator cuff  M75 112                   Subjective: Patient states no significant change at this time  Objective: See treatment diary below      Assessment:  Patient demonstrates moderate challenge with prone Y's; mild pain with addition of bilateral shoulder ER with scap retract; moderate challenge with sidelying shoulder abduction  Plan: Continue per plan of care  Precautions: GERD, Hx of Basal cell carcinoma, Right RTC repair - 2017      Manuals 10/25 11/1 11/3 11/8 11/10 11/15       Left shoulder PROM  CF MM dc                                                Neuro Re-Ed             Prone T's HEP 10x  2x10 2 x 10 2# 2 x 10  2# 2 x 10        Prone extension     2# 2 x 10  2# 2 x 10        Prone rows     2# 2 x 10  np       Prone Y's  10x  2x10 2x10 np 2# 2x10       Prone I's  10x  2x10 2# 2 x 10  2# 2 x 10       Ball rolls on wall  GMB 10x ea CW/CCW GMB 20x ea CW/CCW x20         Scap ABC's -  supine     2# 1x 2# X 1 set         Iso scap ret   HEP HEP                         Ther Ex             UBE             Pulleys  5 mins 5 mins 5' 5 min  5 min         TB rows  GTB 2 x 10  GTB 2x10 GTB 2 x 10 GTBx3 x 10  GTBx3 x 10        TB ext  RTB 2 x 10  RTB 2x10 GTB 2 x 10 GTB 3 x 10 GTB 3 x 10       TB ER  RTB   2  x10  RTB 2x10 RTB 2 x 10 RTB 3 x 10  np       TB IR  RTB   2 x 10  RTB 2x10 RTB 2 x 10 GTB 3 x 10  np       Bilateral shld ER w/scap ret      GTB 3x10       Sup wand flex HEP 10 x10"  10x10"          S/l ER    2# 2 x 10 2# 2 x 0  2# 2 x 10        S/L ABD    nv 1 x 10  2# 2x10       Ther Activity             Standing flexion/scaption    2# 2 x 10 2# 10  2# 2x10       Cabinet taps    nv 2# 20  2# 20       Gait Training                                         Modalities

## 2022-11-17 ENCOUNTER — OFFICE VISIT (OUTPATIENT)
Dept: PHYSICAL THERAPY | Facility: CLINIC | Age: 77
End: 2022-11-17

## 2022-11-17 DIAGNOSIS — M75.112 NONTRAUMATIC INCOMPLETE TEAR OF LEFT ROTATOR CUFF: Primary | ICD-10-CM

## 2022-11-17 NOTE — PROGRESS NOTES
Daily Note     Today's date: 2022  Patient name: Ayan Aquino  : 1945  MRN: [id number]  Referring provider: Bita Nguyen  Dx: No diagnosis found  Subjective: Patient denies significant change in status since last treatment session  Objective: See treatment diary below      Assessment: Tolerated treatment well  Patient exhibited good technique with therapeutic exercises      Plan: Continue per plan of care  Precautions: GERD, Hx of Basal cell carcinoma, Right RTC repair - 2017      Manuals 10/25 11/1 11/3 11/8 11/10 11/15 11/17      Left shoulder PROM  CF MM dc                                                Neuro Re-Ed             Prone T's HEP 10x  2x10 2 x 10 2# 2 x 10  2# 2 x 10  2# 2 x 10      Prone extension     2# 2 x 10  2# 2 x 10  2# 2 x 10       Prone rows     2# 2 x 10  np 2# 2 x 10       Prone Y's  10x  2x10 2x10 np 2# 2x10 2# 2 x 10       Prone I's  10x  2x10 2# 2 x 10  2# 2 x 10 2# 2 x 10       Ball rolls on wall  GMB 10x ea CW/CCW GMB 20x ea CW/CCW x20         Scap ABC's -  supine     2# 1x 2# X 1 set   2# punch5" x 20       Iso scap ret   HEP HEP                         Ther Ex             UBE       3/3      Pulleys  5 mins 5 mins 5' 5 min  5 min 5 min         TB rows  GTB 2 x 10  GTB 2x10 GTB 2 x 10 GTBx3 x 10  GTBx3 x 10        TB ext  RTB 2 x 10  RTB 2x10 GTB 2 x 10 GTB 3 x 10 GTB 3 x 10       TB ER  RTB   2  x10  RTB 2x10 RTB 2 x 10 RTB 3 x 10  np       TB IR  RTB   2 x 10  RTB 2x10 RTB 2 x 10 GTB 3 x 10  np GTb x 20       Bilateral shld ER w/scap ret      GTB 3x10 GTB x 20       Sup wand flex HEP 10 x10"  10x10"          S/l ER    2# 2 x 10 2# 2 x 0  2# 2 x 10  2# 2 x 10       S/L ABD    nv 1 x 10  2# 2x10 2# 2 x 10       Wall walks       RTB 5 laps       Ther Activity             Standing flexion/scaption    2# 2 x 10 2# 10  2# 2x10       Cabinet taps    nv 2# 20  2# 20       Gait Training                                         Modalities

## 2022-11-22 ENCOUNTER — OFFICE VISIT (OUTPATIENT)
Dept: PHYSICAL THERAPY | Facility: CLINIC | Age: 77
End: 2022-11-22

## 2022-11-22 DIAGNOSIS — M75.112 NONTRAUMATIC INCOMPLETE TEAR OF LEFT ROTATOR CUFF: Primary | ICD-10-CM

## 2022-11-22 NOTE — PROGRESS NOTES
Daily Note     Today's date: 2022  Patient name: Kevin Ruelas  : 1945  MRN: [id number]  Referring provider: Susy Baez  Dx:   Encounter Diagnosis     ICD-10-CM    1  Nontraumatic incomplete tear of left rotator cuff  M75 112                      Subjective: Patient reports that       Objective: See treatment diary below      Assessment: Tolerated treatment well  Patient would benefit from continued PT      Plan: Continue per plan of care  Precautions: GERD, Hx of Basal cell carcinoma, Right RTC repair - 2017      Manuals 10/25 11/1 11/3 11/8 11/10 11/15 11/17 11/22     Left shoulder PROM  CF MM dc                                                Neuro Re-Ed             Prone T's HEP 10x  2x10 2 x 10 2# 2 x 10  2# 2 x 10  2# 2 x 10 2# 2 x 10     Prone extension     2# 2 x 10  2# 2 x 10  2# 2 x 10  2# 2 x 10     Prone rows     2# 2 x 10  np 2# 2 x 10  2# 2 x 10     Prone Y's  10x  2x10 2x10 np 2# 2x10 2# 2 x 10  2# 2 x 10     Prone I's  10x  2x10 2# 2 x 10  2# 2 x 10 2# 2 x 10  2# 2 x 10     Ball rolls on wall  GMB 10x ea CW/CCW GMB 20x ea CW/CCW x20         Scap ABC's -  supine     2# 1x 2# X 1 set   2# punch5" x 20  2# punch      Iso scap ret   HEP HEP                         Ther Ex             UBE       3/3 3/3     Pulleys  5 mins 5 mins 5' 5 min  5 min 5 min  5'       TB rows  GTB 2 x 10  GTB 2x10 GTB 2 x 10 GTBx3 x 10  GTBx3 x 10  GTB 3 x 10 GTB 3 x 10     TB ext  RTB 2 x 10  RTB 2x10 GTB 2 x 10 GTB 3 x 10 GTB 3 x 10 GTB 3 x 10 GTB 3x10     TB ER  RTB   2  x10  RTB 2x10 RTB 2 x 10 RTB 3 x 10  np       TB IR  RTB   2 x 10  RTB 2x10 RTB 2 x 10 GTB 3 x 10  np GTB x 20  GTB x 20     Bilateral shld ER w/scap ret      GTB 3x10 GTB x 20  GTB x 20     Sup wand flex HEP 10 x10"  10x10"          S/l ER    2# 2 x 10 2# 2 x 0  2# 2 x 10  2# 2 x 10  3# 2 x 10     S/L ABD    nv 1 x 10  2# 2x10 2# 2 x 10  3# 2 x 10     Wall walks       RTB 5 laps  RTB 5 laps     Ther Activity Standing flexion/scaption    2# 2 x 10 2# 10  2# 2x10  2# 2 x 10 3#    Cabinet taps    nv 2# 20  2# 20  2# x 10     Gait Training                                         Modalities no abdominal pain, no bloating, no constipation, no diarrhea, no nausea and no vomiting.

## 2022-11-29 ENCOUNTER — OFFICE VISIT (OUTPATIENT)
Dept: PHYSICAL THERAPY | Facility: CLINIC | Age: 77
End: 2022-11-29

## 2022-11-29 DIAGNOSIS — M75.112 NONTRAUMATIC INCOMPLETE TEAR OF LEFT ROTATOR CUFF: Primary | ICD-10-CM

## 2022-11-29 NOTE — PROGRESS NOTES
Daily Note     Today's date: 2022  Patient name: Frannie So  : 1945  MRN: [id number]  Referring provider: Chely Diallo  Dx:   Encounter Diagnosis     ICD-10-CM    1  Nontraumatic incomplete tear of left rotator cuff  M75 112                      Subjective: Patient reports that he did something to his shoulder which caused pain  He is uncertain what the motion is but pain persists this morning      Objective: See treatment diary below  Noted exercises were held due to pain  Assessment: Tolerated treatment well  Patient would benefit from continued PT      Plan: Continue per plan of care  Precautions: GERD, Hx of Basal cell carcinoma, Right RTC repair - 2017      Manuals 10/25 11/1 11/3 11/8 11/10 11/15 11/17 11/22 11/29    Left shoulder PROM  CF MM dc         (-) pressure IASTM         6'     SBS         10 x :05 with iastm    Shoulder ext in standing         x10    Neuro Re-Ed             Prone T's HEP 10x  2x10 2 x 10 2# 2 x 10  2# 2 x 10  2# 2 x 10 2# 2 x 10 hold    Prone extension     2# 2 x 10  2# 2 x 10  2# 2 x 10  2# 2 x 10     Prone rows     2# 2 x 10  np 2# 2 x 10  2# 2 x 10     Prone Y's  10x  2x10 2x10 np 2# 2x10 2# 2 x 10  2# 2 x 10     Prone I's  10x  2x10 2# 2 x 10  2# 2 x 10 2# 2 x 10  2# 2 x 10     Ball rolls on wall  GMB 10x ea CW/CCW GMB 20x ea CW/CCW x20         Scap ABC's -  supine     2# 1x 2# X 1 set   2# punch5" x 20  2# punch      Iso scap ret   HEP HEP                         Ther Ex             UBE       3/3 3/3     Pulleys  5 mins 5 mins 5' 5 min  5 min 5 min  5' 5'      TB rows  GTB 2 x 10  GTB 2x10 GTB 2 x 10 GTBx3 x 10  GTBx3 x 10  GTB 3 x 10 GTB 3 x 10 np    TB ext  RTB 2 x 10  RTB 2x10 GTB 2 x 10 GTB 3 x 10 GTB 3 x 10 GTB 3 x 10 GTB 3x10 np    TB ER  RTB   2  x10  RTB 2x10 RTB 2 x 10 RTB 3 x 10  np   np    TB IR  RTB   2 x 10  RTB 2x10 RTB 2 x 10 GTB 3 x 10  np GTB x 20  GTB x 20     Bilateral shld ER w/scap ret      GTB 3x10 GTB x 20  GTB x 20 Sup wand flex HEP 10 x10"  10x10"          S/l ER    2# 2 x 10 2# 2 x 0  2# 2 x 10  2# 2 x 10  3# 2 x 10     S/L ABD    nv 1 x 10  2# 2x10 2# 2 x 10  3# 2 x 10     Wall walks       RTB 5 laps  RTB 5 laps     Ther Activity             Standing flexion/scaption    2# 2 x 10 2# 10  2# 2x10  2# 2 x 10 3#    Cabinet taps    nv 2# 20  2# 20  2# x 10     Pendulums          2#                                Modalities Opioid Pregnancy And Lactation Text: These medications can lead to premature delivery and should be avoided during pregnancy. These medications are also present in breast milk in small amounts.

## 2022-12-01 ENCOUNTER — OFFICE VISIT (OUTPATIENT)
Dept: OBGYN CLINIC | Facility: OTHER | Age: 77
End: 2022-12-01

## 2022-12-01 ENCOUNTER — OFFICE VISIT (OUTPATIENT)
Dept: PHYSICAL THERAPY | Facility: CLINIC | Age: 77
End: 2022-12-01

## 2022-12-01 VITALS
HEIGHT: 70 IN | SYSTOLIC BLOOD PRESSURE: 109 MMHG | WEIGHT: 170 LBS | BODY MASS INDEX: 24.34 KG/M2 | DIASTOLIC BLOOD PRESSURE: 74 MMHG | HEART RATE: 82 BPM

## 2022-12-01 DIAGNOSIS — M75.112 NONTRAUMATIC INCOMPLETE TEAR OF LEFT ROTATOR CUFF: Primary | ICD-10-CM

## 2022-12-01 DIAGNOSIS — M75.112 INCOMPLETE TEAR OF LEFT ROTATOR CUFF, UNSPECIFIED WHETHER TRAUMATIC: Primary | ICD-10-CM

## 2022-12-01 NOTE — PROGRESS NOTES
Daily Note     Today's date: 2022  Patient name: Inocente White  : 1945  MRN: [id number]  Referring provider: Evie Zapien  Dx:   Encounter Diagnosis     ICD-10-CM    1  Nontraumatic incomplete tear of left rotator cuff  M75 112                      Subjective: Patient reports that he has improvement in the shoulder today vs LV  Objective: See treatment diary below      Assessment: Tolerated treatment well  Patient would benefit from continued PT      Plan: Continue per plan of care  Precautions: GERD, Hx of Basal cell carcinoma, Right RTC repair - 2017      Manuals 10/25 11/1 11/3 11/8 11/10 11/15 11/17 11/22 11/29 12/1   Left shoulder PROM  CF MM dc         (-) pressure IASTM         6'  6'   SBS         10 x :05 with iastm dc   Shoulder ext in standing         x10 dc   Neuro Re-Ed             Prone T's HEP 10x  2x10 2 x 10 2# 2 x 10  2# 2 x 10  2# 2 x 10 2# 2 x 10 hold np   Prone extension     2# 2 x 10  2# 2 x 10  2# 2 x 10  2# 2 x 10  np   Prone rows     2# 2 x 10  np 2# 2 x 10  2# 2 x 10  np   Prone Y's  10x  2x10 2x10 np 2# 2x10 2# 2 x 10  2# 2 x 10  np   Prone I's  10x  2x10 2# 2 x 10  2# 2 x 10 2# 2 x 10  2# 2 x 10  np   Ball rolls on wall  GMB 10x ea CW/CCW GMB 20x ea CW/CCW x20         Scap ABC's -  supine     2# 1x 2# X 1 set   2# punch5" x 20  2# punch   np   Iso scap ret   HEP HEP                         Ther Ex             UBE       3/3 3/3  np   Pulleys  5 mins 5 mins 5' 5 min  5 min 5 min  5' 5'   5'   TB rows  GTB 2 x 10  GTB 2x10 GTB 2 x 10 GTBx3 x 10  GTBx3 x 10  GTB 3 x 10 GTB 3 x 10 np GTB 3 x 10   TB ext  RTB 2 x 10  RTB 2x10 GTB 2 x 10 GTB 3 x 10 GTB 3 x 10 GTB 3 x 10 GTB 3x10 np GTB 3 x 10   TB ER  RTB   2  x10  RTB 2x10 RTB 2 x 10 RTB 3 x 10  np   np GTB x 30   TB IR  RTB   2 x 10  RTB 2x10 RTB 2 x 10 GTB 3 x 10  np GTB x 20  GTB x 20  GTB x 20   Bilateral shld ER w/scap ret      GTB 3x10 GTB x 20  GTB x 20  np   Sup wand flex HEP 10 x10"  10x10" S/l ER    2# 2 x 10 2# 2 x 0  2# 2 x 10  2# 2 x 10  3# 2 x 10  3# 2 x 10   S/L ABD    nv 1 x 10  2# 2x10 2# 2 x 10  3# 2 x 10  3# 2 x 10   Wall walks       RTB 5 laps  RTB 5 laps     Ther Activity             Standing flexion/scaption    2# 2 x 10 2# 10  2# 2x10  2# 2 x 10 3# 3# 2 x 10   Cabinet taps    nv 2# 20  2# 20  2# x 10  np   Pendulums          2#                                Modalities

## 2022-12-01 NOTE — PROGRESS NOTES
Assessment  Diagnoses and all orders for this visit:    Partial thickness tear of left supraspinatus tendon      Discussion and Plan:    The patient has an examination and MRI consistent with a partial thickness supraspinatus tear  I have discussed with the patient the pathophysiology of this diagnosis and reviewed how the examination correlates with this diagnosis  Surgical vs conservative treatment options were discussed at length and after discussing these treatment options, the patient elected to continue conservative treatment  Patient would like to continue PT and HEP at this time  If symptoms worsen or fail to improve we can consider diagnostic arthroscopy  Subjective:   Patient ID: Gaurav Zeng is a 78 y o  male      HPI  Patient presents today for follow up of a partial thickness supraspinatus tear of the left shoulder  Patient injured the shoulder in June playing golf  Recent MRI 10 yrs ago showed the same findings  Patient was referred to PT at his last visit  He reports he has been attending PT as instructed but has not been diligent in performing his HEP like she should  He reports persistent pain  The following portions of the patient's history were reviewed and updated as appropriate: allergies, current medications, past family history, past medical history, past social history, past surgical history and problem list     Review of Systems   Constitutional: Negative for chills and fever  HENT: Negative for drooling and hearing loss  Eyes: Negative for visual disturbance  Respiratory: Negative for cough and shortness of breath  Cardiovascular: Negative for chest pain  Gastrointestinal: Negative for abdominal pain  Skin: Negative for rash  Psychiatric/Behavioral: Negative for agitation         Objective:  /74 (BP Location: Right arm, Patient Position: Sitting, Cuff Size: Adult)   Pulse 82   Ht 5' 10" (1 778 m)   Wt 77 1 kg (170 lb)   BMI 24 39 kg/m²     Left Shoulder Exam      Tenderness   The patient is experiencing no tenderness       Range of Motion   The patient has normal left shoulder ROM     Muscle Strength   Abduction: 4/5   External rotation: 4/5      Other   Erythema: absent  Sensation: normal  Pulse: present     Physical Exam  Vitals reviewed  Constitutional:       Appearance: He is well-developed  HENT:      Head: Normocephalic  Eyes:      Pupils: Pupils are equal, round, and reactive to light  Pulmonary:      Effort: Pulmonary effort is normal    Abdominal:      General: Abdomen is flat  There is no distension  Skin:     General: Skin is warm and dry  I have personally reviewed pertinent films in PACS and my interpretation is as follows      MRI Left Shoulder 10/8/22: Supraspinatus indertional tendinosis and insterstitial tearing but no evidence of full thickness tear      Scribe Attestation    I,:  Robles Connolly am acting as a scribe while in the presence of the attending physician :       I,:  Sherron Koehler MD personally performed the services described in this documentation    as scribed in my presence :

## 2022-12-05 ENCOUNTER — OFFICE VISIT (OUTPATIENT)
Dept: PHYSICAL THERAPY | Facility: CLINIC | Age: 77
End: 2022-12-05

## 2022-12-05 DIAGNOSIS — M75.112 NONTRAUMATIC INCOMPLETE TEAR OF LEFT ROTATOR CUFF: Primary | ICD-10-CM

## 2022-12-05 NOTE — PROGRESS NOTES
Daily Note     Today's date: 2022  Patient name: Edgard Right  : 1945  MRN: [id number]  Referring provider: Norma Blake  Dx:   Encounter Diagnosis     ICD-10-CM    1  Nontraumatic incomplete tear of left rotator cuff  M75 112                      Subjective: Patient states that he has noticed an improvement with pain  Objective: See treatment diary below      Assessment: Tolerated treatment well  Patient exhibited good technique with therapeutic exercises      Plan: Continue per plan of care  Precautions: GERD, Hx of Basal cell carcinoma, Right RTC repair - 2017      Manuals 12/5    11/10 11/15 11/17 11/22 11/29 12/1   Left shoulder PROM 6            (-) pressure IASTM         6'  6'   SBS         10 x :05 with iastm dc   Shoulder ext in standing         x10 dc   Neuro Re-Ed             Prone T's     2# 2 x 10  2# 2 x 10  2# 2 x 10 2# 2 x 10 hold np   Prone extension     2# 2 x 10  2# 2 x 10  2# 2 x 10  2# 2 x 10  np   Prone rows     2# 2 x 10  np 2# 2 x 10  2# 2 x 10  np   Prone Y's     np 2# 2x10 2# 2 x 10  2# 2 x 10  np   Prone I's      2# 2 x 10 2# 2 x 10  2# 2 x 10  np   Ball rolls on wall             Scap ABC's -  supine       2# punch5" x 20  2# punch   np   Iso scap ret                            Ther Ex             UBE 3/3      3/3 3/3  np   Pulleys 5    5 min  5 min 5 min  5' 5'   5'   TB rows GTB 3 x 10     GTBx3 x 10  GTBx3 x 10  GTB 3 x 10 GTB 3 x 10 np GTB 3 x 10   TB ext GTB 3 x 10     GTB 3 x 10 GTB 3 x 10 GTB 3 x 10 GTB 3x10 np GTB 3 x 10   TB ER GTB x 30     RTB 3 x 10  np   np GTB x 30   TB IR GTB x 30     GTB 3 x 10  np GTB x 20  GTB x 20  GTB x 20   Bilateral shld ER w/scap ret      GTB 3x10 GTB x 20  GTB x 20  np   Sup wand flex             S/l ER 3# 2 x 10     2# 2 x 0  2# 2 x 10  2# 2 x 10  3# 2 x 10  3# 2 x 10   S/L ABD 3# 2 x10     1 x 10  2# 2x10 2# 2 x 10  3# 2 x 10  3# 2 x 10   Wall walks       RTB 5 laps  RTB 5 laps     Ther Activity Standing flexion/scaption 3# 2 x 10     2# 10  2# 2x10  2# 2 x 10 3# 3# 2 x 10   Cabinet taps     2# 20  2# 20  2# x 10  np   Pendulums          2#                              Modalities

## 2022-12-07 ENCOUNTER — HOSPITAL ENCOUNTER (OUTPATIENT)
Dept: RADIOLOGY | Age: 77
Discharge: HOME | End: 2022-12-07

## 2022-12-07 DIAGNOSIS — R91.1 PULMONARY NODULE: ICD-10-CM

## 2022-12-08 ENCOUNTER — APPOINTMENT (OUTPATIENT)
Dept: PHYSICAL THERAPY | Facility: CLINIC | Age: 77
End: 2022-12-08

## 2022-12-12 ENCOUNTER — OFFICE VISIT (OUTPATIENT)
Dept: PHYSICAL THERAPY | Facility: CLINIC | Age: 77
End: 2022-12-12

## 2022-12-12 DIAGNOSIS — M75.112 NONTRAUMATIC INCOMPLETE TEAR OF LEFT ROTATOR CUFF: Primary | ICD-10-CM

## 2022-12-12 NOTE — PROGRESS NOTES
Daily Note     Today's date: 2022  Patient name: Charles Alfonso  : 1945  MRN: [id number]  Referring provider: Janet Padilla  Dx:   Encounter Diagnosis     ICD-10-CM    1  Nontraumatic incomplete tear of left rotator cuff  M75 112           Start Time: 0800  Stop Time: 0847  Total time in clinic (min): 47 minutes    Subjective: Pt reports he is doing good today, much better as he overdid it last week with doing the theraband exercises but feels significantly better as it calmed down  Objective: See treatment diary below      Assessment: Tolerated treatment well  Took short rest breaks in between TB ER/IR, which pt felt better with versus doing 3 straight sets in a row (was alternating back and forth between IR and ER in sets)  Pt notes feeling better with standing scaption versus standing flexion, as he feels more of a pinching feeling anteriorly with flexion  Instructed pt to allow for adequate rest breaks to allow for recovery of muscles to prevent overuse/over fatigue  Patient demonstrated fatigue post treatment, exhibited good technique with therapeutic exercises and would benefit from continued PT  Plan: Continue per plan of care  Precautions: GERD, Hx of Basal cell carcinoma, Right RTC repair - 2017      Manuals 12/5 12/12   11/10 11/15 11/17 11/22 11/29 12/1   Left shoulder PROM 6 5           (-) pressure IASTM         6'  6'   SBS         10 x :05 with iastm dc   Shoulder ext in standing         x10 dc   Neuro Re-Ed             Prone T's     2# 2 x 10  2# 2 x 10  2# 2 x 10 2# 2 x 10 hold np   Prone extension     2# 2 x 10  2# 2 x 10  2# 2 x 10  2# 2 x 10  np   Prone rows     2# 2 x 10  np 2# 2 x 10  2# 2 x 10  np   Prone Y's     np 2# 2x10 2# 2 x 10  2# 2 x 10  np   Prone I's      2# 2 x 10 2# 2 x 10  2# 2 x 10  np   Ball rolls on wall             Scap ABC's -  supine       2# punch5" x 20  2# punch   np   Iso scap ret                            Ther Ex             UBE 3/3 3/3     3/3 3/3  np   Pulleys 5 5   5 min  5 min 5 min  5' 5'   5'   TB rows GTB 3 x 10  GTB 3x10   GTBx3 x 10  GTBx3 x 10  GTB 3 x 10 GTB 3 x 10 np GTB 3 x 10   TB ext GTB 3 x 10  GTB 3x10   GTB 3 x 10 GTB 3 x 10 GTB 3 x 10 GTB 3x10 np GTB 3 x 10   TB ER GTB x 30  GTB x30   RTB 3 x 10  np   np GTB x 30   TB IR GTB x 30  GTB x30   GTB 3 x 10  np GTB x 20  GTB x 20  GTB x 20   Bilateral shld ER w/scap ret      GTB 3x10 GTB x 20  GTB x 20  np   Sup wand flex             S/l ER 3# 2 x 10  2# 2x10   2# 2 x 0  2# 2 x 10  2# 2 x 10  3# 2 x 10  3# 2 x 10   S/L ABD 3# 2 x10  2# 2x10   1 x 10  2# 2x10 2# 2 x 10  3# 2 x 10  3# 2 x 10   Wall walks       RTB 5 laps  RTB 5 laps     Ther Activity             Standing flexion/scaption 3# 2 x 10  2# 2x10    2# 10  2# 2x10  2# 2 x 10 3# 3# 2 x 10   Cabinet taps     2# 20  2# 20  2# x 10  np   Pendulums          2#                              Modalities

## 2022-12-15 ENCOUNTER — OFFICE VISIT (OUTPATIENT)
Dept: PHYSICAL THERAPY | Facility: CLINIC | Age: 77
End: 2022-12-15

## 2022-12-15 DIAGNOSIS — M75.112 NONTRAUMATIC INCOMPLETE TEAR OF LEFT ROTATOR CUFF: Primary | ICD-10-CM

## 2022-12-15 NOTE — PROGRESS NOTES
Daily Note     Today's date: 12/15/2022  Patient name: Ayan Aquino  : 1945  MRN: [id number]  Referring provider: Bita Nguyen  Dx:   Encounter Diagnosis     ICD-10-CM    1  Nontraumatic incomplete tear of left rotator cuff  M75 112                      Subjective: patient states that he has noticed an exacerbation of pain if he over does IR/ER strengthening exercises  Objective: See treatment diary below      Assessment: Tolerated treatment well  Patient exhibited good technique with therapeutic exercises      Plan: Continue per plan of care        Precautions: GERD, Hx of Basal cell carcinoma, Right RTC repair - 2017      Manuals 12/5 12/12 12/15          Left shoulder PROM 6 5 DC           (-) pressure IASTM             SBS             Shoulder ext in standing             Neuro Re-Ed             Ther Ex             UBE 3/3 3/3 3/3          Pulleys 5 5 5          TB rows GTB 3 x 10  GTB 3x10 [place] 9# 2 x 10           TB ext GTB 3 x 10  GTB 3x10 Fernando 10# 2 x 10           TB ER GTB x 30  GTB x30 fernando 3 9# 2 x 10           TB IR GTB x 30  GTB x30 5# 2 x 10           Bilateral shld ER w/scap ret             Sup wand flex             S/l ER 3# 2 x 10  2# 2x10 2# 2 x 15          S/L ABD 3# 2 x10  2# 2x10 2# 2 x 15          Supine shoulder flexion   2# 2 x 15           Ther Activity             Standing flexion/scaption 3# 2 x 10  2# 2x10  2# 2 x 10           Cabinet taps             Pendulums                                        Modalities

## 2022-12-20 ENCOUNTER — OFFICE VISIT (OUTPATIENT)
Dept: PHYSICAL THERAPY | Facility: CLINIC | Age: 77
End: 2022-12-20

## 2022-12-20 DIAGNOSIS — M75.112 NONTRAUMATIC INCOMPLETE TEAR OF LEFT ROTATOR CUFF: Primary | ICD-10-CM

## 2022-12-20 NOTE — PROGRESS NOTES
PT Re-evaluation     Today's date: 2022  Patient name: Vargas Murray  : 1945  MRN: [id number]  Referring provider: Veto Wade  Dx:   Encounter Diagnosis     ICD-10-CM    1  Nontraumatic incomplete tear of left rotator cuff  M75 112                   Assessment:    Mariangel Goodwin has attended physical therapy for L shoulder RTCT  He presents with less pain and improved strength since the IE  He does continue to feel pain with functional activities however that is improving as well  He would continue to benefit from an additional 2-3 weeks of physical therapy to maximize his functional benefits  Thank you for your referral       Assessment details: Vargas Murray is a 78 y o  male who presents with pain, decreased strength, and decreased ROM  Due to these impairments, patient has difficulty performing a/iadls and recreational activities  Patient's clinical presentation is consistent with their referring diagnosis of incomplete tear of left rotator cuff  Patient also demonstrates signs and symptoms consistent with impingement syndrome of the left shoulder  Patient would benefit from skilled physical therapy to address their aforementioned impairments, improve their level of function and to improve their overall quality of life  Impairments: abnormal or restricted ROM, impaired physical strength and pain with function  Understanding of Dx/Px/POC: good   Prognosis: good    Goals  Short term goals - to be achieved in 4 weeks:    Decrease pain 20-50%  - partially met   Increase strength by 1/2 grade  - partially met   Improve range of motion by 25%  - met  Long term goals - to be achieved by discharge:    Overhead reaching is improved to maximal level of function  Lifting is improved to maximal level of function  IADL performance in related activities is improved to maximal level of function  Performance in related recreational activities is improved to maximal level of function  Plan  Planned therapy interventions: manual therapy, neuromuscular re-education, patient education, postural training, strengthening, stretching, therapeutic activities, therapeutic exercise and home exercise program  Frequency: 2x week  Duration in visits: 12  Duration in weeks: 6  Plan of Care beginning date: 2022  Plan of Care expiration date: 2023  Treatment plan discussed with: patient        Subjective Evaluation    History of Present Illness  Mechanism of injury: Patient refers to PT with c/o pain in his left shoulder; states chronic pain for multiple years which was aggravated approximately three months previous when playing golf  Patient received an MRI of his left shoulder on 10/8/22 which revealed prominent subacromial spur with supraspinatus/infraspinatus insertional tendinosis and longitudinal interstitial tearing but no evidence of full-thickness component rotator cuff tear  Patient denies numbness and tingling in his left UE  Patient states pain with overhead reaching, reaching behind his back, and reaching behind his head  Patient also c/o pain reaching across his body  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  22:  Patient reports that pain persists with functional activities  Pain  Current pain rating: 3  /  3  At best pain ratin  / 0  At worst pain ratin    /     7/10  (when I tweak it once a week)  Quality: sharp  Relieving factors: medications  Aggravating factors: overhead activity and lifting (Reaching behind head/back)  Progression: no change    Patient Goals  Patient goals for therapy: decreased pain          Objective     Static Posture     Shoulders  Rounded      Active Range of Motion   Left Shoulder                                  22    Flexion: 155 degrees                        155 degrees  Abduction: 155 degrees                    150 degrees  External rotation BTH: T2                   wfl  Internal rotation BTB: T7 wfl  Passive Range of Motion     Left Shoulder   Flexion: 160 degrees                         170 degrees  Abduction: 160 degrees                      170 degrees  External rotation 90°: 85 degrees   /  94 degrees  Internal rotation 90°: 70 degrees    /   80 degrees    Strength/Myotome Testing     Left Shoulder     Planes of Motion   Flexion: 4 (pain)                            5 pain             Abduction: 4- (pain)                       5   External rotation at 0°: 4 (pain)      4+ pain  Internal rotation at 0°: 4+               4+    Isolated Muscles   Lower trapezius: 3+ (pain)   Middle trapezius: 4-     Tests     Left Shoulder   Positive empty can and Hawkin's  Negative drop arm

## 2022-12-20 NOTE — PROGRESS NOTES
Daily Note     Today's date: 2022  Patient name: Tony Leavitt  : 1945  MRN: [id number]  Referring provider: Nelly Arnold  Dx:   Encounter Diagnosis     ICD-10-CM    1  Nontraumatic incomplete tear of left rotator cuff  M75 112                      Subjective: See REV      Objective: See treatment diary below      Assessment: Tolerated treatment well  Patient would benefit from continued PT      Plan: Continue per plan of care        Precautions: GERD, Hx of Basal cell carcinoma, Right RTC repair - 2017      Manuals 12/5 12/12 12/15 12/20         Left shoulder PROM 6 5 DC           (-) pressure IASTM             SBS             Shoulder ext in standing             Neuro Re-Ed             Ther Ex             UBE 3/3 3/3 3/3 3/3         Pulleys 5 5 5          TB rows - fernando GTB 3 x 10  GTB 3x10 [place] 9# 2 x 10  9# 2 x 10          TB ext - fernando GTB 3 x 10  GTB 3x10 Fernando 10# 2 x 10  10# 2 x 10         TB ER - fernando GTB x 30  GTB x30 fernando 3 9# 2 x 10  4# 2 x 10         TB IR - fernando GTB x 30  GTB x30 5# 2 x 10  5# 2 x 10         Bilateral shld ER w/scap ret             Sup wand flex             S/l ER 3# 2 x 10  2# 2x10 2# 2 x 15 3# 2 x 10         S/L ABD 3# 2 x10  2# 2x10 2# 2 x 15 3# 2 x 10         Supine shoulder flexion   2# 2 x 15  3# 2 x 10         Ther Activity             Standing flexion/scaption 3# 2 x 10  2# 2x10  2# 2 x 10  3# 2 x 10         Cabinet taps    3# 2 x 10         Pendulums                                        Modalities

## 2022-12-22 ENCOUNTER — OFFICE VISIT (OUTPATIENT)
Dept: PHYSICAL THERAPY | Facility: CLINIC | Age: 77
End: 2022-12-22

## 2022-12-22 DIAGNOSIS — M75.112 NONTRAUMATIC INCOMPLETE TEAR OF LEFT ROTATOR CUFF: Primary | ICD-10-CM

## 2022-12-22 NOTE — PROGRESS NOTES
Daily Note     Today's date: 2022  Patient name: Virgil Ireland  : 1945  MRN: [id number]  Referring provider: Juan Kendrick  Dx: No diagnosis found  Subjective: Patient denies a change in status since last treatment session       Objective: See treatment diary below      Assessment: Tolerated treatment well  Patient exhibited good technique with therapeutic exercises      Plan: Continue per plan of care        Precautions: GERD, Hx of Basal cell carcinoma, Right RTC repair - 2017      Manuals 12/5 12/12 12/15 12/20 12/22        Left shoulder PROM 6 5 DC  DC DC        (-) pressure IASTM             SBS             Shoulder ext in standing             Neuro Re-Ed             Ther Ex             UBE 3/3 3/3 3/3 3/3 3/3        Pulleys 5 5 5          TB rows - fernando GTB 3 x 10  GTB 3x10 [place] 9# 2 x 10  9# 2 x 10 10# 2 x 10          TB ext - fernando GTB 3 x 10  GTB 3x10 [place] 10# 2 x 10  10# 2 x 10 10# 2 x 10         TB ER - fernando GTB x 30  GTB x30 [place] 3 9# 2 x 10  4# 2 x 10 4# 2 x 10         TB IR - fernando GTB x 30  GTB x30 5# 2 x 10  5# 2 x 10 5# 2 x 10         Bilateral shld ER w/scap ret             Sup wand flex             S/l ER 3# 2 x 10  2# 2x10 2# 2 x 15 3# 2 x 10 3# 2 x 10         S/L ABD 3# 2 x10  2# 2x10 2# 2 x 15 3# 2 x 10 3# 2 x 10         Supine shoulder flexion   2# 2 x 15  3# 2 x 10 3@ 2 x 10         Wall walks     RTB x 10 laps         Ther Activity             Standing flexion/scaption 3# 2 x 10  2# 2x10  2# 2 x 10  3# 2 x 10 3# 2 x 10         Cabinet taps    3# 2 x 10         Pendulums                                        Modalities

## 2022-12-28 ENCOUNTER — OFFICE VISIT (OUTPATIENT)
Dept: PHYSICAL THERAPY | Facility: CLINIC | Age: 77
End: 2022-12-28

## 2022-12-28 DIAGNOSIS — M75.112 NONTRAUMATIC INCOMPLETE TEAR OF LEFT ROTATOR CUFF: Primary | ICD-10-CM

## 2022-12-28 NOTE — PROGRESS NOTES
Daily Note     Today's date: 2022  Patient name: Virgil Ireland  : 1945  MRN: [id number]  Referring provider: Carly Mauricio  Dx: No diagnosis found  Subjective: Patient denies a change in status following last treatment       Objective: See treatment diary below      Assessment: Tolerated treatment well  Patient exhibited good technique with therapeutic exercises      Plan: Continue per plan of care  Precautions: GERD, Hx of Basal cell carcinoma, Right RTC repair - 2017      Manuals 12/5 12/12 12/15 12/20 12/22 12/28       Left shoulder PROM 6 5 DC  DC DC DC       (-) pressure IASTM             SBS             Shoulder ext in standing             Neuro Re-Ed             Ther Ex             UBE 3/3 3/3 3/3 3/3 3/3 3/3       Pulleys 5 5 5          TB rows - autumn GTB 3 x 10  GTB 3x10 [place] 9# 2 x 10  9# 2 x 10 10# 2 x 10  12  5# x 20        TB ext - autumn GTB 3 x 10  GTB 3x10 [place] 10# 2 x 10  10# 2 x 10 10# 2 x 10  12  5# x 20        TB ER - autumn GTB x 30  GTB x30 [place] 3 9# 2 x 10  4# 2 x 10 4# 2 x 10  4 1# x 20        TB IR - autumn GTB x 30  GTB x30 5# 2 x 10  5# 2 x 10 5# 2 x 10  8# x 20        Bilateral shld ER w/scap ret             Sup wand flex             S/l ER 3# 2 x 10  2# 2x10 2# 2 x 15 3# 2 x 10 3# 2 x 10  3# 20        S/L ABD 3# 2 x10  2# 2x10 2# 2 x 15 3# 2 x 10 3# 2 x 10         Supine shoulder flexion   2# 2 x 15  3# 2 x 10 3@ 2 x 10  3# 20        Wall walks     RTB x 10 laps  RTB x 20        Ther Activity             Standing flexion/scaption 3# 2 x 10  2# 2x10  2# 2 x 10  3# 2 x 10 3# 2 x 10  3# 20       Cabinet taps    3# 2 x 10 3# 2 x 10  4# 15       Pendulums              Postural ER        Red loop x 10        Scap punches      4# x 15        Modalities

## 2023-01-03 ENCOUNTER — OFFICE VISIT (OUTPATIENT)
Dept: PHYSICAL THERAPY | Facility: CLINIC | Age: 78
End: 2023-01-03

## 2023-01-03 DIAGNOSIS — M75.112 NONTRAUMATIC INCOMPLETE TEAR OF LEFT ROTATOR CUFF: Primary | ICD-10-CM

## 2023-01-03 NOTE — PROGRESS NOTES
February 4, 2020      [facility]  [address]  [place] [address]  Phone: [phone]       Patient: Nicola Kumar   YOB: 1991  Date of Visit: 02/04/2020    To Whom It May Concern:    Sebastián Kumar  was at [facility] on 02/04/2020. She may return to work on 2/6/2020 with no restrictions. If you have any questions or concerns, or if I can be of further assistance, please do not hesitate to contact me.    Sincerely,          Darya Maki MD      Daily Note     Today's date: 1/3/2023  Patient name: Tyrone Dias  : 1945  MRN: [id number]  Referring provider: Josi Virk  Dx:   Encounter Diagnosis     ICD-10-CM    1  Nontraumatic incomplete tear of left rotator cuff  M75 [id number]                      Subjective: Patient reports having no significant change in sx  Objective: See treatment diary below      Assessment: Tolerated treatment well  Patient would benefit from continued PT      Plan: Continue per plan of care  Precautions: GERD, Hx of Basal cell carcinoma, Right RTC repair - 2017      Manuals 12/5 12/12 12/15 12/20 12/22 12/28 1/3      Left shoulder PROM 6 5 DC  DC DC DC       (-) pressure IASTM             SBS             Shoulder ext in standing             Neuro Re-Ed             Ther Ex             UBE 3/3 3/3 3/3 3/3 3/3 3/3 3/3      Pulleys 5 5 5          TB rows - autumn GTB 3 x 10  GTB 3x10 [place] 9# 2 x 10  9# 2 x 10 10# 2 x 10  12  5# x 20 12 5# x 20       TB ext - autumn GTB 3 x 10  GTB 3x10 [place] 10# 2 x 10  10# 2 x 10 10# 2 x 10  12  5# x 20  12 5# x 20      TB ER - autumn GTB x 30  GTB x30 [place] 3 9# 2 x 10  4# 2 x 10 4# 2 x 10  4 1# x 20  4 2 # x 20      TB IR - autumn GTB x 30  GTB x30 5# 2 x 10  5# 2 x 10 5# 2 x 10  8# x 20  8# x 20      Bilateral shld ER w/scap ret             Sup wand flex             S/l ER 3# 2 x 10  2# 2x10 2# 2 x 15 3# 2 x 10 3# 2 x 10  3# 20  3# x 20      S/L ABD 3# 2 x10  2# 2x10 2# 2 x 15 3# 2 x 10 3# 2 x 10   3# x 20      Supine shoulder flexion   2# 2 x 15  3# 2 x 10 3@ 2 x 10  3# 20  3# x 20      Wall walks     RTB x 10 laps  RTB x 20  RTB x 20      Ther Activity             Standing flexion/scaption 3# 2 x 10  2# 2x10  2# 2 x 10  3# 2 x 10 3# 2 x 10  3# 20 3# x 20      Cabinet taps    3# 2 x 10 3# 2 x 10  4# 15 4# x 15      Pendulums              Postural ER        Red loop x 10        Scap punches      4# x 15  4# x 15      Modalities

## 2023-01-10 ENCOUNTER — APPOINTMENT (OUTPATIENT)
Dept: PHYSICAL THERAPY | Facility: CLINIC | Age: 78
End: 2023-01-10

## 2023-01-12 ENCOUNTER — APPOINTMENT (OUTPATIENT)
Dept: PHYSICAL THERAPY | Facility: CLINIC | Age: 78
End: 2023-01-12

## 2023-01-12 ENCOUNTER — OFFICE VISIT (OUTPATIENT)
Dept: OBGYN CLINIC | Facility: OTHER | Age: 78
End: 2023-01-12

## 2023-01-12 VITALS
HEART RATE: 86 BPM | DIASTOLIC BLOOD PRESSURE: 73 MMHG | HEIGHT: 70 IN | BODY MASS INDEX: 24.91 KG/M2 | WEIGHT: 174 LBS | SYSTOLIC BLOOD PRESSURE: 107 MMHG

## 2023-01-12 DIAGNOSIS — M75.112 INCOMPLETE TEAR OF LEFT ROTATOR CUFF, UNSPECIFIED WHETHER TRAUMATIC: Primary | ICD-10-CM

## 2023-01-12 NOTE — PROGRESS NOTES
Assessment  Diagnoses and all orders for this visit:    Partial thickness tear of left supraspinatus tendon      Discussion and Plan:    Patient has a MRI showing partial thickness supraspinatus tear  I have discussed with the patient the pathophysiology of this diagnosis and reviewed how the examination correlates with this diagnosis  Surgical vs continued conservative treatment options were discussed at length and after discussing these treatment options the patient would like to consider surgery, however not until the fall as he has some trips planned and wants to be able to play golf this summer  I will see the patient back in August to discuss surgery if indicated  Patient will continue his home exercise program  Continue activities as tolerated      Subjective:   Patient ID: Lennox Curls is a 78 y o  male      HPI  Patient presents today for follow up of a partial thickness supraspinatus tear of the left shoulder  Patient injured the shoulder in June playing golf  Recent MRI 10 yrs ago showed the same findings  Patient has been attending PT as instructed and reports minimal improvement  Patient is considering surgery however does not want surgery until the fall  The following portions of the patient's history were reviewed and updated as appropriate: allergies, current medications, past family history, past medical history, past social history, past surgical history and problem list     Review of Systems   Constitutional: Negative for chills and fever  HENT: Negative for drooling and hearing loss  Eyes: Negative for visual disturbance  Respiratory: Negative for cough and shortness of breath  Cardiovascular: Negative for chest pain  Gastrointestinal: Negative for abdominal pain  Skin: Negative for rash  Psychiatric/Behavioral: Negative for agitation         Objective:  /73   Pulse 86   Ht 5' 10" (1 778 m)   Wt 78 9 kg (174 lb)   BMI 24 97 kg/m²     Left Shoulder Exam      Tenderness   The patient is experiencing no tenderness       Range of Motion   The patient has normal left shoulder ROM     Muscle Strength   Abduction: 5/5   External rotation: 5/5      Other   Erythema: absent  Sensation: normal  Pulse: present     Physical Exam  Vitals reviewed  Constitutional:       Appearance: He is well-developed  HENT:      Head: Normocephalic  Eyes:      Pupils: Pupils are equal, round, and reactive to light  Pulmonary:      Effort: Pulmonary effort is normal    Abdominal:      General: Abdomen is flat  There is no distension  Skin:     General: Skin is warm and dry  I have personally reviewed pertinent films in PACS and my interpretation is as follows  MRI Left Shoulder 10/8/22: Supraspinatus indertional tendinosis and insterstitial tearing but no evidence of full thickness tear      Scribe Attestation    I,:  Anushka Neves am acting as a scribe while in the presence of the attending physician :       I,:  Tatyana Luu MD personally performed the services described in this documentation    as scribed in my presence :

## 2023-01-16 ENCOUNTER — TELEPHONE (OUTPATIENT)
Dept: OBGYN CLINIC | Facility: HOSPITAL | Age: 78
End: 2023-01-16

## 2023-01-16 NOTE — TELEPHONE ENCOUNTER
Caller: Patient     Doctor/Office: [facility]    Call regarding :  Cancelling appt     Call was transferred to: Physical Therapy

## 2023-01-17 ENCOUNTER — APPOINTMENT (OUTPATIENT)
Dept: PHYSICAL THERAPY | Facility: CLINIC | Age: 78
End: 2023-01-17

## 2023-01-19 ENCOUNTER — APPOINTMENT (OUTPATIENT)
Dept: PHYSICAL THERAPY | Facility: CLINIC | Age: 78
End: 2023-01-19

## 2023-01-24 ENCOUNTER — APPOINTMENT (OUTPATIENT)
Dept: PHYSICAL THERAPY | Facility: CLINIC | Age: 78
End: 2023-01-24

## 2023-01-26 ENCOUNTER — APPOINTMENT (OUTPATIENT)
Dept: PHYSICAL THERAPY | Facility: CLINIC | Age: 78
End: 2023-01-26

## 2023-01-31 ENCOUNTER — APPOINTMENT (OUTPATIENT)
Dept: PHYSICAL THERAPY | Facility: CLINIC | Age: 78
End: 2023-01-31

## 2023-02-27 DIAGNOSIS — N40.1 BENIGN LOCALIZED PROSTATIC HYPERPLASIA WITH LOWER URINARY TRACT SYMPTOMS (LUTS): ICD-10-CM

## 2023-02-27 RX ORDER — TAMSULOSIN HYDROCHLORIDE 0.4 MG/1
0.4 CAPSULE ORAL
Qty: 90 CAPSULE | Refills: 0 | Status: SHIPPED | OUTPATIENT
Start: 2023-02-27 | End: 2023-05-28

## 2023-03-07 ENCOUNTER — APPOINTMENT (OUTPATIENT)
Dept: LAB | Age: 78
End: 2023-03-07

## 2023-03-07 DIAGNOSIS — R97.20 ELEVATED PSA: ICD-10-CM

## 2023-03-07 DIAGNOSIS — M25.562 CHRONIC PAIN OF LEFT KNEE: ICD-10-CM

## 2023-03-07 DIAGNOSIS — R73.9 HYPERGLYCEMIA: ICD-10-CM

## 2023-03-07 DIAGNOSIS — M54.12 CERVICAL RADICULOPATHY: Chronic | ICD-10-CM

## 2023-03-07 DIAGNOSIS — R97.20 ELEVATED PSA, LESS THAN 10 NG/ML: ICD-10-CM

## 2023-03-07 DIAGNOSIS — M54.16 LUMBAR RADICULOPATHY, CHRONIC: ICD-10-CM

## 2023-03-07 DIAGNOSIS — G89.29 CHRONIC PAIN OF LEFT KNEE: ICD-10-CM

## 2023-03-07 DIAGNOSIS — E78.2 MODERATE MIXED HYPERLIPIDEMIA NOT REQUIRING STATIN THERAPY: ICD-10-CM

## 2023-03-07 LAB
ALBUMIN SERPL BCP-MCNC: 3.6 G/DL (ref 3.5–5)
ALP SERPL-CCNC: 52 U/L (ref 46–116)
ALT SERPL W P-5'-P-CCNC: 23 U/L (ref 12–78)
ANION GAP SERPL CALCULATED.3IONS-SCNC: -1 MMOL/L (ref 4–13)
AST SERPL W P-5'-P-CCNC: 20 U/L (ref 5–45)
BASOPHILS # BLD AUTO: 0.08 THOUSANDS/ÂΜL (ref 0–0.1)
BASOPHILS NFR BLD AUTO: 1 % (ref 0–1)
BILIRUB SERPL-MCNC: 0.93 MG/DL (ref 0.2–1)
BUN SERPL-MCNC: 22 MG/DL (ref 5–25)
CALCIUM SERPL-MCNC: 9.5 MG/DL (ref 8.3–10.1)
CHLORIDE SERPL-SCNC: 110 MMOL/L (ref 96–108)
CHOLEST SERPL-MCNC: 172 MG/DL
CO2 SERPL-SCNC: 30 MMOL/L (ref 21–32)
CREAT SERPL-MCNC: 0.91 MG/DL (ref 0.6–1.3)
EOSINOPHIL # BLD AUTO: 0.5 THOUSAND/ÂΜL (ref 0–0.61)
EOSINOPHIL NFR BLD AUTO: 9 % (ref 0–6)
ERYTHROCYTE [DISTWIDTH] IN BLOOD BY AUTOMATED COUNT: 14 % (ref 11.6–15.1)
EST. AVERAGE GLUCOSE BLD GHB EST-MCNC: 103 MG/DL
GFR SERPL CREATININE-BSD FRML MDRD: 82 ML/MIN/1.73SQ M
GLUCOSE P FAST SERPL-MCNC: 96 MG/DL (ref 65–99)
HBA1C MFR BLD: 5.2 %
HCT VFR BLD AUTO: 45.8 % (ref 36.5–49.3)
HDLC SERPL-MCNC: 68 MG/DL
HGB BLD-MCNC: 15 G/DL (ref 12–17)
IMM GRANULOCYTES # BLD AUTO: 0.01 THOUSAND/UL (ref 0–0.2)
IMM GRANULOCYTES NFR BLD AUTO: 0 % (ref 0–2)
LDLC SERPL CALC-MCNC: 92 MG/DL (ref 0–100)
LYMPHOCYTES # BLD AUTO: 1.55 THOUSANDS/ÂΜL (ref 0.6–4.47)
LYMPHOCYTES NFR BLD AUTO: 27 % (ref 14–44)
MCH RBC QN AUTO: 31.2 PG (ref 26.8–34.3)
MCHC RBC AUTO-ENTMCNC: 32.8 G/DL (ref 31.4–37.4)
MCV RBC AUTO: 95 FL (ref 82–98)
MONOCYTES # BLD AUTO: 0.5 THOUSAND/ÂΜL (ref 0.17–1.22)
MONOCYTES NFR BLD AUTO: 9 % (ref 4–12)
NEUTROPHILS # BLD AUTO: 3.14 THOUSANDS/ÂΜL (ref 1.85–7.62)
NEUTS SEG NFR BLD AUTO: 54 % (ref 43–75)
NRBC BLD AUTO-RTO: 0 /100 WBCS
PLATELET # BLD AUTO: 197 THOUSANDS/UL (ref 149–390)
PMV BLD AUTO: 10.4 FL (ref 8.9–12.7)
POTASSIUM SERPL-SCNC: 4.3 MMOL/L (ref 3.5–5.3)
PROT SERPL-MCNC: 6.2 G/DL (ref 6.4–8.4)
PSA SERPL-MCNC: 5.2 NG/ML (ref 0–4)
RBC # BLD AUTO: 4.81 MILLION/UL (ref 3.88–5.62)
SODIUM SERPL-SCNC: 139 MMOL/L (ref 135–147)
TRIGL SERPL-MCNC: 62 MG/DL
WBC # BLD AUTO: 5.78 THOUSAND/UL (ref 4.31–10.16)

## 2023-03-09 ENCOUNTER — RA CDI HCC (OUTPATIENT)
Dept: OTHER | Facility: HOSPITAL | Age: 78
End: 2023-03-09

## 2023-03-14 ENCOUNTER — OFFICE VISIT (OUTPATIENT)
Dept: UROLOGY | Facility: CLINIC | Age: 78
End: 2023-03-14

## 2023-03-14 VITALS
WEIGHT: 176 LBS | DIASTOLIC BLOOD PRESSURE: 78 MMHG | SYSTOLIC BLOOD PRESSURE: 110 MMHG | BODY MASS INDEX: 25.2 KG/M2 | HEIGHT: 70 IN

## 2023-03-14 DIAGNOSIS — R97.20 ELEVATED PSA: Primary | ICD-10-CM

## 2023-03-14 RX ORDER — CLINDAMYCIN PHOSPHATE 10 UG/ML
LOTION TOPICAL
COMMUNITY
Start: 2023-02-20

## 2023-03-14 NOTE — PROGRESS NOTES
UROLOGY PROGRESS NOTE   Patient Identifiers: Raven Matamoros (MRN [id number])  Date of Service: 3/14/2023    Subjective:   72-year-old man history of elevated PSA  His PSA was as high as 5 2 and he had a negative  biopsy in 2020  Current PSA 5 2 and stable  MRI in 2021 was PI-RADS 2 with 42 g prostate  Maintained on tamsulosin  Reason for visit: Elevated PSA follow-up    Objective:     VITALS:    There were no vitals filed for this visit  LABS:  Lab Results   Component Value Date    HGB 15 0 03/07/2023    HCT 45 8 03/07/2023    WBC 5 78 03/07/2023     03/07/2023   ]    Lab Results   Component Value Date     09/03/2015    K 4 3 03/07/2023     (H) 03/07/2023    CO2 30 03/07/2023    BUN 22 03/07/2023    CREATININE 0 91 03/07/2023    CALCIUM 9 5 03/07/2023    GLUCOSE 90 09/03/2015   ]        INPATIENT MEDS:    Current Outpatient Medications:   •  tamsulosin (FLOMAX) 0 4 mg, Take 1 capsule (0 4 mg total) by mouth daily at bedtime, Disp: 90 capsule, Rfl: 0  •  desoximetasone (TOPICORT) 0 25 % cream, APPLY TWICE DAILY TO AFFECTED AREA FOR 2-4 WEEKS MAX AS NEEDED, Disp: , Rfl:   •  famotidine (PEPCID) 40 MG tablet, Take 1 tablet (40 mg total) by mouth 2 (two) times a day as needed for heartburn, Disp: 180 tablet, Rfl: 1  •  ibuprofen (MOTRIN) 600 mg tablet, , Disp: , Rfl:       Physical Exam:   There were no vitals taken for this visit  GEN: no acute distress    RESP: breathing comfortably with no accessory muscle use    ABD: soft, non-tender, non-distended   INCISION:    EXT: no significant peripheral edema   (Male): Penis circumcised, phallus normal, meatus patent  Testicles descended into scrotum bilaterally without masses nor tenderness  No inguinal hernias bilaterally  AMELIA: Prostate is enlarged at 40 grams  The prostate is not boggy  The prostate is not tender  A bit rough on the right side    RADIOLOGY:   None    Assessment:   #1    Elevated PSA    Plan:   -Follow-up in 6 months with PSA prior to visit  -  -  -

## 2023-03-16 ENCOUNTER — OFFICE VISIT (OUTPATIENT)
Dept: INTERNAL MEDICINE CLINIC | Facility: CLINIC | Age: 78
End: 2023-03-16

## 2023-03-16 VITALS
HEART RATE: 67 BPM | SYSTOLIC BLOOD PRESSURE: 114 MMHG | DIASTOLIC BLOOD PRESSURE: 80 MMHG | RESPIRATION RATE: 15 BRPM | BODY MASS INDEX: 25.48 KG/M2 | OXYGEN SATURATION: 97 % | HEIGHT: 70 IN | WEIGHT: 178 LBS

## 2023-03-16 DIAGNOSIS — R97.20 ELEVATED PSA, LESS THAN 10 NG/ML: ICD-10-CM

## 2023-03-16 DIAGNOSIS — E55.9 VITAMIN D DEFICIENCY: ICD-10-CM

## 2023-03-16 DIAGNOSIS — R91.1 PULMONARY NODULE: ICD-10-CM

## 2023-03-16 DIAGNOSIS — I20.8 OTHER FORMS OF ANGINA PECTORIS (HCC): ICD-10-CM

## 2023-03-16 DIAGNOSIS — S39.012A STRAIN OF LUMBAR REGION, INITIAL ENCOUNTER: ICD-10-CM

## 2023-03-16 DIAGNOSIS — Z00.00 MEDICARE ANNUAL WELLNESS VISIT, SUBSEQUENT: ICD-10-CM

## 2023-03-16 DIAGNOSIS — E78.2 MODERATE MIXED HYPERLIPIDEMIA NOT REQUIRING STATIN THERAPY: Primary | ICD-10-CM

## 2023-03-16 PROBLEM — I20.89 OTHER FORMS OF ANGINA PECTORIS: Status: ACTIVE | Noted: 2023-03-16

## 2023-03-16 RX ORDER — TIZANIDINE 4 MG/1
4 TABLET ORAL EVERY 8 HOURS PRN
Qty: 90 TABLET | Refills: 0 | Status: SHIPPED | OUTPATIENT
Start: 2023-03-16

## 2023-03-16 NOTE — PROGRESS NOTES
Assessment and Plan:     Problem List Items Addressed This Visit        Cardiovascular and Mediastinum    Other forms of angina pectoris (HCC)       Other    Elevated PSA, less than 10 ng/ml    Relevant Orders    CBC and differential    Comprehensive metabolic panel    PSA Total, Diagnostic   Other Visit Diagnoses     Moderate mixed hyperlipidemia not requiring statin therapy    -  Primary    Relevant Orders    CBC and differential    Comprehensive metabolic panel    Lipid Panel with Direct LDL reflex    Strain of lumbar region, initial encounter        Relevant Medications    tiZANidine (ZANAFLEX) 4 mg tablet    Other Relevant Orders    CBC and differential    Comprehensive metabolic panel    Pulmonary nodule        Relevant Orders    CBC and differential    Comprehensive metabolic panel    CT lung nodule follow-up    Vitamin D deficiency        Relevant Orders    CBC and differential    Comprehensive metabolic panel    Vitamin D 25 hydroxy    Medicare annual wellness visit, subsequent               Preventive health issues were discussed with patient, and age appropriate screening tests were ordered as noted in patient's After Visit Summary  Personalized health advice and appropriate referrals for health education or preventive services given if needed, as noted in patient's After Visit Summary       History of Present Illness:     Patient presents for a Medicare Wellness Visit    HPI   Patient Care Team:  Olayinka Camargo DO as PCP - General (Internal Medicine)     Review of Systems:     Review of Systems     Problem List:     Patient Active Problem List   Diagnosis   • Cervical radiculopathy   • Gastroesophageal reflux disease with esophagitis   • Laryngopharyngeal reflux   • Thrombosed external hemorrhoids   • Closed fracture of medial malleolus of right tibia   • Preoperative examination   • Elevated PSA, less than 10 ng/ml   • Dysphonia   • Muscle tension dysphonia   • Glottic insufficiency   • Reflux laryngitis   • Sensation of foreign body   • Paresis of left vocal fold   • Hiatal hernia   • Family history of prostate cancer in father   • Benign localized prostatic hyperplasia with lower urinary tract symptoms (LUTS)   • Atypical small acinar proliferation of prostate   • Internal derangement of left shoulder   • Incomplete tear of left rotator cuff   • Other forms of angina pectoris [facility])      Past Medical and Surgical History:     Past Medical History:   Diagnosis Date   • Arthritis 2010   • Basal cell carcinoma of shoulder     last assessed: 2014   • Depression     last assessed: 2015   • GERD (gastroesophageal reflux disease) 2000   • Headache(784 0) 1980   • HL (hearing loss) 2010   • Subconjunctival hemorrhage of left eye     last assessed: 2016     Past Surgical History:   Procedure Laterality Date   • HERNIA REPAIR        Family History:     Family History   Problem Relation Age of Onset   • Heart disease Mother         cardiac disorder   • Kidney disease Mother    • Prostate cancer Father    • Hearing loss Sister       Social History:     Social History     Socioeconomic History   • Marital status: /Civil Union     Spouse name: None   • Number of children: None   • Years of education: None   • Highest education level: None   Occupational History   • None   Tobacco Use   • Smoking status: Former     Packs/day: 1 50     Years: 15 00     Pack years: 22 50     Types: Cigarettes     Start date: 1965     Quit date: 1980     Years since quittin 5   • Smokeless tobacco: Never   • Tobacco comments:     Already quit   Vaping Use   • Vaping Use: Never used   Substance and Sexual Activity   • Alcohol use:  Yes     Alcohol/week: 6 0 standard drinks     Types: 6 Shots of liquor per week     Comment: Social   • Drug use: No   • Sexual activity: Not Currently     Partners: Female   Other Topics Concern   • None   Social History Narrative   • None     Social Determinants of Health     Financial Resource Strain: Low Risk    • Difficulty of Paying Living Expenses: Not hard at all   Food Insecurity: Not on file   Transportation Needs: No Transportation Needs   • Lack of Transportation (Medical): No   • Lack of Transportation (Non-Medical): No   Physical Activity: Not on file   Stress: Not on file   Social Connections: Not on file   Intimate Partner Violence: Not on file   Housing Stability: Not on file      Medications and Allergies:     Current Outpatient Medications   Medication Sig Dispense Refill   • tiZANidine (ZANAFLEX) 4 mg tablet Take 1 tablet (4 mg total) by mouth every 8 (eight) hours as needed for muscle spasms 90 tablet 0   • clindamycin (CLEOCIN T) 1 % lotion APPLY TO AFFECTED AREA ON THE FACE ONCE DAILY     • desoximetasone (TOPICORT) 0 25 % cream APPLY TWICE DAILY TO AFFECTED AREA FOR 2-4 WEEKS MAX AS NEEDED     • famotidine (PEPCID) 40 MG tablet Take 1 tablet (40 mg total) by mouth 2 (two) times a day as needed for heartburn 180 tablet 1   • ibuprofen (MOTRIN) 600 mg tablet      • tamsulosin (FLOMAX) 0 4 mg Take 1 capsule (0 4 mg total) by mouth daily at bedtime 90 capsule 0     No current facility-administered medications for this visit       No Known Allergies   Immunizations:     Immunization History   Administered Date(s) Administered   • COVID-19 MODERNA VACC 0 5 ML IM 02/04/2021, 02/04/2021, 03/04/2021, 03/04/2021, 11/02/2021   • INFLUENZA 10/26/2017, 11/05/2018, 10/12/2022   • Influenza Split High Dose Preservative Free IM 10/26/2017, 11/05/2018, 11/05/2018   • Influenza, high dose seasonal 0 7 mL 10/05/2019, 10/14/2020, 10/06/2021, 10/12/2022   • Pneumococcal Conjugate 13-Valent 08/26/2015   • Pneumococcal Polysaccharide PPV23 07/01/2012, 08/28/2017   • Tdap 09/20/2013, 10/14/2013, 10/14/2013, 06/28/2016   • Tuberculin Skin Test-PPD Intradermal 08/01/2012   • Zoster 08/08/2012   • Zoster Vaccine Recombinant 02/25/2019, 02/25/2019, 06/26/2019, 06/26/2019      Health Maintenance:         Topic Date Due   • Colorectal Cancer Screening  10/20/2026   • Hepatitis C Screening  Completed         Topic Date Due   • COVID-19 Vaccine (4 - Booster for Moderna series) 12/28/2021      Medicare Screening Tests and Risk Assessments:     Melvi Houston is here for his Subsequent Wellness visit  Last Medicare Wellness visit information reviewed, patient interviewed and updates made to the record today  Health Risk Assessment:   Patient rates overall health as very good  Patient feels that their physical health rating is same  Patient is satisfied with their life  Eyesight was rated as same  Hearing was rated as same  Patient feels that their emotional and mental health rating is same  Patients states they are never, rarely angry  Patient states they are never, rarely unusually tired/fatigued  Pain experienced in the last 7 days has been some  Patient's pain rating has been 3/10  Patient states that he has experienced no weight loss or gain in last 6 months  Fall Risk Screening: In the past year, patient has experienced: no history of falling in past year      Home Safety:  Patient does not have trouble with stairs inside or outside of their home  Patient has working smoke alarms and has working carbon monoxide detector  Home safety hazards include: none  Nutrition:   Current diet is Regular  Medications:   Patient is currently taking over-the-counter supplements  OTC medications include: see medication list  Patient is able to manage medications  Activities of Daily Living (ADLs)/Instrumental Activities of Daily Living (IADLs):   Walk and transfer into and out of bed and chair?: Yes  Dress and groom yourself?: Yes    Bathe or shower yourself?: Yes    Feed yourself?  Yes  Do your laundry/housekeeping?: Yes  Manage your money, pay your bills and track your expenses?: Yes  Make your own meals?: Yes    Do your own shopping?: Yes    Previous Hospitalizations: Any hospitalizations or ED visits within the last 12 months?: No      Advance Care Planning:   Living will: Yes    Durable POA for healthcare: Yes    Advanced directive: Yes    Five wishes given: Yes      PREVENTIVE SCREENINGS      Cardiovascular Screening:    General: Screening Not Indicated and History Lipid Disorder      Diabetes Screening:     General: Screening Current      Colorectal Cancer Screening:     General: Screening Current      Prostate Cancer Screening:    General: Screening Not Indicated      Abdominal Aortic Aneurysm (AAA) Screening:    Risk factors include: age between 73-67 yo and tobacco use        Lung Cancer Screening:     General: Screening Not Indicated      Hepatitis C Screening:    General: Screening Current    Screening, Brief Intervention, and Referral to Treatment (SBIRT)    Screening  Typical number of drinks in a day: 0  Typical number of drinks in a week: 0  Interpretation: Low risk drinking behavior  AUDIT-C Screenin) How often did you have a drink containing alcohol in the past year? never  2) How many drinks did you have on a typical day when you were drinking in the past year? 0  3) How often did you have 6 or more drinks on one occasion in the past year? never    AUDIT-C Score: 0  Interpretation: Score 0-3 (male): Negative screen for alcohol misuse    Single Item Drug Screening:  How often have you used an illegal drug (including marijuana) or a prescription medication for non-medical reasons in the past year? never    Single Item Drug Screen Score: 0  Interpretation: Negative screen for possible drug use disorder    No results found       Physical Exam:     /80   Pulse 67   Resp 15   Ht 5' 10" (1 778 m)   Wt 80 7 kg (178 lb)   SpO2 97%   BMI 25 54 kg/m²     Physical Exam     Leyda Hanley DO

## 2023-03-16 NOTE — PROGRESS NOTES
Assessment/Plan:    #Sneezing  -noticing often at night  -denies history of allergies  -recommended flonase for now  -defers allergy panel testing  -saw ENT in the past and had scoping which was normal    #Lumbar Strain  -over left lateral lumbar  -was raking leaves  -will start on zanaflex    #HLD  -LDL 92 HDL 68  -continue diet and exercise  -2022 screening carotid US and stress test normal  -mother with CAD    #BPH  -remains on flomax and urinating 1-2 times per night     #Elevated PSA  -PSA at 5 2  -seeing urology and trend PSA 6 months and consider MRI and prostate bx repeat   -previous 2020 bx with 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 and 1 core atypical small acinar proliferation    #LPR  -using pepcid as needed which is helping  -2021 EGD stable    #Renal Cyst  -remains stable without need for further surveillance     #Hearing Loss  -did not see audiology and will consider later    #Lung Nodule  -5mm in RLLL  -CT chest repeat in December 2023  -found incidentally during CT scan due to weight loss  -weight now stable  -was a smoker and quit in 2021, weaned off using pipe and cigars     #Right Ankle Fracture  -pin placement and removal    #Rotator Cuff Pain  -MRI with left partial thickness supraspinatus tear  -currently in pain but tolerable  -history of right rotator cuff repair     #Health Maintenance  -routine labs and followup 1 year  -colonoscopy due 2026  -COVID vaccine up to date  -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[employer]    No problem-specific Assessment & Plan notes found for this encounter  Diagnoses and all orders for this visit:    Moderate mixed hyperlipidemia not requiring statin therapy  -     CBC and differential; Future  -     Comprehensive metabolic panel; Future  -     Lipid Panel with Direct LDL reflex; Future    Elevated PSA, less than 10 ng/ml  -     CBC and differential; Future  -     Comprehensive metabolic panel; Future  -     PSA Total, Diagnostic;  Future    Strain of lumbar region, initial encounter  -     CBC and differential; Future  -     Comprehensive metabolic panel; Future  -     tiZANidine (ZANAFLEX) 4 mg tablet; Take 1 tablet (4 mg total) by mouth every 8 (eight) hours as needed for muscle spasms    Pulmonary nodule  -     CBC and differential; Future  -     Comprehensive metabolic panel; Future  -     CT lung nodule follow-up; Future    Vitamin D deficiency  -     CBC and differential; Future  -     Comprehensive metabolic panel; Future  -     Vitamin D 25 hydroxy; Future    Other forms of angina pectoris ([address] )    Medicare annual wellness visit, subsequent            Current Outpatient Medications:   •  tiZANidine (ZANAFLEX) 4 mg tablet, Take 1 tablet (4 mg total) by mouth every 8 (eight) hours as needed for muscle spasms, Disp: 90 tablet, Rfl: 0  •  clindamycin (CLEOCIN T) 1 % lotion, APPLY TO AFFECTED AREA ON THE FACE ONCE DAILY, Disp: , Rfl:   •  desoximetasone (TOPICORT) 0 25 % cream, APPLY TWICE DAILY TO AFFECTED AREA FOR 2-4 WEEKS MAX AS NEEDED, Disp: , Rfl:   •  famotidine (PEPCID) 40 MG tablet, Take 1 tablet (40 mg total) by mouth 2 (two) times a day as needed for heartburn, Disp: 180 tablet, Rfl: 1  •  ibuprofen (MOTRIN) 600 mg tablet, , Disp: , Rfl:   •  tamsulosin (FLOMAX) 0 4 mg, Take 1 capsule (0 4 mg total) by mouth daily at bedtime, Disp: 90 capsule, Rfl: 0    Subjective:      Patient ID: Vargas Murray is a 78 y o  male  HPI     Patient presents for routine checkup  Denies any recent hospitalizations or surgeries  He has a left rotator cuff tear and completed physical therapy however still continues to have pain  He is deferring and holding off on any surgeries for now  He has had a history of a right shoulder rotator cuff repair in the past   He remains on Flomax for BPH  He is urinating approximately 1-2 times a night  His PSA remains elevated at 5 2  He follows up with urology every 6 months    His LDL is 92 and HDL 68 currently well controlled with diet and exercise  He was a former smoker  His carotid screening was unremarkable and a stress test was normal   He has lung nodules and we will repeat a CT of his chest again in 2023 December  He does have a history of smoking and quit approximately 2 years ago  He has left lumbar muscle strain and we will start him on Zanaflex  He will return to care in 1 year  The following portions of the patient's history were reviewed and updated as appropriate: allergies, current medications, past family history, past medical history, past social history, past surgical history and problem list     Review of Systems   Constitutional: Negative for activity change, appetite change, fatigue and fever  HENT: Negative for congestion, ear pain, hearing loss, sore throat and tinnitus  Eyes: Negative for photophobia, pain and visual disturbance  Respiratory: Negative for cough, shortness of breath and wheezing  Cardiovascular: Negative for chest pain, palpitations and leg swelling  Gastrointestinal: Negative for abdominal distention, abdominal pain, constipation, diarrhea, nausea and vomiting  Genitourinary: Negative for difficulty urinating, frequency and hematuria  Musculoskeletal: Negative for arthralgias, back pain, gait problem, joint swelling, myalgias, neck pain and neck stiffness  Skin: Negative for color change, pallor, rash and wound  Neurological: Negative for dizziness, tremors, numbness and headaches  Hematological: Does not bruise/bleed easily  Objective:      /80   Pulse 67   Resp 15   Ht 5' 10" (1 778 m)   Wt 80 7 kg (178 lb)   SpO2 97%   BMI 25 54 kg/m²          Physical Exam  Vitals reviewed  Constitutional:       Appearance: Normal appearance  He is well-developed  HENT:      Head: Normocephalic and atraumatic  Right Ear: Tympanic membrane, ear canal and external ear normal  There is no impacted cerumen        Left Ear: Tympanic membrane, ear canal and external ear normal  There is no impacted cerumen  Nose: Nose normal    Eyes:      Conjunctiva/sclera: Conjunctivae normal       Pupils: Pupils are equal, round, and reactive to light  Neck:      Thyroid: No thyromegaly  Vascular: No JVD  Cardiovascular:      Rate and Rhythm: Normal rate and regular rhythm  Pulses: Normal pulses  Heart sounds: Normal heart sounds  No murmur heard  Pulmonary:      Effort: Pulmonary effort is normal  No respiratory distress  Breath sounds: Normal breath sounds  No stridor  No wheezing, rhonchi or rales  Chest:      Chest wall: No tenderness  Abdominal:      General: Bowel sounds are normal  There is no distension  Palpations: Abdomen is soft  Tenderness: There is no abdominal tenderness  There is no guarding or rebound  Musculoskeletal:         General: Tenderness (left lateral lumbar, left knee) present  No deformity  Normal range of motion  Cervical back: Normal range of motion and neck supple  Right lower leg: No edema  Left lower leg: No edema  Lymphadenopathy:      Cervical: No cervical adenopathy  Skin:     General: Skin is warm and dry  Findings: No bruising, erythema, lesion or rash  Neurological:      Mental Status: He is alert and oriented to person, place, and time  Mental status is at baseline  Deep Tendon Reflexes: Reflexes are normal and symmetric  Psychiatric:         Mood and Affect: Mood normal          Behavior: Behavior normal          Thought Content: Thought content normal          Judgment: Judgment normal            This note was completed in part utilizing [employer] fluency direct voice recognition software  Grammatical errors, random word insertion, spelling mistakes, and incomplete sentences may be an occasional consequence of the system secondary to software limitations, ambient noise and hardware issues   At the time of dictation, efforts were made to edit, clarify and /or correct errors  Please read the chart carefully and recognize, using context, where substitutions have occurred  If you have any questions or concerns about the context, text or information contained within the body of this dictation, please contact myself, the provider, for further clarification

## 2023-03-16 NOTE — PATIENT INSTRUCTIONS
Medicare Preventive Visit Patient Instructions  Thank you for completing your Welcome to Medicare Visit or Medicare Annual Wellness Visit today  Your next wellness visit will be due in one year (3/16/2024)  The screening/preventive services that you may require over the next 5-10 years are detailed below  Some tests may not apply to you based off risk factors and/or age  Screening tests ordered at today's visit but not completed yet may show as past due  Also, please note that scanned in results may not display below  Preventive Screenings:  Service Recommendations Previous Testing/Comments   Colorectal Cancer Screening  · Colonoscopy    · Fecal Occult Blood Test (FOBT)/Fecal Immunochemical Test (FIT)  · Fecal DNA/Cologuard Test  · Flexible Sigmoidoscopy Age: 39-70 years old   Colonoscopy: every 10 years (May be performed more frequently if at higher risk)  OR  FOBT/FIT: every 1 year  OR  Cologuard: every 3 years  OR  Sigmoidoscopy: every 5 years  Screening may be recommended earlier than age 39 if at higher risk for colorectal cancer  Also, an individualized decision between you and your healthcare provider will decide whether screening between the ages of 74-80 would be appropriate   Colonoscopy: 10/20/2021  FOBT/FIT: Not on file  Cologuard: Not on file  Sigmoidoscopy: Not on file    Screening Current     Prostate Cancer Screening Individualized decision between patient and health care provider in men between ages of 53-78   Medicare will cover every 12 months beginning on the day after your 50th birthday PSA: 5 2 ng/mL     Screening Not Indicated     Hepatitis C Screening Once for adults born between 1945 and 1965  More frequently in patients at high risk for Hepatitis C Hep C Antibody: Not on file    Screening Current   Diabetes Screening 1-2 times per year if you're at risk for diabetes or have pre-diabetes Fasting glucose: 96 mg/dL (3/7/2023)  A1C: 5 2 % (3/7/2023)  Screening Current   Cholesterol Screening Once every 5 years if you don't have a lipid disorder  May order more often based on risk factors  Lipid panel: 03/07/2023  Screening Not Indicated  History Lipid Disorder      Other Preventive Screenings Covered by Medicare:  1  Abdominal Aortic Aneurysm (AAA) Screening: covered once if your at risk  You're considered to be at risk if you have a family history of AAA or a male between the age of 73-68 who smoking at least 100 cigarettes in your lifetime  2  Lung Cancer Screening: covers low dose CT scan once per year if you meet all of the following conditions: (1) Age 50-69; (2) No signs or symptoms of lung cancer; (3) Current smoker or have quit smoking within the last 15 years; (4) You have a tobacco smoking history of at least 20 pack years (packs per day x number of years you smoked); (5) You get a written order from a healthcare provider  3  Glaucoma Screening: covered annually if you're considered high risk: (1) You have diabetes OR (2) Family history of glaucoma OR (3)  aged 48 and older OR (3)  American aged 72 and older  3  Osteoporosis Screening: covered every 2 years if you meet one of the following conditions: (1) Have a vertebral abnormality; (2) On glucocorticoid therapy for more than 3 months; (3) Have primary hyperparathyroidism; (4) On osteoporosis medications and need to assess response to drug therapy  5  HIV Screening: covered annually if you're between the age of 12-76  Also covered annually if you are younger than 13 and older than 72 with risk factors for HIV infection  For pregnant patients, it is covered up to 3 times per pregnancy      Immunizations:  Immunization Recommendations   Influenza Vaccine Annual influenza vaccination during flu season is recommended for all persons aged >= 6 months who do not have contraindications   Pneumococcal Vaccine   * Pneumococcal conjugate vaccine = PCV13 (Prevnar 13), PCV15 (Vaxneuvance), PCV20 (Prevnar 20)  * Pneumococcal polysaccharide vaccine = PPSV23 (Pneumovax) Adults 2364 years old: 1-3 doses may be recommended based on certain risk factors  Adults 72 years old: 1-2 doses may be recommended based off what pneumonia vaccine you previously received   Hepatitis B Vaccine 3 dose series if at intermediate or high risk (ex: diabetes, end stage renal disease, liver disease)   Tetanus (Td) Vaccine - COST NOT COVERED BY MEDICARE PART B Following completion of primary series, a booster dose should be given every 10 years to maintain immunity against tetanus  Td may also be given as tetanus wound prophylaxis  Tdap Vaccine - COST NOT COVERED BY MEDICARE PART B Recommended at least once for all adults  For pregnant patients, recommended with each pregnancy  Shingles Vaccine (Shingrix) - COST NOT COVERED BY MEDICARE PART B  2 shot series recommended in those aged 48 and above     Health Maintenance Due:      Topic Date Due   • Colorectal Cancer Screening  10/20/2026   • Hepatitis C Screening  Completed     Immunizations Due:      Topic Date Due   • COVID-19 Vaccine (4 - Booster for [place] [facility] series) 12/28/2021     Advance Directives   What are advance directives? Advance directives are legal documents that state your wishes and plans for medical care  These plans are made ahead of time in case you lose your ability to make decisions for yourself  Advance directives can apply to any medical decision, such as the treatments you want, and if you want to donate organs  What are the types of advance directives? There are many types of advance directives, and each state has rules about how to use them  You may choose a combination of any of the following:  · Living will: This is a written record of the treatment you want  You can also choose which treatments you do not want, which to limit, and which to stop at a certain time  This includes surgery, medicine, IV fluid, and tube feedings     · Durable power of  for healthcare [facility]): This is a written record that states who you want to make healthcare choices for you when you are unable to make them for yourself  This person, called a proxy, is usually a family member or a friend  You may choose more than 1 proxy  · Do not resuscitate (DNR) order:  A DNR order is used in case your heart stops beating or you stop breathing  It is a request not to have certain forms of treatment, such as CPR  A DNR order may be included in other types of advance directives  · Medical directive: This covers the care that you want if you are in a coma, near death, or unable to make decisions for yourself  You can list the treatments you want for each condition  Treatment may include pain medicine, surgery, blood transfusions, dialysis, IV or tube feedings, and a ventilator (breathing machine)  · Values history: This document has questions about your views, beliefs, and how you feel and think about life  This information can help others choose the care that you would choose  Why are advance directives important? An advance directive helps you control your care  Although spoken wishes may be used, it is better to have your wishes written down  Spoken wishes can be misunderstood, or not followed  Treatments may be given even if you do not want them  An advance directive may make it easier for your family to make difficult choices about your care  Weight Management   Why it is important to manage your weight:  Being overweight increases your risk of health conditions such as heart disease, high blood pressure, type 2 diabetes, and certain types of cancer  It can also increase your risk for osteoarthritis, sleep apnea, and other respiratory problems  Aim for a slow, steady weight loss  Even a small amount of weight loss can lower your risk of health problems  How to lose weight safely:  A safe and healthy way to lose weight is to eat fewer calories and get regular exercise   You can lose up about 1 pound a week by decreasing the number of calories you eat by 500 calories each day  Healthy meal plan for weight management:  A healthy meal plan includes a variety of foods, contains fewer calories, and helps you stay healthy  A healthy meal plan includes the following:  · Eat whole-grain foods more often  A healthy meal plan should contain fiber  Fiber is the part of grains, fruits, and vegetables that is not broken down by your body  Whole-grain foods are healthy and provide extra fiber in your diet  Some examples of whole-grain foods are whole-wheat breads and pastas, oatmeal, brown rice, and bulgur  · Eat a variety of vegetables every day  Include dark, leafy greens such as spinach, kale, alok greens, and mustard greens  Eat yellow and orange vegetables such as carrots, sweet potatoes, and winter squash  · Eat a variety of fruits every day  Choose fresh or canned fruit (canned in its own juice or light syrup) instead of juice  Fruit juice has very little or no fiber  · Eat low-fat dairy foods  Drink fat-free (skim) milk or 1% milk  Eat fat-free yogurt and low-fat cottage cheese  Try low-fat cheeses such as mozzarella and other reduced-fat cheeses  · Choose meat and other protein foods that are low in fat  Choose beans or other legumes such as split peas or lentils  Choose fish, skinless poultry (chicken or turkey), or lean cuts of red meat (beef or pork)  Before you cook meat or poultry, cut off any visible fat  · Use less fat and oil  Try baking foods instead of frying them  Add less fat, such as margarine, sour cream, regular salad dressing and mayonnaise to foods  Eat fewer high-fat foods  Some examples of high-fat foods include french fries, doughnuts, ice cream, and cakes  · Eat fewer sweets  Limit foods and drinks that are high in sugar  This includes candy, cookies, regular soda, and sweetened drinks  Exercise:  Exercise at least 30 minutes per day on most days of the week   Some examples of exercise include walking, biking, dancing, and swimming  You can also fit in more physical activity by taking the stairs instead of the elevator or parking farther away from stores  Ask your healthcare provider about the best exercise plan for you  © Copyright [employer] 2018 Information is for End User's use only and may not be sold, redistributed or otherwise used for commercial purposes   All illustrations and images included in CareNotes® are the copyrighted property of A D A M , Inc  or [address] [place] [address]

## 2023-04-11 DIAGNOSIS — M25.571 ACUTE RIGHT ANKLE PAIN: Primary | ICD-10-CM

## 2023-06-05 ENCOUNTER — OFFICE VISIT (OUTPATIENT)
Dept: INTERNAL MEDICINE CLINIC | Facility: CLINIC | Age: 78
End: 2023-06-05
Payer: MEDICARE

## 2023-06-05 VITALS
RESPIRATION RATE: 16 BRPM | DIASTOLIC BLOOD PRESSURE: 70 MMHG | HEIGHT: 70 IN | BODY MASS INDEX: 24.48 KG/M2 | WEIGHT: 171 LBS | OXYGEN SATURATION: 98 % | SYSTOLIC BLOOD PRESSURE: 110 MMHG | HEART RATE: 67 BPM

## 2023-06-05 DIAGNOSIS — K59.09 OTHER CONSTIPATION: Primary | ICD-10-CM

## 2023-06-05 PROCEDURE — 99213 OFFICE O/P EST LOW 20 MIN: CPT | Performed by: INTERNAL MEDICINE

## 2023-06-05 NOTE — PROGRESS NOTES
Name: Cruz Moore      : 1945      MRN: [id number]  Encounter Provider: Giuseppe Mckeon DO  Encounter Date: 2023   Encounter department: [place] [address]     1  Other constipation  Comments:  suspect his symptoms are constipation and not from other causes  push fluids aggressively daily  c/w miralax and add stool softener  Orders:  -     Ambulatory Referral to Gastroenterology; Future         He requests GI eval to make sure there is no other unusual cause in his distal colon blocking his bowels from moving(needs sigmoidoscopy?)  Subjective      HPI     New to me, here with same day appt of constipation for the last 4-6 weeks  Yani Walker has had hard stools since his symptoms started  He is moving his Bowels but less often  No abd pain, N/V/blood in stool, fever  He started exercising regularly before his symptoms started including doing swimming, cardio and other exercises  He admittedly does not drink much water daily  He denies feeling lightheaded or dizzy  No CP/SOB  He took miralax with some relief but feels incomplete emptying  He had last colonoscopy in   He requests rectal exam to make sure there is nothing obstructing his ability to move his bowels  ROS Otherwise negative, no other complaints  Review of Systems   Constitutional: Negative for fever  Respiratory: Negative for shortness of breath  Cardiovascular: Negative for chest pain  Gastrointestinal: Positive for constipation  Negative for abdominal pain, blood in stool, nausea and vomiting  Musculoskeletal: Negative for arthralgias  Allergic/Immunologic: Negative for immunocompromised state  Neurological: Negative for dizziness  Psychiatric/Behavioral: Negative for dysphoric mood         Current Outpatient Medications on File Prior to Visit   Medication Sig   • clindamycin (CLEOCIN T) 1 % lotion APPLY TO AFFECTED AREA ON THE FACE ONCE DAILY   • desoximetasone "(TOPICORT) 0 25 % cream APPLY TWICE DAILY TO AFFECTED AREA FOR 2-4 WEEKS MAX AS NEEDED   • famotidine (PEPCID) 40 MG tablet Take 1 tablet (40 mg total) by mouth 2 (two) times a day as needed for heartburn   • ibuprofen (MOTRIN) 600 mg tablet    • tiZANidine (ZANAFLEX) 4 mg tablet Take 1 tablet (4 mg total) by mouth every 8 (eight) hours as needed for muscle spasms   • tamsulosin (FLOMAX) 0 4 mg Take 1 capsule (0 4 mg total) by mouth daily at bedtime       Objective     /70   Pulse 67   Resp 16   Ht 5' 10\" (1 778 m)   Wt 77 6 kg (171 lb)   SpO2 98%   BMI 24 54 kg/m²     Physical Exam  Vitals reviewed  Constitutional:       General: He is not in acute distress  HENT:      Head: Normocephalic and atraumatic  Right Ear: Tympanic membrane normal       Left Ear: Tympanic membrane normal    Eyes:      Conjunctiva/sclera: Conjunctivae normal    Cardiovascular:      Rate and Rhythm: Normal rate and regular rhythm  Heart sounds: No murmur heard  Pulmonary:      Effort: Pulmonary effort is normal       Breath sounds: No wheezing or rales  Abdominal:      General: Bowel sounds are normal       Palpations: Abdomen is soft  Tenderness: There is no abdominal tenderness  There is no guarding  Negative signs include Akbar's sign  Hernia: There is no hernia in the ventral area  Genitourinary:     Rectum: No mass or external hemorrhoid  Comments: Small anal polyp present(patient states he has had this for years, prior to previous colonoscopies too)  Mild rafaela-anal erythema present  No retained stool noted on visual exam(digital exam deferred)  Musculoskeletal:      Right lower leg: No edema  Left lower leg: No edema  Neurological:      Mental Status: He is alert     Psychiatric:         Mood and Affect: Mood normal          Behavior: Behavior normal        Noam Beani, DO  "

## 2023-06-05 NOTE — PATIENT INSTRUCTIONS
Drink more fluids to have 6 to 8 (eight ounce) cups per day as you are exercising regularly and it is summer time  Take miralax daily and add stool softener colace  Add fiber once a day if needed    Constipation   AMBULATORY CARE:   Constipation  means you have hard, dry bowel movements, or you go longer than usual between bowel movements  Constipation may be caused by a lack of water or high-fiber foods  Certain medicines, or a lack of fiber or physical activity may also cause constipation  Common symptoms include the following:   Trouble pushing out your bowel movement    Pain or bleeding during your bowel movement    A feeling that you did not finish having your bowel movement    Nausea    Bloating    Headache    Call your doctor if:   You have blood in your bowel movements  You have a fever and abdominal pain with the constipation  Your constipation gets worse  You start to vomit  You have questions or concerns about your condition or care  Relieve constipation:  Medicine can keep you have a bowel movement more easily  Medicines may increase moisture in your bowel movement or increase the motion of your intestines  A suppository  may be used to help soften your bowel movements  This may make them easier to pass  A suppository is guided into your rectum through your anus  Laxatives  can help stimulate your bowels to have a bowel movement  Your provider may recommend you only use laxatives for a short time  Long-term use may make your bowels dependent on the medicine  An enema  is liquid medicine used to clear bowel movement from your rectum  The medicine is put into your rectum through your anus  Prevent constipation:   Drink liquids as directed  You may need to drink extra liquids to help soften and move your bowels  Ask how much liquid to drink each day and which liquids are best for you  Eat high-fiber foods    This may help decrease constipation by adding bulk to your bowel movements  High-fiber foods include fruit, vegetables, whole-grain breads and cereals, and beans  Your healthcare provider or dietitian can help you create a high-fiber meal plan  Your provider may also recommend a fiber supplement if you cannot get enough fiber from food  Exercise regularly  Regular physical activity can help stimulate your intestines  Walking is a good exercise to prevent or relieve constipation  Ask which exercises are best for you  Schedule a time each day to have a bowel movement  This may help train your body to have regular bowel movements  Bend forward while you are on the toilet to help move the bowel movement out  Sit on the toilet for at least 10 minutes, even if you do not have a bowel movement  Talk to your healthcare provider about your medicines  Certain medicines, such as opioids, can cause constipation  Your provider may be able to make medicine changes  For example, he or she may change the kind of medicine, or change when you take it  Follow up with your doctor as directed:  Write down your questions so you remember to ask them during your visits  © Copyright [employer] 2022 Information is for End User's use only and may not be sold, redistributed or otherwise used for commercial purposes  The above information is an  only  It is not intended as medical advice for individual conditions or treatments  Talk to your doctor, nurse or pharmacist before following any medical regimen to see if it is safe and effective for you

## 2023-06-07 ENCOUNTER — CONSULT (OUTPATIENT)
Dept: GASTROENTEROLOGY | Facility: CLINIC | Age: 78
End: 2023-06-07
Payer: MEDICARE

## 2023-06-07 VITALS
BODY MASS INDEX: 24.48 KG/M2 | DIASTOLIC BLOOD PRESSURE: 60 MMHG | TEMPERATURE: 97.7 F | SYSTOLIC BLOOD PRESSURE: 114 MMHG | HEIGHT: 70 IN | WEIGHT: 171 LBS

## 2023-06-07 DIAGNOSIS — K62.89 DECREASED RECTAL SPHINCTER TONE: ICD-10-CM

## 2023-06-07 DIAGNOSIS — K59.09 OTHER CONSTIPATION: Primary | ICD-10-CM

## 2023-06-07 DIAGNOSIS — K21.00 GASTROESOPHAGEAL REFLUX DISEASE WITH ESOPHAGITIS WITHOUT HEMORRHAGE: ICD-10-CM

## 2023-06-07 PROCEDURE — 99204 OFFICE O/P NEW MOD 45 MIN: CPT | Performed by: INTERNAL MEDICINE

## 2023-06-07 NOTE — PROGRESS NOTES
[facility] - Outpatient Consultation  Thierry Engle 70 y o  male MRN: [id number]  Encounter: [id number]          ASSESSMENT AND PLAN:      1  Other constipation  2  Gastroesophageal reflux disease with esophagitis without hemorrhage    - increase fluid and fiber if this does not help would add Colace  If this does not help would add MiraLAX  Anal manometry will be also planned  We can also plan for elective colonoscopy  - continue miralax as needed   - continue pepcid as needed  - last colonoscopy was in 2021 with small colon polyp   -Follow-up in 2 to 3 months    We discussed new onset constipation may be secondary to pelvic floor dysfunction versus lack of fiber and fluid versus a combination  First step will be to optimize fiber and fluid with 8 to 10 cups daily and getting 20 g at least of fiber  If this does not improve overall symptoms then we will proceed with anal manometry since colonoscopy was recently performed 2021  Just to be on the safe side we would also proceed with a colonoscopy but only if symptoms do not improve with lifestyle changes  Further management will be based on anal manometry findings and/or trial of lifestyle changes  Colonoscopy Procedure:   Surveillance five years high The colonoscope was introduced through rectum and   advanced under direct visualization until cecum, appendix and ileo-cecal valve   reached cm  The cecal sling folds were seen  The appendiceal orifice and the   ileo-cecal valve were identified  The colonoscope was retroflexed within the   rectum  Careful visualization was performed as the instrument was withdrawn  Patient tolerance to the procedure was excellent  The procedure was not   difficult  Digital exam was normal  The quality of preparation was 7 (adequate)  CO2 was used throughout the procedure  Colonoscopy Limitations/Complications:    There were no apparent limitations or complications   Colonoscopy Findings: Excavated lesions Multiple non-bleeding diverticula with mixed openings were   seen in the mid-sigmoid colon and mid-descending colon  Diverticulosis appeared   to be severe  Protruding lesions A single semi-pedunculated 4 mm non-bleeding polyp of benign   appearance was found in the cecum  Completely removed  A polypectomy was   performed using a hot snare  The polyp was completely removed  Colonoscopy Impressions:   Severe diverticulosis of the mid-sigmoid colon and mid-descending colon  Polyp (4 mm) in the cecum  (Polypectomy)  Plan: Colonoscopy in 5 years        ______________________________________________________________________    HPI:   He has had new onset constipation over the last 4 to 6 weeks with a sensation of incomplete evacuation  Has had difficulty with cleaning up after bowel movements with more frequent bowel movements  Stool is described most compatible with [place] stool type I  He has tried MiraLAX and recently started Colace without any significant difference  MiraLAX and Colace are only tried once  No lifestyle changes  No other alarm symptoms  No new medications  The symptoms are new  Last colonoscopy was in 2021  This was performed at [facility]  records are not available to us  These records were reviewed which demonstrated [place] polyp otherwise normal   Colonoscopy prior to that was in 2011  He does have history of reflux but symptoms are overall well controlled lifestyle and dietary changes  He knows his triggers which are usually coffee and he is trying to decrease these triggers  Denies any family of colorectal cancer  REVIEW OF SYSTEMS:    CONSTITUTIONAL: Denies any fever, chills, rigors, and weight loss  HEENT: No earache or tinnitus  Denies hearing loss or visual disturbances  CARDIOVASCULAR: No chest pain or palpitations     RESPIRATORY: Denies any cough, hemoptysis, shortness of breath or dyspnea on exertion  GASTROINTESTINAL: As noted in the History of Present Illness  GENITOURINARY: No problems with urination  Denies any hematuria or dysuria  NEUROLOGIC: No dizziness or vertigo, denies headaches  MUSCULOSKELETAL: Denies any muscle or joint pain  SKIN: Denies skin rashes or itching  ENDOCRINE: Denies excessive thirst  Denies intolerance to heat or cold  PSYCHOSOCIAL: Denies depression or anxiety  Denies any recent memory loss         Historical Information   Past Medical History:   Diagnosis Date   • Arthritis 2010   • Basal cell carcinoma of shoulder     last assessed: 2014   • Depression     last assessed: 2015   • GERD (gastroesophageal reflux disease) 2000   • Headache(784 0) 1980   • HL (hearing loss) 2010   • Subconjunctival hemorrhage of left eye     last assessed: 2016     Past Surgical History:   Procedure Laterality Date   • COLONOSCOPY     • HERNIA REPAIR       Social History   Social History     Substance and Sexual Activity   Alcohol Use Yes   • Alcohol/week: 6 0 standard drinks of alcohol   • Types: 6 Shots of liquor per week    Comment: Social     Social History     Substance and Sexual Activity   Drug Use No     Social History     Tobacco Use   Smoking Status Former   • Packs/day: 1 50   • Years: 15 00   • Total pack years: 22 50   • Types: Cigarettes   • Start date: 1965   • Quit date: 1980   • Years since quittin 7   Smokeless Tobacco Never   Tobacco Comments    Already quit     Family History   Problem Relation Age of Onset   • Heart disease Mother         cardiac disorder   • Kidney disease Mother    • Prostate cancer Father    • Hearing loss Sister        Meds/Allergies       Current Outpatient Medications:   •  clindamycin (CLEOCIN T) 1 % lotion  •  desoximetasone (TOPICORT) 0 25 % cream  •  famotidine (PEPCID) 40 MG tablet  •  ibuprofen (MOTRIN) 600 mg tablet  •  tamsulosin (FLOMAX) 0 4 mg  •  tiZANidine (ZANAFLEX) 4 mg "tablet    No Known Allergies        Objective     Blood pressure 114/60, temperature 97 7 °F (36 5 °C), temperature source Tympanic, height 5' 10\" (1 778 m), weight 77 6 kg (171 lb)  Body mass index is 24 54 kg/m²  PHYSICAL EXAM:      General Appearance:   Alert, cooperative, no distress   HEENT:   Normocephalic, atraumatic, anicteric      Neck:  Supple, symmetrical, trachea midline   Lungs:   Clear to auscultation bilaterally; no rales, rhonchi or wheezing; respirations unlabored    Heart[de-identified]   Regular rate and rhythm; no murmur, rub, or gallop  Abdomen:   Soft, non-tender, non-distended; normal bowel sounds; no masses, no organomegaly    Genitalia:   Deferred    Rectal:    Demonstrated external hemorrhoid which is not inflamed  Exam also demonstrated stool in the rectal vault  Squeeze and bear down were overall slightly weak   Extremities:  No cyanosis, clubbing or edema    Pulses:  2+ and symmetric    Skin:  No jaundice, rashes, or lesions    Lymph nodes:  No palpable cervical lymphadenopathy        Lab Results:   No visits with results within 1 Day(s) from this visit     Latest known visit with results is:   Appointment on 03/07/2023   Component Date Value   • WBC 03/07/2023 5 78    • RBC 03/07/2023 4 81    • Hemoglobin 03/07/2023 15 0    • Hematocrit 03/07/2023 45 8    • MCV 03/07/2023 95    • MCH 03/07/2023 31 2    • MCHC 03/07/2023 32 8    • RDW 03/07/2023 14 0    • MPV 03/07/2023 10 4    • Platelets 55/95/4628 197    • nRBC 03/07/2023 0    • Neutrophils Relative 03/07/2023 54    • Immat GRANS % 03/07/2023 0    • Lymphocytes Relative 03/07/2023 27    • Monocytes Relative 03/07/2023 9    • Eosinophils Relative 03/07/2023 9 (H)    • Basophils Relative 03/07/2023 1    • Neutrophils Absolute 03/07/2023 3 14    • Immature Grans Absolute 03/07/2023 0 01    • Lymphocytes Absolute 03/07/2023 1 55    • Monocytes Absolute 03/07/2023 0 50    • Eosinophils Absolute 03/07/2023 0 50    • Basophils Absolute " 03/07/2023 0 08    • Sodium 03/07/2023 139    • Potassium 03/07/2023 4 3    • Chloride 03/07/2023 110 (H)    • CO2 03/07/2023 30    • ANION GAP 03/07/2023 -1 (L)    • BUN 03/07/2023 22    • Creatinine 03/07/2023 0 91    • Glucose, Fasting 03/07/2023 96    • Calcium 03/07/2023 9 5    • AST 03/07/2023 20    • ALT 03/07/2023 23    • Alkaline Phosphatase 03/07/2023 52    • Total Protein 03/07/2023 6 2 (L)    • Albumin 03/07/2023 3 6    • Total Bilirubin 03/07/2023 0 93    • eGFR 03/07/2023 82    • Hemoglobin A1C 03/07/2023 5 2    • EAG 03/07/2023 103    • PSA, Diagnostic 03/07/2023 5 2 (H)    • Cholesterol 03/07/2023 172    • Triglycerides 03/07/2023 62    • HDL, Direct 03/07/2023 68    • LDL Calculated 03/07/2023 92          Radiology Results:   No results found

## 2023-06-13 DIAGNOSIS — N40.1 BENIGN LOCALIZED PROSTATIC HYPERPLASIA WITH LOWER URINARY TRACT SYMPTOMS (LUTS): ICD-10-CM

## 2023-06-13 RX ORDER — TAMSULOSIN HYDROCHLORIDE 0.4 MG/1
0.4 CAPSULE ORAL
Qty: 90 CAPSULE | Refills: 0 | Status: SHIPPED | OUTPATIENT
Start: 2023-06-13 | End: 2023-09-11

## 2023-06-14 ENCOUNTER — APPOINTMENT (OUTPATIENT)
Dept: LAB | Age: 78
End: 2023-06-14
Payer: MEDICARE

## 2023-06-14 DIAGNOSIS — K59.09 OTHER CONSTIPATION: ICD-10-CM

## 2023-06-14 LAB — TSH SERPL DL<=0.05 MIU/L-ACNC: 2.34 UIU/ML (ref 0.45–4.5)

## 2023-06-14 PROCEDURE — 36415 COLL VENOUS BLD VENIPUNCTURE: CPT

## 2023-06-14 PROCEDURE — 84443 ASSAY THYROID STIM HORMONE: CPT

## 2023-06-23 ENCOUNTER — TELEPHONE (OUTPATIENT)
Dept: GASTROENTEROLOGY | Facility: HOSPITAL | Age: 78
End: 2023-06-23

## 2023-06-27 ENCOUNTER — TELEPHONE (OUTPATIENT)
Dept: GASTROENTEROLOGY | Facility: HOSPITAL | Age: 78
End: 2023-06-27

## 2023-07-06 ENCOUNTER — NURSE TRIAGE (OUTPATIENT)
Age: 78
End: 2023-07-06

## 2023-07-06 NOTE — TELEPHONE ENCOUNTER
CONSTANCE     Patients wife Debby Post called in, patient was to have an anal manometry test done today but canceled it do to severe pain in lower abdomen after using fleet enema. He is not able to walk or get out of bed. Patients wife wanted to know if he should go to the ED. He has not produced much BM after the enema. I advised if he is having severe pain and not able to move or walk he should be evaluated in the ED. She understood and will try to convince patient to be seen. She will call back if he decides to go.      Reason for Disposition  • SEVERE abdominal pain (e.g., excruciating)    Protocols used: ABDOMINAL PAIN - MALE-ADULT-[place]

## 2023-07-07 ENCOUNTER — HOSPITAL ENCOUNTER (EMERGENCY)
Facility: HOSPITAL | Age: 78
Discharge: HOME | End: 2023-07-07
Attending: EMERGENCY MEDICINE
Payer: MEDICARE

## 2023-07-07 ENCOUNTER — NURSE TRIAGE (OUTPATIENT)
Age: 78
End: 2023-07-07

## 2023-07-07 ENCOUNTER — APPOINTMENT (EMERGENCY)
Dept: CT IMAGING | Facility: HOSPITAL | Age: 78
End: 2023-07-07
Payer: MEDICARE

## 2023-07-07 VITALS
SYSTOLIC BLOOD PRESSURE: 110 MMHG | OXYGEN SATURATION: 99 % | TEMPERATURE: 98 F | RESPIRATION RATE: 18 BRPM | BODY MASS INDEX: 24.62 KG/M2 | DIASTOLIC BLOOD PRESSURE: 70 MMHG | HEART RATE: 61 BPM | HEIGHT: 70 IN | WEIGHT: 172 LBS

## 2023-07-07 DIAGNOSIS — K59.00 CONSTIPATION: ICD-10-CM

## 2023-07-07 DIAGNOSIS — R10.9 ABDOMINAL PAIN: Primary | ICD-10-CM

## 2023-07-07 LAB
ALBUMIN SERPL BCP-MCNC: 4.3 G/DL (ref 3.5–5)
ALP SERPL-CCNC: 49 U/L (ref 34–104)
ALT SERPL W P-5'-P-CCNC: 14 U/L (ref 7–52)
ANION GAP SERPL CALCULATED.3IONS-SCNC: 5 MMOL/L
AST SERPL W P-5'-P-CCNC: 15 U/L (ref 13–39)
BASOPHILS # BLD AUTO: 0.07 THOUSANDS/ÂΜL (ref 0–0.1)
BASOPHILS NFR BLD AUTO: 1 % (ref 0–1)
BILIRUB SERPL-MCNC: 0.92 MG/DL (ref 0.2–1)
BUN SERPL-MCNC: 19 MG/DL (ref 5–25)
CALCIUM SERPL-MCNC: 9.6 MG/DL (ref 8.4–10.2)
CHLORIDE SERPL-SCNC: 105 MMOL/L (ref 96–108)
CO2 SERPL-SCNC: 30 MMOL/L (ref 21–32)
CREAT SERPL-MCNC: 0.97 MG/DL (ref 0.6–1.3)
EOSINOPHIL # BLD AUTO: 0.23 THOUSAND/ÂΜL (ref 0–0.61)
EOSINOPHIL NFR BLD AUTO: 3 % (ref 0–6)
ERYTHROCYTE [DISTWIDTH] IN BLOOD BY AUTOMATED COUNT: 14.1 % (ref 11.6–15.1)
GFR SERPL CREATININE-BSD FRML MDRD: 75 ML/MIN/1.73SQ M
GLUCOSE SERPL-MCNC: 82 MG/DL (ref 65–140)
HCT VFR BLD AUTO: 46.7 % (ref 36.5–49.3)
HGB BLD-MCNC: 15.5 G/DL (ref 12–17)
IMM GRANULOCYTES # BLD AUTO: 0.02 THOUSAND/UL (ref 0–0.2)
IMM GRANULOCYTES NFR BLD AUTO: 0 % (ref 0–2)
LYMPHOCYTES # BLD AUTO: 1.83 THOUSANDS/ÂΜL (ref 0.6–4.47)
LYMPHOCYTES NFR BLD AUTO: 25 % (ref 14–44)
MCH RBC QN AUTO: 31.6 PG (ref 26.8–34.3)
MCHC RBC AUTO-ENTMCNC: 33.2 G/DL (ref 31.4–37.4)
MCV RBC AUTO: 95 FL (ref 82–98)
MONOCYTES # BLD AUTO: 0.62 THOUSAND/ÂΜL (ref 0.17–1.22)
MONOCYTES NFR BLD AUTO: 8 % (ref 4–12)
NEUTROPHILS # BLD AUTO: 4.63 THOUSANDS/ÂΜL (ref 1.85–7.62)
NEUTS SEG NFR BLD AUTO: 63 % (ref 43–75)
NRBC BLD AUTO-RTO: 0 /100 WBCS
PLATELET # BLD AUTO: 199 THOUSANDS/UL (ref 149–390)
PMV BLD AUTO: 9.6 FL (ref 8.9–12.7)
POTASSIUM SERPL-SCNC: 3.7 MMOL/L (ref 3.5–5.3)
PROT SERPL-MCNC: 6.7 G/DL (ref 6.4–8.4)
RBC # BLD AUTO: 4.9 MILLION/UL (ref 3.88–5.62)
SODIUM SERPL-SCNC: 140 MMOL/L (ref 135–147)
WBC # BLD AUTO: 7.4 THOUSAND/UL (ref 4.31–10.16)

## 2023-07-07 PROCEDURE — 36415 COLL VENOUS BLD VENIPUNCTURE: CPT | Performed by: EMERGENCY MEDICINE

## 2023-07-07 PROCEDURE — G1004 CDSM NDSC: HCPCS

## 2023-07-07 PROCEDURE — 74177 CT ABD & PELVIS W/CONTRAST: CPT

## 2023-07-07 PROCEDURE — 85025 COMPLETE CBC W/AUTO DIFF WBC: CPT | Performed by: EMERGENCY MEDICINE

## 2023-07-07 PROCEDURE — 80053 COMPREHEN METABOLIC PANEL: CPT | Performed by: EMERGENCY MEDICINE

## 2023-07-07 PROCEDURE — 99285 EMERGENCY DEPT VISIT HI MDM: CPT

## 2023-07-07 RX ADMIN — IOHEXOL 100 ML: 350 INJECTION, SOLUTION INTRAVENOUS at 12:14

## 2023-07-07 NOTE — TELEPHONE ENCOUNTER
Reason for Disposition  • Constant abdominal pain lasting > 2 hours    Answer Assessment - Initial Assessment Questions  1. LOCATION: "Where does it hurt?"       Lower abdomen- left sided    2. RADIATION: "Does the pain shoot anywhere else?" (e.g., chest, back)      No    3. ONSET: "When did the pain begin?" (Minutes, hours or days ago)       Yesterday    4. SUDDEN: "Gradual or sudden onset?"      Sudden    5. PATTERN "Does the pain come and go, or is it constant?"     - If constant: "Is it getting better, staying the same, or worsening?"       (Note: Constant means the pain never goes away completely; most serious pain is constant and it progresses)      - If intermittent: "How long does it last?" "Do you have pain now?"      (Note: Intermittent means the pain goes away completely between bouts)      Constant but pain has eased up compared to yesterday. 6. SEVERITY: "How bad is the pain?"  (e.g., Scale 1-10; mild, moderate, or severe)     - MILD (1-3): doesn't interfere with normal activities, abdomen soft and not tender to touch      - MODERATE (4-7): interferes with normal activities or awakens from sleep, tender to touch      - SEVERE (8-10): excruciating pain, doubled over, unable to do any normal activities        2 or 3/10    7. RECURRENT SYMPTOM: "Have you ever had this type of stomach pain before?" If Yes, ask: "When was the last time?" and "What happened that time?"       No    8. CAUSE: "What do you think is causing the stomach pain?"      Started yesterday after enema. Also states he has not had a BM & is concerned about a blockage? 9. RELIEVING/AGGRAVATING FACTORS: "What makes it better or worse?" (e.g., movement, antacids, bowel movement)      Nothing    10. OTHER SYMPTOMS: "Has there been any vomiting, diarrhea, constipation, or urine problems?"        Has not had a BM.     Protocols used: ABDOMINAL PAIN - MALE-ADULT-[place]

## 2023-07-07 NOTE — DISCHARGE INSTRUCTIONS
There was a slight abnormality in your colon on the CT scan. This is likely due to local inflammation but our radiology department is recommending you get a colonoscopy once your symptoms completely resolve.

## 2023-07-07 NOTE — ED PROVIDER NOTES
History  Chief Complaint   Patient presents with   • Abdominal Pain     Pt took an enema yesterday and since then has had LLQ abdominal pain, currently suffering with constipation (PCP aware)      Is a 72-year-old male presents with chief complaint of left lower quadrant abdominal pain. Patient reports that he has been suffering with constipation and was preparing for an anal manometry test by using a fleets enema. Patient reports immediately after placing the enema he had significant stabbing left lower quadrant pain. Pain was so severe he was unable to keep his diagnostic testing appointment. Patient contacted the on-call physician was referred to the ER at that time but stated he was in too much pain to come to the ER. Patient reports now his pain is much improved and he presents for evaluation. No fevers or chills. Patient reports he did have a bowel movement this morning but it was small and consisted of hard stool. He has been prescribed Metamucil and Colace but reports that he has not taken it as prescribed because he was attempting to ease into it. He had a prior left sided inguinal hernia repair but no other abdominal surgeries. History provided by:  Patient   used: No    Abdominal Pain  Pain location:  LLQ  Pain quality: aching and sharp    Pain radiates to:  Does not radiate  Pain severity:  Mild  Onset quality:  Sudden  Duration:  2 days  Timing:  Constant  Progression:  Worsening  Chronicity:  New  Relieved by:  Nothing  Worsened by:  Nothing  Ineffective treatments:  None tried  Associated symptoms: constipation    Associated symptoms: no chest pain, no chills, no diarrhea, no dysuria, no fever, no nausea, no shortness of breath and no vomiting        Prior to Admission Medications   Prescriptions Last Dose Informant Patient Reported? Taking?    clindamycin (CLEOCIN T) 1 % lotion  Self Yes No   Sig: APPLY TO AFFECTED AREA ON THE FACE ONCE DAILY   desoximetasone (TOPICORT) 0.25 % cream  Self Yes No   Sig: APPLY TWICE DAILY TO AFFECTED AREA FOR 2-4 WEEKS MAX AS NEEDED   famotidine (PEPCID) 40 MG tablet  Self No No   Sig: Take 1 tablet (40 mg total) by mouth 2 (two) times a day as needed for heartburn   ibuprofen (MOTRIN) 600 mg tablet  Self Yes No   tamsulosin (FLOMAX) 0.4 mg   No No   Sig: Take 1 capsule (0.4 mg total) by mouth daily at bedtime   tiZANidine (ZANAFLEX) 4 mg tablet   No No   Sig: Take 1 tablet (4 mg total) by mouth every 8 (eight) hours as needed for muscle spasms      Facility-Administered Medications: None       Past Medical History:   Diagnosis Date   • Arthritis 2010   • Basal cell carcinoma of shoulder     last assessed: 2014   • Depression     last assessed: 2015   • GERD (gastroesophageal reflux disease) 2000   • Headache(784.0) 1980   • HL (hearing loss) 2010   • Subconjunctival hemorrhage of left eye     last assessed: 2016       Past Surgical History:   Procedure Laterality Date   • COLONOSCOPY     • HERNIA REPAIR         Family History   Problem Relation Age of Onset   • Heart disease Mother         cardiac disorder   • Kidney disease Mother    • Prostate cancer Father    • Hearing loss Sister      I have reviewed and agree with the history as documented. E-Cigarette/Vaping   • E-Cigarette Use Never User      E-Cigarette/Vaping Substances   • Nicotine No    • THC No    • CBD No    • Flavoring No    • Other No    • Unknown No      Social History     Tobacco Use   • Smoking status: Former     Packs/day: 1.50     Years: 15.00     Total pack years: 22.50     Types: Cigarettes     Start date: 1965     Quit date: 1980     Years since quittin.8   • Smokeless tobacco: Never   • Tobacco comments:     Already quit   Vaping Use   • Vaping Use: Never used   Substance Use Topics   • Alcohol use:  Yes     Alcohol/week: 6.0 standard drinks of alcohol     Types: 6 Shots of liquor per week     Comment: Social   • Drug use: No       Review of Systems   Constitutional: Negative for chills, diaphoresis and fever. Respiratory: Negative for shortness of breath. Cardiovascular: Negative for chest pain and palpitations. Gastrointestinal: Positive for abdominal pain and constipation. Negative for diarrhea, nausea and vomiting. Genitourinary: Negative for dysuria and frequency. Skin: Negative for rash. All other systems reviewed and are negative. Physical Exam  Physical Exam  Vitals and nursing note reviewed. Constitutional:       General: He is in acute distress (mild). Appearance: He is well-developed. HENT:      Head: Normocephalic and atraumatic. Eyes:      Pupils: Pupils are equal, round, and reactive to light. Neck:      Vascular: No JVD. Cardiovascular:      Rate and Rhythm: Normal rate and regular rhythm. Heart sounds: Normal heart sounds. No murmur heard. No friction rub. No gallop. Pulmonary:      Effort: Pulmonary effort is normal. No respiratory distress. Breath sounds: Normal breath sounds. No wheezing or rales. Chest:      Chest wall: No tenderness. Abdominal:      Tenderness: There is abdominal tenderness in the left lower quadrant. There is no guarding or rebound. Musculoskeletal:         General: No tenderness. Normal range of motion. Cervical back: Normal range of motion. Skin:     General: Skin is warm and dry. Neurological:      General: No focal deficit present. Mental Status: He is alert and oriented to person, place, and time. Psychiatric:         Behavior: Behavior normal.         Thought Content:  Thought content normal.         Judgment: Judgment normal.         Vital Signs  ED Triage Vitals [07/07/23 1038]   Temperature Pulse Respirations Blood Pressure SpO2   98 °F (36.7 °C) 89 20 136/67 98 %      Temp Source Heart Rate Source Patient Position - Orthostatic VS BP Location FiO2 (%)   Oral Monitor Sitting Right arm --      Pain Score --           Vitals:    07/07/23 1038 07/07/23 1245   BP: 136/67 108/65   Pulse: 89 58   Patient Position - Orthostatic VS: Sitting Sitting         Visual Acuity      ED Medications  Medications   iohexol (OMNIPAQUE) 350 MG/ML injection (SINGLE-DOSE) 100 mL (100 mL Intravenous Given 7/7/23 1214)       Diagnostic Studies  Results Reviewed     Procedure Component Value Units Date/Time    Comprehensive metabolic panel [[id number]] Collected: 07/07/23 1128    Lab Status: Final result Specimen: Blood from Arm, Left Updated: 07/07/23 1200     Sodium 140 mmol/L      Potassium 3.7 mmol/L      Chloride 105 mmol/L      CO2 30 mmol/L      ANION GAP 5 mmol/L      BUN 19 mg/dL      Creatinine 0.97 mg/dL      Glucose 82 mg/dL      Calcium 9.6 mg/dL      AST 15 U/L      ALT 14 U/L      Alkaline Phosphatase 49 U/L      Total Protein 6.7 g/dL      Albumin 4.3 g/dL      Total Bilirubin 0.92 mg/dL      eGFR 75 ml/min/1.73sq m     Narrative:      [place] guidelines for Chronic Kidney Disease (CKD):   •  Stage 1 with normal or high GFR (GFR > 90 mL/min/1.73 square meters)  •  Stage 2 Mild CKD (GFR = 60-89 mL/min/1.73 square meters)  •  Stage 3A Moderate CKD (GFR = 45-59 mL/min/1.73 square meters)  •  Stage 3B Moderate CKD (GFR = 30-44 mL/min/1.73 square meters)  •  Stage 4 Severe CKD (GFR = 15-29 mL/min/1.73 square meters)  •  Stage 5 End Stage CKD (GFR <15 mL/min/1.73 square meters)  Note: GFR calculation is accurate only with a steady state creatinine    CBC and differential [[id number]] Collected: 07/07/23 1128    Lab Status: Final result Specimen: Blood from Arm, Left Updated: 07/07/23 1138     WBC 7.40 Thousand/uL      RBC 4.90 Million/uL      Hemoglobin 15.5 g/dL      Hematocrit 46.7 %      MCV 95 fL      MCH 31.6 pg      MCHC 33.2 g/dL      RDW 14.1 %      MPV 9.6 fL      Platelets 764 Thousands/uL      nRBC 0 /100 WBCs      Neutrophils Relative 63 %      Immat GRANS % 0 %      Lymphocytes Relative 25 % Monocytes Relative 8 %      Eosinophils Relative 3 %      Basophils Relative 1 %      Neutrophils Absolute 4.63 Thousands/µL      Immature Grans Absolute 0.02 Thousand/uL      Lymphocytes Absolute 1.83 Thousands/µL      Monocytes Absolute 0.62 Thousand/µL      Eosinophils Absolute 0.23 Thousand/µL      Basophils Absolute 0.07 Thousands/µL     UA w Reflex to Microscopic w Reflex to Culture [[id number]]     Lab Status: No result Specimen: Urine                  CT abdomen pelvis with contrast   Final Result by Alisa Ford MD (07/07 1331)      Diverticulosis most pronounced in the sigmoid colon. Compared to the prior CT there is increased soft tissue density wall thickening of the sigmoid colon, which may be related to chronic diverticular disease or focal colitis. No definite focal inflamed    diverticulum is seen. Consider follow-up colonoscopy after resolution of acute symptoms to exclude underlying neoplasm. The study was marked in [facility] for immediate notification. Workstation performed: [id number]                    Procedures  Procedures         ED Course                               SBIRT 20yo+    Flowsheet Row Most Recent Value   Initial Alcohol Screen: US AUDIT-C     1. How often do you have a drink containing alcohol? 3 Filed at: 07/07/2023 1104   2. How many drinks containing alcohol do you have on a typical day you are drinking? 1 Filed at: 07/07/2023 1104   3b. FEMALE Any Age, or MALE 65+: How often do you have 4 or more drinks on one occassion? 0 Filed at: 07/07/2023 1104   Audit-C Score 4 Filed at: 07/07/2023 1104   HILLARY: How many times in the past year have you. .. Used an illegal drug or used a prescription medication for non-medical reasons? Never Filed at: 07/07/2023 1104                    Medical Decision Making  Background: 70 y.o. male presents with chief complaint of lower abdominal pain.      Differential:diverticulitis, mesenteric adenitis, less likely perforated rectum from insertion of enema, cystitis, pyelonephritis, ureterolithiasis, constipation, colitis, gastric ulcer, mesenteric ischemia, perforated viscous, non specific abdominal pain    Plan: cbc, cmp, lipase, urinalysis, ct scan, symptom control       Amount and/or Complexity of Data Reviewed  Labs: ordered. Radiology: ordered. Risk  Prescription drug management. Disposition  Final diagnoses:   Abdominal pain   Constipation     Time reflects when diagnosis was documented in both MDM as applicable and the Disposition within this note     Time User Action Codes Description Comment    7/7/2023  1:49 PM Loki JOSEPH Add [R10.9] Abdominal pain     7/7/2023  1:49 PM Gretta Spatz Add [K59.00] Constipation       ED Disposition     ED Disposition   Discharge    Condition   Stable    Date/Time   Fri Jul 7, 2023  1:49 PM    Comment   Lenny Lawrence discharge to home/self care. Follow-up Information     Follow up With Specialties Details Why Yan Zamudio MD Gastroenterology Schedule an appointment as soon as possible for a visit in 1 week  [address]  [address] [place], [place] [phone]            Patient's Medications   Discharge Prescriptions    No medications on file       No discharge procedures on file.     PDMP Review       Value Time User    PDMP Reviewed  Yes 11/22/2019  1:15 PM Merritt Zabala DO          ED Provider  Electronically Signed by           Lenka Guevara MD  07/07/23 0943

## 2023-07-11 ENCOUNTER — HOSPITAL ENCOUNTER (OUTPATIENT)
Dept: GASTROENTEROLOGY | Facility: HOSPITAL | Age: 78
Discharge: HOME | End: 2023-07-11
Attending: INTERNAL MEDICINE
Payer: MEDICARE

## 2023-07-11 VITALS
HEART RATE: 64 BPM | SYSTOLIC BLOOD PRESSURE: 121 MMHG | OXYGEN SATURATION: 97 % | DIASTOLIC BLOOD PRESSURE: 70 MMHG | RESPIRATION RATE: 16 BRPM

## 2023-07-11 DIAGNOSIS — K59.09 OTHER CONSTIPATION: ICD-10-CM

## 2023-07-11 DIAGNOSIS — K62.89 DECREASED RECTAL SPHINCTER TONE: ICD-10-CM

## 2023-07-11 PROCEDURE — 91122 HB ANAL PRESSURE RECORD: CPT

## 2023-07-11 NOTE — PERIOPERATIVE NURSING NOTE
Patient brought in the room and educated on procedure. Anal digital exam performed and anal manometry catheter inserted via anal canal to rectum and testing was performed according to protocol. Patient tolerated procedure. Catheter removed intact. Balloon expulsion test performed and a single use balloon inserted via anal canal to rectum and 50 cc room temperature water used to inflate the balloon. Patient assisted to the commode and instructed to push out the balloon. Patient was not able to expel the balloon in the maximum 3 minute time frame. Catheter was removed intact from the rectum. Patient tolerated the procedure. Patient discharged from room and ambulated out of the room in stable condition.

## 2023-07-21 ENCOUNTER — TELEPHONE (OUTPATIENT)
Dept: GASTROENTEROLOGY | Facility: CLINIC | Age: 78
End: 2023-07-21

## 2023-07-21 DIAGNOSIS — M62.89 PELVIC FLOOR DYSFUNCTION: Primary | ICD-10-CM

## 2023-07-21 NOTE — TELEPHONE ENCOUNTER
----- Message from Tony Dahl MD sent at 7/21/2023 12:34 AM EDT -----  Anal manometry demonstrates patient  has pelvic floor dysfunction this is a likely cause of constipation and pelvic PT will help as result this was placed

## 2023-07-24 NOTE — TELEPHONE ENCOUNTER
Patient is aware of results, but he would like to speak to you in more detail of the DX as to what can cause this? How does it effect him what kind of PT will he be doing? What does level 3 mean on the report. Patient is aware your not in the office but will await your call.

## 2023-07-25 NOTE — TELEPHONE ENCOUNTER
Discussed with patient regarding undergoing pelvic PT and biofeedback. Patient understood the importance of it.

## 2023-08-24 ENCOUNTER — EVALUATION (OUTPATIENT)
Dept: PHYSICAL THERAPY | Facility: CLINIC | Age: 78
End: 2023-08-24
Payer: MEDICARE

## 2023-08-24 DIAGNOSIS — M62.89 PELVIC FLOOR DYSFUNCTION: Primary | ICD-10-CM

## 2023-08-24 PROCEDURE — 97162 PT EVAL MOD COMPLEX 30 MIN: CPT | Performed by: PHYSICAL THERAPIST

## 2023-08-24 PROCEDURE — 97112 NEUROMUSCULAR REEDUCATION: CPT | Performed by: PHYSICAL THERAPIST

## 2023-08-24 NOTE — PROGRESS NOTES
Physical Therapy Initial Evaluation    Today's date: 2023  Patient name: Gwendolyn Kyle  : 1945  MRN: [id number]  Referring provider: Allen Cole MD  Dx:   Encounter Diagnosis     ICD-10-CM    1. Pelvic floor dysfunction  M62.89                      Assessment  Impairments: activity intolerance, lacks appropriate home exercise program, pain with function, poor posture  and poor body mechanics  Understanding of Dx/Px/POC: good   Prognosis: good    Goals  (up to 8 weeks)  1. Independent and safe with HEP. 2. Independent and safe with self-postural correction using a squatty potty at home to improve quality of BM. 3. Independent with bladder/bowel diary to max fluid intake. 4. Independent and safe with body mechanics and PFM activation. Plan  Patient would benefit from: skilled physical therapy  Planned modality interventions: biofeedback  Planned therapy interventions: manual therapy, neuromuscular re-education, patient education, postural training, strengthening, stretching, therapeutic activities, therapeutic exercise, therapeutic training, home exercise program, graded exercise, graded activity, flexibility, breathing training, body mechanics training, behavior modification, activity modification and abdominal trunk stabilization  Frequency: 1x week  Duration in weeks: 8  Treatment plan discussed with: patient        PT Pelvic Floor Subjective:   History of Present Illness:   Pt is 69 y/o male with Hx of benign localized prostate hyperplasia with LUTS, elevated PSA, (+) hemorrhoids, (+) constipation. Recently pt was Dx with decreased rectal sphincter tone after anal manometry (23) and was referred by gastro to [facility] [address] [place] [address] for PF evaluation and tx.    Current functional limitations:  (+) constipation, (+) straining with BM, BM not every day (2-3 days)Date of onset: 3/24/2023          Recurrent probem    Quality of life: good    Social Support:     Lives in:  [address] [place] [address]    Lives with: Spouse    Relationship status: /committed    Work status: employed full time (work from house)    Life stress level: 6    Life stress severity: moderate    History of Depression: noPronouns: he/him  Hand dominance:  Right  Diet and Exercise:    Diet:balanced nutrition    Exercise type: no activity    Exercise frequency: 2-3 times per week    Exercised about 6 months ago. Not exercising due to: lack of time  Bladder Function:     Voiding Difficulties positive for: urgency, frequent urination, straining and incomplete emptying      Voiding Difficulties comments:     Voiding frequency: every 3-4 hours    Urinary leakage: no urine leakage    Urinary leakage aggravated by: post-void dribble    Nocturia (episodes per night): 2    Painful urination: No      Fluid Intake Type: Water, coffee and alcohol    Intake (ounces): Water intake (oz): 50 oz. Coffee intake (oz): 24 oz per day. Soda intake (oz): on/off 1 can per week. Alcohol intake (oz): 3-4 evenings 1-2 drinks. Incontinence Management:     Pads/Diaper Use:  None    Patient has attempted at least 4 weeks of independent pelvic floor strengthening exercises without a resolution of symptoms  Bowel Function:     Voiding DIfficulties: stool frequency abnormal, painful defecating, unfinished feeling after defecating and constipation      Bowel Function comments:  Use of Metamucil 2-3x per day. Pt was educated on ome use of a squatty potty and postural correction.     Bowel frequency: every 2 days and every 3 days    Holly Stool Scale: type 1, type 2, type 4 and type 3    Stool softener use: no stool softeners    Enema use: enema    Uses "squatty potty": no Squatty Potty  Sexual Function:     Sexually Active:  Non-contributory  Pain:     Current pain ratin    At best pain ratin    At worst pain ratin    Location:   L CLBP for years    Onset:  More than 2 years ago    Quality:  Dull ache    Aggravating factors:  Prolonged positions    Duration of symptoms:  Brief and less than 1 hour    Relieving factors:  Change in position    Progression:  No change  Diagnostic Tests: Anal manometry sowed decreased rectal sphincter tone.:  Treatments:     None    Patient Goals:     Patient goals for therapy:  Fully empty bladder or bowels, improved bladder or bowel function, improved comfort and improved quality of life    Other patient goals: To get stronger pelvic floor muscles. Objective     Static Posture     Comments  Objective part of PF IE will be completed by next tx session.              Precautions: not any given      Manuals 8/24            PF MMT next            Objective part of IE                                       Neuro Re-Ed                           PT/benefits/anatomy/porpose/edu 5'            Squatty potty use/posture/edu/benefits/purpose 5'            Bladder diary use/edu/purpose/benefits 5'                                                   Ther Ex                                                                                                                     Ther Activity                                       Gait Training                                       Modalities

## 2023-08-28 ENCOUNTER — APPOINTMENT (OUTPATIENT)
Dept: PHYSICAL THERAPY | Facility: CLINIC | Age: 78
End: 2023-08-28
Payer: MEDICARE

## 2023-09-01 ENCOUNTER — NEW PATIENT (OUTPATIENT)
Dept: URBAN - METROPOLITAN AREA CLINIC 6 | Facility: CLINIC | Age: 78
End: 2023-09-01

## 2023-09-01 DIAGNOSIS — H25.813: ICD-10-CM

## 2023-09-01 DIAGNOSIS — H04.123: ICD-10-CM

## 2023-09-01 DIAGNOSIS — H35.3111: ICD-10-CM

## 2023-09-01 PROCEDURE — 92004 COMPRE OPH EXAM NEW PT 1/>: CPT

## 2023-09-01 PROCEDURE — 92134 CPTRZ OPH DX IMG PST SGM RTA: CPT

## 2023-09-01 ASSESSMENT — VISUAL ACUITY
OS_SC: 20/30+2
OD_PH: 20/30-1
OD_SC: 20/40+2

## 2023-09-01 ASSESSMENT — TONOMETRY
OS_IOP_MMHG: 13
OD_IOP_MMHG: 14

## 2023-09-05 ENCOUNTER — OFFICE VISIT (OUTPATIENT)
Dept: PHYSICAL THERAPY | Facility: CLINIC | Age: 78
End: 2023-09-05
Payer: MEDICARE

## 2023-09-05 ENCOUNTER — APPOINTMENT (OUTPATIENT)
Dept: LAB | Age: 78
End: 2023-09-05
Payer: MEDICARE

## 2023-09-05 DIAGNOSIS — M62.89 PELVIC FLOOR DYSFUNCTION: Primary | ICD-10-CM

## 2023-09-05 DIAGNOSIS — R97.20 ELEVATED PSA: ICD-10-CM

## 2023-09-05 LAB — PSA SERPL-MCNC: 5.65 NG/ML (ref 0–4)

## 2023-09-05 PROCEDURE — 97110 THERAPEUTIC EXERCISES: CPT | Performed by: PHYSICAL THERAPIST

## 2023-09-05 PROCEDURE — 97530 THERAPEUTIC ACTIVITIES: CPT | Performed by: PHYSICAL THERAPIST

## 2023-09-05 PROCEDURE — 97140 MANUAL THERAPY 1/> REGIONS: CPT | Performed by: PHYSICAL THERAPIST

## 2023-09-05 PROCEDURE — 84153 ASSAY OF PSA TOTAL: CPT

## 2023-09-05 PROCEDURE — 36415 COLL VENOUS BLD VENIPUNCTURE: CPT

## 2023-09-05 NOTE — PROGRESS NOTES
Daily Note     Today's date: 2023  Patient name: Qing Oh  : 1945  MRN: [id number]  Referring provider: Sanya Redman MD  Dx:   Encounter Diagnosis     ICD-10-CM    1. Pelvic floor dysfunction  M62.89                      Subjective: Pt forgot his bladder diary sheets. Objective: See treatment diary below      Assessment: Tolerated treatment well. Patient demonstrated fatigue post treatment and would benefit from continued PT for further PFM strengthening as eddy. HEP was given and reviewed. Pt is very motivated and willing to participate in PT tx. Plan: Continue per plan of care. Progress treatment as tolerated.        Precautions: not any given      Manuals            PF MMT next 2+/5 to 35 decreased endurance           Objective part of IE  SZ                                     Neuro Re-Ed                           PT/benefits/anatomy/porpose/edu 5'            Squatty potty use/posture/edu/benefits/purpose 5'            Bladder diary use/edu/purpose/benefits 5'                                                   Ther Ex             Sitting PF/TA  5x5" VC's           Sitting PF/TA/AD's t-ball use  5x5" VC's           Sitting PF/TA/AB's TB use  5x5" VC's           Sitting u/l TKE/PF/TA  5x5" ea LE VC's           Butterfly stretch  5x30" ea VC's                                                  Ther Activity             HEP edu/review/given  8' VC's for all                        Gait Training                                       Modalities

## 2023-09-12 ENCOUNTER — APPOINTMENT (OUTPATIENT)
Dept: PHYSICAL THERAPY | Facility: CLINIC | Age: 78
End: 2023-09-12
Payer: MEDICARE

## 2023-09-14 ENCOUNTER — OFFICE VISIT (OUTPATIENT)
Dept: UROLOGY | Facility: CLINIC | Age: 78
End: 2023-09-14
Payer: MEDICARE

## 2023-09-14 VITALS
SYSTOLIC BLOOD PRESSURE: 102 MMHG | DIASTOLIC BLOOD PRESSURE: 60 MMHG | HEART RATE: 84 BPM | BODY MASS INDEX: 24.05 KG/M2 | OXYGEN SATURATION: 95 % | WEIGHT: 168 LBS | HEIGHT: 70 IN | RESPIRATION RATE: 16 BRPM

## 2023-09-14 DIAGNOSIS — R97.20 ELEVATED PSA: Primary | ICD-10-CM

## 2023-09-14 PROCEDURE — 99213 OFFICE O/P EST LOW 20 MIN: CPT | Performed by: PHYSICIAN ASSISTANT

## 2023-09-14 NOTE — PROGRESS NOTES
UROLOGY PROGRESS NOTE   Patient Identifiers: Jose De Jesus (MRN [id number])  Date of Service: 9/14/2023    Subjective:   79-year-old man history of elevated PSA. He had a negative biopsy in 2020 with an MRI showing PI-RADS 2. He has been maintained on tamsulosin. A stable 5.65. Reason for visit: Elevated PSA follow-up    Objective:     VITALS:    There were no vitals filed for this visit. LABS:  Lab Results   Component Value Date    HGB 15.5 07/07/2023    HCT 46.7 07/07/2023    WBC 7.40 07/07/2023     07/07/2023   ]    Lab Results   Component Value Date     09/03/2015    K 3.7 07/07/2023     07/07/2023    CO2 30 07/07/2023    BUN 19 07/07/2023    CREATININE 0.97 07/07/2023    CALCIUM 9.6 07/07/2023    GLUCOSE 90 09/03/2015   ]        INPATIENT MEDS:    Current Outpatient Medications:   •  clindamycin (CLEOCIN T) 1 % lotion, APPLY TO AFFECTED AREA ON THE FACE ONCE DAILY, Disp: , Rfl:   •  desoximetasone (TOPICORT) 0.25 % cream, APPLY TWICE DAILY TO AFFECTED AREA FOR 2-4 WEEKS MAX AS NEEDED, Disp: , Rfl:   •  famotidine (PEPCID) 40 MG tablet, Take 1 tablet (40 mg total) by mouth 2 (two) times a day as needed for heartburn, Disp: 180 tablet, Rfl: 1  •  ibuprofen (MOTRIN) 600 mg tablet, , Disp: , Rfl:   •  tiZANidine (ZANAFLEX) 4 mg tablet, Take 1 tablet (4 mg total) by mouth every 8 (eight) hours as needed for muscle spasms, Disp: 90 tablet, Rfl: 0  •  tamsulosin (FLOMAX) 0.4 mg, Take 1 capsule (0.4 mg total) by mouth daily at bedtime, Disp: 90 capsule, Rfl: 0      Physical Exam:   There were no vitals taken for this visit. GEN: no acute distress    RESP: breathing comfortably with no accessory muscle use    ABD: soft, non-tender, non-distended   INCISION:    EXT: no significant peripheral edema       RADIOLOGY:   None    Assessment:   #1.   Elevated PSA    Plan:   -His PSA has been stable over time with a negative biopsy  -Follow-up in 1 year with PSA in 6 months or prior to visit  -  -

## 2023-10-02 ENCOUNTER — OFFICE VISIT (OUTPATIENT)
Dept: PHYSICAL THERAPY | Facility: CLINIC | Age: 78
End: 2023-10-02
Payer: MEDICARE

## 2023-10-02 DIAGNOSIS — M62.89 PELVIC FLOOR DYSFUNCTION: Primary | ICD-10-CM

## 2023-10-02 PROCEDURE — 97110 THERAPEUTIC EXERCISES: CPT | Performed by: PHYSICAL THERAPIST

## 2023-10-02 PROCEDURE — 97530 THERAPEUTIC ACTIVITIES: CPT | Performed by: PHYSICAL THERAPIST

## 2023-10-02 PROCEDURE — 97112 NEUROMUSCULAR REEDUCATION: CPT | Performed by: PHYSICAL THERAPIST

## 2023-10-02 NOTE — PROGRESS NOTES
Daily Note     Today's date: 10/2/2023  Patient name: Freedom Agosto  : 1945  MRN: [id number]  Referring provider: Angie Gonzales MD  Dx:   Encounter Diagnosis     ICD-10-CM    1. Pelvic floor dysfunction  M62.89                      Subjective: Pt reports improvement with use more fiber in his diet and stool softener. Less constipation noted. Objective: See treatment diary below      Assessment: Tolerated treatment well. Patient demonstrated fatigue post treatment, exhibited good technique with therapeutic exercises and would benefit from continued PT. PF MMT next tx session. Plan: Continue per plan of care. Progress treatment as tolerated.        Precautions: not any given      Manuals 8/24 9/5 10/2          PF MMT next 2+/5 to 3/5 decreased endurance           Objective part of IE  SZ                                     Neuro Re-Ed                           PT/benefits/anatomy/porpose/edu 5'            Squatty potty use/posture/edu/benefits/purpose 5'            Bladder diary use/edu/purpose/benefits 5'                         Rec bike/posture/TA/PF   L4 10'                       Ther Ex             Sitting PF/TA  5x5" VC's           Sitting PF/TA/AD's t-ball use  5x5" VC's           Sitting PF/TA/AB's TB use  5x5" VC's           Sitting u/l TKE/PF/TA  5x5" ea LE VC's           Butterfly stretch  5x30" ea VC's           Sit to stand/PF/TA   #6 10x2          Leg press/PF/TA   L6 10x2          Standing PF/TA/postural TB   Black TB 10x2, 5"          Standing PF/TA   5x5"                                                                                        Ther Activity             HEP edu/review/given  8' VC's for all 8' updated/review                       Gait Training                                       Modalities

## 2023-10-03 ENCOUNTER — TELEPHONE (OUTPATIENT)
Dept: INTERNAL MEDICINE CLINIC | Facility: CLINIC | Age: 78
End: 2023-10-03

## 2023-10-03 DIAGNOSIS — L03.019 CELLULITIS OF FINGER, UNSPECIFIED LATERALITY: Primary | ICD-10-CM

## 2023-10-03 RX ORDER — CEPHALEXIN 500 MG/1
500 CAPSULE ORAL 2 TIMES DAILY
Qty: 14 CAPSULE | Refills: 0 | Status: SHIPPED | OUTPATIENT
Start: 2023-10-03 | End: 2023-10-10

## 2023-10-03 NOTE — TELEPHONE ENCOUNTER
Keflex antibiotic is sent in but we are not sure if it is infected. He can start on it now or wait until he comes in Friday.

## 2023-10-03 NOTE — TELEPHONE ENCOUNTER
Called pt he tried to upload Kathryn Lu was not able to tried sending it through e-mail was not able to please advise what to do

## 2023-10-03 NOTE — TELEPHONE ENCOUNTER
Ken Paula, this is Jackie Moon calling for Doctor Wai's office. My phone number is [phone] and my date of birth is [birth year] calling in regards to the slight infection I have on my round. The ankle, I had a scratch and it's not healing and I've got a red Council around it so it's still it's infected, it appears and it's not going away. So it's about the size of 1/4 I guess. So I think I need some antibiotic or something because it's not doing the way it normally does. So if you could pass that along and get back to me, I'd appreciate it, [phone] and it's now 10:30 on Tuesday. Thank you.  Bye.

## 2023-10-06 ENCOUNTER — OFFICE VISIT (OUTPATIENT)
Dept: INTERNAL MEDICINE CLINIC | Facility: CLINIC | Age: 78
End: 2023-10-06
Payer: MEDICARE

## 2023-10-06 VITALS
OXYGEN SATURATION: 99 % | TEMPERATURE: 98 F | BODY MASS INDEX: 23.96 KG/M2 | DIASTOLIC BLOOD PRESSURE: 76 MMHG | SYSTOLIC BLOOD PRESSURE: 116 MMHG | WEIGHT: 167 LBS | HEART RATE: 77 BPM

## 2023-10-06 DIAGNOSIS — Z23 FLU VACCINE NEED: ICD-10-CM

## 2023-10-06 DIAGNOSIS — S80.812D: Primary | ICD-10-CM

## 2023-10-06 PROCEDURE — 99213 OFFICE O/P EST LOW 20 MIN: CPT | Performed by: INTERNAL MEDICINE

## 2023-10-06 PROCEDURE — 90662 IIV NO PRSV INCREASED AG IM: CPT | Performed by: INTERNAL MEDICINE

## 2023-10-06 PROCEDURE — G0008 ADMIN INFLUENZA VIRUS VAC: HCPCS | Performed by: INTERNAL MEDICINE

## 2023-10-06 NOTE — PROGRESS NOTES
Assessment/Plan:      #Leg Abrasion  -occurred 4 weeks ago while in garage  -likely scraped against a metal object or brick  -has scabbing there and mild redness  -is now on keflex and will continue  -is improving    #Diverticulosis  -over LLQ sigmoid on CT  -was seen in ER 7/7/23 and now resolved  -defers colonoscopy for now  -is on metamucil and miralax and improved  -anal manometry shows dysfunction and now working with PT for exercises to strengthen pelvic floor but does not appear to be helping    #Health Maintenance  -flu vaccine today  No problem-specific Assessment & Plan notes found for this encounter. Diagnoses and all orders for this visit:    Leg abrasion, left, subsequent encounter    Flu vaccine need  -     influenza vaccine, high-dose, PF 0.7 mL (FLUZONE HIGH-DOSE)            Current Outpatient Medications:   •  cephalexin (KEFLEX) 500 mg capsule, Take 1 capsule (500 mg total) by mouth 2 (two) times a day for 7 days, Disp: 14 capsule, Rfl: 0  •  clindamycin (CLEOCIN T) 1 % lotion, APPLY TO AFFECTED AREA ON THE FACE ONCE DAILY, Disp: , Rfl:   •  desoximetasone (TOPICORT) 0.25 % cream, APPLY TWICE DAILY TO AFFECTED AREA FOR 2-4 WEEKS MAX AS NEEDED, Disp: , Rfl:   •  famotidine (PEPCID) 40 MG tablet, Take 1 tablet (40 mg total) by mouth 2 (two) times a day as needed for heartburn, Disp: 180 tablet, Rfl: 1  •  ibuprofen (MOTRIN) 600 mg tablet, , Disp: , Rfl:   •  tamsulosin (FLOMAX) 0.4 mg, Take 1 capsule (0.4 mg total) by mouth daily at bedtime, Disp: 90 capsule, Rfl: 1  •  tiZANidine (ZANAFLEX) 4 mg tablet, Take 1 tablet (4 mg total) by mouth every 8 (eight) hours as needed for muscle spasms, Disp: 90 tablet, Rfl: 0    Subjective:      Patient ID: Delfin Kuhn is a 70 y.o. male. HPI     Patient presents for an acute visit. He has a scab and abrasion to his left medial leg. He states that he was in his garage working about 4 weeks ago when it occurred. It is slowly healing up.   He is now on Keflex. He has been taking that without any issues. There is no discharge and the scab has dried over. He will continue to with soap and water over the area. On 7/7/2023 he was seen in the emergency room due to left lower quadrant abdominal pain. CT of the abdomen and pelvis was unremarkable except for diverticulosis. He underwent anal manometry and was noted to have slight dysfunction and he is now seeing physical therapy. He is on Metamucil and MiraLAX which is helping. He will continue with this. Recommended that he try to cut back on the MiraLAX and try probiotics to see if this will help. He will receive the flu vaccine today. The following portions of the patient's history were reviewed and updated as appropriate: allergies, current medications, past family history, past medical history, past social history, past surgical history and problem list.    Review of Systems   Constitutional: Negative for activity change, appetite change, fatigue and fever. HENT: Negative for congestion and sore throat. Respiratory: Negative for cough, shortness of breath and wheezing. Cardiovascular: Negative for chest pain. Gastrointestinal: Negative for abdominal pain, diarrhea, nausea and vomiting. Musculoskeletal: Negative for arthralgias, back pain, neck pain and neck stiffness. Skin: Positive for wound (left lower medial leg). Neurological: Negative for dizziness and headaches. Objective:      /76 (BP Location: Left arm, Patient Position: Sitting, Cuff Size: Standard)   Pulse 77   Temp 98 °F (36.7 °C)   Wt 75.8 kg (167 lb)   SpO2 99%   BMI 23.96 kg/m²          Physical Exam  Constitutional:       General: He is not in acute distress. Appearance: He is well-developed. He is not diaphoretic. HENT:      Head: Normocephalic and atraumatic. Eyes:      Conjunctiva/sclera: Conjunctivae normal.      Pupils: Pupils are equal, round, and reactive to light.    Neck:      Vascular: No JVD.      Trachea: No tracheal deviation. Pulmonary:      Effort: Pulmonary effort is normal. No respiratory distress. Musculoskeletal:         General: No tenderness or deformity. Normal range of motion. Cervical back: Normal range of motion. Skin:     General: Skin is warm and dry. Findings: Lesion (left lower leg ) present. No erythema or rash. Neurological:      Mental Status: He is alert and oriented to person, place, and time. This note was completed in part utilizing [employer] direct voice recognition software. Grammatical errors, random word insertion, spelling mistakes, and incomplete sentences may be an occasional consequence of the system secondary to software limitations, ambient noise and hardware issues. At the time of dictation, efforts were made to edit, clarify and /or correct errors. Please read the chart carefully and recognize, using context, where substitutions have occurred. If you have any questions or concerns about the context, text or information contained within the body of this dictation, please contact myself, the provider, for further clarification.

## 2023-10-09 ENCOUNTER — OFFICE VISIT (OUTPATIENT)
Dept: PHYSICAL THERAPY | Facility: CLINIC | Age: 78
End: 2023-10-09
Payer: MEDICARE

## 2023-10-09 DIAGNOSIS — M62.89 PELVIC FLOOR DYSFUNCTION: Primary | ICD-10-CM

## 2023-10-09 PROCEDURE — 97112 NEUROMUSCULAR REEDUCATION: CPT | Performed by: PHYSICAL THERAPIST

## 2023-10-09 PROCEDURE — 97140 MANUAL THERAPY 1/> REGIONS: CPT | Performed by: PHYSICAL THERAPIST

## 2023-10-09 PROCEDURE — 97110 THERAPEUTIC EXERCISES: CPT | Performed by: PHYSICAL THERAPIST

## 2023-10-09 NOTE — PROGRESS NOTES
Daily Note     Today's date: 10/9/2023  Patient name: Tiago Frankel  : 1945  MRN: [id number]  Referring provider: Zain Mcintyre MD  Dx:   Encounter Diagnosis     ICD-10-CM    1. Pelvic floor dysfunction  M62.89                      Subjective: As per pt, much better constipation management noted. Objective: See treatment diary below      Assessment: Tolerated treatment well. Patient exhibited good technique with therapeutic exercises and would benefit from continued PT and HEP. Pt shows improvement in PF MMT. No discomfort. Plan: Continue per plan of care. Progress treatment as tolerated.        Precautions: not any given      Manuals 8/24 9/5 10/2 10/9         PF MMT next 2+/5 to 3/5 decreased endurance           Objective part of IE  SZ                                     Neuro Re-Ed                           PT/benefits/anatomy/porpose/edu 5'            Squatty potty use/posture/edu/benefits/purpose 5'            Bladder diary use/edu/purpose/benefits 5'                         Rec bike/posture/TA/PF   L4 10' L5 10'                      Ther Ex             Sitting PF/TA  5x5" VC's           Sitting PF/TA/AD's t-ball use  5x5" VC's           Sitting PF/TA/AB's TB use  5x5" VC's           Sitting u/l TKE/PF/TA  5x5" ea LE VC's           Butterfly stretch  5x30" ea VC's           Sit to stand/PF/TA   #6 10x2 #6 15x2         Leg press/PF/TA   L6 10x2 L6 15x2         Standing PF/TA/postural TB   Black TB 10x2, 5" 15x2 5" ea         Standing PF/TA   5x5" #6 5x5"         airex mini squats and t-ball toss/trampoline use/PF/TA    #6 10x2         T'dmill amb/postural edu/PF activation w gait    2.0 mph 5'                                                             Ther Activity             HEP edu/review/given  8' VC's for all 8' updated/review 8' review                      Gait Training                                       Modalities

## 2023-10-16 ENCOUNTER — OFFICE VISIT (OUTPATIENT)
Dept: PHYSICAL THERAPY | Facility: CLINIC | Age: 78
End: 2023-10-16
Payer: MEDICARE

## 2023-10-16 DIAGNOSIS — M62.89 PELVIC FLOOR DYSFUNCTION: Primary | ICD-10-CM

## 2023-10-16 PROCEDURE — 97110 THERAPEUTIC EXERCISES: CPT | Performed by: PHYSICAL THERAPIST

## 2023-10-16 PROCEDURE — 97112 NEUROMUSCULAR REEDUCATION: CPT | Performed by: PHYSICAL THERAPIST

## 2023-10-16 NOTE — PROGRESS NOTES
Daily Note     Today's date: 10/16/2023  Patient name: Nenita Goyal  : 1945  MRN: [id number]  Referring provider: Maren Oh MD  Dx:   Encounter Diagnosis     ICD-10-CM    1. Pelvic floor dysfunction  M62.89                      Subjective: Pt reports doing well with self management of constipation. Objective: See treatment diary below      Assessment: Tolerated treatment well. Patient exhibited good technique with therapeutic exercises and HEP review. Pt continues to work on PFM/core strengthening with increased intensity and repetitions. No discomfort post tx voiced. Plan: Continue per plan of care. Potential discharge next visit.      Precautions: not any given      Manuals 8/24 9/5 10/2 10/9 10/16        PF MMT next 2+/5 to 3/5 decreased endurance           Objective part of IE  SZ                                     Neuro Re-Ed                           PT/benefits/anatomy/porpose/edu 5'            Squatty potty use/posture/edu/benefits/purpose 5'            Bladder diary use/edu/purpose/benefits 5'                         Rec bike/posture/TA/PF   L4 10' L5 10' L6 10'                     Ther Ex             Sitting PF/TA  5x5" VC's           Sitting PF/TA/AD's t-ball use  5x5" VC's           Sitting PF/TA/AB's TB use  5x5" VC's           Sitting u/l TKE/PF/TA  5x5" ea LE VC's           Butterfly stretch  5x30" ea VC's           Sit to stand/PF/TA   #6 10x2 #6 15x2 #8 10x2        Leg press/PF/TA   L6 10x2 L6 15x2 L7 10x2        Standing PF/TA/postural TB   Black TB 10x2, 5" 15x2 5" ea Black 15x2, 5" ea        Standing PF/TA   5x5" #6 5x5"         airex mini squats and t-ball toss/trampoline use/PF/TA    #6 10x2 #8 10x2        Airex u/l balance w t-ball toss/PF/TA     #8 10x2        T'dmill amb/postural edu/PF activation w gait    2.0 mph 5' 2.0 mph 8'                                                            Ther Activity             HEP edu/review/given  8' VC's for all 8' updated/review 8' review                      Gait Training                                       Modalities

## 2023-10-23 ENCOUNTER — OFFICE VISIT (OUTPATIENT)
Dept: PHYSICAL THERAPY | Facility: CLINIC | Age: 78
End: 2023-10-23
Payer: MEDICARE

## 2023-10-23 DIAGNOSIS — M62.89 PELVIC FLOOR DYSFUNCTION: Primary | ICD-10-CM

## 2023-10-23 PROCEDURE — 97112 NEUROMUSCULAR REEDUCATION: CPT | Performed by: PHYSICAL THERAPIST

## 2023-10-23 PROCEDURE — 97110 THERAPEUTIC EXERCISES: CPT | Performed by: PHYSICAL THERAPIST

## 2023-10-23 PROCEDURE — 97530 THERAPEUTIC ACTIVITIES: CPT | Performed by: PHYSICAL THERAPIST

## 2023-10-23 NOTE — PROGRESS NOTES
Daily Note     Today's date: 10/23/2023  Patient name: Keegan Burgess  : 1945  MRN: [id number]  Referring provider: Linnette Bryant MD  Dx:   Encounter Diagnosis     ICD-10-CM    1. Pelvic floor dysfunction  M62.89                      Subjective: No discomfort, no PF issues verbalized. Objective: See treatment diary below  HEP: safe and independent with all  No constipation, no PF discomfort verbalized, BM improved to normal type. (Met all goals)  1. Independent and safe with HEP. 2. Independent and safe with self-postural correction using a squatty potty at home to improve quality of BM. 3. Independent with bladder/bowel diary to max fluid intake. 4. Independent and safe with body mechanics and PFM activation. Assessment: Tolerated treatment well. Patient exhibited good technique with therapeutic exercises and HEP review. Max potential reached. Plan: D/C PT at this time.      Precautions: not any given      Manuals 8/24 9/5 10/2 10/9 10/16 10/23       PF MMT next 2+/5 to 35 decreased endurance           Objective part of IE  SZ                                     Neuro Re-Ed                           PT/benefits/anatomy/porpose/edu 5'            Squatty potty use/posture/edu/benefits/purpose 5'            Bladder diary use/edu/purpose/benefits 5'                         Rec bike/posture/TA/PF   L4 10' L5 10' L6 10' L6 10'                    Ther Ex             Sitting PF/TA  5x5" VC's           Sitting PF/TA/AD's t-ball use  5x5" VC's           Sitting PF/TA/AB's TB use  5x5" VC's           Sitting u/l TKE/PF/TA  5x5" ea LE VC's           Butterfly stretch  5x30" ea VC's           Sit to stand/PF/TA   #6 10x2 #6 15x2 #8 10x2 Airex #8 15x2       Leg press/PF/TA   L6 10x2 L6 15x2 L7 10x2 L7 10x3       Standing PF/TA/postural TB   Black TB 10x2, 5" 15x2 5" ea Black 15x2, 5" ea        Standing PF/TA   5x5" #6 5x5"         airex mini squats and t-ball toss/trampoline use/PF/TA    #6 10x2 #8 10x2 #8 15x2       Airex u/l balance w t-ball toss/PF/TA     #8 10x2 #8 15x2       T'dmill amb/postural edu/PF activation w gait    2.0 mph 5' 2.0 mph 8' 2.5 mph 8'                                                           Ther Activity             HEP edu/review/given  8' VC's for all 8' updated/review 8' review  8' reviewed                    Gait Training                                       Modalities

## 2023-11-13 ENCOUNTER — OFFICE VISIT (OUTPATIENT)
Dept: OBGYN CLINIC | Facility: OTHER | Age: 78
End: 2023-11-13
Payer: MEDICARE

## 2023-11-13 VITALS
BODY MASS INDEX: 24.77 KG/M2 | HEIGHT: 70 IN | WEIGHT: 173 LBS | DIASTOLIC BLOOD PRESSURE: 77 MMHG | SYSTOLIC BLOOD PRESSURE: 124 MMHG | HEART RATE: 88 BPM

## 2023-11-13 DIAGNOSIS — M75.112 INCOMPLETE TEAR OF LEFT ROTATOR CUFF, UNSPECIFIED WHETHER TRAUMATIC: Primary | ICD-10-CM

## 2023-11-13 PROCEDURE — 99214 OFFICE O/P EST MOD 30 MIN: CPT | Performed by: ORTHOPAEDIC SURGERY

## 2023-11-13 RX ORDER — CHLORHEXIDINE GLUCONATE ORAL RINSE 1.2 MG/ML
15 SOLUTION DENTAL ONCE
OUTPATIENT
Start: 2023-11-13 | End: 2023-11-13

## 2023-11-13 NOTE — PROGRESS NOTES
Assessment  Diagnoses and all orders for this visit:    Partial thickness tear of left supraspinatus tendon    Discussion and Plan:    The patient continues to be symptomatic with his left partial-thickness supraspinatus tendon tear, this is despite prolonged nonoperative care including an injection as well as physical therapy over the past few years. He has had MRI findings consistent with partial tearing of the rotator cuff for almost a decade and given the fact that his symptoms continue to bother him, especially with his routine daily activities he does wish to consider arthroscopic repair of the rotator cuff at this time. We did discuss surgical treatment as well as alternatives of surgical treatment including a repeat injection and more physical therapy and after discussing these options the patient did elect for surgical treatment arthroscopic rotator cuff repair left shoulder. A thorough discussion was performed with the patient regarding the risks and benefit of operative and nonoperative treatment of their rotator cuff tear. Risks discussed include but were not limited to infection, neurovascular injury, recurrent tear, nonhealing of the repair, need for further surgery, need for biceps tenodesis or tenotomy, stiffness, need for prolonged rehabilitation, as well as the risk of anesthesia. After this discussion all questions were answered and informed consent was obtained in the office for arthroscopic rotator cuff repair of the left shoulder. The patient will be scheduled for this procedure accordingly. Subjective:   Patient ID: Jordyn Holcomb is a 70 y.o. male      HPI  Patient presents today for follow up regarding left shoulder pain. Patient has a known partial thickness supraspinatus tear. Patient was last seen 1/12/23 at which time he wanted to hold off on surgery. Patient reports the pain is starting of affect playing golf.        The following portions of the patient's history were reviewed and updated as appropriate: allergies, current medications, past family history, past medical history, past social history, past surgical history and problem list.        Objective:  /77 (BP Location: Right arm, Patient Position: Sitting, Cuff Size: Standard)   Pulse 88   Ht 5' 10" (1.778 m)   Wt 78.5 kg (173 lb)   BMI 24.82 kg/m²       Left Shoulder Exam     Tenderness   The patient is experiencing no tenderness. Range of Motion   The patient has normal left shoulder ROM. Muscle Strength   External rotation: 5/5   Supraspinatus: 4/5     Tests   Cabrera test: positive  Impingement: positive  Drop arm: positive    Other   Erythema: absent  Sensation: normal  Pulse: present             Physical Exam  Vitals and nursing note reviewed. Constitutional:       Appearance: He is well-developed. HENT:      Head: Normocephalic and atraumatic. Eyes:      Pupils: Pupils are equal, round, and reactive to light. Cardiovascular:      Rate and Rhythm: Normal rate and regular rhythm. Pulses: Normal pulses. Heart sounds: Normal heart sounds. Pulmonary:      Effort: Pulmonary effort is normal. No respiratory distress. Breath sounds: Normal breath sounds. Abdominal:      General: Abdomen is flat. There is no distension. Palpations: Abdomen is soft. Musculoskeletal:      Cervical back: Normal range of motion and neck supple. Skin:     General: Skin is warm and dry. Neurological:      Mental Status: He is alert and oriented to person, place, and time. Psychiatric:         Mood and Affect: Mood normal.         Behavior: Behavior normal.         I have personally reviewed pertinent films in PACS and my interpretation is as follows.   MRI Left Shoulder 10/8/22:  Near full thickness supraspinatus tear    Scribe Attestation      I,:  Rudy Navid am acting as a scribe while in the presence of the attending physician.:       I,:  Katey Sam MD personally performed the services described in this documentation    as scribed in my presence.:

## 2023-11-13 NOTE — H&P (VIEW-ONLY)
Assessment  Diagnoses and all orders for this visit:    Partial thickness tear of left supraspinatus tendon    Discussion and Plan:    The patient continues to be symptomatic with his left partial-thickness supraspinatus tendon tear, this is despite prolonged nonoperative care including an injection as well as physical therapy over the past few years. He has had MRI findings consistent with partial tearing of the rotator cuff for almost a decade and given the fact that his symptoms continue to bother him, especially with his routine daily activities he does wish to consider arthroscopic repair of the rotator cuff at this time. We did discuss surgical treatment as well as alternatives of surgical treatment including a repeat injection and more physical therapy and after discussing these options the patient did elect for surgical treatment arthroscopic rotator cuff repair left shoulder. A thorough discussion was performed with the patient regarding the risks and benefit of operative and nonoperative treatment of their rotator cuff tear. Risks discussed include but were not limited to infection, neurovascular injury, recurrent tear, nonhealing of the repair, need for further surgery, need for biceps tenodesis or tenotomy, stiffness, need for prolonged rehabilitation, as well as the risk of anesthesia. After this discussion all questions were answered and informed consent was obtained in the office for arthroscopic rotator cuff repair of the left shoulder. The patient will be scheduled for this procedure accordingly. Subjective:   Patient ID: Tonya Orr is a 70 y.o. male      HPI  Patient presents today for follow up regarding left shoulder pain. Patient has a known partial thickness supraspinatus tear. Patient was last seen 1/12/23 at which time he wanted to hold off on surgery. Patient reports the pain is starting of affect playing golf.        The following portions of the patient's history were reviewed and updated as appropriate: allergies, current medications, past family history, past medical history, past social history, past surgical history and problem list.        Objective:  /77 (BP Location: Right arm, Patient Position: Sitting, Cuff Size: Standard)   Pulse 88   Ht 5' 10" (1.778 m)   Wt 78.5 kg (173 lb)   BMI 24.82 kg/m²       Left Shoulder Exam     Tenderness   The patient is experiencing no tenderness. Range of Motion   The patient has normal left shoulder ROM. Muscle Strength   External rotation: 5/5   Supraspinatus: 4/5     Tests   Cabrera test: positive  Impingement: positive  Drop arm: positive    Other   Erythema: absent  Sensation: normal  Pulse: present             Physical Exam  Vitals and nursing note reviewed. Constitutional:       Appearance: He is well-developed. HENT:      Head: Normocephalic and atraumatic. Eyes:      Pupils: Pupils are equal, round, and reactive to light. Cardiovascular:      Rate and Rhythm: Normal rate and regular rhythm. Pulses: Normal pulses. Heart sounds: Normal heart sounds. Pulmonary:      Effort: Pulmonary effort is normal. No respiratory distress. Breath sounds: Normal breath sounds. Abdominal:      General: Abdomen is flat. There is no distension. Palpations: Abdomen is soft. Musculoskeletal:      Cervical back: Normal range of motion and neck supple. Skin:     General: Skin is warm and dry. Neurological:      Mental Status: He is alert and oriented to person, place, and time. Psychiatric:         Mood and Affect: Mood normal.         Behavior: Behavior normal.         I have personally reviewed pertinent films in PACS and my interpretation is as follows.   MRI Left Shoulder 10/8/22:  Near full thickness supraspinatus tear    Scribe Attestation      I,:  Christian Rowan am acting as a scribe while in the presence of the attending physician.:       I,:  Samy Tlilman MD personally performed the services described in this documentation    as scribed in my presence.:

## 2023-11-13 NOTE — PATIENT INSTRUCTIONS
You are being scheduled for a shoulder arthroscopy to treat your symptoms. Below are some instructions and information on what to expect before and after your surgery. Pre-Surgical Preparation for Arthroscopic Shoulder Surgery: You will be contacted the evening prior to your surgery to confirm the scheduled time of the procedure and when to arrive at the hospital.   Do not eat or drink anything after midnight the night before your surgery. Since you are having out-patient surgery, make sure that you have someone who can drive you home later in the day. Also, prepare that person for a long day, as the process of safely preparing for and recovering from the procedure is more time consuming than the actual procedure! As you will be in a sling after surgery, please wear or bring a loose fitting button-down shirt so that you can easily place this over the sling when you leave the surgical suite. This avoids having to place the operative arm in a sleeve. Most patients find that this is the easiest outfit to wear for the first week or so after surgery so you may want to plan accordingly. Most patients find that lying down in bed after shoulder surgery accentuates their discomfort. This is likely related to the effect of gravity on the swelling in the shoulder. As a result, most patients sleep better in a recliner or in bed with pillows propped up behind their back for the first few days or weeks after surgery. It is a good idea to plan for this ahead of time so there will be less hassle getting things set up the night after surgery. What to Expect After Arthroscopic Shoulder Surgery: It is normal to have swelling and discomfort in the shoulder for several days or a week after surgery. It is also normal to have a small amount of drainage from the surgical wounds (especially the first few days after surgery), as we put fluid into the shoulder to visualize the structures during surgery. It is NOT normal to have foul smelling, purulent drainage and if this is noted, please contact the office immediately or proceed to the emergency room for evaluation as this may indicate an infection. Applying ice bags to the shoulder may help with pain that is not controlled by the regional block. Ice should be applied 20-30 minutes at a time, every hour or two. Make sure to put a thin towel or T-shirt next to your skin to avoid direct contact of the ice with the skin. Icing is most helpful in the first 48 hours, although many people find that continuing past this time frame lessens their postoperative pain. Please note that your post-operative dressing is not conductive to ice, so if you need to, it is okay to remove that dressing even the night after surgery and place band-aids over the wounds in order for the ice to take effect. Pain Control    Most patients will receive a nerve block, the local anesthetic may keep your whole arm numb for up to 4 days. You will be given a prescription for narcotic pain medication when you are discharged from the hospital.  With the newer nerve block that is being utilized, patients are rarely requiring the use of this narcotic pain medication. If you find you do not tolerate that type of pain medicine well, call our office and we will try another one. In addition to the narcotic pain medication, it is safe to use an anti-inflammatory (unless the patient has a medical condition that would not allow safe use of this mediation). This includes the Advil, Motrin, Ibuprofen and Alleve category of medications. Simply follow the over the counter dosing on the package and use as indicated as another adjunct. Importantly since these medications are all very similar, use only one of them. Tylenol is a separate medication that can be utilized as well and can be taken at the same time as the other medication or given in a "staggered" manner.   Just make sure that you follow the dosing on the over the counter bottle instructions. Also make sure that the pain medication prescribed by Dr Stevo Bentley team does not contain acetaminophen (this is found in Percocet and Vicodin). Typically we do not prescribe those types of pain medications but if for some reason that has been prescribed DO NOT add more Tylenol (acetaminophen) as you could end up taking too much of that medication. As mentioned above, most patients find that lying down accentuates their discomfort. You might sleep better in a recliner, or propped up in bed. Dr. Meliza Leon encourages patients to safely ambulate around the house as much as possible in the first few days after the procedure as this can help with blood circulation in the legs. While the incidence of blood clots is very rare following shoulder surgery, early ambulation is a great way to help decrease the already low rate. 24 hours after the surgery you may remove the bandage and cover incisions with Band-Aids if needed. At that time you may shower, the wounds will have a surgical glue that will protect them from shower water but do not submerge your incisions directly (bathing or swimming) until at least 2 weeks post-operatively. It is safe to let the arm hang at the side and take a shower and put on a shirt without the sling on. Just make sure that you do not use the operative are to reach out and grab anything as that may damage the repair. When you are done showering and getting dressed please return the operative arm to the sling. Unless noted otherwise in your discharge paperwork, Dr. Meliza Leon uses absorbable sutures so they do not need to be removed. Dr. Dinah Oneal physician assistant (PA) will see you in the office a few days after the procedure to review the intra-operative findings and to initiate physical therapy if appropriate.   A post-operative appointment should have been scheduled for you already, but if for some reason this did not happen, please call the office to make one. Physical therapy is important after nearly all shoulder surgeries and a detailed rehabilitation plan based on the specific intra-operative findings and procedures will be provided to your therapist at the first post-operative office visit. Most patients have post-operative therapy appointments scheduled pre-operatively, but if you do not, that will be handled at the first post-operative office visit. Unless expressly directed otherwise it is safe to remove the sling even the first day after the surgery and let the arm hang by the side. This allows patients to shower and even put a shirt on (bad arm in the sleeve first). It is also safe to flex and extend their wrist, hand and fingers as much as possible when the block wears off. These simple motions can serve to pump fluid out of the forearm and decrease swelling in the arm.

## 2023-11-15 ENCOUNTER — ANESTHESIA EVENT (OUTPATIENT)
Dept: PERIOP | Facility: AMBULARY SURGERY CENTER | Age: 78
End: 2023-11-15
Payer: MEDICARE

## 2023-11-15 ENCOUNTER — APPOINTMENT (OUTPATIENT)
Dept: PREADMISSION TESTING | Facility: HOSPITAL | Age: 78
End: 2023-11-15
Payer: MEDICARE

## 2023-11-24 NOTE — PRE-PROCEDURE INSTRUCTIONS
Pre-Surgery Instructions:   Medication Instructions    clindamycin (CLEOCIN T) 1 % lotion Hold day of surgery. desoximetasone (TOPICORT) 0.25 % cream Hold day of surgery. famotidine (PEPCID) 40 MG tablet Take day of surgery. ibuprofen (MOTRIN) 600 mg tablet Stop taking 3 days prior to surgery. tamsulosin (FLOMAX) 0.4 mg Take night before surgery    tiZANidine (ZANAFLEX) 4 mg tablet Uses PRN- OK to take day of surgery    Medication instructions for day surgery reviewed. Please use only a sip of water to take your instructed medications. Avoid all over the counter vitamins, supplements and NSAIDS for one week prior to surgery per anesthesia guidelines. Tylenol is ok to take as needed. You will receive a call one business day prior to surgery with an arrival time and hospital directions. If your surgery is scheduled on a Monday, the hospital will be calling you on the Friday prior to your surgery. If you have not heard from anyone by 8pm, please call the hospital supervisor through the hospital  at [phone]. Kaela Sampson [phone]). Do not eat or drink anything after midnight the night before your surgery, including candy, mints, lifesavers, or chewing gum. Do not drink alcohol 24hrs before your surgery. Try not to smoke at least 24hrs before your surgery. Follow the pre surgery showering instructions as listed in the [facility] Surgical Experience Booklet” or otherwise provided by your surgeon's office. Do not use a blade to shave the surgical area 1 week before surgery. It is okay to use a clean electric clippers up to 24 hours before surgery. Do not apply any lotions, creams, including makeup, cologne, deodorant, or perfumes after showering on the day of your surgery. Do not use dry shampoo, hair spray, hair gel, or any type of hair products. No contact lenses, eye make-up, or artificial eyelashes.  Remove nail polish, including gel polish, and any artificial, gel, or acrylic nails if possible. Remove all jewelry including rings and body piercing jewelry. Wear causal clothing that is easy to take on and off. Consider your type of surgery. Keep any valuables, jewelry, piercings at home. Please bring any specially ordered equipment (sling, braces) if indicated. Arrange for a responsible person to drive you to and from the hospital on the day of your surgery. Visitor Guidelines discussed. Call the surgeon's office with any new illnesses, exposures, or additional questions prior to surgery. Please reference your [facility] Surgical Experience Booklet” for additional information to prepare for your upcoming surgery.

## 2023-11-27 ENCOUNTER — HOSPITAL ENCOUNTER (OUTPATIENT)
Dept: RADIOLOGY | Age: 78
Discharge: HOME | End: 2023-11-27
Payer: MEDICARE

## 2023-11-27 DIAGNOSIS — R91.1 PULMONARY NODULE: ICD-10-CM

## 2023-11-27 PROCEDURE — 71250 CT THORAX DX C-: CPT

## 2023-11-27 PROCEDURE — G1004 CDSM NDSC: HCPCS

## 2023-11-29 ENCOUNTER — HOSPITAL ENCOUNTER (OUTPATIENT)
Facility: AMBULARY SURGERY CENTER | Age: 78
Setting detail: OUTPATIENT SURGERY
Discharge: HOME | End: 2023-11-29
Attending: ORTHOPAEDIC SURGERY | Admitting: ORTHOPAEDIC SURGERY
Payer: MEDICARE

## 2023-11-29 ENCOUNTER — ANESTHESIA (OUTPATIENT)
Dept: PERIOP | Facility: AMBULARY SURGERY CENTER | Age: 78
End: 2023-11-29
Payer: MEDICARE

## 2023-11-29 VITALS
TEMPERATURE: 97.5 F | OXYGEN SATURATION: 94 % | DIASTOLIC BLOOD PRESSURE: 61 MMHG | SYSTOLIC BLOOD PRESSURE: 108 MMHG | RESPIRATION RATE: 18 BRPM | HEART RATE: 66 BPM

## 2023-11-29 DIAGNOSIS — M75.102 TEAR OF LEFT SUPRASPINATUS TENDON: Primary | ICD-10-CM

## 2023-11-29 PROCEDURE — 29823 SHO ARTHRS SRG XTNSV DBRDMT: CPT | Performed by: ORTHOPAEDIC SURGERY

## 2023-11-29 PROCEDURE — 29827 SHO ARTHRS SRG RT8TR CUF RPR: CPT | Performed by: ORTHOPAEDIC SURGERY

## 2023-11-29 PROCEDURE — C1713 ANCHOR/SCREW BN/BN,TIS/BN: HCPCS | Performed by: ORTHOPAEDIC SURGERY

## 2023-11-29 PROCEDURE — 29826 SHO ARTHRS SRG DECOMPRESSION: CPT | Performed by: ORTHOPAEDIC SURGERY

## 2023-11-29 DEVICE — SELF-PUNCHING TRIPLE LOADED FIBERTAK
Type: IMPLANTABLE DEVICE | Site: SHOULDER | Status: FUNCTIONAL
Brand: ARTHREX®

## 2023-11-29 RX ORDER — OXYCODONE HYDROCHLORIDE 5 MG/1
TABLET ORAL
Qty: 13 TABLET | Refills: 0 | Status: SHIPPED | OUTPATIENT
Start: 2023-11-29

## 2023-11-29 RX ORDER — CHLORHEXIDINE GLUCONATE ORAL RINSE 1.2 MG/ML
15 SOLUTION DENTAL ONCE
Status: DISCONTINUED | OUTPATIENT
Start: 2023-11-29 | End: 2023-11-29 | Stop reason: HOSPADM

## 2023-11-29 RX ORDER — FENTANYL CITRATE/PF 50 MCG/ML
25 SYRINGE (ML) INJECTION
Status: ACTIVE | OUTPATIENT
Start: 2023-11-29 | End: 2023-11-29

## 2023-11-29 RX ORDER — OXYCODONE HYDROCHLORIDE 5 MG/1
5 TABLET ORAL EVERY 4 HOURS PRN
Status: DISCONTINUED | OUTPATIENT
Start: 2023-11-29 | End: 2023-11-29 | Stop reason: HOSPADM

## 2023-11-29 RX ORDER — MIDAZOLAM HYDROCHLORIDE 2 MG/2ML
INJECTION, SOLUTION INTRAMUSCULAR; INTRAVENOUS AS NEEDED
Status: DISCONTINUED | OUTPATIENT
Start: 2023-11-29 | End: 2023-11-29

## 2023-11-29 RX ORDER — LIDOCAINE HYDROCHLORIDE 10 MG/ML
INJECTION, SOLUTION EPIDURAL; INFILTRATION; INTRACAUDAL; PERINEURAL AS NEEDED
Status: DISCONTINUED | OUTPATIENT
Start: 2023-11-29 | End: 2023-11-29

## 2023-11-29 RX ORDER — ONDANSETRON 2 MG/ML
4 INJECTION INTRAMUSCULAR; INTRAVENOUS EVERY 6 HOURS PRN
Status: DISCONTINUED | OUTPATIENT
Start: 2023-11-29 | End: 2023-11-29 | Stop reason: HOSPADM

## 2023-11-29 RX ORDER — ACETAMINOPHEN 325 MG/1
650 TABLET ORAL EVERY 6 HOURS PRN
Status: DISCONTINUED | OUTPATIENT
Start: 2023-11-29 | End: 2023-11-29 | Stop reason: HOSPADM

## 2023-11-29 RX ORDER — ONDANSETRON 2 MG/ML
4 INJECTION INTRAMUSCULAR; INTRAVENOUS ONCE AS NEEDED
Status: COMPLETED | OUTPATIENT
Start: 2023-11-29 | End: 2023-11-29

## 2023-11-29 RX ORDER — FENTANYL CITRATE 50 UG/ML
INJECTION, SOLUTION INTRAMUSCULAR; INTRAVENOUS AS NEEDED
Status: DISCONTINUED | OUTPATIENT
Start: 2023-11-29 | End: 2023-11-29

## 2023-11-29 RX ORDER — PROPOFOL 10 MG/ML
INJECTION, EMULSION INTRAVENOUS AS NEEDED
Status: DISCONTINUED | OUTPATIENT
Start: 2023-11-29 | End: 2023-11-29

## 2023-11-29 RX ORDER — HYDROMORPHONE HCL/PF 1 MG/ML
0.2 SYRINGE (ML) INJECTION
Status: ACTIVE | OUTPATIENT
Start: 2023-11-29 | End: 2023-11-29

## 2023-11-29 RX ORDER — PHENYLEPHRINE HCL IN 0.9% NACL 1 MG/10 ML
SYRINGE (ML) INTRAVENOUS AS NEEDED
Status: DISCONTINUED | OUTPATIENT
Start: 2023-11-29 | End: 2023-11-29

## 2023-11-29 RX ORDER — DEXAMETHASONE SODIUM PHOSPHATE 10 MG/ML
INJECTION, SOLUTION INTRAMUSCULAR; INTRAVENOUS AS NEEDED
Status: DISCONTINUED | OUTPATIENT
Start: 2023-11-29 | End: 2023-11-29

## 2023-11-29 RX ORDER — KETOROLAC TROMETHAMINE 30 MG/ML
INJECTION, SOLUTION INTRAMUSCULAR; INTRAVENOUS AS NEEDED
Status: DISCONTINUED | OUTPATIENT
Start: 2023-11-29 | End: 2023-11-29

## 2023-11-29 RX ORDER — ALBUTEROL SULFATE 2.5 MG/3ML
2.5 SOLUTION RESPIRATORY (INHALATION) ONCE AS NEEDED
Status: DISCONTINUED | OUTPATIENT
Start: 2023-11-29 | End: 2023-11-29 | Stop reason: HOSPADM

## 2023-11-29 RX ORDER — METOCLOPRAMIDE HYDROCHLORIDE 5 MG/ML
10 INJECTION INTRAMUSCULAR; INTRAVENOUS ONCE AS NEEDED
Status: COMPLETED | OUTPATIENT
Start: 2023-11-29 | End: 2023-11-29

## 2023-11-29 RX ORDER — METOCLOPRAMIDE HYDROCHLORIDE 5 MG/ML
INJECTION INTRAMUSCULAR; INTRAVENOUS AS NEEDED
Status: DISCONTINUED | OUTPATIENT
Start: 2023-11-29 | End: 2023-11-29

## 2023-11-29 RX ORDER — CEFAZOLIN SODIUM 2 G/50ML
2000 SOLUTION INTRAVENOUS ONCE
Status: COMPLETED | OUTPATIENT
Start: 2023-11-29 | End: 2023-11-29

## 2023-11-29 RX ORDER — ONDANSETRON 2 MG/ML
INJECTION INTRAMUSCULAR; INTRAVENOUS AS NEEDED
Status: DISCONTINUED | OUTPATIENT
Start: 2023-11-29 | End: 2023-11-29

## 2023-11-29 RX ORDER — SODIUM CHLORIDE, SODIUM LACTATE, POTASSIUM CHLORIDE, CALCIUM CHLORIDE 600; 310; 30; 20 MG/100ML; MG/100ML; MG/100ML; MG/100ML
INJECTION, SOLUTION INTRAVENOUS CONTINUOUS PRN
Status: DISCONTINUED | OUTPATIENT
Start: 2023-11-29 | End: 2023-11-29

## 2023-11-29 RX ADMIN — ONDANSETRON 4 MG: 2 INJECTION INTRAMUSCULAR; INTRAVENOUS at 09:50

## 2023-11-29 RX ADMIN — METOCLOPRAMIDE 10 MG: 5 INJECTION, SOLUTION INTRAMUSCULAR; INTRAVENOUS at 09:39

## 2023-11-29 RX ADMIN — DEXAMETHASONE SODIUM PHOSPHATE 10 MG: 10 INJECTION INTRAMUSCULAR; INTRAVENOUS at 09:50

## 2023-11-29 RX ADMIN — FENTANYL CITRATE 25 MCG: 50 INJECTION INTRAMUSCULAR; INTRAVENOUS at 09:48

## 2023-11-29 RX ADMIN — SODIUM CHLORIDE, SODIUM LACTATE, POTASSIUM CHLORIDE, AND CALCIUM CHLORIDE: .6; .31; .03; .02 INJECTION, SOLUTION INTRAVENOUS at 09:01

## 2023-11-29 RX ADMIN — KETOROLAC TROMETHAMINE 30 MG: 30 INJECTION, SOLUTION INTRAMUSCULAR; INTRAVENOUS at 10:32

## 2023-11-29 RX ADMIN — Medication 50 MCG: at 10:07

## 2023-11-29 RX ADMIN — FENTANYL CITRATE 50 MCG: 50 INJECTION INTRAMUSCULAR; INTRAVENOUS at 08:48

## 2023-11-29 RX ADMIN — Medication 100 MCG: at 09:52

## 2023-11-29 RX ADMIN — MIDAZOLAM 1 MG: 1 INJECTION INTRAMUSCULAR; INTRAVENOUS at 08:48

## 2023-11-29 RX ADMIN — FENTANYL CITRATE 25 MCG: 50 INJECTION INTRAMUSCULAR; INTRAVENOUS at 10:25

## 2023-11-29 RX ADMIN — METOCLOPRAMIDE 10 MG: 5 INJECTION, SOLUTION INTRAMUSCULAR; INTRAVENOUS at 11:59

## 2023-11-29 RX ADMIN — PROPOFOL 130 MG: 10 INJECTION, EMULSION INTRAVENOUS at 09:31

## 2023-11-29 RX ADMIN — Medication 50 MCG: at 09:59

## 2023-11-29 RX ADMIN — SODIUM CHLORIDE, SODIUM LACTATE, POTASSIUM CHLORIDE, AND CALCIUM CHLORIDE: .6; .31; .03; .02 INJECTION, SOLUTION INTRAVENOUS at 09:20

## 2023-11-29 RX ADMIN — CEFAZOLIN SODIUM 2000 MG: 2 SOLUTION INTRAVENOUS at 09:32

## 2023-11-29 RX ADMIN — LIDOCAINE HYDROCHLORIDE 40 MG: 10 INJECTION, SOLUTION EPIDURAL; INFILTRATION; INTRACAUDAL at 09:31

## 2023-11-29 RX ADMIN — ONDANSETRON 4 MG: 2 INJECTION INTRAMUSCULAR; INTRAVENOUS at 11:02

## 2023-11-29 NOTE — ANESTHESIA POSTPROCEDURE EVALUATION
Post-Op Assessment Note    CV Status:  Stable  Pain Score: 0    Pain management: adequate       Mental Status:  Arousable   Hydration Status:  Euvolemic   PONV Controlled:  Controlled   Airway Patency:  Patent    No anethesia notable event occurred.     Staff: RENAN           BP   122/66   Temp     Pulse  60   Resp   14   SpO2 96% 3lpm nc

## 2023-11-29 NOTE — INTERVAL H&P NOTE
H&P reviewed. After examining the patient I find no changes in the patients condition since the H&P had been written.     Vitals:    11/29/23 0818   BP: 114/67   Pulse: 78   Resp: 18   Temp: (!) 97.4 °F (36.3 °C)   SpO2: 96%

## 2023-11-29 NOTE — DISCHARGE INSTR - AVS FIRST PAGE
You are being scheduled for a shoulder arthroscopy to treat your symptoms. Below are some instructions and information on what to expect before and after your surgery. Pre-Surgical Preparation for Arthroscopic Shoulder Surgery: You will be contacted the evening prior to your surgery to confirm the scheduled time of the procedure and when to arrive at the hospital.   Do not eat or drink anything after midnight the night before your surgery. Since you are having out-patient surgery, make sure that you have someone who can drive you home later in the day. Also, prepare that person for a long day, as the process of safely preparing for and recovering from the procedure is more time consuming than the actual procedure! As you will be in a sling after surgery, please wear or bring a loose fitting button-down shirt so that you can easily place this over the sling when you leave the surgical suite. This avoids having to place the operative arm in a sleeve. Most patients find that this is the easiest outfit to wear for the first week or so after surgery so you may want to plan accordingly. Most patients find that lying down in bed after shoulder surgery accentuates their discomfort. This is likely related to the effect of gravity on the swelling in the shoulder. As a result, most patients sleep better in a recliner or in bed with pillows propped up behind their back for the first few days or weeks after surgery. It is a good idea to plan for this ahead of time so there will be less hassle getting things set up the night after surgery. What to Expect After Arthroscopic Shoulder Surgery: It is normal to have swelling and discomfort in the shoulder for several days or a week after surgery. It is also normal to have a small amount of drainage from the surgical wounds (especially the first few days after surgery), as we put fluid into the shoulder to visualize the structures during surgery. It is NOT normal to have foul smelling, purulent drainage and if this is noted, please contact the office immediately or proceed to the emergency room for evaluation as this may indicate an infection. Applying ice bags to the shoulder may help with pain that is not controlled by the regional block. Ice should be applied 20-30 minutes at a time, every hour or two. Make sure to put a thin towel or T-shirt next to your skin to avoid direct contact of the ice with the skin. Icing is most helpful in the first 48 hours, although many people find that continuing past this time frame lessens their postoperative pain. Please note that your post-operative dressing is not conductive to ice, so if you need to, it is okay to remove that dressing even the night after surgery and place band-aids over the wounds in order for the ice to take effect. Pain Control    Most patients will receive a nerve block, the local anesthetic may keep your whole arm numb for up to 4 days. You will be given a prescription for narcotic pain medication when you are discharged from the hospital.  With the newer nerve block that is being utilized, patients are rarely requiring the use of this narcotic pain medication. If you find you do not tolerate that type of pain medicine well, call our office and we will try another one. In addition to the narcotic pain medication, it is safe to use an anti-inflammatory (unless the patient has a medical condition that would not allow safe use of this mediation). This includes the Advil, Motrin, Ibuprofen and Alleve category of medications. Simply follow the over the counter dosing on the package and use as indicated as another adjunct. Importantly since these medications are all very similar, use only one of them. Tylenol is a separate medication that can be utilized as well and can be taken at the same time as the other medication or given in a "staggered" manner.   Just make sure that you follow the dosing on the over the counter bottle instructions. Also make sure that the pain medication prescribed by Dr Manasa Llanes team does not contain acetaminophen (this is found in Percocet and Vicodin). Typically we do not prescribe those types of pain medications but if for some reason that has been prescribed DO NOT add more Tylenol (acetaminophen) as you could end up taking too much of that medication. As mentioned above, most patients find that lying down accentuates their discomfort. You might sleep better in a recliner, or propped up in bed. Dr. Graham Song encourages patients to safely ambulate around the house as much as possible in the first few days after the procedure as this can help with blood circulation in the legs. While the incidence of blood clots is very rare following shoulder surgery, early ambulation is a great way to help decrease the already low rate. 24 hours after the surgery you may remove the bandage and cover incisions with Band-Aids if needed. At that time you may shower, the wounds will have a surgical glue that will protect them from shower water but do not submerge your incisions directly (bathing or swimming) until at least 2 weeks post-operatively. It is safe to let the arm hang at the side and take a shower and put on a shirt without the sling on. Just make sure that you do not use the operative are to reach out and grab anything as that may damage the repair. When you are done showering and getting dressed please return the operative arm to the sling. Unless noted otherwise in your discharge paperwork, Dr. Graham Song uses absorbable sutures so they do not need to be removed. Dr. Miki Negro physician assistant (PA) will see you in the office a few days after the procedure to review the intra-operative findings and to initiate physical therapy if appropriate.   A post-operative appointment should have been scheduled for you already, but if for some reason this did not happen, please call the office to make one. Physical therapy is important after nearly all shoulder surgeries and a detailed rehabilitation plan based on the specific intra-operative findings and procedures will be provided to your therapist at the first post-operative office visit. Most patients have post-operative therapy appointments scheduled pre-operatively, but if you do not, that will be handled at the first post-operative office visit. Unless expressly directed otherwise it is safe to remove the sling even the first day after the surgery and let the arm hang by the side. This allows patients to shower and even put a shirt on (bad arm in the sleeve first). It is also safe to flex and extend their wrist, hand and fingers as much as possible when the block wears off. These simple motions can serve to pump fluid out of the forearm and decrease swelling in the arm.

## 2023-11-29 NOTE — OP NOTE
OPERATIVE REPORT  PATIENT NAME: Allison Farrell    :  1945  MRN: [id number]  Pt Location: [facility] OR [facility] 06    SURGERY DATE: 2023     SURGEON: Lisa Krueger MD     RESIDENT ASSITANT: Hebert Da Silva MD PGY-5 Assisted in the procedure. Elio Milian MD, was present for the entire procedure and was scrubbed for and performed all the key and essential components of the procedure. PREOPERATIVE DIAGNOSIS:  Left Shoulder Supraspinatus Tear    POSTOPERATIVE DIAGNOSIS: Left Shoulder Supraspinatus Tear    PROCEDURES: Surgical Arthroscopy Left Shoulder with Rotator Cuff Repair, Extensive Debridement, and Subacromial Decompression    ANESTHESIA STAFF: Jose Manuel Larson MD     ANESTHESIA TYPE: General LMA with ultrasound guided interscalene block (Exparel). The interscalene block was provided by the anesthesia staff per my request for postoperative pain control and to decrease the use of postoperative narcotic medication for pain control. COMPLICATIONS: None    FINDINGS: Supraspinatus Tear    SPECIMEN(S):  None    ESTIMATED BLOOD LOSS: Minimal    INDICATION:  Briefly, the patient is a 70 y.o.  male with left shoulder pain. MRI scan confirmed a partial thickness supraspinatus tear. The patient elected for arthroscopic treatment. Informed consent was obtained after a thorough discussion of the risks and benefits of the procedure, as well as alternatives to the procedure. OPERATIVE TECHNIQUE:  On the day of surgery, I identified the patient’s left shoulder and marked it with my initials. The patient was taken to the operating room where anesthesia was induced and 2 grams of IV Cefazolin were given. The patient was examined in the supine position and was found to have full range of motion of the left shoulder with no instability . The patient was then positioned in the [address] [place] [address] chair position. All bony prominences were padded.  The shoulder was prepped and draped in normal sterile fashion. After a time-out for safety, a standard posterior arthroscopic portal was made. Glenohumeral evaluation revealed early degenerative changes humeral head and glenoid with no full-thickness cartilage loss and no intra-articular bodies. There was an abnormal appearance to the posterior supraspinatus suggestive of a near full-thickness bursal sided tear. The infraspinatus and subscap were intact as was the long head of biceps, there was some degenerative change of the superior anterior and posterior glenoid labrum. Shaver device introduced through an anterior cannula and extensive debridement of glenohumeral joint was performed including the superior labrum at the long head biceps tendon anchor complex, the anterior and posterior glenoid labrum as well as the humeral head and glenoid articular cartilage. After the intra-articular work was completed, the scope was then placed in the subacromial space through a posterolateral portal where a thorough bursectomy was performed. The bursal sided partial thickness supraspinatus tear was identified and the location of the abnormality seen intra-articularly and indeed there were some articular sided fibers still remaining so these were released converting this to a full-thickness tear. After release this tear was found to measure 1.0 cm from anterior to posterior and was able to be easily reduced to the tuberosity but did have significant degeneration of the tendon fibers. The tuberosity was prepared in routine fashion and a repair of the supraspinatus was performed with a single 2.6 mm triple loaded Arthrex All-Suture anchor using a combination of horizontal mattress and simple stiches through the tendon was performed achieving anatomic reduction of the rotator cuff tendon to the tuberosity. The long head of biceps was not indicated for tenodesis given intra-operative appearance.  The CA ligament was frayed so it was released off the anterolateral edge of acromion and a gentle acromioplasty was performed. The area was then irrigated. Scope was withdrawn. Wounds were closed with 4-0 Monocryl and Histoacryl. Sterile dressings and a sling with an abduction pillow was placed. The patient was awoken without complication and returned to the recovery room in good condition. We will see the patient back in the office next week to initiate therapy following standard rotator cuff repair rehabilitation protocol. At the end of procedure, the counts were correct.      PATIENT DISPOSITION:  Stable to PACU      SIGNATURE: Dulce Cha MD  DATE: November 29, 2023  TIME: 10:32 AM

## 2023-11-29 NOTE — ANESTHESIA PREPROCEDURE EVALUATION
Procedure:  SHOULDER ARTHROSCOPIC ROTATOR CUFF REPAIR (Left: Shoulder)    Relevant Problems   CARDIO   (+) Other forms of angina pectoris      GI/HEPATIC   (+) Gastroesophageal reflux disease with esophagitis   (+) Hiatal hernia      /RENAL   (+) Atypical small acinar proliferation of prostate   (+) Benign localized prostatic hyperplasia with lower urinary tract symptoms (LUTS)      MUSCULOSKELETAL   (+) Hiatal hernia      NPO >8hrs  (-) URI  Physical Exam    Airway    Mallampati score: II  TM Distance: >3 FB  Neck ROM: full     Dental   No notable dental hx     Cardiovascular      Pulmonary      Other Findings        Anesthesia Plan  ASA Score- 2     Anesthesia Type- general with ASA Monitors. Additional Monitors:     Airway Plan: LMA. Comment: Pre-op interscalene nerve block with exparel. Plan Factors-Exercise tolerance (METS): >4 METS. Chart reviewed. Patient summary reviewed. Induction- intravenous. Postoperative Plan- Plan for postoperative opioid use. Planned trial extubation    Informed Consent- Anesthetic plan and risks discussed with patient. I personally reviewed this patient with the CRNA. Discussed and agreed on the Anesthesia Plan with the CRNA. Collins Kinney

## 2023-12-04 ENCOUNTER — EVALUATION (OUTPATIENT)
Dept: PHYSICAL THERAPY | Facility: CLINIC | Age: 78
End: 2023-12-04
Payer: MEDICARE

## 2023-12-04 ENCOUNTER — OFFICE VISIT (OUTPATIENT)
Dept: OBGYN CLINIC | Facility: OTHER | Age: 78
End: 2023-12-04

## 2023-12-04 VITALS
HEIGHT: 70 IN | WEIGHT: 171.96 LBS | HEART RATE: 80 BPM | DIASTOLIC BLOOD PRESSURE: 73 MMHG | BODY MASS INDEX: 24.62 KG/M2 | SYSTOLIC BLOOD PRESSURE: 115 MMHG

## 2023-12-04 DIAGNOSIS — M75.112 INCOMPLETE TEAR OF LEFT ROTATOR CUFF, UNSPECIFIED WHETHER TRAUMATIC: Primary | ICD-10-CM

## 2023-12-04 DIAGNOSIS — M75.112 INCOMPLETE TEAR OF LEFT ROTATOR CUFF, UNSPECIFIED WHETHER TRAUMATIC: ICD-10-CM

## 2023-12-04 DIAGNOSIS — M25.512 ACUTE PAIN OF LEFT SHOULDER: ICD-10-CM

## 2023-12-04 PROCEDURE — 97140 MANUAL THERAPY 1/> REGIONS: CPT

## 2023-12-04 PROCEDURE — 97110 THERAPEUTIC EXERCISES: CPT

## 2023-12-04 PROCEDURE — 99024 POSTOP FOLLOW-UP VISIT: CPT | Performed by: ORTHOPAEDIC SURGERY

## 2023-12-04 PROCEDURE — 97161 PT EVAL LOW COMPLEX 20 MIN: CPT

## 2023-12-04 NOTE — PROGRESS NOTES
Assessment:  1. Partial thickness tear of left supraspinatus tendon              Surgery: Shoulder Arthroscopic Rotator Cuff Repair Extensive Debridment - Left  Date of Surgery: 11/29/2023        Discussion and Plan:   The patient looks great and his pain is well-controlled following his arthroscopic rotator cuff repair of his left shoulder. He is initiated into physical therapy and will follow the standard rehab protocol with follow-up in 2 months or sooner if there are any issues. Intraoperative findings and photographs were reviewed. Follow Up:  2 months        CHIEF COMPLAINT:  Chief Complaint   Patient presents with    Left Shoulder - Post-op         SUBJECTIVE:  Delfin Kuhn is a 70y.o. year old male who presents for follow up after Shoulder Arthroscopic Rotator Cuff Repair Extensive Debridment - Left. Today patient has No Complaints.        PHYSICAL EXAMINATION:  General: well developed and well nourished, alert, oriented times 3, and appears comfortable  Psychiatric: Normal    MUSCULOSKELETAL EXAMINATION:  Incision: Clean, dry, intact  Surgery Site: normal, no evidence of infection   Range of Motion: As expected  Neurovascular status: Neuro intact, good cap refill      Scribe Attestation      I,:  Mamadou Ospina am acting as a scribe while in the presence of the attending physician.:       I,:  Ansley Bell MD personally performed the services described in this documentation    as scribed in my presence.:

## 2023-12-04 NOTE — PROGRESS NOTES
PT Evaluation    Today's date: 2023   Patient name: Nitish Key  : 1945  MRN: [id number]  Referring provider: No ref. provider found  Dx:   Encounter Diagnosis     ICD-10-CM    1. Incomplete tear of left rotator cuff, unspecified whether traumatic  M75.112       2. Acute pain of left shoulder  M25.512               Subjective Evaluation     History of Present Illness    Patient presents with c/o L shoulder pain from a L incomplete RTC repair on 23. Pt reported that his sx started about 1 year ago while golfing. Pt reported that his sx increase with adjustment his sling. Pt has a PMH of R RTC (8 year ago). Neuro signs: None  Bowel and bladder sxs: Normal  Red flags: Normal  Occupation: Working, Pet store owner     Pain  At best pain ratin/10  At worst pain rating: 3/10  Location: L shoulder     Social Support  Lives with his wife in a 2 story home. Patient Goals  Patient goals for therapy: Golfing    STGs  1. Decrease pain by 20% in 2-4 weeks to improve standing tolerance. 2. Improve L shoulder PROM by 10 degrees in 2-4 weeks. 3. (I) with HEP within 2-4 weeks in order to improve PT outcomes. LTGs  1. Decrease pain by 60% in 6-8 weeks. 2. Increase L shoulder AROM WNL and L shoulder MMT 5/5 within 6-8 weeks. 3. Perform job/dressing/showering activities independently without pain in 6-8 weeks. 4. Improve FOTO to greater than or equal to 59.         Objective Measurements:    Shoulder  A/PROM R L Strength   Shoulder R L   Flex  WNL NT Flex 5 NT   Abd WNL NT Abd 5 NT   ER WNL NT ER 5 NT   IR WNL NT IR 5 NT           Wrist AROM        Flexion WNL WNL      Extension WNL WNL      Supination WNL 42 deg      Pronation WNL 90 deg      UD WNL WNL      RD WNL WNL      Elbow PROM/  AROM        Flexion  deg/   NT      Extension WNL -20 deg/   NT            Assessment:    Nitish Key is a pleasant 70 y.o. male who is attending skilled PT for L shoulder pain from a S/P L incomplete RTC repair on 11/29/23. Pt demonstrated deficits in L shoulder, elbow, and wrist ROM/MMT. Pt scored a 28 on FOTO. Manual therapy focused on increasing L elbow/wrist PROM. Pt was educated and demonstrated understanding of HEP, POC, protocol, and benefit of skill PT. Pt would benefit from further skilled PT in order to decrease pain, address deficits (ROM/MMT), help pt achieve goals, and progress program as tolerated. Thank you for the referral!    Plan  Patient would benefit from:Skilled physical therapy  Planned therapy interventions: manual therapy, neuromuscular re-education, stretching, strengthening, therapeutic activities, therapeutic exercise, patient education, home exercise program, modalities to decrease pain, and activity modification. Frequency: 2x week  Duration in weeks: 8  Treatment plan discussed with: patient       Goals: Patient's goal is to return to playing golf.      Precautions: SEE ATTACHED PROTOCOL  Dx: L RTC repair      Daily Treatment Diary     Manuals 12/4/2023        L elbow PROM MS        L shoulder PROM  NV (within protocol)                         Ther Ex         Scap squeeze 5"x10        Towel squeeze 20x        L UT str 15"x4        C/S ret AROM 5"x10        Wrist circles 10x        L Elbow AAROM 10x AROM       Pendulums                   Pt Edu POC/HEP  /protocol  Pete Gallo        Neuro Re-ed                                                      Ther Activity                                    Gait Training                           Modalities         Ice Deferred

## 2023-12-06 ENCOUNTER — OFFICE VISIT (OUTPATIENT)
Dept: PHYSICAL THERAPY | Facility: CLINIC | Age: 78
End: 2023-12-06
Payer: MEDICARE

## 2023-12-06 DIAGNOSIS — M75.112 INCOMPLETE TEAR OF LEFT ROTATOR CUFF, UNSPECIFIED WHETHER TRAUMATIC: ICD-10-CM

## 2023-12-06 DIAGNOSIS — M25.512 ACUTE PAIN OF LEFT SHOULDER: Primary | ICD-10-CM

## 2023-12-06 PROCEDURE — 97110 THERAPEUTIC EXERCISES: CPT | Performed by: PHYSICAL THERAPIST

## 2023-12-06 PROCEDURE — 97140 MANUAL THERAPY 1/> REGIONS: CPT | Performed by: PHYSICAL THERAPIST

## 2023-12-06 NOTE — PROGRESS NOTES
Daily Note     Today's date: 2023  Patient name: Jordyn Holcomb  : 1945  MRN: [id number]  Referring provider: No ref. provider found  Dx: No diagnosis found. Subjective: Patient reports that he is feeling well. Objective: See treatment diary below      Assessment: Tolerated treatment well. Patient would benefit from continued PT  Patient with PROM to limits allowed by protocol. Plan: Continue per plan of care. Goals: Patient's goal is to return to playing golf.      Precautions: SEE ATTACHED PROTOCOL:   DOS:  :  Flex:  90 deg;  ER:  35:  IR: to body;  ABD:  60 deg  Dx: L RTC repair      Daily Treatment Diary     Manuals 2023       L elbow PROM MS        L shoulder PROM  NV (within protocol)                         Ther Ex         Scap squeeze 5"x10 10 x :05       Towel squeeze 20x x20       L UT str 15"x4 4 x 15"       C/S ret AROM 5"x10        Wrist circles 10x x10       L Elbow AAROM 10x AROM x20       Pendulums   x20                Pt Edu POC/HEP  /protocol  Jose Miguel Gutierrez        Neuro Re-ed                                                      Ther Activity                                    Gait Training                           Modalities         Ice Deferred

## 2023-12-12 ENCOUNTER — OFFICE VISIT (OUTPATIENT)
Dept: PHYSICAL THERAPY | Facility: CLINIC | Age: 78
End: 2023-12-12
Payer: MEDICARE

## 2023-12-12 DIAGNOSIS — M25.512 ACUTE PAIN OF LEFT SHOULDER: Primary | ICD-10-CM

## 2023-12-12 DIAGNOSIS — M75.112 INCOMPLETE TEAR OF LEFT ROTATOR CUFF, UNSPECIFIED WHETHER TRAUMATIC: ICD-10-CM

## 2023-12-12 PROCEDURE — 97110 THERAPEUTIC EXERCISES: CPT

## 2023-12-12 PROCEDURE — 97140 MANUAL THERAPY 1/> REGIONS: CPT

## 2023-12-12 NOTE — PROGRESS NOTES
Daily Note     Today's date: 2023  Patient name: Jayla Hollins  : 1945  MRN: [id number]  Referring provider: No ref. provider found  Dx: No diagnosis found. Subjective: patient feeling well and maximally compliant with HEP. Has reached full ROM within MD protocol. Objective: See treatment diary below      Assessment: Tolerated treatment well. Patient exhibited good technique with therapeutic exercises      Plan: Continue per plan of care. Goals: Patient's goal is to return to playing golf.      Precautions: SEE ATTACHED PROTOCOL:   DOS:  :  Flex:  90 deg;  ER:  35:  IR: to body;  ABD:  60 deg  Dx: L RTC repair      Daily Treatment Diary     Manuals 2023      L elbow PROM MS        L shoulder PROM  NV (within protocol) 10                         Ther Ex         Scap squeeze 5"x10 10 x :05 5" x 20       Towel squeeze 20x x20 Yellow 3 min       L UT str 15"x4 4 x 15" 4 x 20"       C/S ret AROM 5"x10        Wrist circles 10x x10       L Elbow AAROM 10x AROM x20 X 20       Pendulums   x20  X 20                Pt Edu POC/HEP  /protocol  /ice        Neuro Re-ed                                                      Ther Activity                                    Gait Training                           Modalities         Ice Deferred

## 2023-12-14 ENCOUNTER — APPOINTMENT (OUTPATIENT)
Dept: PHYSICAL THERAPY | Facility: CLINIC | Age: 78
End: 2023-12-14
Payer: MEDICARE

## 2023-12-17 ENCOUNTER — OFFICE VISIT (OUTPATIENT)
Dept: URGENT CARE | Age: 78
End: 2023-12-17
Payer: MEDICARE

## 2023-12-17 VITALS
TEMPERATURE: 98.7 F | DIASTOLIC BLOOD PRESSURE: 80 MMHG | HEART RATE: 75 BPM | OXYGEN SATURATION: 96 % | RESPIRATION RATE: 18 BRPM | SYSTOLIC BLOOD PRESSURE: 130 MMHG

## 2023-12-17 DIAGNOSIS — R59.9 SWOLLEN LYMPH NODES: Primary | ICD-10-CM

## 2023-12-17 PROCEDURE — G0463 HOSPITAL OUTPT CLINIC VISIT: HCPCS | Performed by: STUDENT IN AN ORGANIZED HEALTH CARE EDUCATION/TRAINING PROGRAM

## 2023-12-17 PROCEDURE — 99213 OFFICE O/P EST LOW 20 MIN: CPT | Performed by: STUDENT IN AN ORGANIZED HEALTH CARE EDUCATION/TRAINING PROGRAM

## 2023-12-17 RX ORDER — AMOXICILLIN AND CLAVULANATE POTASSIUM 875; 125 MG/1; MG/1
1 TABLET, FILM COATED ORAL EVERY 12 HOURS SCHEDULED
Qty: 14 TABLET | Refills: 0 | Status: SHIPPED | OUTPATIENT
Start: 2023-12-20 | End: 2023-12-19

## 2023-12-18 ENCOUNTER — APPOINTMENT (OUTPATIENT)
Dept: PHYSICAL THERAPY | Facility: CLINIC | Age: 78
End: 2023-12-18
Payer: MEDICARE

## 2023-12-18 NOTE — PROGRESS NOTES
[facility]        NAME: Eladio Ayala is a 78 y.o. male  : 1945    MRN: [id number]  DATE: 2023  TIME: 9:10 PM    Assessment and Plan   Swollen lymph nodes [R59.9]  1. Swollen lymph nodes  amoxicillin-clavulanate (AUGMENTIN) 875-125 mg per tablet      With recent sick exposures and currently with sore throat and left ear discomfort, discussed that most likely this is a reactive lymph node due to viral illness/exposure to virus from family members.  However, as he has had been having symptoms longer than this, will cover for bacterial lymphadenitis if he is not starting to gradually improve in the coming days.  Provided paper prescription which may be filled on .  If his symptoms do not fully resolve, he will follow-up with his PCP for further evaluation of his enlarged lymph node.      Patient Instructions   Please use warm compresses, motrin, tylenol, warm salt water gargles, and warm tea with honey for the next few days.  If your symptoms are worsening rather than improving with increased swelling of the lymph node, fever, chills, you may take the antibiotic.  If your symptoms are starting to improve, please do not take the antibiotic as all antibiotics have potential side effects.  If your symptoms are persistent, please follow up with your PCP.    Follow up with PCP in 3-5 days.  Proceed to  ER if symptoms worsen.    Chief Complaint     Chief Complaint   Patient presents with    Cold Like Symptoms     For a few weeks now patient has felt a swollen gland on the left side. He states that the pain is radiating to his left ear and feels pain on the left side of his throat when swallowing. He notes intermittent HA's but denies head and chest congestion.          History of Present Illness       Patient reports several weeks of fluctuating swollen lymph node on his left neck.  He notes that has been worse over the past several days.  He is also feeling a sore throat on the left  side, some radiation into his ear with discomfort.  He does have sick exposure of his granddaughter and wife who recently had viral symptoms about 1 week ago.  Currently, he does not have any fevers, chills, congestion, cough.  Remote smoking history.  He feels that the lymph node has gone up and down in size rather than consistently growing.        Review of Systems   Review of Systems   All other systems reviewed and are negative.        Current Medications       Current Outpatient Medications:     [START ON 12/20/2023] amoxicillin-clavulanate (AUGMENTIN) 875-125 mg per tablet, Take 1 tablet by mouth every 12 (twelve) hours for 7 days Do not start before December 20, 2023., Disp: 14 tablet, Rfl: 0    clindamycin (CLEOCIN T) 1 % lotion, APPLY TO AFFECTED AREA ON THE FACE ONCE DAILY, Disp: , Rfl:     desoximetasone (TOPICORT) 0.25 % cream, APPLY TWICE DAILY TO AFFECTED AREA FOR 2-4 WEEKS MAX AS NEEDED, Disp: , Rfl:     famotidine (PEPCID) 40 MG tablet, Take 1 tablet (40 mg total) by mouth 2 (two) times a day as needed for heartburn, Disp: 180 tablet, Rfl: 1    ibuprofen (MOTRIN) 600 mg tablet, every 8 (eight) hours as needed, Disp: , Rfl:     oxyCODONE (ROXICODONE) 5 immediate release tablet, 1 tablets every 6 hours as needed for severe shoulder pain., Disp: 13 tablet, Rfl: 0    tamsulosin (FLOMAX) 0.4 mg, Take 1 capsule (0.4 mg total) by mouth daily at bedtime, Disp: 90 capsule, Rfl: 1    tiZANidine (ZANAFLEX) 4 mg tablet, Take 1 tablet (4 mg total) by mouth every 8 (eight) hours as needed for muscle spasms, Disp: 90 tablet, Rfl: 0    Current Allergies     Allergies as of 12/17/2023    (No Known Allergies)            The following portions of the patient's history were reviewed and updated as appropriate: allergies, current medications, past family history, past medical history, past social history, past surgical history and problem list.     Past Medical History:   Diagnosis Date    Arthritis 1/1/2010    Basal  cell carcinoma of shoulder     last assessed: 08/28/2014    Depression     last assessed: 08/26/2015    GERD (gastroesophageal reflux disease) 1/1/2000    Headache(784.0) 1/1/1980    HL (hearing loss) 1/1/2010    Subconjunctival hemorrhage of left eye     last assessed: 01/14/2016       Past Surgical History:   Procedure Laterality Date    COLONOSCOPY      HERNIA REPAIR      NC SURGICAL ARTHROSCOPY SHOULDER W/ROTATOR CUFF RPR Left 11/29/2023    Procedure: SHOULDER ARTHROSCOPIC ROTATOR CUFF REPAIR EXTENSIVE DEBRIDMENT;  Surgeon: Steven Hamilton MD;  [facility]: [facility] MA[facility] OR;  Service: Orthopedics    ROTATOR CUFF REPAIR Right 2015       Family History   Problem Relation Age of Onset    Heart disease Mother         cardiac disorder    Kidney disease Mother     Prostate cancer Father     Hearing loss Sister          Medications have been verified.        Objective   /80   Pulse 75   Temp 98.7 °F (37.1 °C)   Resp 18   SpO2 96%   No LMP for male patient.       Physical Exam     Physical Exam  Vitals and nursing note reviewed.   Constitutional:       General: He is not in acute distress.     Appearance: Normal appearance. He is not ill-appearing or toxic-appearing.   HENT:      Head: Normocephalic and atraumatic.      Right Ear: Tympanic membrane, ear canal and external ear normal.      Left Ear: Tympanic membrane, ear canal and external ear normal.      Nose: Nose normal.      Mouth/Throat:      Mouth: Mucous membranes are moist.   Eyes:      Extraocular Movements: Extraocular movements intact.   Cardiovascular:      Rate and Rhythm: Normal rate and regular rhythm.      Heart sounds: Normal heart sounds.   Pulmonary:      Effort: Pulmonary effort is normal. No respiratory distress.      Breath sounds: Normal breath sounds. No stridor. No wheezing, rhonchi or rales.   Lymphadenopathy:      Cervical: Cervical adenopathy (Enlarged node approximately 2.5cm diameter to left anterior neck, tender) present.    Skin:     General: Skin is warm and dry.      Capillary Refill: Capillary refill takes less than 2 seconds.      Findings: No erythema or rash.   Neurological:      Mental Status: He is alert.      Gait: Gait normal.   Psychiatric:         Behavior: Behavior normal.

## 2023-12-18 NOTE — PATIENT INSTRUCTIONS
Please use warm compresses, motrin, tylenol, warm salt water gargles, and warm tea with honey for the next few days.  If your symptoms are worsening rather than improving with increased swelling of the lymph node, fever, chills, you may take the antibiotic.  If your symptoms are starting to improve, please do not take the antibiotic as all antibiotics have potential side effects.  If your symptoms are persistent, please follow up with your PCP.

## 2023-12-19 ENCOUNTER — OFFICE VISIT (OUTPATIENT)
Dept: INTERNAL MEDICINE CLINIC | Facility: CLINIC | Age: 78
End: 2023-12-19
Payer: MEDICARE

## 2023-12-19 ENCOUNTER — TELEPHONE (OUTPATIENT)
Dept: INTERNAL MEDICINE CLINIC | Facility: CLINIC | Age: 78
End: 2023-12-19

## 2023-12-19 VITALS
SYSTOLIC BLOOD PRESSURE: 118 MMHG | HEART RATE: 85 BPM | OXYGEN SATURATION: 98 % | DIASTOLIC BLOOD PRESSURE: 82 MMHG | TEMPERATURE: 98 F

## 2023-12-19 DIAGNOSIS — J02.8 ACUTE BACTERIAL PHARYNGITIS: Primary | ICD-10-CM

## 2023-12-19 DIAGNOSIS — B96.89 ACUTE BACTERIAL PHARYNGITIS: Primary | ICD-10-CM

## 2023-12-19 LAB — S PYO AG THROAT QL: POSITIVE

## 2023-12-19 PROCEDURE — 99213 OFFICE O/P EST LOW 20 MIN: CPT | Performed by: INTERNAL MEDICINE

## 2023-12-19 PROCEDURE — 87880 STREP A ASSAY W/OPTIC: CPT | Performed by: INTERNAL MEDICINE

## 2023-12-19 RX ORDER — AMOXICILLIN 875 MG/1
875 TABLET, COATED ORAL 2 TIMES DAILY
Qty: 20 TABLET | Refills: 0 | Status: SHIPPED | OUTPATIENT
Start: 2023-12-19 | End: 2023-12-29

## 2023-12-19 NOTE — TELEPHONE ENCOUNTER
Can you please triage these calls and if a patient is requesting an appointment the most appropriate thing to do would be to return their call and schedule them. Unless you have a specific question for me about this? If there are no available appointments at [facility] or with me, you may want to refer to [facility] as I understand there are some openings there, or if none are available they can be seen at urgent care.

## 2023-12-19 NOTE — TELEPHONE ENCOUNTER
This is Martha Ayala calling and I'm calling for my  Eladio. He's got a really bad, it's not really a sore throat, but it's the lymph node in his neck and he at this point kind of can't swallow or it's very difficult talking. So I was wondering if somebody could see him. So if you could give me a call back at [phone], I that would be greatly appreciated. Thank you.

## 2023-12-19 NOTE — PROGRESS NOTES
Name: Eladio Ayala      : 1945      MRN: [id number]  Encounter Provider: Noam Willett DO  Encounter Date: 2023   Encounter department: [facility] INTERNAL MEDICINE    Assessment & Plan     1. Acute bacterial pharyngitis  Comments:  strep test is positive, will treat with amoxicillin for bacterial pharyngitis.  precautions advised  Orders:  -     amoxicillin (AMOXIL) 875 mg tablet; Take 1 tablet (875 mg total) by mouth 2 (two) times a day for 10 days  -     POCT rapid strepA           Subjective      HPI    Here with c/o 5-6 days of left side sore throat and painful swallowing.  No fever, cough or congestion.  He had some sick contacts a few weeks ago at home but none since.  He went to  2 days ago and was told he has lymphadenitis, no strep testing was done.  He was prescribed augmentin by  but was told not to start it till tomorrow.  He used to see Dr Ngo as PCP.  Rapid strep testing positive in the office.  ROS Otherwise negative, no other complaints.    Review of Systems   Constitutional:  Negative for chills and fever.   HENT:  Positive for sore throat. Negative for congestion, postnasal drip and rhinorrhea.    Eyes:  Negative for visual disturbance.   Respiratory:  Negative for cough.    Gastrointestinal:  Negative for abdominal pain and diarrhea.   Skin:  Negative for rash.   Allergic/Immunologic: Negative for immunocompromised state.   Hematological:  Positive for adenopathy.       Current Outpatient Medications on File Prior to Visit   Medication Sig    clindamycin (CLEOCIN T) 1 % lotion APPLY TO AFFECTED AREA ON THE FACE ONCE DAILY    desoximetasone (TOPICORT) 0.25 % cream APPLY TWICE DAILY TO AFFECTED AREA FOR 2-4 WEEKS MAX AS NEEDED    famotidine (PEPCID) 40 MG tablet Take 1 tablet (40 mg total) by mouth 2 (two) times a day as needed for heartburn    ibuprofen (MOTRIN) 600 mg tablet every 8 (eight) hours as needed    oxyCODONE (ROXICODONE) 5 immediate release tablet 1  tablets every 6 hours as needed for severe shoulder pain.    tamsulosin (FLOMAX) 0.4 mg Take 1 capsule (0.4 mg total) by mouth daily at bedtime    tiZANidine (ZANAFLEX) 4 mg tablet Take 1 tablet (4 mg total) by mouth every 8 (eight) hours as needed for muscle spasms    [DISCONTINUED] amoxicillin-clavulanate (AUGMENTIN) 875-125 mg per tablet Take 1 tablet by mouth every 12 (twelve) hours for 7 days Do not start before December 20, 2023.       Objective     /82 (BP Location: Left arm, Patient Position: Sitting, Cuff Size: Standard)   Pulse 85   Temp 98 °F (36.7 °C)   SpO2 98%     Physical Exam  Vitals reviewed.   Constitutional:       General: He is not in acute distress.     Appearance: He is ill-appearing.   HENT:      Head: Normocephalic and atraumatic.      Right Ear: Tympanic membrane normal.      Left Ear: Tympanic membrane normal.      Nose: No congestion.      Mouth/Throat:      Mouth: Mucous membranes are moist.      Pharynx: Posterior oropharyngeal erythema present.   Eyes:      Conjunctiva/sclera: Conjunctivae normal.   Cardiovascular:      Rate and Rhythm: Normal rate and regular rhythm.      Heart sounds: No murmur heard.  Pulmonary:      Effort: Pulmonary effort is normal.      Breath sounds: No wheezing or rales.   Musculoskeletal:      Right lower leg: No edema.      Left lower leg: No edema.      Comments: Left arm in brace   Lymphadenopathy:      Cervical: No cervical adenopathy (but tender in left anterior cervical chain x1).   Neurological:      Mental Status: He is alert.   Psychiatric:         Mood and Affect: Mood normal.         Behavior: Behavior normal.       Results for orders placed or performed in visit on 12/19/23   POCT rapid strepA   Result Value Ref Range     RAPID STREP A Positive (A) Negative         Noam Willett DO

## 2023-12-19 NOTE — PATIENT INSTRUCTIONS
Take amoxicillin for treatment  Rest, try to stay hydrated  Sore throat lozenges  If symptoms worsen, go to ER    Pharyngitis   AMBULATORY CARE:   Pharyngitis , or sore throat, is inflammation of the tissues and structures in your pharynx (throat). Pharyngitis is most often caused by bacteria or a virus. Other causes include smoking, allergies, or acid reflux.  Signs and symptoms  depend on the cause of your pharyngitis. You may have any of the following:  Sore throat or pain when you swallow    Fever, chills, and body aches    Hoarse or raspy voice    Cough, runny or stuffy nose, itchy or watery eyes    Headache    Upset stomach and loss of appetite    Whitish-yellow patches on the back of the throat    Tender, swollen lumps on the sides of the neck    Call your local emergency number (911 in the ) if:   You have trouble breathing or swallowing because your throat is swollen.      Seek care immediately if:   You are drooling because it hurts too much to swallow.    Your fever is higher than 102?F (39?C) or lasts longer than 3 days.    You are confused.    You taste blood.    Call your doctor if:   Your throat pain gets worse.    You have a painful lump in your throat that does not go away after 5 days.    Your symptoms do not improve after 5 days.    You have questions or concerns about your condition or care.    Treatment:  Viral pharyngitis will go away on its own without treatment. Your sore throat should start to feel better in 3 to 5 days. You may need any of the following:  Antibiotics  treat a bacterial infection.    NSAIDs  help decrease swelling and pain or fever. This medicine is available with or without a doctor's order. NSAIDs can cause stomach bleeding or kidney problems in certain people. If you take blood thinner medicine, always ask your healthcare provider if NSAIDs are safe for you. Always read the medicine label and follow directions.    Acetaminophen  decreases pain and fever. It is available  without a doctor's order. Ask how much to take and how often to take it. Follow directions. Read the labels of all other medicines you are using to see if they also contain acetaminophen, or ask your doctor or pharmacist. Acetaminophen can cause liver damage if not taken correctly.    Manage your symptoms:   Gargle salt water.  Mix ¼ teaspoon salt in an 8 ounce glass of warm water and gargle. Do not swallow. Salt water may help decrease swelling in your throat.    Drink liquids as directed.  You may need to drink more liquids than usual. Liquids may help soothe your throat and prevent dehydration. Ask how much liquid to drink each day and which liquids are best for you.    Use a cool mist humidifier.  This will add moisture to the air and help decrease your cough.    Soothe your throat.  Cough drops, ice, soft foods, or popsicles may help decrease throat pain.    Prevent the spread of pharyngitis:  Cover your mouth and nose when you cough or sneeze. Do not share food or drinks. Wash your hands often. Use soap and water. If soap and water are unavailable, use an alcohol-based hand .       Follow up with your doctor as directed:  Write down your questions so you remember to ask them during your visits.  © Copyright [employer] 2023 Information is for End User's use only and may not be sold, redistributed or otherwise used for commercial purposes.  The above information is an  only. It is not intended as medical advice for individual conditions or treatments. Talk to your doctor, nurse or pharmacist before following any medical regimen to see if it is safe and effective for you.

## 2023-12-21 ENCOUNTER — APPOINTMENT (OUTPATIENT)
Dept: PHYSICAL THERAPY | Facility: CLINIC | Age: 78
End: 2023-12-21
Payer: MEDICARE

## 2023-12-26 ENCOUNTER — OFFICE VISIT (OUTPATIENT)
Dept: PHYSICAL THERAPY | Facility: CLINIC | Age: 78
End: 2023-12-26
Payer: MEDICARE

## 2023-12-26 DIAGNOSIS — M25.512 ACUTE PAIN OF LEFT SHOULDER: Primary | ICD-10-CM

## 2023-12-26 DIAGNOSIS — M75.112 INCOMPLETE TEAR OF LEFT ROTATOR CUFF, UNSPECIFIED WHETHER TRAUMATIC: ICD-10-CM

## 2023-12-26 PROCEDURE — 97140 MANUAL THERAPY 1/> REGIONS: CPT

## 2023-12-26 PROCEDURE — 97110 THERAPEUTIC EXERCISES: CPT

## 2023-12-26 NOTE — PROGRESS NOTES
"Daily Note     Today's date: 2023  Patient name: Eladio Ayala  : 1945  MRN: [id number]  Referring provider: No ref. provider found  Dx:   Encounter Diagnosis     ICD-10-CM    1. Acute pain of left shoulder  M25.512       2. Incomplete tear of left rotator cuff, unspecified whether traumatic  M75.112           Start Time: 1005  Stop Time: 1030  Total time in clinic (min): 25 minutes    Subjective: Pt reported no pain but minor ache in the L arm at night.       Objective: See treatment diary below      Assessment: Tolerated treatment well. Program started with pt's HEP. Pt required VC in order to perform pundulums with correct form. Manual therapy focused on increasing L shoulder PROM within protocol. Pt deferred modalities. Patient would benefit from continued PT      Plan: Continue per plan of care.      Goals: Patient's goal is to return to playing golf.     Precautions: SEE ATTACHED PROTOCOL:   DOS:  :  Flex:  90 deg;  ER:  35:  IR: to body;  ABD:  60 deg  Dx: L RTC repair      Daily Treatment Diary     Manuals 2023     L elbow PROM MS        L shoulder PROM  NV (within protocol) 10  MS (within  Protocol)                       Ther Ex         Scap squeeze 5\"x10 10 x :05 5\" x 20  5\"x20     Towel squeeze 20x x20 Yellow 3 min       L UT str 15\"x4 4 x 15\" 4 x 20\"  4x20'     C/S ret AROM 5\"x10        Wrist circles 10x x10  20x     L Elbow AAROM 10x AROM x20 X 20  AROM, 20x     Pendulums   x20  X 20  x20              Pt Edu POC/HEP  /protocol  /ice   Precautions     Neuro Re-ed                                                      Ther Activity                                    Gait Training                           Modalities         Ice Deferred                            "

## 2023-12-29 ENCOUNTER — APPOINTMENT (OUTPATIENT)
Dept: PHYSICAL THERAPY | Facility: CLINIC | Age: 78
End: 2023-12-29
Payer: MEDICARE

## 2023-12-29 ENCOUNTER — OFFICE VISIT (OUTPATIENT)
Dept: PHYSICAL THERAPY | Facility: CLINIC | Age: 78
End: 2023-12-29
Payer: MEDICARE

## 2023-12-29 DIAGNOSIS — M25.512 ACUTE PAIN OF LEFT SHOULDER: Primary | ICD-10-CM

## 2023-12-29 DIAGNOSIS — M75.112 INCOMPLETE TEAR OF LEFT ROTATOR CUFF, UNSPECIFIED WHETHER TRAUMATIC: ICD-10-CM

## 2023-12-29 PROCEDURE — 97140 MANUAL THERAPY 1/> REGIONS: CPT

## 2023-12-29 PROCEDURE — 97110 THERAPEUTIC EXERCISES: CPT

## 2023-12-29 NOTE — PROGRESS NOTES
"Daily Note     Today's date: 2023  Patient name: Eladio Ayala  : 1945  MRN: [id number]  Referring provider: Steven Hamilton*  Dx:   Encounter Diagnosis     ICD-10-CM    1. Acute pain of left shoulder  M25.512       2. Incomplete tear of left rotator cuff, unspecified whether traumatic  M75.112           Start Time: 1030  Stop Time: 1114  Total time in clinic (min): 44 minutes    Subjective: Pt reported no pain and also noted that he has been performing his HEP.       Objective: See treatment diary below      Assessment: Tolerated treatment well. Program focused on increasing L shoulder ROM within pain free ROM. Manual therapy focused on increasing L shoulder PROM. Pt demonstrated increased L shoulder PROM. Ice was utilized post-treatment. Patient would benefit from continued PT      Plan: Continue per plan of care.      Goals: Patient's goal is to return to playing golf.     Precautions: SEE ATTACHED PROTOCOL:   DOS:      Dx: L RTC repair      Daily Treatment Diary     Manuals 2023    L elbow PROM MS        L shoulder PROM  NV (within protocol) 10  MS (within  Protocol) MS                      Ther Ex         Scap squeeze 5\"x10 10 x :05 5\" x 20  5\"x20     Towel squeeze 20x x20 Yellow 3 min       L UT str 15\"x4 4 x 15\" 4 x 20\"  4x20'     C/S ret AROM 5\"x10        Wrist circles 10x x10  20x     L Elbow AAROM 10x AROM x20 X 20  AROM, 20x     Pendulums   x20  X 20  x20 x20    Pulleys     5'    Cane shoulder flx AAROM (supine)     5\"x10    Cane shoulder abd AAROM (standing)     5\"x10    Cane shoulder ER  AAROM (standing)     5\"x10             Pt Edu POC/HEP  /protocol  /ice   Precautions     Neuro Re-ed                                                      Ther Activity                                    Gait Training                           Modalities         Ice Deferred    10'                          "

## 2024-01-02 ENCOUNTER — OFFICE VISIT (OUTPATIENT)
Dept: PHYSICAL THERAPY | Facility: CLINIC | Age: 79
End: 2024-01-02
Payer: MEDICARE

## 2024-01-02 DIAGNOSIS — M75.112 INCOMPLETE TEAR OF LEFT ROTATOR CUFF, UNSPECIFIED WHETHER TRAUMATIC: ICD-10-CM

## 2024-01-02 DIAGNOSIS — M25.512 ACUTE PAIN OF LEFT SHOULDER: Primary | ICD-10-CM

## 2024-01-02 PROCEDURE — 97110 THERAPEUTIC EXERCISES: CPT

## 2024-01-02 PROCEDURE — 97140 MANUAL THERAPY 1/> REGIONS: CPT

## 2024-01-02 NOTE — PROGRESS NOTES
"Daily Note     Today's date: 2024  Patient name: Eladio Ayala  : 1945  MRN: [id number]  Referring provider: Steven Hamilton*  Dx:   Encounter Diagnosis     ICD-10-CM    1. Acute pain of left shoulder  M25.512       2. Incomplete tear of left rotator cuff, unspecified whether traumatic  M75.112           Start Time: 0900  Stop Time: 0945  Total time in clinic (min): 45 minutes    Subjective: Pt reported minor soreness and also mentioned that he has been performing his HEP.       Objective: See treatment diary below      Assessment: Tolerated treatment well. Pt tolerated increased reps with AAROM well. Manual therapy focused on increasing L shoulder PROM. Ice was utilized post-treatment. Patient would benefit from continued PT      Plan: Continue per plan of care.      Goals: Patient's goal is to return to playing golf.     Precautions: SEE ATTACHED PROTOCOL:   DOS:      Dx: L RTC repair      Daily Treatment Diary     Manuals 2023   L elbow PROM MS        L shoulder PROM  NV (within protocol) 10  MS (within  Protocol) MS MS                     Ther Ex         Scap squeeze 5\"x10 10 x :05 5\" x 20  5\"x20     Towel squeeze 20x x20 Yellow 3 min       L UT str 15\"x4 4 x 15\" 4 x 20\"  4x20'     C/S ret AROM 5\"x10        Wrist circles 10x x10  20x     L Elbow AAROM 10x AROM x20 X 20  AROM, 20x     Pendulums   x20  X 20  x20 x20 x20   Pulleys     5' 3' Flx/  3' Abd   Cane shoulder flx AAROM (supine)     5\"x10 5\"x20   Cane shoulder abd AAROM (standing)     5\"x10 5\"x20   Cane shoulder ER  AAROM (standing)     5\"x10 5\"x20            Pt Edu POC/HEP  /protocol  /ice   Precautions     Neuro Re-ed                                                      Ther Activity                                    Gait Training                           Modalities         Ice Deferred    10' 10'                           "

## 2024-01-04 ENCOUNTER — EVALUATION (OUTPATIENT)
Dept: PHYSICAL THERAPY | Facility: CLINIC | Age: 79
End: 2024-01-04
Payer: MEDICARE

## 2024-01-04 DIAGNOSIS — M25.512 ACUTE PAIN OF LEFT SHOULDER: Primary | ICD-10-CM

## 2024-01-04 DIAGNOSIS — M75.112 INCOMPLETE TEAR OF LEFT ROTATOR CUFF, UNSPECIFIED WHETHER TRAUMATIC: ICD-10-CM

## 2024-01-04 PROCEDURE — 97140 MANUAL THERAPY 1/> REGIONS: CPT

## 2024-01-04 PROCEDURE — 97110 THERAPEUTIC EXERCISES: CPT

## 2024-01-04 NOTE — PROGRESS NOTES
Re-Evaluation     Today's date: 2024  Patient name: Eladio Ayala  : 1945  MRN: [id number]  Referring provider: Steven Hamilton*  Dx:   Encounter Diagnosis     ICD-10-CM    1. Acute pain of left shoulder  M25.512       2. Incomplete tear of left rotator cuff, unspecified whether traumatic  M75.112           Start Time: 0900  Stop Time: 09  Total time in clinic (min): 51 minutes  Subjective Evaluation     History of Present Illness  : Pt reported that his shoulder pain has been ranging between 0-4/10 and also reported a subjective improvement percentage of 50%.     IE: Patient presents with c/o L shoulder pain from a L incomplete RTC repair on 23. Pt reported that his sx started about 1 year ago while golfing. Pt reported that his sx increase with adjustment his sling. Pt has a PMH of R RTC (8 year ago).     Neuro signs: None  Bowel and bladder sxs: Normal  Red flags: Normal  Occupation: Working, [employer] store owner     Pain  At best pain ratin/10  At worst pain ratin/10  Location: L shoulder     Social Support  Lives with his wife in a 2 story home.     Patient Goals  Patient goals for therapy: Golfing    STGs  1. Decrease pain by 20% in 2-4 weeks to improve standing tolerance.-Partially Met  2. Improve L shoulder PROM by 10 degrees in 2-4 weeks.-Met  3. (I) with HEP within 2-4 weeks in order to improve PT outcomes.-Met    LTGs  1. Decrease pain by 60% in 6-8 weeks.-Not Met  2. Increase L shoulder AROM WNL and L shoulder MMT 5/5 within 6-8 weeks.-Not Met   3. Perform job/dressing/showering activities independently without pain in 6-8 weeks. -Not Met   4. Improve FOTO to greater than or equal to 59.-Not Met      Objective Measurements:    Shoulder  A/PROM R L Strength   Shoulder R L   Flex  WNL NT/ 140 deg Flex 5 NT   Abd WNL NT/ 148 deg Abd 5 NT   ER WNL NT/70 deg @45deg abd ER 5 NT   IR WNL NT/50 deg@45 deg abd IR 5 NT           Wrist AROM        Flexion WNL WNL      Extension  "WNL WNL      Supination WNL WNL      Pronation WNL WNL      UD WNL WNL      RD WNL WNL      Elbow PROM/  AROM        Flexion WNL WNL/   NT      Extension WNL WNL/NT            Assessment:    Eladio Ayala is a pleasant 78 y.o. male who has attended 7 visits of skilled PT for L shoulder pain S/P L incomplete RTC repair on 11/29/23. Pt demonstrated improvement in L shoulder and elbow PROM but still demonstrates deficits in L shoulder ROM. L shoulder MMT was not assessed due to protocol. Pt scored a 24 on FOTO. Manual therapy focused on increasing L shoulder PROM. Pt was educated and demonstrated understanding of HEP, POC, protocol (stressed importance of no AROM because pt was observed performing AROM), and benefit of skill PT. Ice was utilized post-treatment.  Pt would benefit from further skilled PT in order to decrease pain, address deficits (ROM/MMT), help pt achieve goals, and progress program as tolerated.     Thank you for the referral!    Plan  Patient would benefit from:Skilled physical therapy  Planned therapy interventions: manual therapy, neuromuscular re-education, stretching, strengthening, therapeutic activities, therapeutic exercise, patient education, home exercise program, modalities to decrease pain, and activity modification.    Frequency: 2x week  Duration in weeks: 8  Treatment plan discussed with: patient       Goals: Patient's goal is to return to playing golf.   Precautions: SEE ATTACHED PROTOCOL:   DOS:  11/ 29/23    Dx: L RTC repair      Daily Treatment Diary     Manuals 1/4 12/12 12/26 12/29 1/2   L elbow PROM         L shoulder PROM MS  10  MS (within  Protocol) MS MS                     Ther Ex         Scap squeeze   5\" x 20  5\"x20     Towel squeeze   Yellow 3 min       L UT str   4 x 20\"  4x20'     C/S ret AROM         Wrist circles    20x     L Elbow AAROM   X 20  AROM, 20x     Pendulums     X 20  x20 x20 x20   Pulleys 3' Flx/3' Abd    5' 3' Flx/  3' Abd   Cane shoulder flx AAROM " "(supine) 5\"x20    5\"x10 5\"x20   Cane shoulder abd AAROM (standing) 5\"x20    5\"x10 5\"x20   Cane shoulder ER  AAROM (standing) 5\"x20    5\"x10 5\"x20            Pt Edu    Precautions     Neuro Re-ed                                                      Ther Activity                                    Gait Training                           Modalities         Ice 10'    10' 10'                             "

## 2024-01-08 ENCOUNTER — OFFICE VISIT (OUTPATIENT)
Dept: PHYSICAL THERAPY | Facility: CLINIC | Age: 79
End: 2024-01-08
Payer: MEDICARE

## 2024-01-08 DIAGNOSIS — M25.512 ACUTE PAIN OF LEFT SHOULDER: Primary | ICD-10-CM

## 2024-01-08 DIAGNOSIS — M75.112 INCOMPLETE TEAR OF LEFT ROTATOR CUFF, UNSPECIFIED WHETHER TRAUMATIC: ICD-10-CM

## 2024-01-08 PROCEDURE — 97140 MANUAL THERAPY 1/> REGIONS: CPT

## 2024-01-08 PROCEDURE — 97110 THERAPEUTIC EXERCISES: CPT

## 2024-01-11 ENCOUNTER — OFFICE VISIT (OUTPATIENT)
Dept: PHYSICAL THERAPY | Facility: CLINIC | Age: 79
End: 2024-01-11
Payer: MEDICARE

## 2024-01-11 DIAGNOSIS — M25.512 ACUTE PAIN OF LEFT SHOULDER: Primary | ICD-10-CM

## 2024-01-11 DIAGNOSIS — M75.112 INCOMPLETE TEAR OF LEFT ROTATOR CUFF, UNSPECIFIED WHETHER TRAUMATIC: ICD-10-CM

## 2024-01-11 PROCEDURE — 97140 MANUAL THERAPY 1/> REGIONS: CPT

## 2024-01-11 PROCEDURE — 97110 THERAPEUTIC EXERCISES: CPT

## 2024-01-11 PROCEDURE — 97112 NEUROMUSCULAR REEDUCATION: CPT

## 2024-01-11 NOTE — PROGRESS NOTES
"Daily Note     Today's date: 2024  Patient name: Eladio Ayala  : 1945  MRN: [id number]  Referring provider: Steven Hamilton*  Dx:   Encounter Diagnosis     ICD-10-CM    1. Acute pain of left shoulder  M25.512       2. Incomplete tear of left rotator cuff, unspecified whether traumatic  M75.112           Start Time: 0900  Stop Time: 0955  Total time in clinic (min): 55 minutes    Subjective: Pt reported that his shoulder was a little achy today.       Objective: See treatment diary below      Assessment: Tolerated treatment well. Pt's program started with the pulleys. Program progressions focused on increasing L shoulder AAROM. Shoulder isos were added in order to prepare pt for AROM. Manual therapy focused on increasing L shoulder PROM. Pt was educated on protocol. Ice was utilized. Patient would benefit from continued PT      Plan: Continue per plan of care.      Goals: Patient's goal is to return to playing golf.     Precautions: SEE ATTACHED PROTOCOL:   DOS:      Dx: L RTC repair      Daily Treatment Diary     Manuals    L elbow PROM         L shoulder PROM MS MS 10  MS (within  Protocol) MS MS                     Ther Ex         Scap squeeze   5\" x 20  5\"x20     Towel squeeze   Yellow 3 min       L UT str   4 x 20\"  4x20'     C/S ret AROM         Wrist circles    20x     L Elbow AAROM   X 20  AROM, 20x     Pendulums     X 20  x20 x20 x20   Pulleys 4'/3 3'/3'   5' 3' Flx/  3' Abd   Cane shoulder flx AAROM (supine) 5\"x20    5\"x10 5\"x20   Cane shoulder abd AAROM (standing) 5\"x20 5\"x10   5\"x10 5\"x20   Cane shoulder ER  AAROM (standing) 5\"x20 5\"x20   5\"x10 5\"x20   Wall ladder  Flx-10x       Pt Edu Protocol   Precautions     Neuro Re-ed         Shoulder isos  Flx/abd/ext  5\"x10       Soulder flexion AROM         Shoulder abd AROM         SL ER AROM                           Ther Activity                                    Gait Training         Arm swing  " Performed                 Modalities         Ice 10' 10'   10' 10'                                No

## 2024-01-15 ENCOUNTER — OFFICE VISIT (OUTPATIENT)
Dept: FAMILY MEDICINE CLINIC | Facility: CLINIC | Age: 79
End: 2024-01-15
Payer: MEDICARE

## 2024-01-15 ENCOUNTER — OFFICE VISIT (OUTPATIENT)
Dept: PHYSICAL THERAPY | Facility: CLINIC | Age: 79
End: 2024-01-15
Payer: MEDICARE

## 2024-01-15 VITALS
HEIGHT: 70 IN | BODY MASS INDEX: 24.94 KG/M2 | SYSTOLIC BLOOD PRESSURE: 132 MMHG | WEIGHT: 174.2 LBS | DIASTOLIC BLOOD PRESSURE: 80 MMHG | RESPIRATION RATE: 16 BRPM | TEMPERATURE: 97.6 F | HEART RATE: 68 BPM | OXYGEN SATURATION: 97 %

## 2024-01-15 DIAGNOSIS — S99.922D INJURY OF TOE ON LEFT FOOT, SUBSEQUENT ENCOUNTER: Primary | ICD-10-CM

## 2024-01-15 DIAGNOSIS — M25.512 ACUTE PAIN OF LEFT SHOULDER: Primary | ICD-10-CM

## 2024-01-15 PROCEDURE — 97112 NEUROMUSCULAR REEDUCATION: CPT | Performed by: PHYSICAL THERAPIST

## 2024-01-15 PROCEDURE — 97140 MANUAL THERAPY 1/> REGIONS: CPT | Performed by: PHYSICAL THERAPIST

## 2024-01-15 PROCEDURE — 97110 THERAPEUTIC EXERCISES: CPT | Performed by: PHYSICAL THERAPIST

## 2024-01-15 PROCEDURE — 99213 OFFICE O/P EST LOW 20 MIN: CPT | Performed by: FAMILY MEDICINE

## 2024-01-15 NOTE — PROGRESS NOTES
"Daily Note     Today's date: 1/15/2024  Patient name: Eladio Ayala  : 1945  MRN: [id number]  Referring provider: Steven Hamilton*  Dx:   Encounter Diagnosis     ICD-10-CM    1. Acute pain of left shoulder  M25.512                      Subjective: No new complaints.       Objective: See treatment diary below      Assessment: PROM good in all planes      Plan: Continue per plan of care.      Goals: Patient's goal is to return to playing golf.     Precautions: SEE ATTACHED PROTOCOL:   DOS:      Dx: L RTC repair      Daily Treatment Diary     Manuals 1/8 1/11 1/15 12/26 12/29 1/2   L elbow PROM         L shoulder PROM MS MS 10  MS (within  Protocol) MS MS                     Ther Ex         Scap squeeze    5\"x20     Towel squeeze         L UT str    4x20'     C/S ret AROM         Wrist circles    20x     L Elbow AAROM    AROM, 20x     Pendulums      x20 x20 x20   Pulleys 4'/3 3'/3' 3'/3'  5' 3' Flx/  3' Abd   Cane shoulder flx AAROM (supine) 5\"x20    5\"x10 5\"x20   Cane shoulder abd AAROM (standing) 5\"x20 5\"x10 5s x20  5\"x10 5\"x20   Cane shoulder ER  AAROM (standing) 5\"x20 5\"x20 5s x20  5\"x10 5\"x20   Wall ladder  Flx-10x Flex x20      Pt Edu Protocol   Precautions     Neuro Re-ed         Shoulder isos  Flx/abd/ext  5\"x10 5s x10      Soulder flexion AROM         Shoulder abd AROM         SL ER AROM                           Ther Activity                                    Gait Training         Arm swing  Performed                 Modalities         Ice 10' 10'   10' 10'                                 "

## 2024-01-15 NOTE — PROGRESS NOTES
"Name: Eladio Ayala      : 1945      MRN: [id number]  Encounter Provider: Tara Blevins DO  Encounter Date: 1/15/2024   Encounter department: [facility]    Assessment & Plan     1. Injury of toe on left foot, subsequent encounter  Assessment & Plan:  He may lose the nail spontaneously, but recommend podiatry consult as he continues to have persistent nailbed bleeding suggesting injury, may need intervention. Follow up as needed.    Orders:  -     Ambulatory Referral to Podiatry; Future         Subjective      HPI  Stubbed toe on bathroom vanity 10 days ago. Was seen at urgent care for XR and was not fractured. Today here because there is slight soreness at the tip of his left big toe and some tenderness at the nail bed, with blood under the toenail. He does have some intermittent bleeding at the nail bed.    Review of Systems    Current Outpatient Medications on File Prior to Visit   Medication Sig   • clindamycin (CLEOCIN T) 1 % lotion APPLY TO AFFECTED AREA ON THE FACE ONCE DAILY   • desoximetasone (TOPICORT) 0.25 % cream APPLY TWICE DAILY TO AFFECTED AREA FOR 2-4 WEEKS MAX AS NEEDED   • famotidine (PEPCID) 40 MG tablet Take 1 tablet (40 mg total) by mouth 2 (two) times a day as needed for heartburn   • ibuprofen (MOTRIN) 600 mg tablet every 8 (eight) hours as needed   • mupirocin (BACTROBAN) 2 % ointment    • mupirocin (BACTROBAN) 2 % ointment    • oxyCODONE (ROXICODONE) 5 immediate release tablet 1 tablets every 6 hours as needed for severe shoulder pain.   • tamsulosin (FLOMAX) 0.4 mg Take 1 capsule (0.4 mg total) by mouth daily at bedtime   • tiZANidine (ZANAFLEX) 4 mg tablet Take 1 tablet (4 mg total) by mouth every 8 (eight) hours as needed for muscle spasms       Objective     /80 (BP Location: Left arm, Patient Position: Sitting, Cuff Size: Standard)   Pulse 68   Temp 97.6 °F (36.4 °C) (Temporal)   Resp 16   Ht 5' 10\" (1.778 m)   Wt 79 kg (174 lb 3.2 oz)   SpO2 97%  "  BMI 25.00 kg/m²     Physical Exam  Left big toe with erythema at the nailbed, dried blood, accumulation of blood under the loose toenail.      Tara Blevins DO

## 2024-01-17 ENCOUNTER — OFFICE VISIT (OUTPATIENT)
Dept: PHYSICAL THERAPY | Facility: CLINIC | Age: 79
End: 2024-01-17
Payer: MEDICARE

## 2024-01-17 DIAGNOSIS — M75.112 INCOMPLETE TEAR OF LEFT ROTATOR CUFF, UNSPECIFIED WHETHER TRAUMATIC: ICD-10-CM

## 2024-01-17 DIAGNOSIS — M25.512 ACUTE PAIN OF LEFT SHOULDER: Primary | ICD-10-CM

## 2024-01-17 PROBLEM — S99.922A INJURY OF LEFT TOE: Status: ACTIVE | Noted: 2024-01-17

## 2024-01-17 PROCEDURE — 97110 THERAPEUTIC EXERCISES: CPT

## 2024-01-17 PROCEDURE — 97140 MANUAL THERAPY 1/> REGIONS: CPT

## 2024-01-17 NOTE — ASSESSMENT & PLAN NOTE
He may lose the nail spontaneously, but recommend podiatry consult as he continues to have persistent nailbed bleeding suggesting injury, may need intervention. Follow up as needed.

## 2024-01-17 NOTE — PROGRESS NOTES
"Daily Note     Today's date: 2024  Patient name: Eladio Ayala  : 1945  MRN: [id number]  Referring provider: Steven Hamilton*  Dx:   Encounter Diagnosis     ICD-10-CM    1. Acute pain of left shoulder  M25.512       2. Incomplete tear of left rotator cuff, unspecified whether traumatic  M75.112           Start Time: 0905  Stop Time: 0950  Total time in clinic (min): 45 minutes    Subjective: Pt reported minor shoulder pain/ache.       Objective: See treatment diary below      Assessment: Tolerated treatment well. Program started with cane AAROM str and pulleys. AROM was added per protocol. Manual therapy focused on increasing L shoulder PROM. Ice was utilized post-treatment. Patient would benefit from continued PT      Plan: Continue per plan of care.      Goals: Patient's goal is to return to playing golf.     Precautions: SEE ATTACHED PROTOCOL:   DOS:      Dx: L RTC repair      Daily Treatment Diary     Manuals 1/8 1/11 1/15 1/17  1   L elbow PROM         L shoulder PROM MS MS 10  MS  MS                     Ther Ex         Scap squeeze         Towel squeeze         L UT str         C/S ret AROM         Wrist circles         L Elbow AAROM         Pendulums        x20   Pulleys 4'/3 3'/3' 3'/3' 3'/3'  3' Flx/  3' Abd   Cane shoulder flx AAROM (supine) 5\"x20     5\"x20   Cane shoulder abd AAROM (standing) 5\"x20 5\"x10 5s x20 5\"x20  5\"x20   Cane shoulder ER  AAROM (standing) 5\"x20 5\"x20 5s x20 5\"x20  5\"x20   Wall ladder  Flx-10x Flex x20 Flex-10x     Pt Edu Protocol        Neuro Re-ed         Shoulder isos  Flx/abd/ext  5\"x10 5s x10 5\"x10 each     Soulder flexion AROM    10x to 90 deg     Shoulder abd AROM    10x to 90 deg     SL ER AROM    10x                       Ther Activity                                    Gait Training         Arm swing  Performed                 Modalities         Ice 10' 10'  10'  10'                                   "

## 2024-01-22 ENCOUNTER — OFFICE VISIT (OUTPATIENT)
Dept: PHYSICAL THERAPY | Facility: CLINIC | Age: 79
End: 2024-01-22
Payer: MEDICARE

## 2024-01-22 DIAGNOSIS — M25.512 ACUTE PAIN OF LEFT SHOULDER: Primary | ICD-10-CM

## 2024-01-22 DIAGNOSIS — M75.112 INCOMPLETE TEAR OF LEFT ROTATOR CUFF, UNSPECIFIED WHETHER TRAUMATIC: ICD-10-CM

## 2024-01-22 PROCEDURE — 97112 NEUROMUSCULAR REEDUCATION: CPT

## 2024-01-22 PROCEDURE — 97140 MANUAL THERAPY 1/> REGIONS: CPT

## 2024-01-22 PROCEDURE — 97110 THERAPEUTIC EXERCISES: CPT

## 2024-01-22 NOTE — PROGRESS NOTES
"Daily Note     Today's date: 2024  Patient name: Eladio Ayala  : 1945  MRN: [id number]  Referring provider: Steven Hamilton*  Dx:   Encounter Diagnosis     ICD-10-CM    1. Acute pain of left shoulder  M25.512       2. Incomplete tear of left rotator cuff, unspecified whether traumatic  M75.112           Start Time: 0900  Stop Time: 0959  Total time in clinic (min): 59 minutes    Subjective: Pt reported having an ache last night with sleeping.       Objective: See treatment diary below      Assessment: Tolerated treatment well. Pt's program started with the pulleys. Progressions focused on increasing shoulder isometric strength in order to increase shoulder AROM. Manual therapy focused on increasing L shoulder PROM. Ice was utilized post-treatment. Patient would benefit from continued PT    Billing reflects increased one-on-one time.     Plan: Continue per plan of care.      Goals: Patient's goal is to return to playing golf.     Precautions: SEE ATTACHED PROTOCOL:   DOS:      Dx: L RTC repair      Daily Treatment Diary     Manuals 1/8 1/11 1/15 1/17 1/22    L elbow PROM         L shoulder PROM MS MS 10  MS MS                      Ther Ex         Scap squeeze         Towel squeeze         L UT str         C/S ret AROM         Wrist circles         L Elbow AAROM         Pendulums           Pulleys 4'/3 3'/3' 3'/3' 3'/3' 3'/3'    Cane shoulder flx AAROM (supine) 5\"x20    5\"x10 (standing)    Cane shoulder abd AAROM (standing) 5\"x20 5\"x10 5s x20 5\"x20 5\"x10    Cane shoulder ER  AAROM (standing) 5\"x20 5\"x20 5s x20 5\"x20 5\"x10    Wall ladder  Flx-10x Flex x20 Flex-10x Flex-10x, Abd-10x    Pt [facility] Protocol        Neuro Re-ed         Shoulder isos  Flx/abd/ext  5\"x10 5s x10 5\"x10 each 5\"x10 each   +IR/ER    Soulder flexion AROM    10x to 90 deg 10x to 90 deg ... Full ROM   Shoulder abd AROM    10x to 90 deg 10x to 90 deg ... Full ROM   SL ER AROM    10x 10x                      Ther Activity    "                                 Gait Training         Arm swing  Performed                 Modalities         Ice 10' 10'  10' 10'

## 2024-01-25 ENCOUNTER — OFFICE VISIT (OUTPATIENT)
Dept: PHYSICAL THERAPY | Facility: CLINIC | Age: 79
End: 2024-01-25
Payer: MEDICARE

## 2024-01-25 DIAGNOSIS — M75.112 INCOMPLETE TEAR OF LEFT ROTATOR CUFF, UNSPECIFIED WHETHER TRAUMATIC: ICD-10-CM

## 2024-01-25 DIAGNOSIS — M25.512 ACUTE PAIN OF LEFT SHOULDER: Primary | ICD-10-CM

## 2024-01-25 PROCEDURE — 97110 THERAPEUTIC EXERCISES: CPT

## 2024-01-25 PROCEDURE — 97140 MANUAL THERAPY 1/> REGIONS: CPT

## 2024-01-25 NOTE — PROGRESS NOTES
"Daily Note     Today's date: 2024  Patient name: Eladio Ayala  : 1945  MRN: [id number]  Referring provider: Steven Hamilton*  Dx:   Encounter Diagnosis     ICD-10-CM    1. Acute pain of left shoulder  M25.512       2. Incomplete tear of left rotator cuff, unspecified whether traumatic  M75.112           Start Time: 905  Stop Time: 952  Total time in clinic (min): 47 minutes    Subjective: Pt reported that his shoulder was achy today but noted that he didn't think he did too much last visit.       Objective: See treatment diary below      Assessment: Tolerated treatment well. Program focused on increasing shoulder PROM and AROM. Manual therapy focused on increasing L shoulder PROM. AROM flexion and abduction ROM was increased. Ice was utilized.     Patient would benefit from continued PT      Plan: Continue per plan of care.      Goals: Patient's goal is to return to playing golf.     Precautions: SEE ATTACHED PROTOCOL:   DOS:      Dx: L RTC repair      Daily Treatment Diary     Manuals 1/8 1/11 1/15 1/17 1/22 1/25   L elbow PROM         L shoulder PROM MS MS 10  MS MS MS                     Ther Ex         Scap squeeze         Towel squeeze         L UT str         C/S ret AROM         Wrist circles         L Elbow AAROM         Pendulums           Pulleys 4'/3 3'/3' 3'/3' 3'/3' 3'/3' 3'/3'   Cane shoulder flx AAROM (supine) 5\"x20    5\"x10 (standing) 5\"x10  (Standing)   Cane shoulder abd AAROM (standing) 5\"x20 5\"x10 5s x20 5\"x20 5\"x10 5\"x10   Cane shoulder ER  AAROM (standing) 5\"x20 5\"x20 5s x20 5\"x20 5\"x10 5\"x10   Wall ladder  Flx-10x Flex x20 Flex-10x Flex-10x, Abd-10x Flex-10x, Abd-10x   Pt [facility] Protocol        Neuro Re-ed         Shoulder isos  Flx/abd/ext  5\"x10 5s x10 5\"x10 each 5\"x10 each   +IR/ER 5\"x10 each way   Soulder flexion AROM    10x to 90 deg 10x to 90 deg 10x Full ROM   Shoulder abd AROM    10x to 90 deg 10x to 90 deg 10x Full ROM   SL ER AROM    10x 10x              "         Ther Activity                                    Gait Training         Arm swing  Performed                 Modalities         Ice 10' 10'  10' 10' 10'

## 2024-01-26 ENCOUNTER — OFFICE VISIT (OUTPATIENT)
Dept: PODIATRY | Facility: CLINIC | Age: 79
End: 2024-01-26
Payer: MEDICARE

## 2024-01-26 VITALS
HEIGHT: 70 IN | SYSTOLIC BLOOD PRESSURE: 124 MMHG | DIASTOLIC BLOOD PRESSURE: 80 MMHG | BODY MASS INDEX: 24.77 KG/M2 | WEIGHT: 173 LBS

## 2024-01-26 DIAGNOSIS — M79.675 PAIN IN LEFT TOE(S): ICD-10-CM

## 2024-01-26 DIAGNOSIS — S90.212A CONTUSION OF LEFT GREAT TOE WITH DAMAGE TO NAIL, INITIAL ENCOUNTER: Primary | ICD-10-CM

## 2024-01-26 DIAGNOSIS — M19.072 OSTEOARTHRITIS OF LEFT ANKLE OR FOOT: ICD-10-CM

## 2024-01-26 PROCEDURE — 99202 OFFICE O/P NEW SF 15 MIN: CPT | Performed by: PODIATRIST

## 2024-01-26 NOTE — PROGRESS NOTES
"Assessment/Plan:  X-ray report from 1/4/2024 reviewed, no images available.  Negative for fracture.  Also noted were degenerative changes of the first MPJ.  X-ray findings reviewed with patient.  Monitor left great toe nail growth for infection that could develop over time.    Does not require removal of nail as a new nail will most likely push out the old.  Follow-up as needed.    No problem-specific Assessment & Plan notes found for this encounter.   Diagnoses and all orders for this visit:    Contusion of left great toe with damage to nail, initial encounter  -     Ambulatory Referral to Podiatry    Pain in left toe(s)    Osteoarthritis of left ankle or foot          Subjective:      Patient ID: Eladio Ayala is a 78 y.o. male.    1/26/2024: 78-year-old male presents for initial evaluation of injury to left great toe.  Patient reports injuring it 3 weeks ago stubbing it under his bathroom vanity.  Went to urgent care and had x-ray which was negative for any fracture.  Concern with nailbed discoloration though pain and swelling have diminished since initial injury.      The following portions of the patient's history were reviewed and updated as appropriate: allergies, current medications, past family history, past medical history, past social history, past surgical history, and problem list.    Review of Systems   Constitutional: Negative.    HENT: Negative.     Eyes: Negative.    Respiratory: Negative.     Cardiovascular: Negative.    Gastrointestinal: Negative.    Endocrine: Negative.    Genitourinary: Negative.    Musculoskeletal: Negative.    Skin:         Discolored thick nail left great toe.   Allergic/Immunologic: Negative.    Neurological: Negative.    Hematological: Negative.    Psychiatric/Behavioral: Negative.           Objective:      /80   Ht 5' 10\" (1.778 m)   Wt 78.5 kg (173 lb)   BMI 24.82 kg/m²          Physical Exam  Constitutional:       Appearance: Normal appearance.   HENT:      " Head: Normocephalic.      Right Ear: Tympanic membrane normal.      Left Ear: Tympanic membrane normal.      Nose: No congestion.      Mouth/Throat:      Pharynx: No oropharyngeal exudate or posterior oropharyngeal erythema.   Eyes:      Conjunctiva/sclera: Conjunctivae normal.      Pupils: Pupils are equal, round, and reactive to light.   Cardiovascular:      Rate and Rhythm: Normal rate and regular rhythm.      Pulses: Normal pulses.           Dorsalis pedis pulses are 2+ on the right side and 2+ on the left side.        Posterior tibial pulses are 2+ on the right side and 2+ on the left side.   Pulmonary:      Effort: Pulmonary effort is normal.   Musculoskeletal:         General: Normal range of motion.   Feet:      Right foot:      Protective Sensation: 10 sites tested.  10 sites sensed.      Skin integrity: Skin integrity normal.      Left foot:      Protective Sensation: 10 sites tested.  10 sites sensed.      Skin integrity: Skin integrity normal.      Toenail Condition: Left toenails are abnormally thick. Fungal disease present.  Skin:     General: Skin is warm and dry.      Capillary Refill: Capillary refill takes 2 to 3 seconds.      Coloration: Skin is not pale.      Findings: No bruising, erythema, lesion or rash.      Comments: Left great toe: Distal subungual hematoma, stable, dystrophic changes of nail consistent with mycosis present.  Subungual debris and brittle nature noted.   Neurological:      General: No focal deficit present.      Mental Status: He is alert.      Cranial Nerves: No cranial nerve deficit.      Sensory: No sensory deficit.      Motor: No weakness.      Gait: Gait normal.      Deep Tendon Reflexes: Reflexes normal.   Psychiatric:         Mood and Affect: Mood normal.         Behavior: Behavior normal.         Judgment: Judgment normal.            [No Acute Distress] : no acute distress [Well-Appearing] : well-appearing [No Respiratory Distress] : no respiratory distress  [No Accessory Muscle Use] : no accessory muscle use [No Edema] : there was no peripheral edema [Non-distended] : non-distended [No Rash] : no rash [No Focal Deficits] : no focal deficits [Normal Gait] : normal gait [Normal Affect] : the affect was normal [Alert and Oriented x3] : oriented to person, place, and time [Normal Insight/Judgement] : insight and judgment were intact

## 2024-01-29 ENCOUNTER — TELEPHONE (OUTPATIENT)
Dept: INTERNAL MEDICINE CLINIC | Facility: CLINIC | Age: 79
End: 2024-01-29

## 2024-01-29 ENCOUNTER — OFFICE VISIT (OUTPATIENT)
Dept: PHYSICAL THERAPY | Facility: CLINIC | Age: 79
End: 2024-01-29
Payer: MEDICARE

## 2024-01-29 ENCOUNTER — OFFICE VISIT (OUTPATIENT)
Dept: OBGYN CLINIC | Facility: OTHER | Age: 79
End: 2024-01-29

## 2024-01-29 ENCOUNTER — TELEPHONE (OUTPATIENT)
Dept: FAMILY MEDICINE CLINIC | Facility: CLINIC | Age: 79
End: 2024-01-29

## 2024-01-29 VITALS
WEIGHT: 175 LBS | HEART RATE: 67 BPM | SYSTOLIC BLOOD PRESSURE: 131 MMHG | HEIGHT: 70 IN | BODY MASS INDEX: 25.05 KG/M2 | DIASTOLIC BLOOD PRESSURE: 78 MMHG

## 2024-01-29 DIAGNOSIS — Z98.890 S/P LEFT ROTATOR CUFF REPAIR: Primary | ICD-10-CM

## 2024-01-29 DIAGNOSIS — M75.112 INCOMPLETE TEAR OF LEFT ROTATOR CUFF, UNSPECIFIED WHETHER TRAUMATIC: Primary | ICD-10-CM

## 2024-01-29 DIAGNOSIS — M75.112 INCOMPLETE TEAR OF LEFT ROTATOR CUFF, UNSPECIFIED WHETHER TRAUMATIC: ICD-10-CM

## 2024-01-29 DIAGNOSIS — M25.512 ACUTE PAIN OF LEFT SHOULDER: Primary | ICD-10-CM

## 2024-01-29 PROCEDURE — 97140 MANUAL THERAPY 1/> REGIONS: CPT

## 2024-01-29 PROCEDURE — 97110 THERAPEUTIC EXERCISES: CPT

## 2024-01-29 PROCEDURE — 97112 NEUROMUSCULAR REEDUCATION: CPT

## 2024-01-29 PROCEDURE — 99024 POSTOP FOLLOW-UP VISIT: CPT | Performed by: PHYSICIAN ASSISTANT

## 2024-01-29 RX ORDER — MELOXICAM 15 MG/1
15 TABLET ORAL DAILY
Qty: 30 TABLET | Refills: 0 | Status: SHIPPED | OUTPATIENT
Start: 2024-01-29

## 2024-01-29 NOTE — PROGRESS NOTES
"Daily Note     Today's date: 2024  Patient name: Eladio Ayala  : 1945  MRN: [id number]  Referring provider: Steven Hamilton*  Dx:   Encounter Diagnosis     ICD-10-CM    1. Acute pain of left shoulder  M25.512       2. Incomplete tear of left rotator cuff, unspecified whether traumatic  M75.112           Start Time: 09  Stop Time: 1005  Total time in clinic (min): 62 minutes    Subjective: Pt reported that his shoulder was sore/achy today, possibly from the weather over the last few days.       Objective: See treatment diary below      Assessment: Tolerated treatment well. Program started with the pulleys. Manual therapy focused on increasing L shoulder PROM. Pt continues to demonstrate an increase in L shoulder active and passive ROM. Pt tolerated increased reps with AROM well. L levator stretch was added in order to decrease pain and increase levator flexibility. Ice was utilized post-treatment. Further progressions were held due to protocol. Patient would benefit from continued PT      Plan: Continue per plan of care.      Goals: Patient's goal is to return to playing golf.     Precautions: SEE ATTACHED PROTOCOL:   DOS:      Dx: L RTC repair      Daily Treatment Diary     Manuals    L elbow PROM         L shoulder PROM MS   MS MS MS                     Ther Ex         Scap squeeze         Towel squeeze         L UT str L levator str  15\"x4        C/S ret AROM         Wrist circles         L Elbow AAROM         Pendulums          Pulleys 3'/3'   3'/3' 3'/3' 3'/3'   Cane shoulder flx AAROM (supine) Standing  5\"x10    5\"x10 (standing) 5\"x10  (Standing)   Cane shoulder abd AAROM (standing) 5\"x10   5\"x20 5\"x10 5\"x10   Cane shoulder ER  AAROM (standing) 5\"x10   5\"x20 5\"x10 5\"x10   Wall ladder Flex: 10x  Abd: 10x   Flex-10x Flex-10x, Abd-10x Flex-10x, Abd-10x   Pt Edu         Neuro Re-ed         Shoulder isos 5\"x10 all directions   5\"x10 each 5\"x10 each   +IR/ER " "5\"x10 each way   Soulder flexion AROM 10x2   10x to 90 deg 10x to 90 deg 10x Full ROM   Shoulder abd AROM 10x2   10x to 90 deg 10x to 90 deg 10x Full ROM   SL ER AROM 10x2   10x 10x                      Ther Activity                                    Gait Training         Arm swing                  Modalities         Ice 10'   10' 10' 10'                                         "

## 2024-01-29 NOTE — PROGRESS NOTES
"Surgery: SALS w/RCR, SAD, debridement on 11/29/23    S: Patient is doing well.  They have been compliant with formal PT and the HEP.  Denies new injury or trauma    /78 (BP Location: Right arm, Patient Position: Sitting, Cuff Size: Standard)   Pulse 67   Ht 5' 10\" (1.778 m) Comment: verbal  Wt 79.4 kg (175 lb)   BMI 25.11 kg/m²     O: left shoulder  FF: 170  Abd: 90  ER: equivalent  IR: LLS  ER strength: Good resistance  Good elbow, wrist and hand range of motion  Skin - warm and dry  SI  NVI    A/P: 8 weeks following arthroscopic left shoulder rotator cuff repair with subacromial decompression  Continue with formal physical therapy - following standard RCR rehab protocol  HEP - per therapy.  stretching  Pain control - Tylenol 1000 mg every 8 hours  Ice as needed - 20 minutes on and 20 minutes off  Follow up in 8 weeks   "

## 2024-01-29 NOTE — TELEPHONE ENCOUNTER
Pt called because he had rotator cuff surgery but is still having discomfort while sleeping he asked the surgern today they said to call his pcp to see what he could take he still has oxy but does not want to take it he has been taken Tylenol and Motrin but he does not want to take to much of that please advise            Hi, this is Eladio Ayala calling. Date of birth is [birth year] calling with regards to. I had the rotator cuff surgery 8 weeks so nine weeks ago and I'm just having a tough time sleeping with the discomfort and the pain. So I was at the surgeons office today and he didn't have any recommendations to what I maybe could take. He suggested I call my primary care physician to see if there's some something that he could recommend or prescribe that I could take. I still have some of the Oxys, but I don't want to take those. And I have Tylenol and Motrin, but I'm just concerned about taking too much of that. Oh my God, take it every night. And this is going to be for another month or two at least. So I don't know where that's a good thing for me to be taking. So I wanted to see if I get some guidance from him as to what I could take. So if you can call me back, [phone]. It's now 1:00 on Monday. Thank you. Bye.

## 2024-02-01 ENCOUNTER — OFFICE VISIT (OUTPATIENT)
Dept: PHYSICAL THERAPY | Facility: CLINIC | Age: 79
End: 2024-02-01
Payer: MEDICARE

## 2024-02-01 DIAGNOSIS — N40.1 BENIGN LOCALIZED PROSTATIC HYPERPLASIA WITH LOWER URINARY TRACT SYMPTOMS (LUTS): ICD-10-CM

## 2024-02-01 DIAGNOSIS — M75.112 INCOMPLETE TEAR OF LEFT ROTATOR CUFF, UNSPECIFIED WHETHER TRAUMATIC: ICD-10-CM

## 2024-02-01 DIAGNOSIS — M25.512 ACUTE PAIN OF LEFT SHOULDER: Primary | ICD-10-CM

## 2024-02-01 PROCEDURE — 97110 THERAPEUTIC EXERCISES: CPT

## 2024-02-01 PROCEDURE — 97112 NEUROMUSCULAR REEDUCATION: CPT

## 2024-02-01 RX ORDER — TAMSULOSIN HYDROCHLORIDE 0.4 MG/1
0.4 CAPSULE ORAL
Qty: 90 CAPSULE | Refills: 0 | Status: SHIPPED | OUTPATIENT
Start: 2024-02-01

## 2024-02-01 NOTE — PROGRESS NOTES
"Daily Note     Today's date: 2024  Patient name: Eladio Ayala  : 1945  MRN: [id number]  Referring provider: Steven Hamilton*  Dx:   Encounter Diagnosis     ICD-10-CM    1. Acute pain of left shoulder  M25.512       2. Incomplete tear of left rotator cuff, unspecified whether traumatic  M75.112           Start Time: 0900  Stop Time: 0950  Total time in clinic (min): 50 minutes    Subjective: Pt reported that his shoulder pain was his normal ache.       Objective: See treatment diary below      Assessment: Tolerated treatment well. Pt's program started wit the pulleys. Manual therapy focused on increasing L shoulder PROM. Pt continues to demonstrate increased L shoulder PROM. Ice was utilized post-treatment. Patient would benefit from continued PT      Plan: Continue per plan of care.      Goals: Patient's goal is to return to playing golf.     Precautions: SEE ATTACHED PROTOCOL:   DOS:      Dx: L RTC repair      Daily Treatment Diary     Manuals    L elbow PROM         L shoulder PROM MS MS  MS MS MS                     Ther Ex         Scap squeeze         Towel squeeze         L UT str L levator str  15\"x4        C/S ret AROM         Wrist circles         L Elbow AAROM         Pendulums          Pulleys 3'/3' 3'/3'  3'/3' 3'/3' 3'/3'   Cane shoulder flx AAROM (supine) Standing  5\"x10 Standing  5\"x10   5\"x10 (standing) 5\"x10  (Standing)   Cane shoulder abd AAROM (standing) 5\"x10 Standing  5\"x10  5\"x20 5\"x10 5\"x10   Cane shoulder ER  AAROM (standing) 5\"x10 Standing  5\"x10  5\"x20 5\"x10 5\"x10   Wall ladder Flex: 10x  Abd: 10x Flex: 10x  Abd: 10x  Flex-10x Flex-10x, Abd-10x Flex-10x, Abd-10x   Pt Edu         Neuro Re-ed         Shoulder isos 5\"x10 all directions 5\"x10 all directions  5\"x10 each 5\"x10 each   +IR/ER 5\"x10 each way   Soulder flexion AROM 10x2 10x2  10x to 90 deg 10x to 90 deg 10x Full ROM   Shoulder abd AROM 10x2 10x2  10x to 90 deg 10x to 90 deg 10x " Full ROM   SL ER AROM 10x2 10x2  10x 10x                      Ther Activity                                    Gait Training         Arm swing                  Modalities         Ice 10' 10'  10' 10' 10'

## 2024-02-05 ENCOUNTER — EVALUATION (OUTPATIENT)
Dept: PHYSICAL THERAPY | Facility: CLINIC | Age: 79
End: 2024-02-05
Payer: MEDICARE

## 2024-02-05 DIAGNOSIS — M25.512 ACUTE PAIN OF LEFT SHOULDER: Primary | ICD-10-CM

## 2024-02-05 DIAGNOSIS — M75.112 INCOMPLETE TEAR OF LEFT ROTATOR CUFF, UNSPECIFIED WHETHER TRAUMATIC: ICD-10-CM

## 2024-02-05 PROCEDURE — 97112 NEUROMUSCULAR REEDUCATION: CPT

## 2024-02-05 PROCEDURE — 97110 THERAPEUTIC EXERCISES: CPT

## 2024-02-05 NOTE — PROGRESS NOTES
Re-Evaluation     Today's date: 2024  Patient name: Eladio Ayala  : 1945  MRN: [id number]  Referring provider: Steven Hamilton*  Dx:   Encounter Diagnosis     ICD-10-CM    1. Acute pain of left shoulder  M25.512       2. Incomplete tear of left rotator cuff, unspecified whether traumatic  M75.112           Start Time: 1030  Stop Time: 1127  Total time in clinic (min): 57 minutes   Subjective Evaluation     History of Present Illness  : Pt reported that his shoulder pain has been ranging between 0-3/10 and also reported a subjective improvement percentage of 50%.      IE: Patient presents with c/o L shoulder pain from a L incomplete RTC repair on 23. Pt reported that his sx started about 1 year ago while golfing. Pt reported that his sx increase with adjustment his sling. Pt has a PMH of R RTC (8 year ago).     Neuro signs: None  Bowel and bladder sxs: Normal  Red flags: Normal  Occupation: Working, [employer] store owner     Pain  At best pain ratin/10  At worst pain rating: 3/10  Location: L shoulder      Social Support  Lives with his wife in a 2 story home.      Patient Goals  Patient goals for therapy: Golfing     STGs  1. Decrease pain by 20% in 2-4 weeks to improve standing tolerance.-Met  2. Improve L shoulder PROM by 10 degrees in 2-4 weeks.-Met  3. (I) with HEP within 2-4 weeks in order to improve PT outcomes.-Met     LTGs  1. Decrease pain by 60% in 6-8 weeks.-Partially Met  2. Increase L shoulder AROM WNL and L shoulder MMT 5/5 within 6-8 weeks.-Not Met   3. Perform job/dressing/showering activities independently without pain in 6-8 weeks. -Partially Met   4. Improve FOTO to greater than or equal to 59.-Not Met        Objective Measurements:     Shoulder  A/PROM R L Strength   Shoulder R L   Flex   deg/ 160 deg Flex 5 NT   Abd  deg/ 170 deg Abd 5 NT   ER WNL NT/85 deg @ 80 deg abd ER 5 NT   IR WNL NT/65 deg@80 deg abd IR 5 NT                 Wrist AROM            "  Flexion WNL WNL         Extension WNL WNL         Supination WNL WNL         Pronation WNL WNL         UD WNL WNL         RD WNL WNL         Elbow PROM/  AROM             Flexion WNL WNL/   NT         Extension WNL WNL/NT                  Assessment:     Eladio Ayala is a pleasant 78 y.o. male who has attended 16 visits of skilled PT for L shoulder pain S/P L incomplete RTC repair on 11/29/23. Pt demonstrated improvement in L shoulder PROM but still demonstrates deficits in L shoulder ROM. L shoulder MMT was not assessed due to protocol. Pt scored a 45 on FOTO. Manual therapy focused on increasing L shoulder PROM. Pt was educated and demonstrated understanding of HEP, POC, protocol, and benefit of skill PT. Ice was utilized post-treatment.  Ice was utilized post-treatment. Pt would benefit from further skilled PT in order to decrease pain, address deficits (ROM/MMT), help pt achieve goals, and progress program as tolerated.      Thank you for the referral!     Plan  Patient would benefit from:Skilled physical therapy  Planned therapy interventions: manual therapy, neuromuscular re-education, stretching, strengthening, therapeutic activities, therapeutic exercise, patient education, home exercise program, modalities to decrease pain, and activity modification.     Frequency: 2x week  Duration in weeks: 8  Treatment plan discussed with: patient        Goals: Patient's goal is to return to playing golf.   Precautions: SEE ATTACHED PROTOCOL:   DOS:  11/29/23    Dx: L RTC repair        Daily Treatment Diary      Manuals 1/29 2/1 2/5 1/22 1/25   L elbow PROM               L shoulder PROM MS MS MS   MS MS                                   Ther Ex               Scap squeeze               Towel squeeze               L UT str L levator str  15\"x4             C/S ret AROM               Wrist circles               L Elbow AAROM               Pendulums                Pulleys 3'/3' 3'/3' 3'/3'   3'/3' 3'/3'   Cane shoulder " "flx AAROM (supine) Standing  5\"x10 Standing  5\"x10 Standing 5\"x10   5\"x10 (standing) 5\"x10  (Standing)   Cane shoulder abd AAROM (standing) 5\"x10 Standing  5\"x10 Standing   5\"x10   5\"x10 5\"x10   Cane shoulder ER  AAROM (standing) 5\"x10 Standing  5\"x10 Standing  5\"x10   5\"x10 5\"x10   Wall ladder Flex: 10x  Abd: 10x Flex: 10x  Abd: 10x Flex: 10x  Abd: 10x   Flex-10x, Abd-10x Flex-10x, Abd-10x   Pt Edu               Neuro Re-ed               Shoulder isos 5\"x10 all directions 5\"x10 all directions 5\"x10 all directions    5\"x10 each   +IR/ER 5\"x10 each way   Soulder flexion AROM 10x2 10x2 10x2   10x to 90 deg 10x Full ROM   Shoulder abd AROM 10x2 10x2 10x2   10x to 90 deg 10x Full ROM   SL ER AROM 10x2 10x2 10x2   10x                                     Ther Activity                                                               Gait Training               Arm swing                               Modalities               Ice 10' 10' 10'   10' 10'                                            "

## 2024-02-08 ENCOUNTER — OFFICE VISIT (OUTPATIENT)
Dept: PHYSICAL THERAPY | Facility: CLINIC | Age: 79
End: 2024-02-08
Payer: MEDICARE

## 2024-02-08 DIAGNOSIS — M75.112 INCOMPLETE TEAR OF LEFT ROTATOR CUFF, UNSPECIFIED WHETHER TRAUMATIC: ICD-10-CM

## 2024-02-08 DIAGNOSIS — M25.512 ACUTE PAIN OF LEFT SHOULDER: Primary | ICD-10-CM

## 2024-02-08 PROCEDURE — 97140 MANUAL THERAPY 1/> REGIONS: CPT

## 2024-02-08 PROCEDURE — 97110 THERAPEUTIC EXERCISES: CPT

## 2024-02-08 NOTE — PROGRESS NOTES
"Daily Note     Today's date: 2024  Patient name: Eladio Ayala  : 1945  MRN: [id number]  Referring provider: Steven Hamilton*  Dx:   Encounter Diagnosis     ICD-10-CM    1. Acute pain of left shoulder  M25.512       2. Incomplete tear of left rotator cuff, unspecified whether traumatic  M75.112           Start Time: 1000  Stop Time: 1052  Total time in clinic (min): 52 minutes    Subjective: Pt reported increased soreness today and also noted that sleeping was difficult last night because of his shoulder.       Objective: See treatment diary below      Assessment: Tolerated treatment well. Program started with the pulleys and wall ladder. Manual therapy focused on increasing L shoulder PROM. Ice was utilized post-treatment. Patient would benefit from continued PT      Plan: Continue per plan of care. Initiate strengthening exercises next week based on protocol.      Goals: Patient's goal is to return to playing golf.     Precautions: SEE ATTACHED PROTOCOL:   DOS:      Dx: L RTC repair      Daily Treatment Diary          Manuals    L elbow PROM              L shoulder PROM MS MS MS  MS  MS                                 Ther Ex              Scap squeeze              Towel squeeze              L UT str L levator str  15\"x4            C/S ret AROM              Wrist circles              L Elbow AAROM              Pendulums               Pulleys 3'/3' 3'/3' 3'/3'  3'/3'  3'/3'   Cane shoulder flx AAROM (supine) Standing  5\"x10 Standing  5\"x10 Standing 5\"x10  Standing 5\"x10  5\"x10  (Standing)   Cane shoulder abd AAROM (standing) 5\"x10 Standing  5\"x10 Standing   5\"x10  Standing 5\"x10  5\"x10   Cane shoulder ER  AAROM (standing) 5\"x10 Standing  5\"x10 Standing  5\"x10  Standing 5\"x10  5\"x10   Wall ladder Flex: 10x  Abd: 10x Flex: 10x  Abd: 10x Flex: 10x  Abd: 10x  Flex: 10x  Abd: 10x  Flex-10x, Abd-10x   Pt Edu              Neuro Re-ed              Shoulder isos 5\"x10 all " "directions 5\"x10 all directions 5\"x10 all directions   5\"x10 all directions  5\"x10 each way   Soulder flexion AROM 10x2 10x2 10x2  10x2  10x Full ROM   Shoulder abd AROM 10x2 10x2 10x2  10x2  10x Full ROM   SL ER AROM 10x2 10x2 10x2  10x2                                    Ther Activity                                                           Gait Training              Arm swing                             Modalities              Ice 10' 10' 10'  10'  10'                                         "

## 2024-02-12 ENCOUNTER — OFFICE VISIT (OUTPATIENT)
Dept: PHYSICAL THERAPY | Facility: CLINIC | Age: 79
End: 2024-02-12
Payer: MEDICARE

## 2024-02-12 DIAGNOSIS — M75.112 INCOMPLETE TEAR OF LEFT ROTATOR CUFF, UNSPECIFIED WHETHER TRAUMATIC: ICD-10-CM

## 2024-02-12 DIAGNOSIS — M25.512 ACUTE PAIN OF LEFT SHOULDER: Primary | ICD-10-CM

## 2024-02-12 PROCEDURE — 97110 THERAPEUTIC EXERCISES: CPT

## 2024-02-12 PROCEDURE — 97112 NEUROMUSCULAR REEDUCATION: CPT

## 2024-02-12 NOTE — PROGRESS NOTES
"Daily Note     Today's date: 2024  Patient name: Eladio Ayala  : 1945  MRN: [id number]  Referring provider: Steven Hamilton*  Dx:   Encounter Diagnosis     ICD-10-CM    1. Acute pain of left shoulder  M25.512       2. Incomplete tear of left rotator cuff, unspecified whether traumatic  M75.112           Start Time: 0900  Stop Time: 0948  Total time in clinic (min): 48 minutes    Subjective: Pt reported that his sx were about the same today.       Objective: See treatment diary below      Assessment: Tolerated treatment well. Program started with the pulleys. Manual therapy focused on increasing L shoulder PROM. Pt demonstrated an increase in L shoulder PROM. Pt also demonstrated increased understanding of exercises. Ice was utilized post-treatment. Patient would benefit from continued PT      Plan: Continue per plan of care.      Goals: Patient's goal is to return to playing golf.     Precautions: SEE ATTACHED PROTOCOL:   DOS:      Dx: L RTC repair      Daily Treatment Diary          Manuals     L elbow PROM             L shoulder PROM MS MS MS  MS MS                                Ther Ex             Scap squeeze             Towel squeeze             L UT str L levator str  15\"x4           C/S ret AROM             Wrist circles             L Elbow AAROM             Pendulums              Pulleys 3'/3' 3'/3' 3'/3'  3'/3' 3'/3    Cane shoulder flx AAROM (supine) Standing  5\"x10 Standing  5\"x10 Standing 5\"x10  Standing 5\"x10 Standing 5\"x10    Cane shoulder abd AAROM (standing) 5\"x10 Standing  5\"x10 Standing   5\"x10  Standing 5\"x10 Standing 5\"x10    Cane shoulder ER  AAROM (standing) 5\"x10 Standing  5\"x10 Standing  5\"x10  Standing 5\"x10 Standing 5\"x10    Wall ladder Flex: 10x  Abd: 10x Flex: 10x  Abd: 10x Flex: 10x  Abd: 10x  Flex: 10x  Abd: 10x Flex: 10x  Abd: 10x    Pt Edu             Neuro Re-ed             Shoulder isos 5\"x10 all directions 5\"x10 all directions " "5\"x10 all directions   5\"x10 all directions 5\"x10 all directions    Soulder flexion AROM 10x2 10x2 10x2  10x2 10x2    Shoulder abd AROM 10x2 10x2 10x2  10x2 10x2    SL ER AROM 10x2 10x2 10x2  10x2 10x2                                Ther Activity                                                       Gait Training             Arm swing                           Modalities             Ice 10' 10' 10'  10' 10'                                           "

## 2024-02-15 ENCOUNTER — OFFICE VISIT (OUTPATIENT)
Dept: PHYSICAL THERAPY | Facility: CLINIC | Age: 79
End: 2024-02-15
Payer: MEDICARE

## 2024-02-15 DIAGNOSIS — M75.112 INCOMPLETE TEAR OF LEFT ROTATOR CUFF, UNSPECIFIED WHETHER TRAUMATIC: ICD-10-CM

## 2024-02-15 DIAGNOSIS — M25.512 ACUTE PAIN OF LEFT SHOULDER: Primary | ICD-10-CM

## 2024-02-15 PROCEDURE — 97110 THERAPEUTIC EXERCISES: CPT

## 2024-02-15 PROCEDURE — 97140 MANUAL THERAPY 1/> REGIONS: CPT

## 2024-02-15 PROCEDURE — 97112 NEUROMUSCULAR REEDUCATION: CPT

## 2024-02-15 NOTE — PROGRESS NOTES
"Daily Note     Today's date: 2/15/2024  Patient name: Eladio Ayala  : 1945  MRN: [id number]  Referring provider: Steven Hamilton*  Dx:   Encounter Diagnosis     ICD-10-CM    1. Acute pain of left shoulder  M25.512       2. Incomplete tear of left rotator cuff, unspecified whether traumatic  M75.112           Start Time: 1400  Stop Time: 1440  Total time in clinic (min): 40 minutes    Subjective: Still has difficulty sleeping through the night due to his L shoulder.       Objective: See treatment diary below      Assessment: Tolerated treatment well. Full PROM Patient demonstrated fatigue post treatment      Plan: Continue per plan of care.      Goals: Patient's goal is to return to playing golf.     Precautions: SEE ATTACHED PROTOCOL:   DOS:      Dx: L RTC repair      Daily Treatment Diary          Manuals 1/29 2/1 2/5  2/8 2/12 02/15   L elbow PROM             L shoulder PROM MS MS MS  MS MS LNK                               Ther Ex             Scap squeeze             Towel squeeze             L UT str L levator str  15\"x4           C/S ret AROM             Wrist circles             L Elbow AAROM             Pendulums              Pulleys 3'/3' 3'/3' 3'/3'  3'/3' 3'/3 3'/3'   Cane shoulder flx AAROM (supine) Standing  5\"x10 Standing  5\"x10 Standing 5\"x10  Standing 5\"x10 Standing 5\"x10 Standing 5\"x10   Cane shoulder abd AAROM (standing) 5\"x10 Standing  5\"x10 Standing   5\"x10  Standing 5\"x10 Standing 5\"x10 Standing 5\"x10   Cane shoulder ER  AAROM (standing) 5\"x10 Standing  5\"x10 Standing  5\"x10  Standing 5\"x10 Standing 5\"x10 Standing 5\"x10   Wall ladder Flex: 10x  Abd: 10x Flex: 10x  Abd: 10x Flex: 10x  Abd: 10x  Flex: 10x  Abd: 10x Flex: 10x  Abd: 10x Flex: 10x  Abd: 10x   Pt Edu             Neuro Re-ed             Shoulder isos 5\"x10 all directions 5\"x10 all directions 5\"x10 all directions   5\"x10 all directions 5\"x10 all directions 5\"x10    Soulder flexion AROM 10x2 10x2 10x2  10x2 10x2 " 2x10 stand   Shoulder abd AROM 10x2 10x2 10x2  10x2 10x2 2x10   SL ER AROM 10x2 10x2 10x2  10x2 10x2 2x10                               Ther Activity                                                       Gait Training             Arm swing                           Modalities             Ice 10' 10' 10'  10' 10'

## 2024-02-19 ENCOUNTER — OFFICE VISIT (OUTPATIENT)
Dept: PHYSICAL THERAPY | Facility: CLINIC | Age: 79
End: 2024-02-19
Payer: MEDICARE

## 2024-02-19 DIAGNOSIS — M25.512 ACUTE PAIN OF LEFT SHOULDER: Primary | ICD-10-CM

## 2024-02-19 DIAGNOSIS — M75.112 INCOMPLETE TEAR OF LEFT ROTATOR CUFF, UNSPECIFIED WHETHER TRAUMATIC: ICD-10-CM

## 2024-02-19 PROCEDURE — 97112 NEUROMUSCULAR REEDUCATION: CPT

## 2024-02-19 PROCEDURE — 97110 THERAPEUTIC EXERCISES: CPT

## 2024-02-19 NOTE — PROGRESS NOTES
"Daily Note     Today's date: 2024  Patient name: Eladio Ayala  : 1945  MRN: [id number]  Referring provider: Steven Hamilton*  Dx:   Encounter Diagnosis     ICD-10-CM    1. Acute pain of left shoulder  M25.512       2. Incomplete tear of left rotator cuff, unspecified whether traumatic  M75.112           Start Time: 0900  Stop Time: 0950  Total time in clinic (min): 50 minutes    Subjective: Pt reported that his L shoulder sx were about the same as last visit.       Objective: See treatment diary below      Assessment: Tolerated treatment well. Program started with the pulleys. Manual therapy focused on increasing L shoulder PROM. Pt tolerated adding serratus punches well. Ice was utilized post-treatment. Ice was utilized post-treatment. Patient would benefit from continued PT      Plan: Continue per plan of care. Initiate strengthening phase within pt's tolerance.      Goals: Patient's goal is to return to playing golf.     Precautions: SEE ATTACHED PROTOCOL:   DOS:      Dx: L RTC repair      Daily Treatment Diary          Manuals 2/19  2/5  2/8 2/12 02/15   L elbow PROM           L shoulder PROM MS  MS  MS MS LNK                           Ther Ex           Scap squeeze           Towel squeeze           L UT str           C/S ret AROM           Wrist circles           L Elbow AAROM           Pendulums            Pulleys 3'/3'  3'/3'  3'/3' 3'/3 3'/3'   Cane shoulder flx AAROM (supine) Standing  5\"x10  Standing 5\"x10  Standing 5\"x10 Standing 5\"x10 Standing 5\"x10   Cane shoulder abd AAROM (standing) Standing   5\"x10  Standing   5\"x10  Standing 5\"x10 Standing 5\"x10 Standing 5\"x10   Cane shoulder ER  AAROM (standing) Standing   5\"x10  Standing  5\"x10  Standing 5\"x10 Standing 5\"x10 Standing 5\"x10   Wall ladder Flx: 10x  Abd: 10x  Flex: 10x  Abd: 10x  Flex: 10x  Abd: 10x Flex: 10x  Abd: 10x Flex: 10x  Abd: 10x   Pt Edu           Neuro Re-ed           Shoulder isos 5\"x10 all directions   " "5\"x10 all directions   5\"x10 all directions 5\"x10 all directions 5\"x10    Soulder flexion AROM 20x 1# 10x2  10x2 10x2 2x10 stand   Shoulder abd AROM 20x 1# 10x2  10x2 10x2 2x10   SL ER AROM 20x 1# 10x2  10x2 10x2 2x10    Serratus punches 5\"x20           TB rows/ext  NV         Prone row   NV       Prone y/t/I's   NV       Ther Activity                                               Gait Training           Arm swing                       Modalities           Ice 10'  10'  10' 10'                                             "

## 2024-02-22 ENCOUNTER — OFFICE VISIT (OUTPATIENT)
Dept: PHYSICAL THERAPY | Facility: CLINIC | Age: 79
End: 2024-02-22
Payer: MEDICARE

## 2024-02-22 DIAGNOSIS — M75.112 INCOMPLETE TEAR OF LEFT ROTATOR CUFF, UNSPECIFIED WHETHER TRAUMATIC: ICD-10-CM

## 2024-02-22 DIAGNOSIS — M25.512 ACUTE PAIN OF LEFT SHOULDER: Primary | ICD-10-CM

## 2024-02-22 PROCEDURE — 97112 NEUROMUSCULAR REEDUCATION: CPT

## 2024-02-22 PROCEDURE — 97110 THERAPEUTIC EXERCISES: CPT

## 2024-02-22 NOTE — PROGRESS NOTES
"Daily Note     Today's date: 2024  Patient name: Eladio Ayala  : 1945  MRN: [id number]  Referring provider: Steven Hamilton*  Dx:   Encounter Diagnosis     ICD-10-CM    1. Acute pain of left shoulder  M25.512       2. Incomplete tear of left rotator cuff, unspecified whether traumatic  M75.112           Start Time: 0900  Stop Time: 0950  Total time in clinic (min): 50 minutes    Subjective: Pt reported having his normal ache in his shoulder.       Objective: See treatment diary below      Assessment: Tolerated treatment well. Program started with the pulleys. Manual therapy focused on increasing L shoulder PROM. Progressions focused on initiating strengthening. Pt required VC/TC in order to perform prone exercises with correct form. Ice was utilized post-treatment. Patient would benefit from continued PT      Plan: Continue per plan of care.      Goals: Patient's goal is to return to playing golf.     Precautions: SEE ATTACHED PROTOCOL:   DOS:      Dx: L RTC repair    Daily Treatment Diary       Manuals 2/19 2/22   2/8 2/12 02/15   L elbow PROM          L shoulder PROM MS MS   MS MS LNK                         Ther Ex          Scap squeeze          Towel squeeze          L UT str          C/S ret AROM          Wrist circles          L Elbow AAROM          Pendulums           Pulleys 3'/3' 3'/3'   3'/3' 3'/3 3'/3'   Cane shoulder flx AAROM (supine) Standing  5\"x10 Standing   5\"x10   Standing 5\"x10 Standing 5\"x10 Standing 5\"x10   Cane shoulder abd AAROM (standing) Standing   5\"x10 Standing   5\"x10   Standing 5\"x10 Standing 5\"x10 Standing 5\"x10   Cane shoulder ER  AAROM (standing) Standing   5\"x10 Standing   5\"x10   Standing 5\"x10 Standing 5\"x10 Standing 5\"x10   Wall ladder Flx: 10x  Abd: 10x NT   Flex: 10x  Abd: 10x Flex: 10x  Abd: 10x Flex: 10x  Abd: 10x   Pt Edu          Neuro Re-ed          Shoulder isos 5\"x10 all directions  5\"x10 all directions   5\"x10 all directions 5\"x10 all " "directions 5\"x10    Soulder flexion AROM 20x 1#, 20x   10x2 10x2 2x10 stand   Shoulder abd AROM 20x 1#, 20x   10x2 10x2 2x10   SL ER AROM 20x 1#, 20x   10x2 10x2 2x10    Serratus punches 5\"x20 5\"x20, 1#         TB IR/ER   NV       Prone row   1#, 20x       Prone horizontal abd/extension   10x2 each, 1#       Ther Activity                                           Gait Training          Arm swing                     Modalities          Ice 10' 10'   10' 10'                                              "

## 2024-02-26 ENCOUNTER — OFFICE VISIT (OUTPATIENT)
Dept: PHYSICAL THERAPY | Facility: CLINIC | Age: 79
End: 2024-02-26
Payer: MEDICARE

## 2024-02-26 ENCOUNTER — OFFICE VISIT (OUTPATIENT)
Dept: OBGYN CLINIC | Facility: OTHER | Age: 79
End: 2024-02-26

## 2024-02-26 VITALS
BODY MASS INDEX: 25.05 KG/M2 | WEIGHT: 175 LBS | HEART RATE: 69 BPM | SYSTOLIC BLOOD PRESSURE: 129 MMHG | DIASTOLIC BLOOD PRESSURE: 80 MMHG | HEIGHT: 70 IN

## 2024-02-26 DIAGNOSIS — Z98.890 S/P LEFT ROTATOR CUFF REPAIR: Primary | ICD-10-CM

## 2024-02-26 DIAGNOSIS — M25.512 ACUTE PAIN OF LEFT SHOULDER: Primary | ICD-10-CM

## 2024-02-26 DIAGNOSIS — M75.112 INCOMPLETE TEAR OF LEFT ROTATOR CUFF, UNSPECIFIED WHETHER TRAUMATIC: ICD-10-CM

## 2024-02-26 PROCEDURE — 99024 POSTOP FOLLOW-UP VISIT: CPT | Performed by: ORTHOPAEDIC SURGERY

## 2024-02-26 PROCEDURE — 97112 NEUROMUSCULAR REEDUCATION: CPT

## 2024-02-26 PROCEDURE — 97110 THERAPEUTIC EXERCISES: CPT

## 2024-02-26 NOTE — PROGRESS NOTES
"Daily Note     Today's date: 2024  Patient name: Eladio Ayala  : 1945  MRN: [id number]  Referring provider: Steven Hamilton*  Dx:   Encounter Diagnosis     ICD-10-CM    1. Acute pain of left shoulder  M25.512       2. Incomplete tear of left rotator cuff, unspecified whether traumatic  M75.112           Start Time: 0900  Stop Time: 0948  Total time in clinic (min): 48 minutes    Subjective: Pt noted increased soreness after last visit.       Objective: See treatment diary below      Assessment: Tolerated treatment well. Program started with the pulleys. Manual therapy focused on increasing L shoulder PROM. Pt continues to demonstrate increased L shoulder PROM. SL abd with DB was added in order to increase UE strength. Ice was utilized post-treatment. Patient would benefit from continued PT      Plan: Continue per plan of care.      Goals: Patient's goal is to return to playing golf.     Precautions: SEE ATTACHED PROTOCOL:   DOS:      Dx: L RTC repair    Daily Treatment Diary       Manuals 2/19 2/22 2/26  2/12 02/15   L elbow PROM         L shoulder PROM MS MS MS  MS LNK                       Ther Ex         Scap squeeze         Towel squeeze         L UT str         C/S ret AROM         Wrist circles         L Elbow AAROM         Pendulums          Pulleys 3'/3' 3'/3' 3'/3'  3'/3 3'/3'   Cane shoulder flx AAROM (supine) Standing  5\"x10 Standing   5\"x10 Standing   5\"x10  Standing 5\"x10 Standing 5\"x10   Cane shoulder abd AAROM (standing) Standing   5\"x10 Standing   5\"x10 Standing   5\"x10  Standing 5\"x10 Standing 5\"x10   Cane shoulder ER  AAROM (standing) Standing   5\"x10 Standing   5\"x10 Standing   5\"x10  Standing 5\"x10 Standing 5\"x10   Wall ladder Flx: 10x  Abd: 10x NT NT  Flex: 10x  Abd: 10x Flex: 10x  Abd: 10x   Pt Edu         Neuro Re-ed         Shoulder isos 5\"x10 all directions  5\"x10 all directions NT  5\"x10 all directions 5\"x10    Soulder flexion AROM 20x 1#, 20x 1#, 20x  10x2 " "2x10 stand   Shoulder abd AROM 20x 1#, 20x 1#, 20x  10x2 2x10   SL ER AROM 20x 1#, 20x 1#, 20x  10x2 2x10   SL abd AROM   1#, 20x       Serratus punches 5\"x20 5\"x20, 1# 5\"x20, 1#       TB IR/ER    NV     Prone row   1#, 20x 1#, 20x      Prone horizontal abd/extension   10x2 each, 1# 10x2 each  1#      Ther Activity                                       Gait Training         Arm swing                   Modalities         Ice 10' 10' 10'  10'                                               "

## 2024-02-26 NOTE — PROGRESS NOTES
"Assessment:  1. S/P left rotator cuff repair  XR shoulder 2+ vw left            Surgery: Shoulder Arthroscopic Rotator Cuff Repair Extensive Debridment - Left  Date of Surgery: 11/29/2023        Discussion and Plan:     Patient is now 3 months s/p above mentioned procedure. Explained to the patient that it appears that he is going through what could be just normal post operative recovery symptoms as he is transitioning into the strengthening phase. He has good resistance with rotator cuff testing on today's examination so my index of suspicion for a recurrent tear or a nonhealed tear is low.  I do however expect the symptoms to improve significantly over the next few weeks and if they do not then further imaging in the form of a plain or MRI arthrogram would be indicated.   Instructed to take a 2 week holiday from formal physical therapy to allow his symptoms to calm down. Continue home exercise program as per post operative protocol as tolerated.  No need for a post operative MRI at this time. In the future, if his symptoms persist then we could consider new imaging to further evaluate the healing of his rotator cuff.  Given the resistance he shows today and his range of motion/strength I do not feel that is indicated at this time and after discussing this with him he agrees.    Follow Up:  Return in about 6 weeks (around 4/8/2024).        CHIEF COMPLAINT:  Chief Complaint   Patient presents with    Left Shoulder - Follow-up         SUBJECTIVE:  Eladio Ayala is a 78 y.o. year old male who presents for follow up after Shoulder Arthroscopic Rotator Cuff Repair Extensive Debridment - Left. Today patient notes a continued \"dull, ache\" about the shoulder, that has become intermittently more painful. He has just began strengthening exercises in PT/HEP. No numbness or tingling. No fevers or chills.       PHYSICAL EXAMINATION:  General: well developed and well nourished, alert, oriented times 3, and appears " comfortable  Psychiatric: Normal    MUSCULOSKELETAL EXAMINATION:  Incision: healed  Surgery Site: normal, no evidence of infection   Range of Motion: As expected  Neurovascular status: Neuro intact, good cap refill      Left Shoulder Exam     Range of Motion   External rotation:  40   Forward flexion:  140     Muscle Strength   Abduction: 5/5     Other   Erythema: absent  Sensation: normal  Pulse: present               Scribe Attestation      I,:  Darien Shahid am acting as a scribe while in the presence of the attending physician.:       I,:  Steven Hamilton MD personally performed the services described in this documentation    as scribed in my presence.:

## 2024-02-29 ENCOUNTER — OFFICE VISIT (OUTPATIENT)
Dept: PHYSICAL THERAPY | Facility: CLINIC | Age: 79
End: 2024-02-29
Payer: MEDICARE

## 2024-02-29 DIAGNOSIS — M75.112 INCOMPLETE TEAR OF LEFT ROTATOR CUFF, UNSPECIFIED WHETHER TRAUMATIC: ICD-10-CM

## 2024-02-29 DIAGNOSIS — M25.512 ACUTE PAIN OF LEFT SHOULDER: Primary | ICD-10-CM

## 2024-02-29 PROCEDURE — 97112 NEUROMUSCULAR REEDUCATION: CPT

## 2024-02-29 PROCEDURE — 97110 THERAPEUTIC EXERCISES: CPT

## 2024-02-29 NOTE — PROGRESS NOTES
"Daily Note     Today's date: 2024  Patient name: Eladio Ayala  : 1945  MRN: [id number]  Referring provider: Steven Hamilton*  Dx:   Encounter Diagnosis     ICD-10-CM    1. Acute pain of left shoulder  M25.512       2. Incomplete tear of left rotator cuff, unspecified whether traumatic  M75.112           Start Time: 0900  Stop Time: 0954  Total time in clinic (min): 54 minutes    Subjective: Pt reported having increased shoulder soreness today.       Objective: See treatment diary below      Assessment: Tolerated treatment well. Program started with the pulleys. Manual therapy focused on increasing L shoulder PROM. IR stretch was added in order to increase L shoulder IR PROM. TB were were added in order to increase shoulder strength. Pt also tolerated added weight to all DB exercises. Ice was utilized post-treatment. Patient would benefit from continued PT      Plan: Continue per plan of care.      Goals: Patient's goal is to return to playing golf.     Precautions: SEE ATTACHED PROTOCOL:   DOS:      Dx: L RTC repair    Daily Treatment Diary       Manuals 2/19 2/22 2/26 2/29  02/15   L elbow PROM         L shoulder PROM MS MS MS MS  LNK                       Ther Ex         Scap squeeze         Towel squeeze         L UT str         C/S ret AROM         Wrist circles         L Elbow AAROM         Pendulums          Pulleys 3'/3' 3'/3' 3'/3' 3'/3'  3'/3'   Cane shoulder flx AAROM (supine) Standing  5\"x10 Standing   5\"x10 Standing   5\"x10 Standing   5\"x10  Standing 5\"x10   Cane shoulder abd AAROM (standing) Standing   5\"x10 Standing   5\"x10 Standing   5\"x10 Standing   5\"x10  Standing 5\"x10   Cane shoulder ER  AAROM (standing) Standing   5\"x10 Standing   5\"x10 Standing   5\"x10 Standing   5\"x10  Standing 5\"x10   Wall ladder Flx: 10x  Abd: 10x NT NT NT  Flex: 10x  Abd: 10x   IR stretch with strap    10\"x5     Pt Edu         Neuro Re-ed         Shoulder isos 5\"x10 all directions  5\"x10 all " "directions NT   5\"x10    Soulder flexion AROM 20x 1#, 20x 1#, 20x 2#, 20x  2x10 stand   Shoulder abd AROM 20x 1#, 20x 1#, 20x 2#, 20x  2x10   SL ER AROM 20x 1#, 20x 1#, 20x 2#, 20x  2x10   SL abd AROM   1#, 20x 2#, 20x      Serratus punches 5\"x20 5\"x20, 1# 5\"x20, 1# 5\"x20, 2#      TB IR/ER    RTB, 10x2     Prone row   1#, 20x 1#, 20x 2#, 20x     Prone horizontal abd/extension   10x2 each, 1# 10x2 each  1# 10x2 each  2#     Ther Activity                                       Gait Training         Arm swing                   Modalities         Ice 10' 10' 10' 10'                                                  "

## 2024-03-04 ENCOUNTER — OFFICE VISIT (OUTPATIENT)
Dept: PHYSICAL THERAPY | Facility: CLINIC | Age: 79
End: 2024-03-04
Payer: MEDICARE

## 2024-03-04 DIAGNOSIS — M75.112 INCOMPLETE TEAR OF LEFT ROTATOR CUFF, UNSPECIFIED WHETHER TRAUMATIC: ICD-10-CM

## 2024-03-04 DIAGNOSIS — M25.512 ACUTE PAIN OF LEFT SHOULDER: Primary | ICD-10-CM

## 2024-03-04 PROCEDURE — 97112 NEUROMUSCULAR REEDUCATION: CPT

## 2024-03-04 PROCEDURE — 97110 THERAPEUTIC EXERCISES: CPT

## 2024-03-04 NOTE — PROGRESS NOTES
"Daily Note     Today's date: 3/4/2024  Patient name: Eladio Ayala  : 1945  MRN: [id number]  Referring provider: Steven Hamilton*  Dx:   Encounter Diagnosis     ICD-10-CM    1. Acute pain of left shoulder  M25.512       2. Incomplete tear of left rotator cuff, unspecified whether traumatic  M75.112           Start Time: 0900  Stop Time: 0948  Total time in clinic (min): 48 minutes    Subjective: Pt reported increased pain and ache at the front of his L shoulder.       Objective: See treatment diary below      Assessment: Tolerated treatment well. Program started with the [employer]. Manual therapy focused on increasing L shoulder PROM. Program focused on increasing L shoulder ROM and strength. Progressions were held due to increased difficulty level with exercises. Ice was utilized. Patient would benefit from continued PT      Plan: Continue per plan of care.      Goals: Patient's goal is to return to playing golf.     Precautions: SEE ATTACHED PROTOCOL:   DOS:      Dx: L RTC repair    Daily Treatment Diary       Manuals 2/19 2/22 2/26 2/29 3/4    L elbow PROM         L shoulder PROM MS MS MS MS MS                        Ther Ex         Scap squeeze         Towel squeeze         L UT str         C/S ret AROM         Wrist circles         L Elbow AAROM         Pendulums          Pulleys 3'/3' 3'/3' 3'/3' 3'/3' 3'/3'    Cane shoulder flx AAROM (supine) Standing  5\"x10 Standing   5\"x10 Standing   5\"x10 Standing   5\"x10 Standing 5\"x10    Cane shoulder abd AAROM (standing) Standing   5\"x10 Standing   5\"x10 Standing   5\"x10 Standing   5\"x10 Standing 5\"x10    Cane shoulder ER  AAROM (standing) Standing   5\"x10 Standing   5\"x10 Standing   5\"x10 Standing   5\"x10 Standing 5\"x10    Wall ladder Flx: 10x  Abd: 10x NT NT NT     IR stretch with strap    10\"x5 10\"x5    Pt Edu         Neuro Re-ed         Shoulder isos 5\"x10 all directions  5\"x10 all directions NT      Soulder flexion AROM 20x 1#, 20x 1#, 20x " "2#, 20x 2#, 20x    Shoulder abd AROM 20x 1#, 20x 1#, 20x 2#, 20x 2#, 20x    SL ER AROM 20x 1#, 20x 1#, 20x 2#, 20x 2#, 20x    SL abd AROM   1#, 20x 2#, 20x 2#, 20x     Serratus punches 5\"x20 5\"x20, 1# 5\"x20, 1# 5\"x20, 2# 5\"x20, 2#     TB IR/ER    RTB, 10x2 RTB, 10x2    Prone row   1#, 20x 1#, 20x 2#, 20x 2#, 20X    Prone horizontal abd/extension   10x2 each, 1# 10x2 each  1# 10x2 each  2# 20x each, 2#    Ther Activity                                       Gait Training         Arm swing                   Modalities         Ice 10' 10' 10' 10' 10'                                                   "

## 2024-03-07 ENCOUNTER — EVALUATION (OUTPATIENT)
Dept: PHYSICAL THERAPY | Facility: CLINIC | Age: 79
End: 2024-03-07
Payer: MEDICARE

## 2024-03-07 DIAGNOSIS — M75.112 INCOMPLETE TEAR OF LEFT ROTATOR CUFF, UNSPECIFIED WHETHER TRAUMATIC: ICD-10-CM

## 2024-03-07 DIAGNOSIS — M25.512 ACUTE PAIN OF LEFT SHOULDER: Primary | ICD-10-CM

## 2024-03-07 PROCEDURE — 97110 THERAPEUTIC EXERCISES: CPT

## 2024-03-07 PROCEDURE — 97112 NEUROMUSCULAR REEDUCATION: CPT

## 2024-03-07 NOTE — PROGRESS NOTES
Re-Evaluation     Today's date: 3/7/2024  Patient name: Eladio Ayala  : 1945  MRN: [id number]  Referring provider: Steven Hamilton*  Dx:   Encounter Diagnosis     ICD-10-CM    1. Acute pain of left shoulder  M25.512       2. Incomplete tear of left rotator cuff, unspecified whether traumatic  M75.112           Start Time: 0900  Stop Time: 0945  Total time in clinic (min): 45 minutes    Subjective Evaluation     History of Present Illness  3/7: Pt reported that his shoulder pain has been ranging between 2-6/10 and also reported a subjective improvement percentage of 65%.      IE: Patient presents with c/o L shoulder pain from a L incomplete RTC repair on 23. Pt reported that his sx started about 1 year ago while golfing. Pt reported that his sx increase with adjustment his sling. Pt has a PMH of R RTC (8 year ago).     Neuro signs: None  Bowel and bladder sxs: Normal  Red flags: Normal  Occupation: Working, [employer] store owner     Pain  At best pain ratin/10  At worst pain ratin/10  Location: L shoulder      Social Support  Lives with his wife in a 2 story home.      Patient Goals  Patient goals for therapy: Golfing     STGs  1. Decrease pain by 20% in 2-4 weeks to improve standing tolerance.-Met  2. Improve L shoulder PROM by 10 degrees in 2-4 weeks.-Met  3. (I) with HEP within 2-4 weeks in order to improve PT outcomes.-Met     LTGs  1. Decrease pain by 60% in 6-8 weeks.-Partially Met  2. Increase L shoulder AROM WNL and L shoulder MMT 5/5 within 6-8 weeks.-Partially Met   3. Perform job/dressing/showering activities independently without pain in 6-8 weeks. -Partially Met   4. Improve FOTO to greater than or equal to 59.-Not Met        Objective Measurements:     Shoulder  A/PROM R L Strength   Shoulder R L   Flex   deg/ 160 deg Flex 5 4   Abd  deg/ 176 deg Abd 5 4   ER WNL NT/88 deg @ 80 deg abd ER 5 4   IR WNL NT/65 deg@80 deg abd IR 5 4+                 Wrist AROM          "    Flexion WNL WNL         Extension WNL WNL         Supination WNL WNL         Pronation WNL WNL         UD WNL WNL         RD WNL WNL         Elbow PROM/  AROM             Flexion WNL WNL/   NT         Extension WNL WNL/NT                  Assessment:     Eladio Ayala is a pleasant 78 y.o. male who has attended 25 visits of skilled PT for L shoulder pain S/P L incomplete RTC repair on 11/29/23. Pt demonstrated improvement in L shoulder PROM and MMT but still demonstrates deficits in these categories. Pt scored a 53 on FOTO. Program was modified due to re-evaluation being performed. Manual therapy focused on increasing L shoulder PROM. Pt was educated and demonstrated understanding of HEP, POC, and benefit of skill PT. Ice was utilized post-treatment.  Ice was utilized post-treatment. Pt would benefit from further skilled PT in order to decrease pain, address deficits (ROM/MMT), help pt achieve goals, and progress program as tolerated.      Thank you for the referral!     Plan  Patient would benefit from:Skilled physical therapy  Planned therapy interventions: manual therapy, neuromuscular re-education, stretching, strengthening, therapeutic activities, therapeutic exercise, patient education, home exercise program, modalities to decrease pain, and activity modification.     Frequency: 2x week  Duration in weeks: 8  Treatment plan discussed with: patient        Goals: Patient's goal is to return to playing golf.     Precautions: SEE ATTACHED PROTOCOL:   DOS:  11/ 29/23    Dx: L RTC repair    Daily Treatment Diary       Manuals 2/19 2/22 2/26 2/29 3/4 3/7   L elbow PROM         L shoulder PROM MS MS MS MS MS MS                       Ther Ex         Scap squeeze         Hang str      10\"x4   Towel squeeze         L UT str         C/S ret AROM         Wrist circles         L Elbow AAROM         Pendulums          Pulleys 3'/3' 3'/3' 3'/3' 3'/3' 3'/3' 3'/3'   Cane shoulder flx AAROM (supine) Standing  5\"x10 Standing " "  5\"x10 Standing   5\"x10 Standing   5\"x10 Standing 5\"x10 Standing 5\"x5   Cane shoulder abd AAROM (standing) Standing   5\"x10 Standing   5\"x10 Standing   5\"x10 Standing   5\"x10 Standing 5\"x10 Standing 5\"x5   Cane shoulder ER  AAROM (standing) Standing   5\"x10 Standing   5\"x10 Standing   5\"x10 Standing   5\"x10 Standing 5\"x10 Standing 5\"x5   Wall ladder Flx: 10x  Abd: 10x NT NT NT     IR stretch with strap    10\"x5 10\"x5 10\"x4   Pt Edu         Neuro Re-ed         Shoulder isos 5\"x10 all directions  5\"x10 all directions NT      Soulder flexion AROM 20x 1#, 20x 1#, 20x 2#, 20x 2#, 20x 2#, 20x   Shoulder abd AROM 20x 1#, 20x 1#, 20x 2#, 20x 2#, 20x 20x10   SL ER AROM 20x 1#, 20x 1#, 20x 2#, 20x 2#, 20x 2#, 20x   SL abd AROM   1#, 20x 2#, 20x 2#, 20x 2#, 20x    Serratus punches 5\"x20 5\"x20, 1# 5\"x20, 1# 5\"x20, 2# 5\"x20, 2# 5\"x20, 2#    TB IR/ER    RTB, 10x2 RTB, 10x2    Prone row   1#, 20x 1#, 20x 2#, 20x 2#, 20X    Prone horizontal abd/extension   10x2 each, 1# 10x2 each  1# 10x2 each  2# 20x each, 2#    Ther Activity                                       Gait Training         Arm swing                   Modalities         Ice 10' 10' 10' 10' 10' 10'                                                    "

## 2024-03-11 ENCOUNTER — OFFICE VISIT (OUTPATIENT)
Dept: PHYSICAL THERAPY | Facility: CLINIC | Age: 79
End: 2024-03-11
Payer: MEDICARE

## 2024-03-11 DIAGNOSIS — M25.512 ACUTE PAIN OF LEFT SHOULDER: Primary | ICD-10-CM

## 2024-03-11 DIAGNOSIS — M75.112 INCOMPLETE TEAR OF LEFT ROTATOR CUFF, UNSPECIFIED WHETHER TRAUMATIC: ICD-10-CM

## 2024-03-11 PROCEDURE — 97112 NEUROMUSCULAR REEDUCATION: CPT

## 2024-03-11 PROCEDURE — 97110 THERAPEUTIC EXERCISES: CPT

## 2024-03-11 NOTE — PROGRESS NOTES
"Daily Note     Today's date: 3/11/2024  Patient name: Eladio Ayala  : 1945  MRN: [id number]  Referring provider: Steven Hamilton*  Dx:   Encounter Diagnosis     ICD-10-CM    1. Acute pain of left shoulder  M25.512       2. Incomplete tear of left rotator cuff, unspecified whether traumatic  M75.112           Start Time: 0900  Stop Time: 0950  Total time in clinic (min): 50 minutes    Subjective: Pt reported increased pain/soreness but less \"tweaking\" in his shoulder. Pt noted that his pain was 2/10.       Objective: See treatment diary below      Assessment: Tolerated treatment well. Program started with the pulleys. Progressions were held to increased discomfort today. Manual therapy focused on increasing L shoulder PROM. Ice was utilized post-treatment. Patient would benefit from continued PT      Plan: Continue per plan of care. Add UBE NV.      Goals: Patient's goal is to return to playing golf.     Precautions: SEE ATTACHED PROTOCOL:   DOS:      Dx: L RTC repair    Daily Treatment Diary       Manuals  2/22 2/26 2/29 3/4 3/11   L elbow PROM         L shoulder PROM  MS MS MS MS MS                       Ther Ex         UBE (standing) NV        Scap squeeze         Towel squeeze         L UT str         C/S ret AROM         Wrist circles         L Elbow AAROM         Pendulums          Pulleys  3'/3' 3'/3' 3'/3' 3'/3' 3'/3'   Cane shoulder flx AAROM (supine)  Standing   5\"x10 Standing   5\"x10 Standing   5\"x10 Standing 5\"x10 Standing 5\"x10   Cane shoulder abd AAROM (standing)  Standing   5\"x10 Standing   5\"x10 Standing   5\"x10 Standing 5\"x10 Standing 5\"x10   Cane shoulder ER  AAROM (standing)  Standing   5\"x10 Standing   5\"x10 Standing   5\"x10 Standing 5\"x10 Standing 5\"x10   Wall ladder  NT NT NT     IR stretch with strap    10\"x5 10\"x5    Pt Edu         Neuro Re-ed         Shoulder isos  5\"x10 all directions NT      Soulder flexion AROM  1#, 20x 1#, 20x 2#, 20x 2#, 20x 2#, 20x   Shoulder " "abd AROM  1#, 20x 1#, 20x 2#, 20x 2#, 20x 2#, 20x   SL ER AROM  1#, 20x 1#, 20x 2#, 20x 2#, 20x 2#, 20x   SL abd AROM   1#, 20x 2#, 20x 2#, 20x 2, 20x    Serratus punches  5\"x20, 1# 5\"x20, 1# 5\"x20, 2# 5\"x20, 2# 5\"x20, 2#    TB IR/ER    RTB, 10x2 RTB, 10x2 RTB, 10x2   TB  rows/ext NV        Prone row   1#, 20x 1#, 20x 2#, 20x 2#, 20x 2#, 10x2   Prone horizontal abd/extension   10x2 each, 1# 10x2 each  1# 10x2 each  2# 20x each, 2# 20x each  2#            Ball stabs NV                 Ther Activity                                       Gait Training         Arm swing                   Modalities         Ice  10' 10' 10' 10' 10'                                                    "

## 2024-03-14 ENCOUNTER — OFFICE VISIT (OUTPATIENT)
Dept: PHYSICAL THERAPY | Facility: CLINIC | Age: 79
End: 2024-03-14
Payer: MEDICARE

## 2024-03-14 DIAGNOSIS — M75.112 INCOMPLETE TEAR OF LEFT ROTATOR CUFF, UNSPECIFIED WHETHER TRAUMATIC: ICD-10-CM

## 2024-03-14 DIAGNOSIS — M25.512 ACUTE PAIN OF LEFT SHOULDER: Primary | ICD-10-CM

## 2024-03-14 PROCEDURE — 97112 NEUROMUSCULAR REEDUCATION: CPT

## 2024-03-14 PROCEDURE — 97110 THERAPEUTIC EXERCISES: CPT

## 2024-03-14 NOTE — PROGRESS NOTES
"Daily Note     Today's date: 3/14/2024  Patient name: Eladio Ayala  : 1945  MRN: [id number]  Referring provider: Steven Hamilton*  Dx:   Encounter Diagnosis     ICD-10-CM    1. Acute pain of left shoulder  M25.512       2. Incomplete tear of left rotator cuff, unspecified whether traumatic  M75.112           Start Time: 0900  Stop Time: 0945  Total time in clinic (min): 45 minutes    Subjective: Pt noted that the front of his shoulder was tender today. Pt also noted that a door pulled his arm since his last visit. Pt denied an popping in the shoulder.       Objective: See treatment diary below      Assessment: Tolerated treatment well. Program started with the pulleys. Program was modified due to increased tenderness. Program focused on increasing shoulder ROM and stability. Ice was utilized post-treatment.     Patient would benefit from continued PT      Plan: Continue per plan of care.      Goals: Patient's goal is to return to playing golf.     Precautions: SEE ATTACHED PROTOCOL:   DOS:      Dx: L RTC repair    Daily Treatment Diary       Manuals 3/14  2/26 2/29 3/4 3/11   L elbow PROM         L shoulder PROM MS  MS MS MS MS                       Ther Ex         UBE (standing)  NV       Scap squeeze         Towel squeeze         L UT str         C/S ret AROM         Wrist circles         L Elbow AAROM         Pendulums          Pulleys 3'/3'  3'/3' 3'/3' 3'/3' 3'/3'   Cane shoulder flx AAROM (supine) Standing   5\"x10  Standing   5\"x10 Standing   5\"x10 Standing 5\"x10 Standing 5\"x10   Cane shoulder abd AAROM (standing) Standing   5\"x10  Standing   5\"x10 Standing   5\"x10 Standing 5\"x10 Standing 5\"x10   Cane shoulder ER  AAROM (standing) Standing   5\"x10  Standing   5\"x10 Standing   5\"x10 Standing 5\"x10 Standing 5\"x10   Wall ladder   NT NT     IR stretch with strap 5\"x10   10\"x5 10\"x5    Pt Edu         Neuro Re-ed         Shoulder isos   NT      Soulder flexion AROM 2#, 10x  1#, 20x 2#, 20x " "2#, 20x 2#, 20x   Shoulder abd AROM 2#, 10x  1#, 20x 2#, 20x 2#, 20x 2#, 20x   SL ER AROM 2#, 20x  1#, 20x 2#, 20x 2#, 20x 2#, 20x   SL abd AROM 2#, 20x  1#, 20x 2#, 20x 2#, 20x 2#, 20x    Serratus punches   5\"x20, 1# 5\"x20, 2# 5\"x20, 2# 5\"x20, 2#    TB IR/ER    RTB, 10x2 RTB, 10x2 RTB, 10x2   TB  rows/ext NV        Prone row  2#, 20x  1#, 20x 2#, 20x 2#, 20x 2#, 10x2   Prone horizontal abd/extension  20x each, 2#  10x2 each  1# 10x2 each  2# 20x each, 2# 20x each  2#            Ball stabs  NV                Ther Activity                                       Gait Training         Arm swing                   Modalities         Ice 10'  10' 10' 10' 10'                                                      "

## 2024-03-18 ENCOUNTER — OFFICE VISIT (OUTPATIENT)
Dept: PHYSICAL THERAPY | Facility: CLINIC | Age: 79
End: 2024-03-18
Payer: MEDICARE

## 2024-03-18 DIAGNOSIS — M75.112 INCOMPLETE TEAR OF LEFT ROTATOR CUFF, UNSPECIFIED WHETHER TRAUMATIC: ICD-10-CM

## 2024-03-18 DIAGNOSIS — M25.512 ACUTE PAIN OF LEFT SHOULDER: Primary | ICD-10-CM

## 2024-03-18 PROCEDURE — 97112 NEUROMUSCULAR REEDUCATION: CPT

## 2024-03-18 PROCEDURE — 97110 THERAPEUTIC EXERCISES: CPT

## 2024-03-18 NOTE — PROGRESS NOTES
"Daily Note     Today's date: 3/18/2024  Patient name: Eladio Ayala  : 1945  MRN: [id number]  Referring provider: Steven Hamilton*  Dx:   Encounter Diagnosis     ICD-10-CM    1. Acute pain of left shoulder  M25.512       2. Incomplete tear of left rotator cuff, unspecified whether traumatic  M75.112           Start Time: 904  Stop Time: 952  Total time in clinic (min): 48 minutes    Subjective: Pt reported that his sx were a little better compared to his has last visit.       Objective: See treatment diary below      Assessment: Tolerated treatment well. Program started with the UBE. Progressions focused on increasing shoulder strength and stability. Pt required VC in order to perform TB rows with correct form. Ice was utilized post-treatment. Patient would benefit from continued PT      Plan: Continue per plan of care.      Goals: Patient's goal is to return to playing golf.     Precautions: SEE ATTACHED PROTOCOL:   DOS:      Dx: L RTC repair    Daily Treatment Diary       Manuals 3/14 3/18  2/29 3/4 3/11   L elbow PROM         L shoulder PROM MS MS  MS MS MS                       Ther Ex         UBE (standing)  3'/3'       Scap squeeze         Towel squeeze         L UT str         C/S ret AROM         Wrist circles         L Elbow AAROM         Pendulums          Pulleys 3'/3'   3'/3' 3'/3' 3'/3'   Cane shoulder flx AAROM (supine) Standing   5\"x10 Standing 5\"x10  Standing   5\"x10 Standing 5\"x10 Standing 5\"x10   Cane shoulder abd AAROM (standing) Standing   5\"x10 Standing 5\"x10  Standing   5\"x10 Standing 5\"x10 Standing 5\"x10   Cane shoulder ER  AAROM (standing) Standing   5\"x10 Standing 5\"x10  Standing   5\"x10 Standing 5\"x10 Standing 5\"x10   Wall ladder    NT     IR stretch with strap 5\"x10 5\"x10  10\"x5 10\"x5    Pt Edu         Neuro Re-ed         Shoulder isos         Soulder flexion AROM 2#, 10x 2#, 10x  2#, 20x 2#, 20x 2#, 20x   Shoulder abd AROM 2#, 10x 2#, 10x  2#, 20x 2#, 20x 2#, " "20x   SL ER AROM 2#, 20x 2#, 20x  2#, 20x 2#, 20x 2#, 20x   SL abd AROM 2#, 20x 2#, 20x  2#, 20x 2#, 20x 2#, 20x    Serratus punches    5\"x20, 2# 5\"x20, 2# 5\"x20, 2#    TB IR/ER  RTB 20x each  RTB, 10x2 RTB, 10x2 RTB, 10x2   TB  rows/ext NV GTB, 20x each       Prone row  2#, 20x 2#, 20x  2#, 20x 2#, 20x 2#, 10x2   Prone horizontal abd/extension  20x each, 2# 20x each  2#  10x2 each  2# 20x each, 2# 20x each  2#            Ball stabs   NV               Ther Activity                                       Gait Training         Arm swing                   Modalities         Ice 10' 10'  10' 10' 10'                                                        "

## 2024-03-21 ENCOUNTER — OFFICE VISIT (OUTPATIENT)
Dept: PHYSICAL THERAPY | Facility: CLINIC | Age: 79
End: 2024-03-21
Payer: MEDICARE

## 2024-03-21 DIAGNOSIS — M25.512 ACUTE PAIN OF LEFT SHOULDER: Primary | ICD-10-CM

## 2024-03-21 DIAGNOSIS — M75.112 INCOMPLETE TEAR OF LEFT ROTATOR CUFF, UNSPECIFIED WHETHER TRAUMATIC: ICD-10-CM

## 2024-03-21 PROCEDURE — 97112 NEUROMUSCULAR REEDUCATION: CPT

## 2024-03-21 PROCEDURE — 97110 THERAPEUTIC EXERCISES: CPT

## 2024-03-21 NOTE — PROGRESS NOTES
"Daily Note     Today's date: 3/21/2024  Patient name: Eladio Ayala  : 1945  MRN: [id number]  Referring provider: Steven Hamilton*  Dx:   Encounter Diagnosis     ICD-10-CM    1. Acute pain of left shoulder  M25.512       2. Incomplete tear of left rotator cuff, unspecified whether traumatic  M75.112           Start Time: 1100  Stop Time: 1149  Total time in clinic (min): 49 minutes    Subjective: Pt reported hat his shoulder pain was overall about the same but said that housework is getting a little easier.     Objective: See treatment diary below      Assessment: Tolerated treatment well. Program started with the UBE. Program focused on increasing L shoulder PROM, strength and stability. Manual therapy focused on increasing L shoulder PROM and stability. Pt tolerated increased resistance with TB IR/ER. Ice was utilized post-treatment. Patient would benefit from continued PT      Plan: Continue per plan of care.      Goals: Patient's goal is to return to playing golf.     Precautions: SEE ATTACHED PROTOCOL:   DOS:      Dx: L RTC repair    Daily Treatment Diary       Manuals 3/14 3/18 3/21  3/4 3/11   L elbow PROM         L shoulder PROM MS MS MS  MS MS    L rhythmic stabs   MS                Ther Ex         UBE (standing)  3'/3' 3'/3'      Scap squeeze         Towel squeeze         L UT str         C/S ret AROM         Wrist circles         L Elbow AAROM         Pendulums          Pulleys 3'/3'    3'/3' 3'/3'   Cane shoulder flx AAROM (supine) Standing   5\"x10 Standing 5\"x10   Standing 5\"x10 Standing 5\"x10   Cane shoulder abd AAROM (standing) Standing   5\"x10 Standing 5\"x10   Standing 5\"x10 Standing 5\"x10   Cane shoulder ER  AAROM (standing) Standing   5\"x10 Standing 5\"x10   Standing 5\"x10 Standing 5\"x10   Wall ladder         IR stretch with strap 5\"x10 5\"x10   10\"x5    Pt Edu   POC      Neuro Re-ed         Shoulder isos         Soulder flexion AROM 2#, 10x 2#, 10x 2#, 20x  2#, 20x 2#, 20x " "  Shoulder abd AROM 2#, 10x 2#, 10x 2#, 20x  2#, 20x 2#, 20x   SL ER AROM 2#, 20x 2#, 20x 2#, 20x  2#, 20x 2#, 20x   SL abd AROM 2#, 20x 2#, 20x 2#, 20x  2#, 20x 2#, 20x    Serratus punches     5\"x20, 2# 5\"x20, 2#    TB IR/ER  RTB 20x each GTB, 20x each  RTB, 10x2 RTB, 10x2   TB  rows/ext NV GTB, 20x each GTB, 20x each      Prone row  2#, 20x 2#, 20x 2#, 20x  2#, 20x 2#, 10x2   Prone horizontal abd/extension  20x each, 2# 20x each  2# +scap   2# 20 x each  20x each, 2# 20x each  2#            Ball stabs   Grn, 10x each way      Bodyblade     NV     Ther Activity                                       Gait Training         Arm swing                   Modalities         Ice 10' 10' 10'  10' 10'                                                          "

## 2024-03-25 ENCOUNTER — OFFICE VISIT (OUTPATIENT)
Dept: PHYSICAL THERAPY | Facility: CLINIC | Age: 79
End: 2024-03-25
Payer: MEDICARE

## 2024-03-25 DIAGNOSIS — M25.512 ACUTE PAIN OF LEFT SHOULDER: Primary | ICD-10-CM

## 2024-03-25 DIAGNOSIS — M75.112 INCOMPLETE TEAR OF LEFT ROTATOR CUFF, UNSPECIFIED WHETHER TRAUMATIC: ICD-10-CM

## 2024-03-25 PROCEDURE — 97140 MANUAL THERAPY 1/> REGIONS: CPT

## 2024-03-25 PROCEDURE — 97110 THERAPEUTIC EXERCISES: CPT

## 2024-03-25 PROCEDURE — 97112 NEUROMUSCULAR REEDUCATION: CPT

## 2024-03-25 NOTE — PROGRESS NOTES
"Daily Note     Today's date: 3/25/2024  Patient name: Eladio Ayala  : 1945  MRN: [id number]  Referring provider: Steven Hamilton*  Dx:   Encounter Diagnosis     ICD-10-CM    1. Acute pain of left shoulder  M25.512       2. Incomplete tear of left rotator cuff, unspecified whether traumatic  M75.112                      Subjective: Pt states he is still having some pain about a 7/10 but doing okay.     Objective: See treatment diary below      Assessment: Tolerated treatment well. Did well with progression of exercises and addition of body blade. No noted pain. Patients ROM is improved, more focus on improving strength.  Patient would benefit from continued PT      Plan: Continue per plan of care.      Goals: Patient's goal is to return to playing golf.     Precautions: SEE ATTACHED PROTOCOL:   DOS:      Dx: L RTC repair    Daily Treatment Diary       Manuals 3/14 3/18 3/21 3/25 3/4 3/11   L elbow PROM         L shoulder PROM MS MS MS GF MS MS    L rhythmic stabs   MS                Ther Ex         UBE (standing)  3'/3' 3'/3' 3'/3'      Scap squeeze         Towel squeeze         L UT str         C/S ret AROM         Wrist circles         L Elbow AAROM         Pendulums          Pulleys 3'/3'   3'/3' 3'/3' 3'/3'   Cane shoulder flx AAROM (supine) Standing   5\"x10 Standing 5\"x10  Stand 5\" x 10  Standing 5\"x10 Standing 5\"x10   Cane shoulder abd AAROM (standing) Standing   5\"x10 Standing 5\"x10  Stand   5\" x 10  Standing 5\"x10 Standing 5\"x10   Cane shoulder ER  AAROM (standing) Standing   5\"x10 Standing 5\"x10  Stand 5\" x 10  Standing 5\"x10 Standing 5\"x10   Wall ladder         IR stretch with strap 5\"x10 5\"x10  Strap 5\" x 10  10\"x5    Pt Edu   POC      Neuro Re-ed         Shoulder isos         Soulder flexion AROM 2#, 10x 2#, 10x 2#, 20x 2# x 20  2#, 20x 2#, 20x   Shoulder abd AROM 2#, 10x 2#, 10x 2#, 20x 2# x 20 2#, 20x 2#, 20x   SL ER AROM 2#, 20x 2#, 20x 2#, 20x 2# x 20  2#, 20x 2#, 20x   SL " "abd AROM 2#, 20x 2#, 20x 2#, 20x 2# x 20  2#, 20x 2#, 20x    Serratus punches     5\"x20, 2# 5\"x20, 2#    TB IR/ER  RTB 20x each GTB, 20x each GTB  x 20  RTB, 10x2 RTB, 10x2   TB  rows/ext NV GTB, 20x each GTB, 20x each GTB     Prone row  2#, 20x 2#, 20x 2#, 20x 2# x 20  2#, 20x 2#, 10x2   Prone horizontal abd/extension  20x each, 2# 20x each  2# +scap   2# 20 x each 2# x 20 each 20x each, 2# 20x each  2#            Ball stabs   Grn, 10x each way X 10      Bodyblade     X 1 min flex / Abd stand     Ther Activity                                       Gait Training         Arm swing                   Modalities         Ice 10' 10' 10' X 10 min  10' 10'                                                          "

## 2024-03-28 ENCOUNTER — OFFICE VISIT (OUTPATIENT)
Dept: PHYSICAL THERAPY | Facility: CLINIC | Age: 79
End: 2024-03-28
Payer: MEDICARE

## 2024-03-28 DIAGNOSIS — M75.112 INCOMPLETE TEAR OF LEFT ROTATOR CUFF, UNSPECIFIED WHETHER TRAUMATIC: ICD-10-CM

## 2024-03-28 DIAGNOSIS — M25.512 ACUTE PAIN OF LEFT SHOULDER: Primary | ICD-10-CM

## 2024-03-28 PROCEDURE — 97112 NEUROMUSCULAR REEDUCATION: CPT

## 2024-03-28 NOTE — PROGRESS NOTES
"Daily Note     Today's date: 3/28/2024  Patient name: Eladio Ayala  : 1945  MRN: [id number]  Referring provider: Steven Hamilton*  Dx:   Encounter Diagnosis     ICD-10-CM    1. Acute pain of left shoulder  M25.512       2. Incomplete tear of left rotator cuff, unspecified whether traumatic  M75.112           Start Time: 0900  Stop Time: 1000  Total time in clinic (min): 60 minutes    Subjective: Pt reported that his shoulder had it's normal ache and less clicking throughout his program.       Objective: See treatment diary below      Assessment: Tolerated treatment well. Program started with the UBE. Pt tolerated increased weight and resistance throughout his program. Pt required VC in order to perform ball stabs with correct form. Manual therapy focused on increasing L shoulder PROM and stability. Ice was utilized. Patient would benefit from continued PT    Billing reflects decreased one-on-one time.     Plan: Continue per plan of care.      Goals: Patient's goal is to return to playing golf.     Precautions: SEE ATTACHED PROTOCOL:   DOS:      Dx: L RTC repair    Daily Treatment Diary       Manuals 3/14 3/18 3/21 3/25 3/28    L elbow PROM         L shoulder PROM MS MS MS GF MS     L rhythmic stabs   MS  MS              Ther Ex         UBE (standing)  3'/3' 3'/3' 3'/3'  3'/3'    Shoulder adduction str     5\"x10    Scap squeeze         Towel squeeze         L UT str         C/S ret AROM         Wrist circles         L Elbow AAROM         Pendulums          Pulleys 3'/3'   3'/3'     Cane shoulder flx AAROM (supine) Standing   5\"x10 Standing 5\"x10  Stand 5\" x 10      Cane shoulder abd AAROM (standing) Standing   5\"x10 Standing 5\"x10  Stand   5\" x 10      Cane shoulder ER  AAROM (standing) Standing   5\"x10 Standing 5\"x10  Stand 5\" x 10      Wall ladder         IR stretch with strap 5\"x10 5\"x10  Strap 5\" x 10      Pt Edu   POC      Neuro Re-ed         Shoulder isos         Soulder flexion AROM " 2#, 10x 2#, 10x 2#, 20x 2# x 20  3# 20x    Shoulder abd AROM 2#, 10x 2#, 10x 2#, 20x 2# x 20 3# 20x    SL ER AROM 2#, 20x 2#, 20x 2#, 20x 2# x 20  3# 20x    SL abd AROM 2#, 20x 2#, 20x 2#, 20x 2# x 20  3# 20x     Serratus punches          TB IR/ER  RTB 20x each GTB, 20x each GTB  x 20  BTB 30x    TB  rows/ext NV GTB, 20x each GTB, 20x each GTB BTB 30    Prone row  2#, 20x 2#, 20x 2#, 20x 2# x 20  3# 20x    Prone horizontal abd/extension  20x each, 2# 20x each  2# +scap   2# 20 x each 2# x 20 each 3# 20x each             Ball stabs   Grn, 10x each way X 10  10x each    Bodyblade     X 1 min flex / Abd stand     Ther Activity                                       Gait Training         Arm swing                   Modalities         Ice 10' 10' 10' X 10 min  10'

## 2024-04-01 ENCOUNTER — OFFICE VISIT (OUTPATIENT)
Dept: PHYSICAL THERAPY | Facility: CLINIC | Age: 79
End: 2024-04-01
Payer: MEDICARE

## 2024-04-01 ENCOUNTER — OFFICE VISIT (OUTPATIENT)
Dept: OBGYN CLINIC | Facility: OTHER | Age: 79
End: 2024-04-01
Payer: MEDICARE

## 2024-04-01 VITALS
HEART RATE: 66 BPM | HEIGHT: 70 IN | BODY MASS INDEX: 25.2 KG/M2 | WEIGHT: 176 LBS | SYSTOLIC BLOOD PRESSURE: 118 MMHG | DIASTOLIC BLOOD PRESSURE: 75 MMHG

## 2024-04-01 DIAGNOSIS — M75.112 INCOMPLETE TEAR OF LEFT ROTATOR CUFF, UNSPECIFIED WHETHER TRAUMATIC: ICD-10-CM

## 2024-04-01 DIAGNOSIS — M75.102 TEAR OF LEFT SUPRASPINATUS TENDON: Primary | ICD-10-CM

## 2024-04-01 DIAGNOSIS — M25.512 ACUTE PAIN OF LEFT SHOULDER: Primary | ICD-10-CM

## 2024-04-01 PROCEDURE — 99213 OFFICE O/P EST LOW 20 MIN: CPT | Performed by: PHYSICIAN ASSISTANT

## 2024-04-01 PROCEDURE — 97110 THERAPEUTIC EXERCISES: CPT

## 2024-04-01 PROCEDURE — 97112 NEUROMUSCULAR REEDUCATION: CPT

## 2024-04-01 RX ORDER — AMMONIUM LACTATE 12 G/100G
CREAM TOPICAL
COMMUNITY
Start: 2024-03-14

## 2024-04-01 NOTE — PROGRESS NOTES
"Daily Note     Today's date: 2024  Patient name: Eladio Ayala  : 1945  MRN: [id number]  Referring provider: Steven Hamilton*  Dx:   Encounter Diagnosis     ICD-10-CM    1. Acute pain of left shoulder  M25.512       2. Incomplete tear of left rotator cuff, unspecified whether traumatic  M75.112           Start Time: 0900  Stop Time: 09  Total time in clinic (min): 46 minutes    Subjective: Pt reported that though the clicking is improving he still notices it with DB shoulder flexion.       Objective: See treatment diary below      Assessment: Tolerated treatment well. Program started with the UBE. Manual therapy focused on increasing L shoulder PROM and stability. Pt required VC in order to perform ball stabs with correct form. Pt deferred modalities. Patient would benefit from continued PT      Plan: Continue per plan of care.      Goals: Patient's goal is to return to playing golf.     Precautions: SEE ATTACHED PROTOCOL:   DOS:      Dx: L RTC repair    Daily Treatment Diary       Manuals 3/14 3/18 3/21 3/25 3/28 4/1   L elbow PROM         L shoulder PROM MS MS MS GF MS MS    L rhythmic stabs   MS  MS MS             Ther Ex         UBE (standing)  3'/3' 3'/3' 3'/3'  3'/3' 3'/3'   Shoulder adduction str     5\"x10    Scap squeeze         Towel squeeze         L UT str         C/S ret AROM         Wrist circles         L Elbow AAROM         Pendulums          Pulleys 3'/3'   3'/3'     Cane shoulder flx AAROM (supine) Standing   5\"x10 Standing 5\"x10  Stand 5\" x 10   Stand 5\"x10   Cane shoulder abd AAROM (standing) Standing   5\"x10 Standing 5\"x10  Stand   5\" x 10   Stand  5\"x10   Cane shoulder ER  AAROM (standing) Standing   5\"x10 Standing 5\"x10  Stand 5\" x 10   Stand  5\"x10   Wall ladder         IR stretch with strap 5\"x10 5\"x10  Strap 5\" x 10      Pt Edu   POC      Neuro Re-ed         Shoulder isos         Soulder flexion AROM 2#, 10x 2#, 10x 2#, 20x 2# x 20  3# 20x 3# 20x   Shoulder " abd AROM 2#, 10x 2#, 10x 2#, 20x 2# x 20 3# 20x 3# 20x   SL ER AROM 2#, 20x 2#, 20x 2#, 20x 2# x 20  3# 20x 3# 20x   SL abd AROM 2#, 20x 2#, 20x 2#, 20x 2# x 20  3# 20x 3# 20x    Serratus punches          TB IR/ER  RTB 20x each GTB, 20x each GTB  x 20  BTB 30x BTB 30x    TB  rows/ext NV GTB, 20x each GTB, 20x each GTB BTB 30 BTB 30x   Prone row  2#, 20x 2#, 20x 2#, 20x 2# x 20  3# 20x 3# 20x   Prone horizontal abd/extension  20x each, 2# 20x each  2# +scap   2# 20 x each 2# x 20 each 3# 20x each 3# 20x each            Ball stabs   Grn, 10x each way X 10  10x each 10x each   Bodyblade     X 1 min flex / Abd stand     Ther Activity                                       Gait Training         Arm swing                   Modalities         Ice 10' 10' 10' X 10 min  10' NT

## 2024-04-01 NOTE — PROGRESS NOTES
"  Assessment  Diagnoses and all orders for this visit:    Tear of left supraspinatus tendon      Discussion and Plan:    Eladio reports some improvement in pain since last visit but continues to have anterior shoulder pain.  We will obtain a MRI of the left shoulder to evaluate his rotator cuff repair.  We will see him back after the MRI to discuss results.  He may continue with PT (suggested he check with them to make sure he isn't capped with specific number of visits) and his HEP.  Activities to tolerance    Follow up: after MRI    Subjective:   Patient ID: Eladio Ayala is a 79 y.o. male    Eladio presents to the office 4 months s/p arthroscopic rotator cuff repair on the left shoulder.  He has been compliant with formal PT and his HEP.  Denies new injury or trauma.  Eladio reports continued left shoulder pain.  He was asked at last visit to take 2 weeks off PT but he did not.  He continues to have left shoulder pain - located anterior aspect of shoulder.  Pain is worse with overhead motion and exercises.  Pain interferes with sleep.  Denies pain that interferes with ADLs.  States a door caught the wind and pulled his arm.  This caused pain.  He is unsure if this caused damage to his shoulder.      The following portions of the patient's history were reviewed and updated as appropriate: allergies, current medications, past family history, past medical history, past social history, past surgical history and problem list.    Objective:  /75 (BP Location: Right arm, Patient Position: Sitting, Cuff Size: Adult)   Pulse 66   Ht 5' 10\" (1.778 m)   Wt 79.8 kg (176 lb)   BMI 25.25 kg/m²       Left Shoulder Exam     Tenderness   Left shoulder tenderness location: anterior deltoid.    Range of Motion   The patient has normal left shoulder ROM.    Muscle Strength   External rotation: 4/5   Supraspinatus: 4/5     Tests   Cabrera test: positive  Impingement: positive  Drop arm: negative    Other   Erythema: " absent  Sensation: normal  Pulse: present             Physical Exam  Constitutional:       Appearance: He is well-developed.   HENT:      Head: Normocephalic.   Pulmonary:      Effort: No respiratory distress.      Breath sounds: No wheezing.   Musculoskeletal:      Cervical back: Normal range of motion.   Skin:     General: Skin is warm and dry.   Neurological:      Mental Status: He is alert and oriented to person, place, and time.   Psychiatric:         Behavior: Behavior normal.         Thought Content: Thought content normal.         Judgment: Judgment normal.

## 2024-04-04 ENCOUNTER — OFFICE VISIT (OUTPATIENT)
Dept: PHYSICAL THERAPY | Facility: CLINIC | Age: 79
End: 2024-04-04
Payer: MEDICARE

## 2024-04-04 DIAGNOSIS — M25.512 ACUTE PAIN OF LEFT SHOULDER: Primary | ICD-10-CM

## 2024-04-04 DIAGNOSIS — M75.112 INCOMPLETE TEAR OF LEFT ROTATOR CUFF, UNSPECIFIED WHETHER TRAUMATIC: ICD-10-CM

## 2024-04-04 PROCEDURE — 97110 THERAPEUTIC EXERCISES: CPT

## 2024-04-04 PROCEDURE — 97112 NEUROMUSCULAR REEDUCATION: CPT

## 2024-04-04 NOTE — PROGRESS NOTES
"Daily Note     Today's date: 2024  Patient name: Eladio Ayala  : 1945  MRN: [id number]  Referring provider: Steven Hamilton*  Dx:   Encounter Diagnosis     ICD-10-CM    1. Acute pain of left shoulder  M25.512       2. Incomplete tear of left rotator cuff, unspecified whether traumatic  M75.112           Start Time: 0930  Stop Time: 1010  Total time in clinic (min): 40 minutes    Subjective: Pt reported that his shoulder was a little sore but mentioned that his clicking is less consistent.       Objective: See treatment diary below      Assessment: Tolerated treatment well. Pt's program started with the UBE. Manual therapy focused on increasing L shoulder PROM and stability. Pt tolerated increased reps with ball stabs well. Self OH reach was added in order to improve OH reach ADLs. Pt deferred modalities. Patient would benefit from continued PT      Plan: Continue per plan of care.      Goals: Patient's goal is to return to playing golf.     Precautions: SEE ATTACHED PROTOCOL:   DOS:      Dx: L RTC repair    Daily Treatment Diary       Manuals 4/4  3/21 3/25 3/28 4/1   L elbow PROM         L shoulder PROM MS  MS GF MS MS    L rhythmic stabs MS  MS  MS MS             Ther Ex         UBE (standing) 3'/3'  3'/3' 3'/3'  3'/3' 3'/3'   Shoulder adduction str     5\"x10    Scap squeeze         Towel squeeze         L UT str         C/S ret AROM         Wrist circles         L Elbow AAROM         Pendulums          Pulleys    3'/3'     Cane shoulder flx AAROM (supine)    Stand 5\" x 10   Stand 5\"x10   Cane shoulder abd AAROM (standing)    Stand   5\" x 10   Stand  5\"x10   Cane shoulder ER  AAROM (standing)    Stand 5\" x 10   Stand  5\"x10   Wall ladder         IR stretch with strap    Strap 5\" x 10      Pt Edu POC  POC      Neuro Re-ed         Shoulder isos         Soulder flexion AROM   2#, 20x 2# x 20  3# 20x 3# 20x   Shoulder abd AROM   2#, 20x 2# x 20 3# 20x 3# 20x   SL ER AROM 3# 20x  2#, 20x " 2# x 20  3# 20x 3# 20x   SL abd AROM 3# 20x  2#, 20x 2# x 20  3# 20x 3# 20x    Serratus punches          TB IR/ER BTB 30x  GTB, 20x each GTB  x 20  BTB 30x BTB 30x    TB  rows/ext BTB 30x  GTB, 20x each GTB BTB 30 BTB 30x   Prone row  3# 20x  2#, 20x 2# x 20  3# 20x 3# 20x   Prone horizontal abd/extension  3# 20x each  +scap   2# 20 x each 2# x 20 each 3# 20x each 3# 20x each            Ball stabs 20x each  Grn, 10x each way X 10  10x each 10x each   Bodyblade     X 1 min flex / Abd stand     Ther Activity         Shelf OH Reach  3 selves, 5 raps                            Gait Training         Arm swing                   Modalities         Ice NT  10' X 10 min  10' NT

## 2024-04-05 ENCOUNTER — HOSPITAL ENCOUNTER (OUTPATIENT)
Dept: RADIOLOGY | Age: 79
Discharge: HOME | End: 2024-04-05
Payer: MEDICARE

## 2024-04-05 DIAGNOSIS — M75.102 TEAR OF LEFT SUPRASPINATUS TENDON: ICD-10-CM

## 2024-04-05 PROCEDURE — 73221 MRI JOINT UPR EXTREM W/O DYE: CPT

## 2024-04-08 ENCOUNTER — EVALUATION (OUTPATIENT)
Dept: PHYSICAL THERAPY | Facility: CLINIC | Age: 79
End: 2024-04-08
Payer: MEDICARE

## 2024-04-08 DIAGNOSIS — M75.112 INCOMPLETE TEAR OF LEFT ROTATOR CUFF, UNSPECIFIED WHETHER TRAUMATIC: ICD-10-CM

## 2024-04-08 DIAGNOSIS — M25.512 ACUTE PAIN OF LEFT SHOULDER: Primary | ICD-10-CM

## 2024-04-08 PROCEDURE — 97110 THERAPEUTIC EXERCISES: CPT

## 2024-04-08 PROCEDURE — 97112 NEUROMUSCULAR REEDUCATION: CPT

## 2024-04-08 NOTE — PROGRESS NOTES
Re-Evaluation     Today's date: 2024  Patient name: Eladio Ayala  : 1945  MRN: [id number]  Referring provider: Steven Hamilton*  Dx:   Encounter Diagnosis     ICD-10-CM    1. Acute pain of left shoulder  M25.512       2. Incomplete tear of left rotator cuff, unspecified whether traumatic  M75.112           Start Time: 906  Stop Time: 954  Total time in clinic (min): 48 minutes    Subjective Evaluation     History of Present Illness  : Pt reported that his shoulder pain has been ranging between 2-5/10 and also reported a subjective improvement percentage of 75%. Pt also noted that his clicking in his shoulder is a little better. Pt also noted that he sees his surgeon on 4/15.      IE: Patient presents with c/o L shoulder pain from a L incomplete RTC repair on 23. Pt reported that his sx started about 1 year ago while golfing. Pt reported that his sx increase with adjustment his sling. Pt has a PMH of R RTC (8 year ago).     Neuro signs: None  Bowel and bladder sxs: Normal  Red flags: Normal  Occupation: Working, [employer] store owner     Pain  At best pain ratin/10  At worst pain ratin/10  Location: L shoulder      Social Support  Lives with his wife in a 2 story home.      Patient Goals  Patient goals for therapy: Golfing     STGs  1. Decrease pain by 20% in 2-4 weeks to improve standing tolerance.-Met  2. Improve L shoulder PROM by 10 degrees in 2-4 weeks.-Met  3. (I) with HEP within 2-4 weeks in order to improve PT outcomes.-Met     LTGs  1. Decrease pain by 60% in 6-8 weeks.-Partially Met  2. Increase L shoulder AROM WNL and L shoulder MMT 5/5 within 6-8 weeks.-Partially Met   3. Perform job/dressing/showering activities independently without pain in 6-8 weeks. -Partially Met   4. Improve FOTO to greater than or equal to 59.-Met        Objective Measurements:     Shoulder  A/PROM R L Strength   Shoulder R L   Flex   deg/ 174 deg Flex 5 4+   Abd  deg/ 180 deg Abd 5  4+   ER WNL NT/88 deg @ 90 deg abd ER 5 4   IR WNL NT/65 deg@90   deg abd IR 5 4+                 Wrist AROM             Flexion WNL WNL         Extension WNL WNL         Supination WNL WNL         Pronation WNL WNL         UD WNL WNL         RD WNL WNL         Elbow PROM/  AROM             Flexion WNL WNL/   NT         Extension WNL WNL/NT                  Assessment:     Eladio Ayala is a pleasant 78 y.o. male who has attended 34 visits of skilled PT for L shoulder pain S/P L incomplete RTC repair on 11/29/23. Pt demonstrated improvement in L shoulder PROM and MMT but still demonstrates deficits in these categories. Pt scored a 63 on FOTO and reported a subjective improvement percentage of 75%. Program was modified due to re-evaluation being performed. Manual therapy focused on increasing L shoulder PROM and increasing stability. Pt tolerated increased DB weight with table exercises. Pt was educated and demonstrated understanding of progress, POC, and benefit of skill PT. Ice was utilized post-treatment.  Ice was utilized post-treatment. Pt would benefit from further skilled PT in order to decrease pain, address deficits (ROM/MMT), help pt achieve goals, and progress program as tolerated.      Thank you for the referral!     Plan  Patient would benefit from:Skilled physical therapy  Planned therapy interventions: manual therapy, neuromuscular re-education, stretching, strengthening, therapeutic activities, therapeutic exercise, patient education, home exercise program, modalities to decrease pain, and activity modification.     Frequency: 2x week  Duration in weeks: 8  Treatment plan discussed with: patient     Goals: Patient's goal is to return to playing golf.     Precautions: SEE ATTACHED PROTOCOL:   DOS:  11/ 29/23    Dx: L RTC repair    Daily Treatment Diary       Manuals 4/4 4/8  3/25 3/28 4/1   L elbow PROM         L shoulder PROM MS MS  GF MS MS    L rhythmic stabs MS MS   MS MS             Ther Ex        "  UBE (standing) 3'/3' 3'/3'  3'/3'  3'/3' 3'/3'   Shoulder adduction str     5\"x10    Scap squeeze         Towel squeeze         L UT str         C/S ret AROM         Wrist circles         L Elbow AAROM         Pendulums          Pulleys    3'/3'     Cane shoulder flx AAROM (supine)    Stand 5\" x 10   Stand 5\"x10   Cane shoulder abd AAROM (standing)    Stand   5\" x 10   Stand  5\"x10   Cane shoulder ER  AAROM (standing)    Stand 5\" x 10   Stand  5\"x10   Wall ladder         IR stretch with strap    Strap 5\" x 10      Pt Edu POC Progress/  POC       Neuro Re-ed         Shoulder isos         Soulder flexion AROM    2# x 20  3# 20x 3# 20x   Shoulder abd AROM    2# x 20 3# 20x 3# 20x   SL ER AROM 3# 20x 4#, 20x  2# x 20  3# 20x 3# 20x   SL abd AROM 3# 20x 4#, 20x  2# x 20  3# 20x 3# 20x    Serratus punches          TB IR/ER BTB 30x BTB 20x  GTB  x 20  BTB 30x BTB 30x    TB  rows/ext BTB 30x BTB 20x  GTB BTB 30 BTB 30x   Prone row  3# 20x 4#, 20x  2# x 20  3# 20x 3# 20x   Prone horizontal abd/extension  3# 20x each 4#, 20x each  2# x 20 each 3# 20x each 3# 20x each            Ball stabs 20x each 20x each  X 10  10x each 10x each   Bodyblade     X 1 min flex / Abd stand     Ther Activity         Shelf OH Reach  3 selves, 5 raps                            Gait Training         Arm swing                   Modalities         Ice NT 10'  X 10 min  10' NT                                                                  "

## 2024-04-11 ENCOUNTER — APPOINTMENT (OUTPATIENT)
Dept: PHYSICAL THERAPY | Facility: CLINIC | Age: 79
End: 2024-04-11
Payer: MEDICARE

## 2024-04-12 ENCOUNTER — APPOINTMENT (OUTPATIENT)
Dept: PHYSICAL THERAPY | Facility: CLINIC | Age: 79
End: 2024-04-12
Payer: MEDICARE

## 2024-04-15 ENCOUNTER — OFFICE VISIT (OUTPATIENT)
Dept: OBGYN CLINIC | Facility: OTHER | Age: 79
End: 2024-04-15
Payer: MEDICARE

## 2024-04-15 ENCOUNTER — APPOINTMENT (OUTPATIENT)
Dept: PHYSICAL THERAPY | Facility: CLINIC | Age: 79
End: 2024-04-15
Payer: MEDICARE

## 2024-04-15 VITALS
HEIGHT: 70 IN | HEART RATE: 80 BPM | WEIGHT: 176 LBS | SYSTOLIC BLOOD PRESSURE: 119 MMHG | DIASTOLIC BLOOD PRESSURE: 75 MMHG | BODY MASS INDEX: 25.2 KG/M2

## 2024-04-15 DIAGNOSIS — Z98.890 S/P LEFT ROTATOR CUFF REPAIR: Primary | ICD-10-CM

## 2024-04-15 DIAGNOSIS — M75.102 TEAR OF LEFT SUPRASPINATUS TENDON: ICD-10-CM

## 2024-04-15 PROCEDURE — 99214 OFFICE O/P EST MOD 30 MIN: CPT | Performed by: ORTHOPAEDIC SURGERY

## 2024-04-15 NOTE — PROGRESS NOTES
I personally examined the patient and reviewed the history provided.  I agree with the note and the assessment and plan by Dr. Claire Castillo MD.     Assessment:    Left small recurrent or persistent supraspinatus tendon tear    Plan:    Reviewed the MRI findings with the patient as below.  He currently demonstrates much better function, range of motion and strength than preoperatively and I think the small recurrent supraspinatus tendon tear is not clinically significant.  I have recommended he continue to get back into his golf activities and utilize his home exercise program with the understanding that we can always provide a subacromial injection or a revision arthroscopy to evaluate if he fails to fully improve, I am quite confident he will continue to improve with this and he does state he is pleased with the progress he is made so far, his main goal is to get back to golf and I do think the shoulder should allow him to get back to golf        Assessment  Diagnoses and all orders for this visit:    S/P left rotator cuff repair    Discussion and Plan:    Left shoulder MRI demonstrates very small new or residual supraspinatus tear (4 mm), this may be residual from surgery or from a new post-op injury. I had a discussion with him that I think based on this findings, I do not think proceeding with a repeat arthroscopy and attempt at repair of this tear will provide meaningful improvement in his symptoms at this time. His symptoms will likely continue to improve with physical therapy. Discussed that he can perform activities to tolerance, including returning to golfing, without concern that he will cause any damage. Discussed that if his symptoms to not improve, he can return for evaluation and we can try a subacromial corticosteroid injection.    Subjective:   Patient ID: Eladio Ayala is a 79 y.o. male    Presents for follow up for left anterior shoulder pain. He is 4 s/o left arthroscopic rotator cuff  "repair. At last visit, plan was to obtain MRI to assess for any structural abnormality given his persistent pain. Denies any change in symptoms since previous appointment.     The following portions of the patient's history were reviewed and updated as appropriate: allergies, current medications, past family history, past medical history, past social history, past surgical history and problem list.    Objective:  Ht 5' 10\" (1.778 m)   Wt 79.8 kg (176 lb)   BMI 25.25 kg/m²       Left Shoulder Exam     Tenderness   The patient is experiencing no tenderness.     Range of Motion   Active abduction:  normal   External rotation:  normal   Forward flexion:  normal   Internal rotation 0 degrees:  Lumbar     Muscle Strength   Abduction: 5/5   Internal rotation: 5/5   External rotation: 5/5     Tests   Impingement: negative  Drop arm: negative    Other   Erythema: absent  Scars: present  Sensation: normal  Pulse: present             Physical Exam  HENT:      Head: Normocephalic.   Cardiovascular:      Rate and Rhythm: Normal rate.      Pulses: Normal pulses.   Pulmonary:      Effort: Pulmonary effort is normal.   Skin:     General: Skin is warm and dry.      Capillary Refill: Capillary refill takes less than 2 seconds.   Neurological:      General: No focal deficit present.      Mental Status: He is alert and oriented to person, place, and time.   Psychiatric:         Mood and Affect: Mood normal.         Behavior: Behavior normal.           I have personally reviewed pertinent films in PACS and my interpretation is as follows.    MRI Left Shoulder - 4 mm tear of supraspinatus tendon, previous repair otherwise is intact   "

## 2024-04-18 ENCOUNTER — OFFICE VISIT (OUTPATIENT)
Dept: PHYSICAL THERAPY | Facility: CLINIC | Age: 79
End: 2024-04-18
Payer: MEDICARE

## 2024-04-18 DIAGNOSIS — M25.512 ACUTE PAIN OF LEFT SHOULDER: Primary | ICD-10-CM

## 2024-04-18 DIAGNOSIS — M75.112 INCOMPLETE TEAR OF LEFT ROTATOR CUFF, UNSPECIFIED WHETHER TRAUMATIC: ICD-10-CM

## 2024-04-18 PROCEDURE — 97110 THERAPEUTIC EXERCISES: CPT

## 2024-04-18 PROCEDURE — 97112 NEUROMUSCULAR REEDUCATION: CPT

## 2024-04-18 PROCEDURE — 97140 MANUAL THERAPY 1/> REGIONS: CPT

## 2024-04-18 NOTE — PROGRESS NOTES
"Daily Note     Today's date: 2024  Patient name: Eladio Ayala  : 1945  MRN: [id number]  Referring provider: Steven Hamilton*  Dx:   Encounter Diagnosis     ICD-10-CM    1. Acute pain of left shoulder  M25.512       2. Incomplete tear of left rotator cuff, unspecified whether traumatic  M75.112           Start Time: 930  Stop Time: 1015  Total time in clinic (min): 45 minutes    Subjective: Pt reported that he had his follow-up with his surgeon and he got an MRI because of pt reported that he continues to have pain and because of his arm being caught in a door about a month ago. Pt reported that his surgeon told him that he had a small tear in his shoulder but noted that he didn't need to continue to skilled PT if he didn't want to.       Objective: See treatment diary below      Assessment: Tolerated treatment well. Pt's program started with the UBE. Manual therapy focused on increasing L shoulder PROM and stability. Pt demonstrated decreased clicking throughout manual therapy. Pt was educated on POC. Pt deferred modalities. Patient would benefit from continued PT      Plan: Continue per plan of care.      Goals: Patient's goal is to return to playing golf.     Precautions: SEE ATTACHED PROTOCOL:   DOS:      Dx: L RTC repair    Daily Treatment Diary          Manuals 4/4 4/8 4/18  3/28 4/1   L elbow PROM         L shoulder PROM MS MS MS  MS MS    L rhythmic stabs MS MS MS  MS MS             Ther Ex         UBE (standing) 3'/3' 3'/3' 3'/3'  3'/3' 3'/3'   Shoulder adduction str     5\"x10    Scap squeeze         Towel squeeze         L UT str         C/S ret AROM         Wrist circles         L Elbow AAROM         Pendulums          Pulleys         Cane shoulder flx AAROM (supine)      Stand 5\"x10   Cane shoulder abd AAROM (standing)      Stand  5\"x10   Cane shoulder ER  AAROM (standing)      Stand  5\"x10   Wall ladder         IR stretch with strap         Pt Edu POC Progress/  POC POC   "     Neuro Re-ed         Shoulder isos         Soulder flexion AROM     3# 20x 3# 20x   Shoulder abd AROM     3# 20x 3# 20x   SL ER AROM 3# 20x 4#, 20x 4#, 20x  3# 20x 3# 20x   SL abd AROM 3# 20x 4#, 20x 4#, 20x  3# 20x 3# 20x    Serratus punches          TB IR/ER BTB 30x BTB 20x BTB 20x  BTB 30x BTB 30x    TB  rows/ext BTB 30x BTB 20x BTB 20x  BTB 30 BTB 30x   Prone row  3# 20x 4#, 20x 4#, 20x  3# 20x 3# 20x   Prone horizontal abd/extension  3# 20x each 4#, 20x each 4#, 20x each  3# 20x each 3# 20x each   TB PNF    NV     Ball stabs 20x each 20x each   10x each 10x each   Bodyblade          Ther Activity         Shelf OH Reach  3 selves, 5 raps                            Gait Training         Arm swing                   Modalities         Ice NT 10' NT  10' NT

## 2024-04-22 ENCOUNTER — OFFICE VISIT (OUTPATIENT)
Dept: PHYSICAL THERAPY | Facility: CLINIC | Age: 79
End: 2024-04-22
Payer: MEDICARE

## 2024-04-22 DIAGNOSIS — M25.512 ACUTE PAIN OF LEFT SHOULDER: Primary | ICD-10-CM

## 2024-04-22 DIAGNOSIS — M75.112 INCOMPLETE TEAR OF LEFT ROTATOR CUFF, UNSPECIFIED WHETHER TRAUMATIC: ICD-10-CM

## 2024-04-22 PROCEDURE — 97112 NEUROMUSCULAR REEDUCATION: CPT

## 2024-04-22 PROCEDURE — 97140 MANUAL THERAPY 1/> REGIONS: CPT

## 2024-04-22 NOTE — PROGRESS NOTES
"Daily Note     Today's date: 2024  Patient name: Eladio Ayala  : 1945  MRN: [id number]  Referring provider: Steven Hamilton*  Dx:   Encounter Diagnosis     ICD-10-CM    1. Acute pain of left shoulder  M25.512       2. Incomplete tear of left rotator cuff, unspecified whether traumatic  M75.112           Start Time: 0900  Stop Time: 0940  Total time in clinic (min): 40 minutes    Subjective: Pt noted decreased clicking in his shoulder since last visit.       Objective: See treatment diary below      Assessment: Tolerated treatment well. Program started with the UBE. Manual therapy focused on increasing L shoulder PROM and stability. Progressions focused on increasing L shoulder stability, especially during a golf swing. Pt deferred modalities. Patient would benefit from continued PT      Plan: Continue per plan of care.      Goals: Patient's goal is to return to playing golf.     Precautions: SEE ATTACHED PROTOCOL:   DOS:      Dx: L RTC repair    Daily Treatment Diary          Manuals    L elbow PROM         L shoulder PROM MS MS MS MS  MS    L rhythmic stabs MS MS MS MS  MS             Ther Ex         UBE (standing) 3'/3' 3'/3' 3'/3' 3'/3'  3'/3'   Shoulder adduction str         Scap squeeze         Towel squeeze         L UT str         C/S ret AROM         Wrist circles         L Elbow AAROM         Pendulums          Pulleys         Cane shoulder flx AAROM (supine)      Stand 5\"x10   Cane shoulder abd AAROM (standing)      Stand  5\"x10   Cane shoulder ER  AAROM (standing)      Stand  5\"x10   Wall ladder         IR stretch with strap         Pt Edu POC Progress/  POC POC       Neuro Re-ed         Shoulder isos         Soulder flexion AROM     NV 3# 20x   Shoulder abd AROM     NV 3# 20x   SL ER AROM 3# 20x 4#, 20x 4#, 20x 4#, 20x  3# 20x   SL abd AROM 3# 20x 4#, 20x 4#, 20x 4#, 20x  3# 20x    Serratus punches          TB IR/ER BTB 30x BTB 20x BTB 20x BTB 20x  " BTB 30x    TB  rows/ext BTB 30x BTB 20x BTB 20x BTB 20x  BTB 30x   Prone row  3# 20x 4#, 20x 4#, 20x 4#, 20x  3# 20x   Prone horizontal abd/extension/  flx 3# 20x each 4#, 20x each 4#, 20x each 4#, 20x  3# 20x each   TB PNF 1 and 2    RTB 10x each     Ball stabs 20x each 20x each  20x each  10x each   Bodyblade          Ther Activity         Shelf OH Reach  3 selves, 5 raps                            Gait Training         Arm swing                   Modalities         Ice NT 10' NT NT  NT

## 2024-04-23 ENCOUNTER — TELEPHONE (OUTPATIENT)
Dept: OTHER | Facility: OTHER | Age: 79
End: 2024-04-23

## 2024-04-23 ENCOUNTER — TELEPHONE (OUTPATIENT)
Dept: FAMILY MEDICINE CLINIC | Facility: CLINIC | Age: 79
End: 2024-04-23

## 2024-04-23 DIAGNOSIS — Z13.6 ENCOUNTER FOR LIPID SCREENING FOR CARDIOVASCULAR DISEASE: ICD-10-CM

## 2024-04-23 DIAGNOSIS — K21.00 GASTROESOPHAGEAL REFLUX DISEASE WITH ESOPHAGITIS WITHOUT HEMORRHAGE: Primary | ICD-10-CM

## 2024-04-23 DIAGNOSIS — E55.9 VITAMIN D DEFICIENCY: ICD-10-CM

## 2024-04-23 DIAGNOSIS — Z13.220 ENCOUNTER FOR LIPID SCREENING FOR CARDIOVASCULAR DISEASE: ICD-10-CM

## 2024-04-23 NOTE — TELEPHONE ENCOUNTER
Patient called, he has an appointment scheduled for 4/30/24 as a new patient he want to know if you could put lab orders in for him to have done before his appointment. This patient was a pt of Dr. Ngo.

## 2024-04-23 NOTE — TELEPHONE ENCOUNTER
Pt is at [facility] for lab work, there is nothing on file. Would like it to be input. Please call back pt once the lab request is put in.

## 2024-04-23 NOTE — TELEPHONE ENCOUNTER
Patient called again and would please like this addressed before he goes back to the lab tomorrow morning.

## 2024-04-23 NOTE — TELEPHONE ENCOUNTER
BW ordered as below:      Orders Placed This Encounter   Procedures    Comprehensive metabolic panel    Lipid Panel with Direct LDL reflex    CBC and differential    Vitamin D 25 hydroxy

## 2024-04-25 ENCOUNTER — OFFICE VISIT (OUTPATIENT)
Dept: PHYSICAL THERAPY | Facility: CLINIC | Age: 79
End: 2024-04-25
Payer: MEDICARE

## 2024-04-25 ENCOUNTER — APPOINTMENT (OUTPATIENT)
Dept: LAB | Age: 79
End: 2024-04-25
Payer: MEDICARE

## 2024-04-25 DIAGNOSIS — K21.00 GASTROESOPHAGEAL REFLUX DISEASE WITH ESOPHAGITIS WITHOUT HEMORRHAGE: ICD-10-CM

## 2024-04-25 DIAGNOSIS — M25.512 ACUTE PAIN OF LEFT SHOULDER: Primary | ICD-10-CM

## 2024-04-25 DIAGNOSIS — Z13.220 ENCOUNTER FOR LIPID SCREENING FOR CARDIOVASCULAR DISEASE: ICD-10-CM

## 2024-04-25 DIAGNOSIS — M75.112 INCOMPLETE TEAR OF LEFT ROTATOR CUFF, UNSPECIFIED WHETHER TRAUMATIC: ICD-10-CM

## 2024-04-25 DIAGNOSIS — Z13.6 ENCOUNTER FOR LIPID SCREENING FOR CARDIOVASCULAR DISEASE: ICD-10-CM

## 2024-04-25 LAB
25(OH)D3 SERPL-MCNC: 33.6 NG/ML (ref 30–100)
ALBUMIN SERPL BCP-MCNC: 4 G/DL (ref 3.5–5)
ALP SERPL-CCNC: 55 U/L (ref 34–104)
ALT SERPL W P-5'-P-CCNC: 21 U/L (ref 7–52)
ANION GAP SERPL CALCULATED.3IONS-SCNC: 7 MMOL/L (ref 4–13)
AST SERPL W P-5'-P-CCNC: 21 U/L (ref 13–39)
BASOPHILS # BLD AUTO: 0.06 THOUSANDS/ÂΜL (ref 0–0.1)
BASOPHILS NFR BLD AUTO: 1 % (ref 0–1)
BILIRUB SERPL-MCNC: 0.5 MG/DL (ref 0.2–1)
BUN SERPL-MCNC: 19 MG/DL (ref 5–25)
CALCIUM SERPL-MCNC: 9.4 MG/DL (ref 8.4–10.2)
CHLORIDE SERPL-SCNC: 103 MMOL/L (ref 96–108)
CHOLEST SERPL-MCNC: 154 MG/DL
CO2 SERPL-SCNC: 30 MMOL/L (ref 21–32)
CREAT SERPL-MCNC: 1.04 MG/DL (ref 0.6–1.3)
EOSINOPHIL # BLD AUTO: 0.37 THOUSAND/ÂΜL (ref 0–0.61)
EOSINOPHIL NFR BLD AUTO: 7 % (ref 0–6)
ERYTHROCYTE [DISTWIDTH] IN BLOOD BY AUTOMATED COUNT: 13.7 % (ref 11.6–15.1)
GFR SERPL CREATININE-BSD FRML MDRD: 68 ML/MIN/1.73SQ M
GLUCOSE P FAST SERPL-MCNC: 102 MG/DL (ref 65–99)
HCT VFR BLD AUTO: 48 % (ref 36.5–49.3)
HDLC SERPL-MCNC: 55 MG/DL
HGB BLD-MCNC: 15.8 G/DL (ref 12–17)
IMM GRANULOCYTES # BLD AUTO: 0.01 THOUSAND/UL (ref 0–0.2)
IMM GRANULOCYTES NFR BLD AUTO: 0 % (ref 0–2)
LDLC SERPL CALC-MCNC: 85 MG/DL (ref 0–100)
LYMPHOCYTES # BLD AUTO: 1.12 THOUSANDS/ÂΜL (ref 0.6–4.47)
LYMPHOCYTES NFR BLD AUTO: 20 % (ref 14–44)
MCH RBC QN AUTO: 31 PG (ref 26.8–34.3)
MCHC RBC AUTO-ENTMCNC: 32.9 G/DL (ref 31.4–37.4)
MCV RBC AUTO: 94 FL (ref 82–98)
MONOCYTES # BLD AUTO: 0.76 THOUSAND/ÂΜL (ref 0.17–1.22)
MONOCYTES NFR BLD AUTO: 14 % (ref 4–12)
NEUTROPHILS # BLD AUTO: 3.17 THOUSANDS/ÂΜL (ref 1.85–7.62)
NEUTS SEG NFR BLD AUTO: 58 % (ref 43–75)
NRBC BLD AUTO-RTO: 0 /100 WBCS
PLATELET # BLD AUTO: 174 THOUSANDS/UL (ref 149–390)
PMV BLD AUTO: 10.1 FL (ref 8.9–12.7)
POTASSIUM SERPL-SCNC: 4.2 MMOL/L (ref 3.5–5.3)
PROT SERPL-MCNC: 6.4 G/DL (ref 6.4–8.4)
RBC # BLD AUTO: 5.09 MILLION/UL (ref 3.88–5.62)
SODIUM SERPL-SCNC: 140 MMOL/L (ref 135–147)
TRIGL SERPL-MCNC: 70 MG/DL
WBC # BLD AUTO: 5.49 THOUSAND/UL (ref 4.31–10.16)

## 2024-04-25 PROCEDURE — 80053 COMPREHEN METABOLIC PANEL: CPT

## 2024-04-25 PROCEDURE — 82306 VITAMIN D 25 HYDROXY: CPT

## 2024-04-25 PROCEDURE — 36415 COLL VENOUS BLD VENIPUNCTURE: CPT

## 2024-04-25 PROCEDURE — 97112 NEUROMUSCULAR REEDUCATION: CPT

## 2024-04-25 PROCEDURE — 97110 THERAPEUTIC EXERCISES: CPT

## 2024-04-25 PROCEDURE — 85025 COMPLETE CBC W/AUTO DIFF WBC: CPT

## 2024-04-25 PROCEDURE — 80061 LIPID PANEL: CPT

## 2024-04-25 NOTE — PROGRESS NOTES
Daily Note     Today's date: 2024  Patient name: Eladio Ayala  : 1945  MRN: [id number]  Referring provider: Steven Hamilton*  Dx:   Encounter Diagnosis     ICD-10-CM    1. Acute pain of left shoulder  M25.512       2. Incomplete tear of left rotator cuff, unspecified whether traumatic  M75.112           Start Time: 922  Stop Time: 101  Total time in clinic (min): 53 minutes    Subjective: Pt reported that his shoulder pain was 4/10 today.       Objective: See treatment diary below      Assessment: Tolerated treatment well. Program focused on increasing L shoulder PROM but focusing on increasing L shoulder stability. Manual therapy focused on increasing L shoulder PROM and stability. Shoulder flexion and abd was added with decreased weight to limiting jt crepitus. Ice was utilized post-treatment. Patient would benefit from continued PT      Plan: Continue per plan of care.      Goals: Patient's goal is to return to playing golf.     Precautions: SEE ATTACHED PROTOCOL:   DOS:      Dx: L RTC repair    Daily Treatment Diary          Manuals     L elbow PROM         L shoulder PROM MS MS MS MS MS     L rhythmic stabs MS MS MS MS MS+PNF              Ther Ex         UBE (standing) 3'/3' 3'/3' 3'/3' 3'/3' 3'/3'    Shoulder adduction str         Scap squeeze         Towel squeeze         L UT str         C/S ret AROM         Wrist circles         L Elbow AAROM         Pendulums          Pulleys         Cane shoulder flx AAROM (supine)         Cane shoulder abd AAROM (standing)         Cane shoulder ER  AAROM (standing)         Wall ladder         IR stretch with strap         Pt Edu POC Progress/  POC POC   POC    Neuro Re-ed         Shoulder isos         Soulder flexion AROM     2#, 10x    Shoulder abd AROM     2#, 10x    SL ER AROM 3# 20x 4#, 20x 4#, 20x 4#, 20x 4#, 20x    SL abd AROM 3# 20x 4#, 20x 4#, 20x 4#, 20x 4#, 20x     Serratus punches          TB IR/ER BTB  Report called to Gretel CHRISTIANSON with questions answered as needed.   30x BTB 20x BTB 20x BTB 20x BTB 20x    TB  rows/ext BTB 30x BTB 20x BTB 20x BTB 20x BTB 20x    Prone row  3# 20x 4#, 20x 4#, 20x 4#, 20x 4#, 20x     Prone horizontal abd/extension/  flx 3# 20x each 4#, 20x each 4#, 20x each 4#, 20x 4#, 20x each    TB PNF 1 and 2    RTB 10x each RTB 10x2 each    Ball stabs 20x each 20x each  20x each 20x each    Bodyblade          Ther Activity         Shelf OH Reach  3 selves, 5 raps                            Gait Training         Arm swing                   Modalities         Ice NT 10' NT NT 10'

## 2024-04-29 ENCOUNTER — OFFICE VISIT (OUTPATIENT)
Dept: PHYSICAL THERAPY | Facility: CLINIC | Age: 79
End: 2024-04-29
Payer: MEDICARE

## 2024-04-29 DIAGNOSIS — M75.112 INCOMPLETE TEAR OF LEFT ROTATOR CUFF, UNSPECIFIED WHETHER TRAUMATIC: ICD-10-CM

## 2024-04-29 DIAGNOSIS — M25.512 ACUTE PAIN OF LEFT SHOULDER: Primary | ICD-10-CM

## 2024-04-29 PROCEDURE — 97112 NEUROMUSCULAR REEDUCATION: CPT

## 2024-04-29 PROCEDURE — 97140 MANUAL THERAPY 1/> REGIONS: CPT

## 2024-04-29 NOTE — PROGRESS NOTES
"Daily Note     Today's date: 2024  Patient name: Eladio Ayala  : 1945  MRN: [id number]  Referring provider: Steven Hamilton*  Dx:   Encounter Diagnosis     ICD-10-CM    1. Acute pain of left shoulder  M25.512       2. Incomplete tear of left rotator cuff, unspecified whether traumatic  M75.112           Start Time: 0900  Stop Time: 942  Total time in clinic (min): 42 minutes    Subjective: Pt reported that he decreased his DB weight with shoulder flexion at home and noted only a small amount a \"clicking\" without pain.       Objective: See treatment diary below      Assessment: Tolerated treatment well. Program focused on increasing L shoulder PROM and stability. Shoulder flexion with the DB was changed with scap in order to decrease jt crepitus. Pt tolerated increased reps with some DB exercises. Bodyblade was added in order to increase L shoulder stability. Pt deferred modalities. Patient would benefit from continued PT      Plan: Continue per plan of care.      Goals: Patient's goal is to return to playing golf.     Precautions: SEE ATTACHED PROTOCOL: DOS:      Dx: L RTC repair    Daily Treatment Diary          Manuals     L elbow PROM         L shoulder PROM  MS MS MS MS MS    L rhythmic stabs  MS MS MS MS+PNF MS             Ther Ex         UBE (standing)  3'/3' 3'/3' 3'/3' 3'/3' 3'/3'   Shoulder adduction str         Scap squeeze         Towel squeeze         L UT str         C/S ret AROM         Wrist circles         L Elbow AAROM         Pendulums          Pulleys         Cane shoulder flx AAROM (supine)         Cane shoulder abd AAROM (standing)         Cane shoulder ER  AAROM (standing)         Wall ladder         IR stretch with strap         Pt Edu  Progress/  POC POC   POC    Neuro Re-ed         Shoulder isos         Soulder flexion AROM     2#, 10x SCAP, 2#  10x2   Shoulder abd AROM     2#, 10x 2#, 10x2   SL ER AROM  4#, 20x 4#, 20x 4#, 20x 4#, 20x " "4#, 20x   SL abd AROM  4#, 20x 4#, 20x 4#, 20x 4#, 20x 4#, 20x    Serratus punches          TB IR/ER  BTB 20x BTB 20x BTB 20x BTB 20x BTB 20x each   TB  rows/ext  BTB 20x BTB 20x BTB 20x BTB 20x BTB 20x each   Prone row   4#, 20x 4#, 20x 4#, 20x 4#, 20x  4#, 20x   Prone horizontal abd/extension/  flx  4#, 20x each 4#, 20x each 4#, 20x 4#, 20x each 4#, 20x each   TB PNF 1 and 2    RTB 10x each RTB 10x2 each RTB 10x2 each   Ball stabs  20x each  20x each 20x each 20x each Grn   Bodyblade  +PNF     Flx and IR/ER 30\" each   Ther Activity         Shelf OH Reach                              Gait Training         Arm swing                   Modalities         Ice  10' NT NT 10' NT                                                                                                  "

## 2024-04-30 ENCOUNTER — OFFICE VISIT (OUTPATIENT)
Dept: INTERNAL MEDICINE CLINIC | Facility: CLINIC | Age: 79
End: 2024-04-30
Payer: MEDICARE

## 2024-04-30 ENCOUNTER — APPOINTMENT (OUTPATIENT)
Dept: LAB | Age: 79
End: 2024-04-30
Payer: MEDICARE

## 2024-04-30 VITALS
DIASTOLIC BLOOD PRESSURE: 76 MMHG | SYSTOLIC BLOOD PRESSURE: 116 MMHG | OXYGEN SATURATION: 96 % | WEIGHT: 176 LBS | BODY MASS INDEX: 25.25 KG/M2 | TEMPERATURE: 98 F | HEART RATE: 71 BPM

## 2024-04-30 DIAGNOSIS — Z13.220 ENCOUNTER FOR LIPID SCREENING FOR CARDIOVASCULAR DISEASE: ICD-10-CM

## 2024-04-30 DIAGNOSIS — Z13.6 ENCOUNTER FOR LIPID SCREENING FOR CARDIOVASCULAR DISEASE: ICD-10-CM

## 2024-04-30 DIAGNOSIS — R73.03 PRE-DIABETES: ICD-10-CM

## 2024-04-30 DIAGNOSIS — Z00.00 MEDICARE ANNUAL WELLNESS VISIT, SUBSEQUENT: ICD-10-CM

## 2024-04-30 DIAGNOSIS — R97.20 ELEVATED PSA: ICD-10-CM

## 2024-04-30 DIAGNOSIS — E55.9 VITAMIN D DEFICIENCY: ICD-10-CM

## 2024-04-30 DIAGNOSIS — N40.1 BENIGN LOCALIZED PROSTATIC HYPERPLASIA WITH LOWER URINARY TRACT SYMPTOMS (LUTS): Primary | ICD-10-CM

## 2024-04-30 DIAGNOSIS — K21.00 GASTROESOPHAGEAL REFLUX DISEASE WITH ESOPHAGITIS WITHOUT HEMORRHAGE: ICD-10-CM

## 2024-04-30 PROBLEM — Z01.818 PREOPERATIVE EXAMINATION: Status: RESOLVED | Noted: 2018-12-28 | Resolved: 2024-04-30

## 2024-04-30 PROBLEM — I20.89 OTHER FORMS OF ANGINA PECTORIS: Status: RESOLVED | Noted: 2023-03-16 | Resolved: 2024-04-30

## 2024-04-30 LAB
PSA SERPL-MCNC: 7.38 NG/ML (ref 0–4)
SL AMB POCT HEMOGLOBIN AIC: 5.4 (ref ?–6.5)

## 2024-04-30 PROCEDURE — G0439 PPPS, SUBSEQ VISIT: HCPCS | Performed by: INTERNAL MEDICINE

## 2024-04-30 PROCEDURE — 36415 COLL VENOUS BLD VENIPUNCTURE: CPT

## 2024-04-30 PROCEDURE — 99214 OFFICE O/P EST MOD 30 MIN: CPT | Performed by: INTERNAL MEDICINE

## 2024-04-30 PROCEDURE — 84153 ASSAY OF PSA TOTAL: CPT

## 2024-04-30 PROCEDURE — 83036 HEMOGLOBIN GLYCOSYLATED A1C: CPT | Performed by: INTERNAL MEDICINE

## 2024-04-30 NOTE — PROGRESS NOTES
Name: Eladio Ayala      : 1945      MRN: [id number]  Encounter Provider: Noam Willett DO  Encounter Date: 2024   Encounter department: [facility] INTERNAL MEDICINE    Assessment & Plan     1. Benign localized prostatic hyperplasia with lower urinary tract symptoms (LUTS)  Comments:  taking flomax and stable, c/w care with urology    2. Gastroesophageal reflux disease with esophagitis without hemorrhage  Comments:  taking H2 blocker and as needed.  Orders:  -     Comprehensive metabolic panel; Future; Expected date: 2025  -     CBC and differential; Future; Expected date: 2025    3. Pre-diabetes  Comments:  stable, c/w lower carb intake and exercise  Orders:  -     POCT hemoglobin A1c  -     Hemoglobin A1C; Future; Expected date: 2025    4. Vitamin D deficiency  Comments:  taking vit D OTC, c/w rx  Orders:  -     Vitamin D 25 hydroxy; Future; Expected date: 2025    5. Medicare annual wellness visit, subsequent    6. Encounter for lipid screening for cardiovascular disease  -     Lipid Panel with Direct LDL reflex; Future; Expected date: 2025        Depression Screening and Follow-up Plan: Patient was screened for depression during today's encounter. They screened negative with a PHQ-2 score of 0.        Subjective      HPI    Here to establish care.  Used to see Dr Ngo as PCP in past.  Reviewed meds and briefly reviewed past med hx with Eladio.  He is overall doing well and taking his medication.  He completed BW prior to today's appt.  He has pre-DM and POCT A1C today in the office is 5.4%  he is doing better with his bowels after taking fiber and completing pelvic floor PT.  He has some add'l questions for GI specialist and has not gone back to GI to follow up.  ROS Otherwise negative, no other complaints.    Review of Systems   Constitutional:  Negative for fever.   HENT:  Negative for congestion.    Eyes:  Negative for visual disturbance.   Respiratory:   Negative for shortness of breath.    Cardiovascular:  Negative for chest pain.   Gastrointestinal:  Positive for constipation (but improved with fiber and pelvic PT).   Endocrine: Negative for polyuria.   Genitourinary:  Negative for difficulty urinating.   Musculoskeletal:  Negative for gait problem.   Skin:  Negative for rash.   Allergic/Immunologic: Negative for immunocompromised state.   Neurological:  Negative for dizziness.   Psychiatric/Behavioral:  Negative for dysphoric mood.        Current Outpatient Medications on File Prior to Visit   Medication Sig    ammonium lactate (LAC-HYDRIN) 12 % cream APPLY TO THE FEET DAILY AFTER THE SHOWER    ciclopirox (LOPROX) 0.77 % cream APPLY TWICE DAILY TO AREAS OF RASH ON THE FEET AND BODY FOR 2 TO 3 WEEKS, THEN CONTINUE ONCE A DAY TO THE FEET ON GOING    clindamycin (CLEOCIN T) 1 % lotion APPLY TO AFFECTED AREA ON THE FACE ONCE DAILY    desoximetasone (TOPICORT) 0.25 % cream APPLY TWICE DAILY TO AFFECTED AREA FOR 2-4 WEEKS MAX AS NEEDED    famotidine (PEPCID) 40 MG tablet Take 1 tablet (40 mg total) by mouth 2 (two) times a day as needed for heartburn    mupirocin (BACTROBAN) 2 % ointment     mupirocin (BACTROBAN) 2 % ointment     tamsulosin (FLOMAX) 0.4 mg Take 1 capsule (0.4 mg total) by mouth daily at bedtime    [DISCONTINUED] ibuprofen (MOTRIN) 600 mg tablet every 8 (eight) hours as needed    [DISCONTINUED] meloxicam (Mobic) 15 mg tablet Take 1 tablet (15 mg total) by mouth daily    [DISCONTINUED] tiZANidine (ZANAFLEX) 4 mg tablet Take 1 tablet (4 mg total) by mouth every 8 (eight) hours as needed for muscle spasms       Objective     /76 (BP Location: Left arm, Patient Position: Sitting, Cuff Size: Standard)   Pulse 71   Temp 98 °F (36.7 °C)   Wt 79.8 kg (176 lb)   SpO2 96%   BMI 25.25 kg/m²     Physical Exam  Vitals reviewed.   Constitutional:       General: He is not in acute distress.  HENT:      Head: Normocephalic and atraumatic.      Right Ear:  Tympanic membrane normal.      Left Ear: Tympanic membrane normal.      Mouth/Throat:      Mouth: Mucous membranes are moist.   Eyes:      Conjunctiva/sclera: Conjunctivae normal.   Cardiovascular:      Rate and Rhythm: Normal rate and regular rhythm.      Heart sounds: No murmur heard.  Pulmonary:      Effort: Pulmonary effort is normal.      Breath sounds: No wheezing or rales.   Abdominal:      General: Bowel sounds are normal.      Palpations: Abdomen is soft.      Tenderness: There is no abdominal tenderness.   Musculoskeletal:      Cervical back: Neck supple.      Right lower leg: No edema.      Left lower leg: No edema.   Neurological:      Mental Status: He is alert. Mental status is at baseline.   Psychiatric:         Mood and Affect: Mood normal.         Behavior: Behavior normal.         Results for orders placed or performed in visit on 04/30/24   POCT hemoglobin A1c   Result Value Ref Range    Hemoglobin A1C 5.4 6.5         Noam Willett DO

## 2024-04-30 NOTE — PATIENT INSTRUCTIONS
Claritin(loratadine) for a couple of weeks  Consider align or florastor probiotic as a trial  Return in 1 year  A1C today is 5.4%

## 2024-04-30 NOTE — PROGRESS NOTES
Eladio is here for his Subsequent Wellness visit.     Health Risk Assessment:   Patient rates overall health as excellent. Patient feels that their physical health rating is same. Patient is very satisfied with their life. Eyesight was rated as same. Hearing was rated as same. Patient feels that their emotional and mental health rating is same. Patients states they are never, rarely angry. Patient states they are sometimes unusually tired/fatigued. Pain experienced in the last 7 days has been some. Patient's pain rating has been 3/10. Patient states that he has experienced no weight loss or gain in last 6 months.     Depression Screening:   PHQ-2 Score: 0  PHQ-9 Score: 5      Fall Risk Screening:   In the past year, patient has experienced: no history of falling in past year      Home Safety:  Patient does not have trouble with stairs inside or outside of their home. Patient has working smoke alarms and has working carbon monoxide detector. Home safety hazards include: none.     Nutrition:   Current diet is Regular and Limited junk food.     Medications:   Patient is not currently taking any over-the-counter supplements. Patient is able to manage medications.     Activities of Daily Living (ADLs)/Instrumental Activities of Daily Living (IADLs):   Walk and transfer into and out of bed and chair?: Yes  Dress and groom yourself?: Yes    Bathe or shower yourself?: Yes    Feed yourself? Yes  Do your laundry/housekeeping?: Yes  Manage your money, pay your bills and track your expenses?: Yes  Make your own meals?: Yes    Do your own shopping?: Yes    Previous Hospitalizations:   Any hospitalizations or ED visits within the last 12 months?: Yes    How many hospitalizations have you had in the last year?: 1-2    Advance Care Planning:   Living will: Yes    Durable POA for healthcare: Yes    Advanced directive: Yes      PREVENTIVE SCREENINGS      Cardiovascular Screening:    General: Screening Current      Diabetes Screening:      General: Screening Current      Colorectal Cancer Screening:     General: Screening Current      Prostate Cancer Screening:    General: Screening Not Indicated      Osteoporosis Screening:    General: Screening Not Indicated      Abdominal Aortic Aneurysm (AAA) Screening:    Risk factors include: tobacco use        General: Screening Not Indicated      Lung Cancer Screening:     General: Screening Not Indicated      Hepatitis C Screening:    General: Screening Current    Screening, Brief Intervention, and Referral to Treatment (SBIRT)    Screening      AUDIT-C Screenin) How often did you have a drink containing alcohol in the past year? 2 to 4 times a month  2) How many drinks did you have on a typical day when you were drinking in the past year? 1 to 2  3) How often did you have 6 or more drinks on one occasion in the past year? never    AUDIT-C Score: 2  Interpretation: Score 0-3 (male): Negative screen for alcohol misuse    Single Item Drug Screening:  How often have you used an illegal drug (including marijuana) or a prescription medication for non-medical reasons in the past year? never    Single Item Drug Screen Score: 0  Interpretation: Negative screen for possible drug use disorder    Other Counseling Topics:   Regular weightbearing exercise.

## 2024-05-02 ENCOUNTER — OFFICE VISIT (OUTPATIENT)
Dept: PHYSICAL THERAPY | Facility: CLINIC | Age: 79
End: 2024-05-02
Payer: MEDICARE

## 2024-05-02 DIAGNOSIS — M25.512 ACUTE PAIN OF LEFT SHOULDER: Primary | ICD-10-CM

## 2024-05-02 DIAGNOSIS — M75.112 INCOMPLETE TEAR OF LEFT ROTATOR CUFF, UNSPECIFIED WHETHER TRAUMATIC: ICD-10-CM

## 2024-05-02 PROCEDURE — 97112 NEUROMUSCULAR REEDUCATION: CPT

## 2024-05-02 PROCEDURE — 97110 THERAPEUTIC EXERCISES: CPT

## 2024-05-02 NOTE — PROGRESS NOTES
"Daily Note     Today's date: 2024  Patient name: Eladio Ayala  : 1945  MRN: [id number]  Referring provider: Steven Hamilton*  Dx:   Encounter Diagnosis     ICD-10-CM    1. Acute pain of left shoulder  M25.512       2. Incomplete tear of left rotator cuff, unspecified whether traumatic  M75.112           Start Time: 09  Stop Time: 1012  Total time in clinic (min): 42 minutes    Subjective: Pt reported a little less clicking in the shoulder yesterday.       Objective: See treatment diary below      Assessment: Tolerated treatment well. Program started with the UBE. Program focused on increasing L shoulder stability. Pt reported decreased \"clicking\" throughout her program. Bodyblade progress focused on increasing OH stability (VC/TC). Pt deferred modalities. Patient would benefit from continued PT      Plan: Continue per plan of care.      Goals: Patient's goal is to return to playing golf.     Precautions: SEE ATTACHED PROTOCOL: DOS:      Dx: L RTC repair    Daily Treatment Diary          Manuals    L elbow PROM         L shoulder PROM   MS MS MS MS    L rhythmic stabs MS  MS MS MS+PNF MS             Ther Ex         UBE (standing) 3'/3'  3'/3' 3'/3' 3'/3' 3'/3'   Shoulder adduction str         Scap squeeze         Towel squeeze         L UT str         C/S ret AROM         Wrist circles         L Elbow AAROM         Pendulums          Pulleys         Cane shoulder flx AAROM (supine)         Cane shoulder abd AAROM (standing)         Cane shoulder ER  AAROM (standing)         Wall ladder         IR stretch with strap         Pt Edu POC  POC   POC    Neuro Re-ed         Shoulder isos         Soulder flexion AROM SCAP 2#  10x2    2#, 10x SCAP, 2#  10x2   Shoulder abd AROM 10x2, 2#    2#, 10x 2#, 10x2   SL ER AROM 4#, 20x  4#, 20x 4#, 20x 4#, 20x 4#, 20x   SL abd AROM 4#, 20x  4#, 20x 4#, 20x 4#, 20x 4#, 20x    Serratus punches          TB IR/ER BTB 20x each  BTB " "20x BTB 20x BTB 20x BTB 20x each   TB  rows/ext BTB 20x each  BTB 20x BTB 20x BTB 20x BTB 20x each   Prone row  4#, 20x  4#, 20x 4#, 20x 4#, 20x  4#, 20x   Prone horizontal abd/extension/  flx 4#, 20x each  4#, 20x each 4#, 20x 4#, 20x each 4#, 20x each   TB PNF 1 and 2 RTB 10x2 each   RTB 10x each RTB 10x2 each RTB 10x2 each   Ball stabs 20x each  Grn   20x each 20x each 20x each Grn   Bodyblade  Flx and IR/ER 30\" each  +PNF, 10x     Flx and IR/ER 30\" each   Ther Activity         Shelf OH Reach                              Gait Training         Arm swing                   Modalities         Ice NT  NT NT 10' NT                                                                                                    "

## 2024-05-03 NOTE — PROGRESS NOTES
Daily Note     Today's date: 2024  Patient name: Eladio Ayala  : 1945  MRN: [id number]  Referring provider: Steven Hamilton*  Dx:   Encounter Diagnosis     ICD-10-CM    1. Acute pain of left shoulder  M25.512       2. Incomplete tear of left rotator cuff, unspecified whether traumatic  M75.112           Start Time: 902  Stop Time: 935  Total time in clinic (min): 33 minutes    Subjective: Patient has noticed less clicking and shoulder pain. Notes he experiences the most difficulty finding a comfortable sleeping position.       Objective: See treatment diary below      Assessment: Patient tolerated treatment session well. Initiated warm-up on UBE for ROM and postural strengthening. Consistent cues and demonstrations required to ensure proper technique/mechanics were exhibited. Good tolerability to progression of reps without an onset of pain/discomfort. Challenged and fatigued by static and dynamic control of body blade especially within flexion plane. Patient would benefit from continued strengthening and stability training to improve level of function.       Plan: Continue per POC. Increase reps/resistance as tolerated.      Goals: Patient's goal is to return to playing golf.     Precautions: SEE ATTACHED PROTOCOL: DOS:      Dx: L RTC repair    Daily Treatment Diary          Manuals    L elbow PROM         L shoulder PROM   MS MS MS MS    L rhythmic stabs MS  MS MS MS+PNF MS             Ther Ex         UBE (standing) 3'/3' 3'/3' 3'/3' 3'/3' 3'/3' 3'/3'   Shoulder adduction str         Scap squeeze         Towel squeeze         L UT str         C/S ret AROM         Wrist circles         L Elbow AAROM         Pendulums          Pulleys         Cane shoulder flx AAROM (supine)         Cane shoulder abd AAROM (standing)         Cane shoulder ER  AAROM (standing)         Wall ladder         IR stretch with strap         Pt Edu POC  POC   POC    Neuro Re-ed       "   Shoulder isos         Soulder flexion AROM SCAP 2#  10x2 SCAP 2#  10x2   2#, 10x SCAP, 2#  10x2   Shoulder abd AROM 10x2, 2# 10x2, 4#   2#, 10x 2#, 10x2   SL ER AROM 4#, 20x 4# 2x15 4#, 20x 4#, 20x 4#, 20x 4#, 20x   SL abd AROM 4#, 20x 4# 2x15 4#, 20x 4#, 20x 4#, 20x 4#, 20x    Serratus punches          TB IR/ER BTB 20x each BTB 20x each BTB 20x BTB 20x BTB 20x BTB 20x each   TB  rows/ext BTB 20x each BTB 30x each BTB 20x BTB 20x BTB 20x BTB 20x each   Prone row  4#, 20x 4#, 20x 4#, 20x 4#, 20x 4#, 20x  4#, 20x   Prone horizontal abd/extension/  flx 4#, 20x each 4#, 20x each 4#, 20x each 4#, 20x 4#, 20x each 4#, 20x each   TB PNF 1 and 2 RTB 10x2 each RTB 10x2 each  RTB 10x each RTB 10x2 each RTB 10x2 each   Ball stabs 20x each  Grn 20x each  Grn  20x each 20x each 20x each Grn   Bodyblade  Flx and IR/ER 30\" each  +PNF, 10x Flx and IR/ER 30\" each  +PNF, 10x    Flx and IR/ER 30\" each   Ther Activity         Shelf OH Reach                              Gait Training         Arm swing                   Modalities         Ice NT NT NT NT 10' NT                                                                                                      "

## 2024-05-06 ENCOUNTER — OFFICE VISIT (OUTPATIENT)
Dept: PHYSICAL THERAPY | Facility: CLINIC | Age: 79
End: 2024-05-06
Payer: MEDICARE

## 2024-05-06 DIAGNOSIS — M25.512 ACUTE PAIN OF LEFT SHOULDER: Primary | ICD-10-CM

## 2024-05-06 DIAGNOSIS — M75.112 INCOMPLETE TEAR OF LEFT ROTATOR CUFF, UNSPECIFIED WHETHER TRAUMATIC: ICD-10-CM

## 2024-05-06 PROCEDURE — 97112 NEUROMUSCULAR REEDUCATION: CPT

## 2024-05-06 PROCEDURE — 97110 THERAPEUTIC EXERCISES: CPT

## 2024-05-09 ENCOUNTER — EVALUATION (OUTPATIENT)
Dept: PHYSICAL THERAPY | Facility: CLINIC | Age: 79
End: 2024-05-09
Payer: MEDICARE

## 2024-05-09 DIAGNOSIS — M25.512 ACUTE PAIN OF LEFT SHOULDER: Primary | ICD-10-CM

## 2024-05-09 DIAGNOSIS — M75.112 INCOMPLETE TEAR OF LEFT ROTATOR CUFF, UNSPECIFIED WHETHER TRAUMATIC: ICD-10-CM

## 2024-05-09 PROCEDURE — 97112 NEUROMUSCULAR REEDUCATION: CPT

## 2024-05-09 PROCEDURE — 97110 THERAPEUTIC EXERCISES: CPT

## 2024-05-09 NOTE — PROGRESS NOTES
Re-Evaluation     Today's date: 2024  Patient name: Eladio Ayala  : 1945  MRN: [id number]  Referring provider: Steven Hamilton*  Dx:   Encounter Diagnosis     ICD-10-CM    1. Acute pain of left shoulder  M25.512       2. Incomplete tear of left rotator cuff, unspecified whether traumatic  M75.112           Start Time: 0900  Stop Time: 0945  Total time in clinic (min): 45 minutes    Subjective Evaluation     History of Present Illness  : Pt reported that his shoulder pain has been ranging between 2-4/10 and also reported a subjective improvement percentage of 85%. Pt mentioned that sleeping and the clicking are getting a little better. Pt also noted that ADL are improving.      IE: Patient presents with c/o L shoulder pain from a L incomplete RTC repair on 23. Pt reported that his sx started about 1 year ago while golfing. Pt reported that his sx increase with adjustment his sling. Pt has a PMH of R RTC (8 year ago).     Neuro signs: None  Bowel and bladder sxs: Normal  Red flags: Normal  Occupation: Working, [employer] store owner     Pain  At best pain ratin/10  At worst pain ratin/10  Location: L shoulder      Social Support  Lives with his wife in a 2 story home.      Patient Goals  Patient goals for therapy: Golfing     STGs  1. Decrease pain by 20% in 2-4 weeks to improve standing tolerance.-Met  2. Improve L shoulder PROM by 10 degrees in 2-4 weeks.-Met  3. (I) with HEP within 2-4 weeks in order to improve PT outcomes.-Met     LTGs  1. Decrease pain by 60% in 6-8 weeks.-Partially Met  2. Increase L shoulder AROM WNL and L shoulder MMT 5/5 within 6-8 weeks.-Partially Met   3. Perform job/dressing/showering activities independently without pain in 6-8 weeks. -Partially Met   4. Improve FOTO to greater than or equal to 59.-Met        Objective Measurements:     Shoulder  A/PROM R L Strength   Shoulder R L   Flex   deg/ 178 deg Flex 5 4+   Abd  deg/ 180 deg Abd 5 4+    ER WNL NT/90 deg @ 90 deg abd ER 5 4+   IR WNL NT/68 deg@90   deg abd IR 5 5                 Wrist AROM             Flexion WNL WNL         Extension WNL WNL         Supination WNL WNL         Pronation WNL WNL         UD WNL WNL         RD WNL WNL         Elbow PROM/  AROM             Flexion WNL WNL/   NT         Extension WNL WNL/NT                  Assessment:     Eladio Ayala is a pleasant 78 y.o. male who has attended 41 visits of skilled PT for L shoulder pain S/P L incomplete RTC repair on 11/29/23. Pt demonstrated improvement in L shoulder PROM and MMT but still demonstrates deficits in these categories. Pt scored a 59 on FOTO and reported a subjective improvement percentage of 85%. Pt was able to perform most of his program. Pt was educated and demonstrated understanding of progress, POC, and benefit of skill PT. Pt deferred modalities. Pt would benefit from further skilled PT in order to decrease pain, address deficits (ROM/MMT), help pt achieve goals, and progress program as tolerated.      Thank you for the referral!     Plan  Patient would benefit from:Skilled physical therapy  Planned therapy interventions: manual therapy, neuromuscular re-education, stretching, strengthening, therapeutic activities, therapeutic exercise, patient education, home exercise program, modalities to decrease pain, and activity modification.     Frequency: 2x week  Duration in weeks: 8  Treatment plan discussed with: patient     Goals: Patient's goal is to return to playing golf.     Precautions: SEE ATTACHED PROTOCOL:   DOS:  11/ 29/23    Dx: L RTC repair    Daily Treatment Diary          Manuals 5/2 5/6 5/9 4/25 4/29   L elbow PROM         L shoulder PROM     MS MS    L rhythmic stabs MS    MS+PNF MS             Ther Ex         UBE (standing) 3'/3' 3'/3' 3'/3'  3'/3' 3'/3'   Shoulder adduction str         Scap squeeze         Towel squeeze         L UT str         C/S ret AROM         Wrist circles         L Elbow  "AAROM         Pendulums          Pulleys         Cane shoulder flx AAROM (supine)         Cane shoulder abd AAROM (standing)         Cane shoulder ER  AAROM (standing)         Wall ladder         IR stretch with strap         Pt Edu POC  POC  POC    Neuro Re-ed         Shoulder isos         Soulder flexion AROM SCAP 2#  10x2 SCAP 2#  10x2 SCAP 2#  10x2  2#, 10x SCAP, 2#  10x2   Shoulder abd AROM 10x2, 2# 10x2, 4# 10x2, 4#  2#, 10x 2#, 10x2   SL ER AROM 4#, 20x 4# 2x15 4#, 10x2  4#, 20x 4#, 20x   SL abd AROM 4#, 20x 4# 2x15 4#, 10x2  4#, 20x 4#, 20x    Serratus punches          TB IR/ER BTB 20x each BTB 20x each BTB 20x each  BTB 20x BTB 20x each   TB  rows/ext BTB 20x each BTB 30x each BTB 20x each  BTB 20x BTB 20x each   Prone row  4#, 20x 4#, 20x 4#, 20x  4#, 20x  4#, 20x   Prone horizontal abd/extension/  flx 4#, 20x each 4#, 20x each 4#, 20x each  4#, 20x each 4#, 20x each   TB PNF 1 and 2 RTB 10x2 each RTB 10x2 each RTB 10x2 each  RTB 10x2 each RTB 10x2 each   Ball stabs 20x each  Grn 20x each  Grn 20x each  Grn  20x each 20x each Grn   Bodyblade  Flx and IR/ER 30\" each  +PNF, 10x Flx and IR/ER 30\" each  +PNF, 10x Flx and IR/ER 30\" each  +PNF, 10x   Flx and IR/ER 30\" each   Ther Activity         Shelf OH Reach                              Gait Training         Arm swing                   Modalities         Ice NT NT NT  10' NT                                                                                                        "

## 2024-05-13 ENCOUNTER — APPOINTMENT (OUTPATIENT)
Dept: PHYSICAL THERAPY | Facility: CLINIC | Age: 79
End: 2024-05-13
Payer: MEDICARE

## 2024-05-16 ENCOUNTER — OFFICE VISIT (OUTPATIENT)
Dept: PHYSICAL THERAPY | Facility: CLINIC | Age: 79
End: 2024-05-16
Payer: MEDICARE

## 2024-05-16 DIAGNOSIS — M25.512 ACUTE PAIN OF LEFT SHOULDER: Primary | ICD-10-CM

## 2024-05-16 DIAGNOSIS — M75.112 INCOMPLETE TEAR OF LEFT ROTATOR CUFF, UNSPECIFIED WHETHER TRAUMATIC: ICD-10-CM

## 2024-05-16 DIAGNOSIS — R97.20 ELEVATED PSA: Primary | ICD-10-CM

## 2024-05-16 PROCEDURE — 97112 NEUROMUSCULAR REEDUCATION: CPT

## 2024-05-16 PROCEDURE — 97110 THERAPEUTIC EXERCISES: CPT

## 2024-05-16 NOTE — PROGRESS NOTES
"Daily Note     Today's date: 2024  Patient name: Eladio Ayala  : 1945  MRN: [id number]  Referring provider: Steven Hamilton*  Dx:   Encounter Diagnosis     ICD-10-CM    1. Acute pain of left shoulder  M25.512       2. Incomplete tear of left rotator cuff, unspecified whether traumatic  M75.112           Start Time: 0900  Stop Time: 943  Total time in clinic (min): 43 minutes    Subjective: Pt reported that his shoulder \"felt a little mushy\" but noted that the clicking maybe decreased a little.       Objective: See treatment diary below      Assessment: Tolerated treatment well. Pt's program started with the UBE. Program focused on increasing shoulder stability. Pt was educated on updated HEP. Pt deferred modalities. Patient would benefit from continued PT      Plan: Continue per plan of care.      Goals: Patient's goal is to return to playing golf.     Precautions: SEE ATTACHED PROTOCOL: DOS:      Dx: L RTC repair    Daily Treatment Diary             Manuals    L elbow PROM         L shoulder PROM      MS    L rhythmic stabs MS     MS             Ther Ex         UBE (standing) 3'/3' 3'/3' 3'/3' 3'/3'  3'/3'   Shoulder adduction str         Scap squeeze         Towel squeeze         L UT str         C/S ret AROM         Wrist circles         L Elbow AAROM         Pendulums          Pulleys         Cane shoulder flx AAROM (supine)         Cane shoulder abd AAROM (standing)         Cane shoulder ER  AAROM (standing)         Wall ladder         IR stretch with strap         Pt Edu POC  POC HEP     Neuro Re-ed         Shoulder isos         Soulder flexion AROM SCAP 2#  10x2 SCAP 2#  10x2 SCAP 2#  10x2 Scap 2#  10x2  SCAP, 2#  10x2   Shoulder abd AROM 10x2, 2# 10x2, 4# 10x2, 4# 10x2, 4#  2#, 10x2   SL ER AROM 4#, 20x 4# 2x15 4#, 10x2 4#, 10x2  4#, 20x   SL abd AROM 4#, 20x 4# 2x15 4#, 10x2 4#, 10x2  4#, 20x    Serratus punches          TB IR/ER BTB 20x each BTB 20x " "each BTB 20x each BTB 20x each  BTB 20x each   TB  rows/ext BTB 20x each BTB 30x each BTB 20x each BTB 20x each  BTB 20x each   Prone row  4#, 20x 4#, 20x 4#, 20x 4#, 20x  4#, 20x   Prone horizontal abd/extension/  flx 4#, 20x each 4#, 20x each 4#, 20x each 4#, 20x each  4#, 20x each   TB PNF 1 and 2 RTB 10x2 each RTB 10x2 each RTB 10x2 each RTB 10x2 each  RTB 10x2 each   Ball stabs 20x each  Grn 20x each  Grn 20x each  Grn 20x each  Grn  20x each Grn   Bodyblade  Flx and IR/ER 30\" each  +PNF, 10x Flx and IR/ER 30\" each  +PNF, 10x Flx and IR/ER 30\" each  +PNF, 10x Flx and IR/ER 30\" each  +PNF, 10x  Flx and IR/ER 30\" each   Ther Activity         Shelf OH Reach                              Gait Training         Arm swing                   Modalities         Ice NT NT NT NT  NT                                                                  "

## 2024-05-28 ENCOUNTER — APPOINTMENT (OUTPATIENT)
Dept: PHYSICAL THERAPY | Facility: CLINIC | Age: 79
End: 2024-05-28
Payer: MEDICARE

## 2024-05-28 ENCOUNTER — PREP FOR PROCEDURE (OUTPATIENT)
Dept: GASTROENTEROLOGY | Facility: CLINIC | Age: 79
End: 2024-05-28

## 2024-05-28 DIAGNOSIS — K59.09 OTHER CONSTIPATION: Primary | ICD-10-CM

## 2024-05-28 RX ORDER — POLYETHYLENE GLYCOL 3350 17 G/17G
POWDER, FOR SOLUTION ORAL
Qty: 238 G | Refills: 0 | Status: SHIPPED | OUTPATIENT
Start: 2024-05-28

## 2024-05-28 RX ORDER — BISACODYL 5 MG/1
5 TABLET, DELAYED RELEASE ORAL ONCE
Qty: 2 TABLET | Refills: 0 | Status: SHIPPED | OUTPATIENT
Start: 2024-05-28 | End: 2024-05-28

## 2024-05-30 ENCOUNTER — OFFICE VISIT (OUTPATIENT)
Dept: PHYSICAL THERAPY | Facility: CLINIC | Age: 79
End: 2024-05-30
Payer: MEDICARE

## 2024-05-30 DIAGNOSIS — M75.112 INCOMPLETE TEAR OF LEFT ROTATOR CUFF, UNSPECIFIED WHETHER TRAUMATIC: ICD-10-CM

## 2024-05-30 DIAGNOSIS — M25.512 ACUTE PAIN OF LEFT SHOULDER: Primary | ICD-10-CM

## 2024-05-30 PROCEDURE — 97112 NEUROMUSCULAR REEDUCATION: CPT

## 2024-05-30 PROCEDURE — 97110 THERAPEUTIC EXERCISES: CPT

## 2024-05-30 NOTE — PROGRESS NOTES
Daily Note     Today's date: 2024  Patient name: Eladio Ayala  : 1945  MRN: [id number]  Referring provider: Steven Hamilton*  Dx:   Encounter Diagnosis     ICD-10-CM    1. Acute pain of left shoulder  M25.512       2. Incomplete tear of left rotator cuff, unspecified whether traumatic  M75.112           Start Time: 0900  Stop Time: 0940  Total time in clinic (min): 40 minutes    Subjective: Pt reported that his shoulder was feeling about the same, but noted that ADLs and the clicking are improving.       Objective: See treatment diary below      Assessment: Tolerated treatment well. Program started with the UBE. Program focused on increasing L shoulder strength and endurance. Pt required VC/TC in order to perform bodyblade with correct form. Pt deferred modalities. Patient would benefit from continued PT      Plan: Continue per plan of care.      Goals: Patient's goal is to return to playing golf.     Precautions: SEE ATTACHED PROTOCOL: DOS:      Dx: L RTC repair    Daily Treatment Diary             Manuals     L elbow PROM         L shoulder PROM          L rhythmic stabs MS                  Ther Ex         UBE (standing) 3'/3' 3'/3' 3'/3' 3'/3' 3'/3'    Shoulder adduction str         Scap squeeze         Towel squeeze         L UT str         C/S ret AROM         Wrist circles         L Elbow AAROM         Pendulums          Pulleys         Cane shoulder flx AAROM (supine)         Cane shoulder abd AAROM (standing)         Cane shoulder ER  AAROM (standing)         Wall ladder         IR stretch with strap         Pt Edu POC  POC HEP POC    Neuro Re-ed         Shoulder isos         Soulder flexion AROM SCAP 2#  10x2 SCAP 2#  10x2 SCAP 2#  10x2 Scap 2#  10x2 Scap 4# 10x2    Shoulder abd AROM 10x2, 2# 10x2, 4# 10x2, 4# 10x2, 4# 10x2, 4#    SL ER AROM 4#, 20x 4# 2x15 4#, 10x2 4#, 10x2 4#, 20x    SL abd AROM 4#, 20x 4# 2x15 4#, 10x2 4#, 10x2 4#, 20x     Serratus  "punches          TB IR/ER BTB 20x each BTB 20x each BTB 20x each BTB 20x each BTB 20x each    TB  rows/ext BTB 20x each BTB 30x each BTB 20x each BTB 20x each BTB 20x each    Prone row  4#, 20x 4#, 20x 4#, 20x 4#, 20x 4#, 20x    Prone horizontal abd/extension/  flx 4#, 20x each 4#, 20x each 4#, 20x each 4#, 20x each 4#, 20 each     TB PNF 1 and 2 RTB 10x2 each RTB 10x2 each RTB 10x2 each RTB 10x2 each RTB 10x2 each    Ball stabs 20x each  Grn 20x each  Grn 20x each  Grn 20x each  Grn 20x each Grn    Bodyblade  Flx and IR/ER 30\" each  +PNF, 10x Flx and IR/ER 30\" each  +PNF, 10x Flx and IR/ER 30\" each  +PNF, 10x Flx and IR/ER 30\" each  +PNF, 10x Flx and IR/ER 30\" each  +PNF, 10x    Ther Activity         Shelf OH Reach                              Gait Training         Arm swing                   Modalities         Ice NT NT NT NT NT                                                                     "

## 2024-05-31 ENCOUNTER — PATIENT MESSAGE (OUTPATIENT)
Dept: GASTROENTEROLOGY | Facility: CLINIC | Age: 79
End: 2024-05-31

## 2024-05-31 ENCOUNTER — TELEPHONE (OUTPATIENT)
Dept: GASTROENTEROLOGY | Facility: CLINIC | Age: 79
End: 2024-05-31

## 2024-06-04 ENCOUNTER — PREP FOR PROCEDURE (OUTPATIENT)
Dept: GASTROENTEROLOGY | Facility: CLINIC | Age: 79
End: 2024-06-04

## 2024-06-04 DIAGNOSIS — K62.89 DECREASED RECTAL SPHINCTER TONE: Primary | ICD-10-CM

## 2024-06-04 DIAGNOSIS — K21.00 GASTROESOPHAGEAL REFLUX DISEASE WITH ESOPHAGITIS WITHOUT HEMORRHAGE: ICD-10-CM

## 2024-06-06 ENCOUNTER — OFFICE VISIT (OUTPATIENT)
Dept: PHYSICAL THERAPY | Facility: CLINIC | Age: 79
End: 2024-06-06
Payer: MEDICARE

## 2024-06-06 DIAGNOSIS — M25.512 ACUTE PAIN OF LEFT SHOULDER: Primary | ICD-10-CM

## 2024-06-06 DIAGNOSIS — M75.112 INCOMPLETE TEAR OF LEFT ROTATOR CUFF, UNSPECIFIED WHETHER TRAUMATIC: ICD-10-CM

## 2024-06-06 PROCEDURE — 97110 THERAPEUTIC EXERCISES: CPT

## 2024-06-06 PROCEDURE — 97112 NEUROMUSCULAR REEDUCATION: CPT

## 2024-06-06 NOTE — PROGRESS NOTES
Daily Note     Today's date: 2024  Patient name: Eladio Ayala  : 1945  MRN: [id number]  Referring provider: Steven Hamilton*  Dx:   Encounter Diagnosis     ICD-10-CM    1. Acute pain of left shoulder  M25.512       2. Incomplete tear of left rotator cuff, unspecified whether traumatic  M75.112           Start Time: 0900  Stop Time: 0945  Total time in clinic (min): 45 minutes    Subjective: Pt noted being sore after playing golf.       Objective: See treatment diary below      Assessment: Tolerated treatment well. Program started with the UBE. Program focused on increasing shoulder stability. Pt deferred modalities. Patient would benefit from continued PT      Plan: Continue per plan of care.      Goals: Patient's goal is to return to playing golf.     Precautions: SEE ATTACHED PROTOCOL: DOS:      Dx: L RTC repair    Daily Treatment Diary             Manuals     L elbow PROM         L shoulder PROM          L rhythmic stabs                   Ther Ex         UBE (standing)  3'/3' 3'/3' 3'/3' 3'/3' 3'/3'   Shoulder adduction str         Scap squeeze         Towel squeeze         L UT str         C/S ret AROM         Wrist circles         L Elbow AAROM         Pendulums          Pulleys         Cane shoulder flx AAROM (supine)         Cane shoulder abd AAROM (standing)         Cane shoulder ER  AAROM (standing)         Wall ladder         IR stretch with strap         Pt Edu   POC HEP POC HEP   Neuro Re-ed         Shoulder isos         Soulder flexion AROM  SCAP 2#  10x2 SCAP 2#  10x2 Scap 2#  10x2 Scap 4# 10x2 4#, 10x2   Shoulder abd AROM  10x2, 4# 10x2, 4# 10x2, 4# 10x2, 4# 4#, 10x2   SL ER AROM  4# 2x15 4#, 10x2 4#, 10x2 4#, 20x 4#,10x2   SL abd AROM  4# 2x15 4#, 10x2 4#, 10x2 4#, 20x 4#, 20x    Serratus punches          TB IR/ER  BTB 20x each BTB 20x each BTB 20x each BTB 20x each BTB 20x each   TB  rows/ext  BTB 30x each BTB 20x each BTB 20x each BTB 20x each BTB  "20x each   Prone row   4#, 20x 4#, 20x 4#, 20x 4#, 20x 4#, 20x   Prone horizontal abd/extension/  flx  4#, 20x each 4#, 20x each 4#, 20x each 4#, 20 each  4#, 20x each   TB PNF 1 and 2  RTB 10x2 each RTB 10x2 each RTB 10x2 each RTB 10x2 each RTB 10x2 each   Ball stabs  20x each  Grn 20x each  Grn 20x each  Grn 20x each Grn 20x each   Bodyblade   Flx and IR/ER 30\" each  +PNF, 10x Flx and IR/ER 30\" each  +PNF, 10x Flx and IR/ER 30\" each  +PNF, 10x Flx and IR/ER 30\" each  +PNF, 10x Flx and IR/ER 30\" each  +PNF, 10x   Ther Activity         Shelf OH Reach                              Gait Training         Arm swing                   Modalities         Ice  NT NT NT NT NT                                                                      "

## 2024-06-11 NOTE — PATIENT COMMUNICATION
Scheduled date of colonoscopy/EGD (as of today): 06/20/2024  Physician performing colonoscopy: Dr. Guthrie   Location of colonoscopy: [facility]  Bowel prep reviewed with patient: 2 Day Paul   Instructions reviewed with patient by: Rita Mailed  Clearances: N/A

## 2024-06-12 ENCOUNTER — PATIENT MESSAGE (OUTPATIENT)
Dept: GASTROENTEROLOGY | Facility: CLINIC | Age: 79
End: 2024-06-12

## 2024-06-13 ENCOUNTER — OFFICE VISIT (OUTPATIENT)
Dept: PHYSICAL THERAPY | Facility: CLINIC | Age: 79
End: 2024-06-13
Payer: MEDICARE

## 2024-06-13 DIAGNOSIS — M75.112 INCOMPLETE TEAR OF LEFT ROTATOR CUFF, UNSPECIFIED WHETHER TRAUMATIC: ICD-10-CM

## 2024-06-13 DIAGNOSIS — M25.512 ACUTE PAIN OF LEFT SHOULDER: Primary | ICD-10-CM

## 2024-06-13 PROCEDURE — 97112 NEUROMUSCULAR REEDUCATION: CPT

## 2024-06-13 NOTE — PATIENT COMMUNICATION
Called [facility] to confirm order.  Per Pharm Tech script has been received, item is on back order, should be received tomorrow PM.  Called [email or web address]

## 2024-06-13 NOTE — PROGRESS NOTES
Daily Note     Today's date: 2024  Patient name: Eladio Ayala  : 1945  MRN: [id number]  Referring provider: Steven Hamilton*  Dx:   Encounter Diagnosis     ICD-10-CM    1. Acute pain of left shoulder  M25.512       2. Incomplete tear of left rotator cuff, unspecified whether traumatic  M75.112           Start Time: 0900  Stop Time: 0940  Total time in clinic (min): 40 minutes    Subjective: Pt reported that ADLs are improving.       Objective: See treatment diary below      Assessment: Tolerated treatment well. Pt required VC in order to perform bodyblade (PNF) with correct form. Program focused on increasing shoulder stability, strength and endurance. Patient would benefit from continued PT    Billing reflects decreased one-on-one time.     Plan: Continue per plan of care.      Goals: Patient's goal is to return to playing golf.     Precautions: SEE ATTACHED PROTOCOL: DOS:      Dx: L RTC repair    Daily Treatment Diary             Manuals    L elbow PROM         L shoulder PROM          L rhythmic stabs                   Ther Ex         UBE (standing) 3'/3'  3'/3' 3'/3' 3'/3' 3'/3'   Shoulder adduction str         Scap squeeze         Towel squeeze         L UT str         C/S ret AROM         Wrist circles         L Elbow AAROM         Pendulums          Pulleys         Cane shoulder flx AAROM (supine)         Cane shoulder abd AAROM (standing)         Cane shoulder ER  AAROM (standing)         Wall ladder         IR stretch with strap         Pt Edu   POC HEP POC HEP   Neuro Re-ed         Shoulder isos         Soulder flexion AROM 2#, 10x2  SCAP 2#  10x2 Scap 2#  10x2 Scap 4# 10x2 4#, 10x2   Shoulder abd AROM 10x2, 4#  10x2, 4# 10x2, 4# 10x2, 4# 4#, 10x2   SL ER AROM 4#, 20x  4#, 10x2 4#, 10x2 4#, 20x 4#,10x2   SL abd AROM 4#, 20x  4#, 10x2 4#, 10x2 4#, 20x 4#, 20x    Serratus punches          TB IR/ER BTB 20x  BTB 20x each BTB 20x each BTB 20x each BTB 20x  "each   TB  rows/ext BTB 20x  BTB 20x each BTB 20x each BTB 20x each BTB 20x each   Prone row  4#, 20x  4#, 20x 4#, 20x 4#, 20x 4#, 20x   Prone horizontal abd/extension/  flx 4#, 20x  4#, 20x each 4#, 20x each 4#, 20 each  4#, 20x each   TB PNF 1 and 2 RTB 10x2 each  RTB 10x2 each RTB 10x2 each RTB 10x2 each RTB 10x2 each   Ball stabs 20x each  Grn  20x each  Grn 20x each  Grn 20x each Grn 20x each   Bodyblade  Flx and IR/ER 30\" each  +PNF, 10x  Flx and IR/ER 30\" each  +PNF, 10x Flx and IR/ER 30\" each  +PNF, 10x Flx and IR/ER 30\" each  +PNF, 10x Flx and IR/ER 30\" each  +PNF, 10x   Ther Activity         Shelf OH Reach                              Gait Training         Arm swing                   Modalities         Ice NT  NT NT NT NT                                                                        "

## 2024-06-13 NOTE — PATIENT COMMUNICATION
Patient called in regarding Prep Rx was not at Pharmacy and he needed the location of procedure spoke with MA she is going to call the Pharmacy to check on Rx patient is aware

## 2024-06-17 DIAGNOSIS — K59.09 OTHER CONSTIPATION: Primary | ICD-10-CM

## 2024-06-19 RX ORDER — SODIUM CHLORIDE, SODIUM LACTATE, POTASSIUM CHLORIDE, CALCIUM CHLORIDE 600; 310; 30; 20 MG/100ML; MG/100ML; MG/100ML; MG/100ML
125 INJECTION, SOLUTION INTRAVENOUS CONTINUOUS
Status: CANCELLED | OUTPATIENT
Start: 2024-06-19

## 2024-06-19 RX ORDER — LIDOCAINE HYDROCHLORIDE 10 MG/ML
0.5 INJECTION, SOLUTION EPIDURAL; INFILTRATION; INTRACAUDAL; PERINEURAL ONCE AS NEEDED
Status: CANCELLED | OUTPATIENT
Start: 2024-06-19

## 2024-06-20 ENCOUNTER — APPOINTMENT (OUTPATIENT)
Dept: PHYSICAL THERAPY | Facility: CLINIC | Age: 79
End: 2024-06-20
Payer: MEDICARE

## 2024-06-20 ENCOUNTER — HOSPITAL ENCOUNTER (OUTPATIENT)
Dept: GASTROENTEROLOGY | Facility: HOSPITAL | Age: 79
Setting detail: OUTPATIENT SURGERY
End: 2024-06-20
Attending: INTERNAL MEDICINE
Payer: MEDICARE

## 2024-06-20 ENCOUNTER — ANESTHESIA (OUTPATIENT)
Dept: GASTROENTEROLOGY | Facility: HOSPITAL | Age: 79
End: 2024-06-20

## 2024-06-20 ENCOUNTER — ANESTHESIA EVENT (OUTPATIENT)
Dept: GASTROENTEROLOGY | Facility: HOSPITAL | Age: 79
End: 2024-06-20

## 2024-06-20 VITALS
DIASTOLIC BLOOD PRESSURE: 79 MMHG | RESPIRATION RATE: 16 BRPM | SYSTOLIC BLOOD PRESSURE: 124 MMHG | BODY MASS INDEX: 24.62 KG/M2 | HEIGHT: 70 IN | OXYGEN SATURATION: 97 % | WEIGHT: 172 LBS | TEMPERATURE: 97.5 F | HEART RATE: 64 BPM

## 2024-06-20 DIAGNOSIS — K21.00 GASTROESOPHAGEAL REFLUX DISEASE WITH ESOPHAGITIS WITHOUT HEMORRHAGE: ICD-10-CM

## 2024-06-20 DIAGNOSIS — K59.09 OTHER CONSTIPATION: ICD-10-CM

## 2024-06-20 PROCEDURE — 43239 EGD BIOPSY SINGLE/MULTIPLE: CPT | Performed by: INTERNAL MEDICINE

## 2024-06-20 PROCEDURE — 88305 TISSUE EXAM BY PATHOLOGIST: CPT | Performed by: PATHOLOGY

## 2024-06-20 PROCEDURE — 45378 DIAGNOSTIC COLONOSCOPY: CPT | Performed by: INTERNAL MEDICINE

## 2024-06-20 RX ORDER — SODIUM CHLORIDE, SODIUM LACTATE, POTASSIUM CHLORIDE, CALCIUM CHLORIDE 600; 310; 30; 20 MG/100ML; MG/100ML; MG/100ML; MG/100ML
INJECTION, SOLUTION INTRAVENOUS CONTINUOUS PRN
Status: DISCONTINUED | OUTPATIENT
Start: 2024-06-20 | End: 2024-06-20

## 2024-06-20 RX ORDER — PROPOFOL 10 MG/ML
INJECTION, EMULSION INTRAVENOUS AS NEEDED
Status: DISCONTINUED | OUTPATIENT
Start: 2024-06-20 | End: 2024-06-20

## 2024-06-20 RX ORDER — LIDOCAINE HYDROCHLORIDE 20 MG/ML
INJECTION, SOLUTION EPIDURAL; INFILTRATION; INTRACAUDAL; PERINEURAL AS NEEDED
Status: DISCONTINUED | OUTPATIENT
Start: 2024-06-20 | End: 2024-06-20

## 2024-06-20 RX ORDER — BISACODYL 5 MG/1
5 TABLET, DELAYED RELEASE ORAL ONCE
Qty: 2 TABLET | Refills: 0 | Status: SHIPPED | OUTPATIENT
Start: 2024-06-20 | End: 2024-06-20

## 2024-06-20 RX ADMIN — PROPOFOL 50 MG: 10 INJECTION, EMULSION INTRAVENOUS at 11:55

## 2024-06-20 RX ADMIN — PROPOFOL 50 MG: 10 INJECTION, EMULSION INTRAVENOUS at 11:47

## 2024-06-20 RX ADMIN — PROPOFOL 100 MCG/KG/MIN: 10 INJECTION, EMULSION INTRAVENOUS at 11:50

## 2024-06-20 RX ADMIN — SODIUM CHLORIDE, SODIUM LACTATE, POTASSIUM CHLORIDE, AND CALCIUM CHLORIDE: .6; .31; .03; .02 INJECTION, SOLUTION INTRAVENOUS at 11:09

## 2024-06-20 RX ADMIN — LIDOCAINE HYDROCHLORIDE 100 MG: 20 INJECTION, SOLUTION EPIDURAL; INFILTRATION; INTRACAUDAL at 11:43

## 2024-06-20 RX ADMIN — PROPOFOL 150 MG: 10 INJECTION, EMULSION INTRAVENOUS at 11:43

## 2024-06-20 NOTE — H&P
History and Physical - [facility]  Eladio Ayala 79 y.o. male MRN: [id number]                  HPI: Eladio Ayala is a 79 y.o. year old male who presents for EGD and colonoscopy evaluation.  EGD is evaluation for osteophyte disease.  Colonoscopy is for change in bowel habits and history of colon polyps.      REVIEW OF SYSTEMS: Per the HPI, and otherwise unremarkable.    Historical Information   Past Medical History:   Diagnosis Date    Arthritis 2010    Basal cell carcinoma of shoulder     last assessed: 2014    Depression     last assessed: 2015    GERD (gastroesophageal reflux disease) 2000    Headache(784.0) 1980    HL (hearing loss) 2010    Subconjunctival hemorrhage of left eye     last assessed: 2016     Past Surgical History:   Procedure Laterality Date    COLONOSCOPY      HERNIA REPAIR      RI SURGICAL ARTHROSCOPY SHOULDER W/ROTATOR CUFF RPR Left 2023    Procedure: SHOULDER ARTHROSCOPIC ROTATOR CUFF REPAIR EXTENSIVE DEBRIDMENT;  Surgeon: Steven Hamilton MD;  Location: [facility];  Service: Orthopedics    ROTATOR CUFF REPAIR Right      Social History   Social History     Substance and Sexual Activity   Alcohol Use Yes    Alcohol/week: 6.0 standard drinks of alcohol    Types: 6 Shots of liquor per week    Comment: Social     Social History     Substance and Sexual Activity   Drug Use No     Social History     Tobacco Use   Smoking Status Former    Current packs/day: 0.00    Average packs/day: 1.5 packs/day for 15.0 years (22.5 ttl pk-yrs)    Types: Cigarettes    Start date: 1965    Quit date: 1980    Years since quittin.8   Smokeless Tobacco Never     Family History   Problem Relation Age of Onset    Heart disease Mother         cardiac disorder    Kidney disease Mother     Prostate cancer Father     Hearing loss Sister     Diabetes Paternal Grandfather        Meds/Allergies       Current Outpatient Medications:      ammonium lactate (LAC-HYDRIN) 12 % cream    bisacodyl (DULCOLAX) 5 mg EC tablet    ciclopirox (LOPROX) 0.77 % cream    clindamycin (CLEOCIN T) 1 % lotion    desoximetasone (TOPICORT) 0.25 % cream    famotidine (PEPCID) 40 MG tablet    mupirocin (BACTROBAN) 2 % ointment    mupirocin (BACTROBAN) 2 % ointment    polyethylene glycol (GLYCOLAX) 17 GM/SCOOP powder    polyethylene glycol (GLYCOLAX) 17 GM/SCOOP powder    polyethylene glycol (GOLYTELY) 4000 mL solution    tamsulosin (FLOMAX) 0.4 mg    No Known Allergies    Objective     There were no vitals taken for this visit.      PHYSICAL EXAM    Gen: NAD  Head: NCAT  CV: RRR  CHEST: Clear  ABD: soft, NT/ND  EXT: no edema      ASSESSMENT/PLAN:  This is a 79 y.o. year old male here for EGD and colonoscopy evaluation, and he is stable and optimized for his procedure.

## 2024-06-20 NOTE — ANESTHESIA POSTPROCEDURE EVALUATION
Post-Op Assessment Note    CV Status:  Stable  Pain Score: 0    Pain management: adequate       Mental Status:  Alert and awake   Hydration Status:  Euvolemic   PONV Controlled:  Controlled   Airway Patency:  Patent     Post Op Vitals Reviewed: Yes    No anethesia notable event occurred.    Staff: CRNA, Anesthesiologist               BP   98/58   Temp      Pulse  70   Resp   16   SpO2   99

## 2024-06-20 NOTE — ANESTHESIA PREPROCEDURE EVALUATION
Procedure:  COLONOSCOPY  EGD    Relevant Problems   GI/HEPATIC   (+) Gastroesophageal reflux disease with esophagitis   (+) Hiatal hernia      /RENAL   (+) Atypical small acinar proliferation of prostate   (+) Benign localized prostatic hyperplasia with lower urinary tract symptoms (LUTS)      MUSCULOSKELETAL   (+) Hiatal hernia      Ear/Nose/Throat   (+) Glottic insufficiency      Neurology/Sleep   (+) Paresis of left vocal fold      Orthopedic/Musculoskeletal   (+) Muscle tension dysphonia        Physical Exam    Airway    Mallampati score: II  TM Distance: >3 FB  Neck ROM: full     Dental   No notable dental hx     Cardiovascular  Rhythm: regular, Rate: normal, Cardiovascular exam normal    Pulmonary  Pulmonary exam normal Breath sounds clear to auscultation    Other Findings        Anesthesia Plan  ASA Score- 2     Anesthesia Type- IV sedation with anesthesia with ASA Monitors.         Additional Monitors:     Airway Plan:     Comment: Discussed risks/benefits, including medication reactions, awareness, aspiration, and serious/life threatening complications.    Plan to maintain native airway with IVGA, monitored with EtCO2.       Plan Factors-Exercise tolerance (METS): >4 METS.    Chart reviewed.    Patient summary reviewed.      Patient instructed to abstain from smoking on day of procedure. Patient did not smoke on day of surgery.            Induction- intravenous.    Postoperative Plan-         Informed Consent- Anesthetic plan and risks discussed with patient.  I personally reviewed this patient with the CRNA. Discussed and agreed on the Anesthesia Plan with the CRNA..

## 2024-06-21 ENCOUNTER — ANESTHESIA (OUTPATIENT)
Dept: GASTROENTEROLOGY | Facility: HOSPITAL | Age: 79
End: 2024-06-21

## 2024-06-21 ENCOUNTER — HOSPITAL ENCOUNTER (OUTPATIENT)
Dept: GASTROENTEROLOGY | Facility: HOSPITAL | Age: 79
Setting detail: OUTPATIENT SURGERY
End: 2024-06-21
Attending: INTERNAL MEDICINE
Payer: MEDICARE

## 2024-06-21 ENCOUNTER — ANESTHESIA EVENT (OUTPATIENT)
Dept: GASTROENTEROLOGY | Facility: HOSPITAL | Age: 79
End: 2024-06-21

## 2024-06-21 VITALS
TEMPERATURE: 96.2 F | OXYGEN SATURATION: 97 % | SYSTOLIC BLOOD PRESSURE: 102 MMHG | HEART RATE: 71 BPM | DIASTOLIC BLOOD PRESSURE: 62 MMHG | RESPIRATION RATE: 17 BRPM

## 2024-06-21 DIAGNOSIS — K59.09 OTHER CONSTIPATION: ICD-10-CM

## 2024-06-21 PROCEDURE — 88305 TISSUE EXAM BY PATHOLOGIST: CPT | Performed by: PATHOLOGY

## 2024-06-21 RX ORDER — PHENYLEPHRINE HCL IN 0.9% NACL 1 MG/10 ML
SYRINGE (ML) INTRAVENOUS AS NEEDED
Status: DISCONTINUED | OUTPATIENT
Start: 2024-06-21 | End: 2024-06-21

## 2024-06-21 RX ORDER — LIDOCAINE HYDROCHLORIDE 10 MG/ML
INJECTION, SOLUTION EPIDURAL; INFILTRATION; INTRACAUDAL; PERINEURAL AS NEEDED
Status: DISCONTINUED | OUTPATIENT
Start: 2024-06-21 | End: 2024-06-21

## 2024-06-21 RX ORDER — SODIUM CHLORIDE 9 MG/ML
INJECTION, SOLUTION INTRAVENOUS CONTINUOUS PRN
Status: DISCONTINUED | OUTPATIENT
Start: 2024-06-21 | End: 2024-06-21

## 2024-06-21 RX ORDER — PROPOFOL 10 MG/ML
INJECTION, EMULSION INTRAVENOUS AS NEEDED
Status: DISCONTINUED | OUTPATIENT
Start: 2024-06-21 | End: 2024-06-21

## 2024-06-21 RX ADMIN — PROPOFOL 40 MG: 10 INJECTION, EMULSION INTRAVENOUS at 13:30

## 2024-06-21 RX ADMIN — SODIUM CHLORIDE: 0.9 INJECTION, SOLUTION INTRAVENOUS at 13:20

## 2024-06-21 RX ADMIN — PROPOFOL 40 MG: 10 INJECTION, EMULSION INTRAVENOUS at 13:26

## 2024-06-21 RX ADMIN — Medication 100 MCG: at 13:39

## 2024-06-21 RX ADMIN — PROPOFOL 80 MG: 10 INJECTION, EMULSION INTRAVENOUS at 13:25

## 2024-06-21 RX ADMIN — PROPOFOL 30 MG: 10 INJECTION, EMULSION INTRAVENOUS at 13:33

## 2024-06-21 RX ADMIN — PROPOFOL 30 MG: 10 INJECTION, EMULSION INTRAVENOUS at 13:39

## 2024-06-21 RX ADMIN — PROPOFOL 40 MG: 10 INJECTION, EMULSION INTRAVENOUS at 13:45

## 2024-06-21 RX ADMIN — PROPOFOL 40 MG: 10 INJECTION, EMULSION INTRAVENOUS at 13:35

## 2024-06-21 RX ADMIN — LIDOCAINE HYDROCHLORIDE 50 MG: 10 INJECTION, SOLUTION EPIDURAL; INFILTRATION; INTRACAUDAL; PERINEURAL at 13:25

## 2024-06-21 RX ADMIN — PROPOFOL 20 MG: 10 INJECTION, EMULSION INTRAVENOUS at 13:49

## 2024-06-21 RX ADMIN — PROPOFOL 30 MG: 10 INJECTION, EMULSION INTRAVENOUS at 13:27

## 2024-06-21 RX ADMIN — Medication 40 MG: at 13:36

## 2024-06-21 NOTE — ANESTHESIA PREPROCEDURE EVALUATION
Procedure:  COLONOSCOPY    Relevant Problems   GI/HEPATIC   (+) Gastroesophageal reflux disease with esophagitis   (+) Hiatal hernia      /RENAL   (+) Atypical small acinar proliferation of prostate   (+) Benign localized prostatic hyperplasia with lower urinary tract symptoms (LUTS)      MUSCULOSKELETAL   (+) Hiatal hernia        Physical Exam    Airway    Mallampati score: II  TM Distance: >3 FB  Neck ROM: full     Dental       Cardiovascular  Cardiovascular exam normal    Pulmonary  Pulmonary exam normal     Other Findings        Anesthesia Plan  ASA Score- 2     Anesthesia Type- IV sedation with anesthesia with ASA Monitors.         Additional Monitors:     Airway Plan:            Plan Factors-Exercise tolerance (METS): >4 METS.    Chart reviewed. EKG reviewed.  Existing labs reviewed. Patient summary reviewed.    Patient is not a current smoker.  Patient did not smoke on day of surgery.    Obstructive sleep apnea risk education given perioperatively.        Induction- intravenous.    Postoperative Plan-     Perioperative Resuscitation Plan - Level 1 - Full Code.       Informed Consent- Anesthetic plan and risks discussed with patient and spouse.  I personally reviewed this patient with the CRNA. Discussed and agreed on the Anesthesia Plan with the CRNA..

## 2024-06-21 NOTE — ANESTHESIA POSTPROCEDURE EVALUATION
Post-Op Assessment Note    CV Status:  Stable    Pain management: adequate       Mental Status:  Alert and awake   Hydration Status:  Euvolemic   PONV Controlled:  Controlled   Airway Patency:  Patent     Post Op Vitals Reviewed: Yes    No anethesia notable event occurred.    Staff: CRNA           /59 (06/21/24 1401)    Temp (!) 96.2 °F (35.7 °C) (06/21/24 1401)    Pulse 65 (06/21/24 1401)   Resp 18 (06/21/24 1401)    SpO2 97 % (06/21/24 1401)

## 2024-06-21 NOTE — H&P
History and Physical - [facility]  Eladio Ayala 79 y.o. male MRN: [id number]                  HPI: Eladio Ayala is a 79 y.o. year old male who presents for change in bms, hx of polyp      REVIEW OF SYSTEMS: Per the HPI, and otherwise unremarkable.    Historical Information   Past Medical History:   Diagnosis Date    Arthritis 2010    Basal cell carcinoma of shoulder     last assessed: 2014    Depression     last assessed: 2015    GERD (gastroesophageal reflux disease) 2000    Headache(784.0) 1980    HL (hearing loss) 2010    Subconjunctival hemorrhage of left eye     last assessed: 2016     Past Surgical History:   Procedure Laterality Date    COLONOSCOPY      HERNIA REPAIR      DC SURGICAL ARTHROSCOPY SHOULDER W/ROTATOR CUFF RPR Left 2023    Procedure: SHOULDER ARTHROSCOPIC ROTATOR CUFF REPAIR EXTENSIVE DEBRIDMENT;  Surgeon: Steven Hamilton MD;  Location: [facility] MA[facility];  Service: Orthopedics    ROTATOR CUFF REPAIR Right 2015     Social History   Social History     Substance and Sexual Activity   Alcohol Use Yes    Alcohol/week: 6.0 standard drinks of alcohol    Types: 6 Shots of liquor per week    Comment: Social     Social History     Substance and Sexual Activity   Drug Use No     Social History     Tobacco Use   Smoking Status Former    Current packs/day: 0.00    Average packs/day: 1.5 packs/day for 15.0 years (22.5 ttl pk-yrs)    Types: Cigarettes    Start date: 1965    Quit date: 1980    Years since quittin.8   Smokeless Tobacco Never     Family History   Problem Relation Age of Onset    Heart disease Mother         cardiac disorder    Kidney disease Mother     Prostate cancer Father     Hearing loss Sister     Diabetes Paternal Grandfather        Meds/Allergies       Current Outpatient Medications:     famotidine (PEPCID) 40 MG tablet    tamsulosin (FLOMAX) 0.4 mg    ammonium lactate (LAC-HYDRIN) 12 % cream    bisacodyl  (DULCOLAX) 5 mg EC tablet    bisacodyl (DULCOLAX) 5 mg EC tablet    ciclopirox (LOPROX) 0.77 % cream    clindamycin (CLEOCIN T) 1 % lotion    desoximetasone (TOPICORT) 0.25 % cream    mupirocin (BACTROBAN) 2 % ointment    mupirocin (BACTROBAN) 2 % ointment    polyethylene glycol (GOLYTELY) 4000 mL solution    No Known Allergies    Objective     /59   Pulse 69   Temp 97.7 °F (36.5 °C) (Tympanic)   Resp 16   SpO2 95%       PHYSICAL EXAM    Gen: NAD  Head: NCAT  CV: RRR  CHEST: Clear  ABD: soft, NT/ND  EXT: no edema      ASSESSMENT/PLAN:  This is a 79 y.o. year old male here for colonoscopy, and he is stable and optimized for his procedure.

## 2024-06-25 PROCEDURE — 88305 TISSUE EXAM BY PATHOLOGIST: CPT | Performed by: PATHOLOGY

## 2024-06-26 PROCEDURE — 88305 TISSUE EXAM BY PATHOLOGIST: CPT | Performed by: PATHOLOGY

## 2024-06-26 NOTE — RESULT ENCOUNTER NOTE
Hi,   I am reaching out to let you know that 3 of the polyps we removed from the colon were adenomas. These are typical polyps that have the potential to grow into cancer, but we fully removed them. I recommend next colonoscopy in 3 years.  Please let me know or call our office if you have any questions.     Oswald Jamil MD  Fellow   Department of Gastroenterology  [facility] - [place], [place].     3 year colonoscopy recall   Sent MCM

## 2024-06-27 ENCOUNTER — EVALUATION (OUTPATIENT)
Dept: PHYSICAL THERAPY | Facility: CLINIC | Age: 79
End: 2024-06-27
Payer: MEDICARE

## 2024-06-27 DIAGNOSIS — M75.112 INCOMPLETE TEAR OF LEFT ROTATOR CUFF, UNSPECIFIED WHETHER TRAUMATIC: ICD-10-CM

## 2024-06-27 DIAGNOSIS — M25.512 ACUTE PAIN OF LEFT SHOULDER: Primary | ICD-10-CM

## 2024-06-27 PROCEDURE — 97110 THERAPEUTIC EXERCISES: CPT

## 2024-06-27 PROCEDURE — 97112 NEUROMUSCULAR REEDUCATION: CPT

## 2024-06-27 NOTE — PROGRESS NOTES
Discharge    Today's date: 2024  Patient name: Eladio Ayala  : 1945  MRN: [id number]  Referring provider: Steven Hamilton*  Dx:   Encounter Diagnosis     ICD-10-CM    1. Acute pain of left shoulder  M25.512       2. Incomplete tear of left rotator cuff, unspecified whether traumatic  M75.112           Start Time: 0900  Stop Time: 0945  Total time in clinic (min): 45 minutes    Subjective Evaluation     History of Present Illness  : Pt reported that his shoulder pain has been ranging between 0-4/10 and also reported a subjective improvement percentage of 80%. Pt also mentioned that he felt comfortable with being Dc'd today.      IE: Patient presents with c/o L shoulder pain from a L incomplete RTC repair on 23. Pt reported that his sx started about 1 year ago while golfing. Pt reported that his sx increase with adjustment his sling. Pt has a PMH of R RTC (8 year ago).     Neuro signs: None  Bowel and bladder sxs: Normal  Red flags: Normal  Occupation: Working, [employer] store owner     Pain  At best pain ratin/10  At worst pain ratin/10  Location: L shoulder      Social Support  Lives with his wife in a 2 story home.      Patient Goals  Patient goals for therapy: Golfing     STGs  1. Decrease pain by 20% in 2-4 weeks to improve standing tolerance.-Met  2. Improve L shoulder PROM by 10 degrees in 2-4 weeks.-Met  3. (I) with HEP within 2-4 weeks in order to improve PT outcomes.-Met     LTGs  1. Decrease pain by 60% in 6-8 weeks.-Partially Met  2. Increase L shoulder AROM WNL and L shoulder MMT 5/5 within 6-8 weeks.-Partially Met   3. Perform job/dressing/showering activities independently without pain in 6-8 weeks. -Partially Met   4. Improve FOTO to greater than or equal to 59.-Met        Objective Measurements:     Shoulder  A/PROM R L Strength   Shoulder R L   Flex   deg/ 178 deg Flex 5 4+   Abd  deg/ 180 deg Abd 5 5   ER WNL NT/90 deg @ 90 deg abd ER 5 4+*   IR WNL  NT/68 deg@90   deg abd IR 5 5                 Wrist AROM             Flexion WNL WNL         Extension WNL WNL         Supination WNL WNL         Pronation WNL WNL         UD WNL WNL         RD WNL WNL         Elbow PROM/  AROM             Flexion WNL WNL/   NT         Extension WNL WNL/NT                  Assessment:     Eladio Ayala is a pleasant 78 y.o. male who has attended 46 visits of skilled PT for L shoulder pain S/P L incomplete RTC repair on 11/29/23. Pt demonstrated improvement in L shoulder PROM and MMT. Pt scored a 62 on FOTO and reported a subjective improvement percentage of 80%. Pt was educated and demonstrate understanding of importance of continuing with HEP and on contacting [facility] with any further questions. Pt is being discharged at this time due to plateau in progress.      Thank you for the referral!     Plan  Patient would benefit from:Skilled physical therapy  Planned therapy interventions: manual therapy, neuromuscular re-education, stretching, strengthening, therapeutic activities, therapeutic exercise, patient education, home exercise program, modalities to decrease pain, and activity modification.     DC  Treatment plan discussed with: patient     Goals: Patient's goal is to return to playing golf.     Precautions: SEE ATTACHED PROTOCOL: DOS:  11/ 29/23    Dx: L RTC repair    Daily Treatment Diary             Manuals 6/13 6/27 5/16 5/30 6/6   L elbow PROM         L shoulder PROM          L rhythmic stabs                   Ther Ex         UBE (standing) 3'/3' 3'/3'  3'/3' 3'/3' 3'/3'   Shoulder adduction str         Scap squeeze         Towel squeeze         L UT str         C/S ret AROM         Wrist circles         L Elbow AAROM         Pendulums          Pulleys         Cane shoulder flx AAROM (supine)         Cane shoulder abd AAROM (standing)         Cane shoulder ER  AAROM (standing)         Wall ladder         IR stretch with strap         Pt Edu  HEP  HEP POC HEP   Neuro  "Re-ed         Shoulder isos         Soulder flexion AROM 2#, 10x2   Scap 2#  10x2 Scap 4# 10x2 4#, 10x2   Shoulder abd AROM 10x2, 4#   10x2, 4# 10x2, 4# 4#, 10x2   SL ER AROM 4#, 20x 4#, 20x  4#, 10x2 4#, 20x 4#,10x2   SL abd AROM 4#, 20x 4#, 20x  4#, 10x2 4#, 20x 4#, 20x    Serratus punches          TB IR/ER BTB 20x BTB 20x  BTB 20x each BTB 20x each BTB 20x each   TB  rows/ext BTB 20x BTB 20x  BTB 20x each BTB 20x each BTB 20x each   Prone row  4#, 20x 4#, 20x  4#, 20x 4#, 20x 4#, 20x   Prone horizontal abd/extension/  flx 4#, 20x 4#, 20x  4#, 20x each 4#, 20 each  4#, 20x each   TB PNF 1 and 2 RTB 10x2 each RTB 10x2 each  RTB 10x2 each RTB 10x2 each RTB 10x2 each   Ball stabs 20x each  Grn 20x each  20x each  Grn 20x each Grn 20x each   Bodyblade  Flx and IR/ER 30\" each  +PNF, 10x Flx and IR/ER 30\" each  +PNF, 10x  Flx and IR/ER 30\" each  +PNF, 10x Flx and IR/ER 30\" each  +PNF, 10x Flx and IR/ER 30\" each  +PNF, 10x   Ther Activity         Shelf OH Reach                              Gait Training         Arm swing                   Modalities         Ice NT NT  NT NT NT                                                                          "

## 2024-08-05 ENCOUNTER — OFFICE VISIT (OUTPATIENT)
Dept: FAMILY MEDICINE CLINIC | Facility: CLINIC | Age: 79
End: 2024-08-05
Payer: MEDICARE

## 2024-08-05 VITALS
HEART RATE: 72 BPM | BODY MASS INDEX: 24.62 KG/M2 | SYSTOLIC BLOOD PRESSURE: 120 MMHG | DIASTOLIC BLOOD PRESSURE: 70 MMHG | WEIGHT: 172 LBS | RESPIRATION RATE: 15 BRPM | OXYGEN SATURATION: 97 % | HEIGHT: 70 IN

## 2024-08-05 DIAGNOSIS — H61.21 IMPACTED CERUMEN OF RIGHT EAR: Primary | ICD-10-CM

## 2024-08-05 DIAGNOSIS — H91.91 DECREASED HEARING OF RIGHT EAR: ICD-10-CM

## 2024-08-05 DIAGNOSIS — N40.1 BENIGN LOCALIZED PROSTATIC HYPERPLASIA WITH LOWER URINARY TRACT SYMPTOMS (LUTS): ICD-10-CM

## 2024-08-05 DIAGNOSIS — H93.13 TINNITUS OF BOTH EARS: ICD-10-CM

## 2024-08-05 PROCEDURE — 99213 OFFICE O/P EST LOW 20 MIN: CPT | Performed by: FAMILY MEDICINE

## 2024-08-05 PROCEDURE — G2211 COMPLEX E/M VISIT ADD ON: HCPCS | Performed by: FAMILY MEDICINE

## 2024-08-05 RX ORDER — TAMSULOSIN HYDROCHLORIDE 0.4 MG/1
0.4 CAPSULE ORAL
Qty: 100 CAPSULE | Refills: 1 | Status: SHIPPED | OUTPATIENT
Start: 2024-08-05

## 2024-08-05 NOTE — PROGRESS NOTES
Ambulatory Visit  Name: Eladio Ayala      : 1945      MRN: [id number]  Encounter Provider: Misha Thorpe MD  Encounter Date: 2024   Encounter department: [facility]    Assessment & Plan   1. Impacted cerumen of right ear  -     Ambulatory Referral to Otolaryngology; Future  2. Decreased hearing of right ear  -     Ambulatory Referral to Otolaryngology; Future  3. Tinnitus of both ears  -     Ambulatory Referral to Otolaryngology; Future      Regarding his symptoms of right ear which is positive for cerumen impaction, decreased hearing on the right as well as bilateral tinnitus which is mild, discussed with patient.  Patient education.  Patient advised to DC Q-tips.  Patient advised to use Debrox his right ear to soften up the wax and also see an ENT.  Patient also needs formal hearing test as well as an evaluation for his tinnitus.  Patient advised that if symptoms acutely worsen and or he does not feel comfortable with them the patient should go to the emergency room to be acutely evaluated.  Otherwise follow-up with his PCP for neck scheduled visit.  RTO for his neck scheduled visit with his PCP.           History of Present Illness     79-year-old male here for an evaluation of his right ear.  Patient asserts that he has decreased hearing and slight fullness to that ear for the last 3 to 4 weeks.  Patient chronic tinnitus bilateral, mild.  Patient has slight decreased hearing on the right side as well.  Patient does admit to having listened to some loud music when he was younger.  And has no acute cold symptoms.  Patient denies any headache dizziness nausea vomiting discharge on the right or the left.  Patient is an ex-smoker and he quit in  as per patient.        Review of Systems   Constitutional:  Negative for chills and fever.   HENT:  Positive for ear pain, hearing loss and tinnitus. Negative for congestion, ear discharge, facial swelling, postnasal drip,  "sinus pressure and sore throat.    Eyes:  Negative for visual disturbance.   Respiratory:  Negative for cough and shortness of breath.    Cardiovascular:  Negative for chest pain and palpitations.   Neurological:  Negative for dizziness and headaches.       Objective     /70   Pulse 72   Resp 15   Ht 5' 10\" (1.778 m)   Wt 78 kg (172 lb)   SpO2 97%   BMI 24.68 kg/m²     Physical Exam  Vitals reviewed.   Constitutional:       General: He is not in acute distress.     Appearance: Normal appearance. He is normal weight. He is not ill-appearing.   HENT:      Head: Normocephalic and atraumatic.      Right Ear: External ear normal. There is impacted cerumen.      Left Ear: Tympanic membrane, ear canal and external ear normal. There is no impacted cerumen.      Ears:      Comments: Right ear canal with cerumen as well as all the way to the tympanic membrane.  Patient has no pain upon palpation or pulling of his pinna.  No discharge.  No blood.  No mucus no pus.    Left ear within normal limits.  And some scant wax in the canal.  Left ear canal is also wider.  And tympanic membrane is visible and within normal limits and without any visible/obvious abnormality.     Mouth/Throat:      Mouth: Mucous membranes are moist.      Pharynx: Oropharynx is clear. No oropharyngeal exudate or posterior oropharyngeal erythema.   Eyes:      Extraocular Movements: Extraocular movements intact.      Conjunctiva/sclera: Conjunctivae normal.   Neck:      Vascular: No carotid bruit.   Cardiovascular:      Rate and Rhythm: Normal rate and regular rhythm.   Pulmonary:      Effort: Pulmonary effort is normal.      Breath sounds: Normal breath sounds.   Lymphadenopathy:      Cervical: No cervical adenopathy.   Neurological:      General: No focal deficit present.      Mental Status: He is alert and oriented to person, place, and time.   Psychiatric:         Mood and Affect: Mood normal.         Behavior: Behavior normal.         Thought " Content: Thought content normal.       Administrative Statements

## 2024-09-10 ENCOUNTER — APPOINTMENT (OUTPATIENT)
Dept: LAB | Age: 79
End: 2024-09-10
Payer: MEDICARE

## 2024-09-10 DIAGNOSIS — R97.20 ELEVATED PSA: ICD-10-CM

## 2024-09-10 LAB — PSA SERPL-MCNC: 6.06 NG/ML (ref 0–4)

## 2024-09-10 PROCEDURE — 36415 COLL VENOUS BLD VENIPUNCTURE: CPT

## 2024-09-10 PROCEDURE — 84153 ASSAY OF PSA TOTAL: CPT

## 2024-10-02 ENCOUNTER — OFFICE VISIT (OUTPATIENT)
Dept: UROLOGY | Facility: CLINIC | Age: 79
End: 2024-10-02
Payer: MEDICARE

## 2024-10-02 VITALS
BODY MASS INDEX: 24.82 KG/M2 | DIASTOLIC BLOOD PRESSURE: 60 MMHG | OXYGEN SATURATION: 97 % | WEIGHT: 173 LBS | SYSTOLIC BLOOD PRESSURE: 100 MMHG | HEART RATE: 81 BPM

## 2024-10-02 DIAGNOSIS — R97.20 ELEVATED PSA: Primary | ICD-10-CM

## 2024-10-02 PROCEDURE — 99213 OFFICE O/P EST LOW 20 MIN: CPT | Performed by: PHYSICIAN ASSISTANT

## 2024-10-02 NOTE — PROGRESS NOTES
UROLOGY PROGRESS NOTE   Patient Identifiers: Eladio Ayala (MRN [id number])  Date of Service: 10/2/2024    Subjective:   79-year-old man history of elevated PSA.  He had a negative biopsy in 2020.  MRI showing PI-RADS 2.  Last PSA 5.5.  Current PSA 6.05.  No voiding complaints.    Reason for visit: Elevated PSA follow-up    Objective:     VITALS:    There were no vitals filed for this visit.        LABS:  Lab Results   Component Value Date    HGB 15.8 04/25/2024    HCT 48.0 04/25/2024    WBC 5.49 04/25/2024     04/25/2024   ]    Lab Results   Component Value Date     09/03/2015    K 4.2 04/25/2024     04/25/2024    CO2 30 04/25/2024    BUN 19 04/25/2024    CREATININE 1.04 04/25/2024    CALCIUM 9.4 04/25/2024    GLUCOSE 90 09/03/2015   ]        INPATIENT MEDS:    Current Outpatient Medications:     ammonium lactate (LAC-HYDRIN) 12 % cream, APPLY TO THE FEET DAILY AFTER THE SHOWER, Disp: , Rfl:     bisacodyl (DULCOLAX) 5 mg EC tablet, Take 1 tablet (5 mg total) by mouth once for 1 dose, Disp: 2 tablet, Rfl: 0    bisacodyl (DULCOLAX) 5 mg EC tablet, Take 1 tablet (5 mg total) by mouth once for 1 dose, Disp: 2 tablet, Rfl: 0    ciclopirox (LOPROX) 0.77 % cream, APPLY TWICE DAILY TO AREAS OF RASH ON THE FEET AND BODY FOR 2 TO 3 WEEKS, THEN CONTINUE ONCE A DAY TO THE FEET ON GOING, Disp: , Rfl:     clindamycin (CLEOCIN T) 1 % lotion, APPLY TO AFFECTED AREA ON THE FACE ONCE DAILY, Disp: , Rfl:     desoximetasone (TOPICORT) 0.25 % cream, APPLY TWICE DAILY TO AFFECTED AREA FOR 2-4 WEEKS MAX AS NEEDED, Disp: , Rfl:     famotidine (PEPCID) 40 MG tablet, Take 1 tablet (40 mg total) by mouth 2 (two) times a day as needed for heartburn, Disp: 180 tablet, Rfl: 1    mupirocin (BACTROBAN) 2 % ointment, , Disp: , Rfl:     mupirocin (BACTROBAN) 2 % ointment, , Disp: , Rfl:     polyethylene glycol (GOLYTELY) 4000 mL solution, Take 4,000 mL by mouth once for 1 dose, Disp: 4000 mL, Rfl: 0    tamsulosin (FLOMAX) 0.4  mg, Take 1 capsule (0.4 mg total) by mouth daily at bedtime, Disp: 100 capsule, Rfl: 1      Physical Exam:   There were no vitals taken for this visit.  GEN: no acute distress    RESP: breathing comfortably with no accessory muscle use    ABD: soft, non-tender, non-distended   INCISION:    EXT: no significant peripheral edema   (Male): Penis circumcised, phallus normal, meatus patent.  Testicles descended into scrotum bilaterally without masses nor tenderness.  No inguinal hernias bilaterally.  AMELIA: Prostate is enlarged at 45 grams. The prostate is not boggy. The prostate is not tender.  No nodules noted      RADIOLOGY:   none     Assessment:   1.  Elevated PSA    Plan:   -PSA has been stable  -Follow-up in 1 year with PSA prior to visit  -  -

## 2024-10-11 ENCOUNTER — ESTABLISHED COMPREHENSIVE EXAM (OUTPATIENT)
Dept: URBAN - METROPOLITAN AREA CLINIC 6 | Facility: CLINIC | Age: 79
End: 2024-10-11

## 2024-10-11 DIAGNOSIS — H04.123: ICD-10-CM

## 2024-10-11 DIAGNOSIS — H25.813: ICD-10-CM

## 2024-10-11 DIAGNOSIS — H35.3111: ICD-10-CM

## 2024-10-11 PROCEDURE — 92014 COMPRE OPH EXAM EST PT 1/>: CPT

## 2024-10-11 PROCEDURE — 92134 CPTRZ OPH DX IMG PST SGM RTA: CPT

## 2024-10-11 ASSESSMENT — TONOMETRY
OS_IOP_MMHG: 11
OD_IOP_MMHG: 15

## 2024-10-11 ASSESSMENT — VISUAL ACUITY
OD_SC: 20/30+1
OU_SC: J2
OS_SC: 20/25-1

## 2025-01-08 ENCOUNTER — OFFICE VISIT (OUTPATIENT)
Dept: FAMILY MEDICINE CLINIC | Facility: CLINIC | Age: 80
End: 2025-01-08
Payer: MEDICARE

## 2025-01-08 VITALS
SYSTOLIC BLOOD PRESSURE: 112 MMHG | HEART RATE: 92 BPM | HEIGHT: 70 IN | BODY MASS INDEX: 24.77 KG/M2 | WEIGHT: 173 LBS | TEMPERATURE: 99.1 F | OXYGEN SATURATION: 96 % | DIASTOLIC BLOOD PRESSURE: 68 MMHG

## 2025-01-08 DIAGNOSIS — U07.1 COVID-19: Primary | ICD-10-CM

## 2025-01-08 PROCEDURE — 99213 OFFICE O/P EST LOW 20 MIN: CPT

## 2025-01-08 RX ORDER — NIRMATRELVIR AND RITONAVIR 300-100 MG
3 KIT ORAL 2 TIMES DAILY
Qty: 30 TABLET | Refills: 0 | Status: SHIPPED | OUTPATIENT
Start: 2025-01-08 | End: 2025-01-13

## 2025-01-08 RX ORDER — BENZONATATE 200 MG/1
200 CAPSULE ORAL 3 TIMES DAILY PRN
Qty: 20 CAPSULE | Refills: 0 | Status: SHIPPED | OUTPATIENT
Start: 2025-01-08

## 2025-01-08 NOTE — PROGRESS NOTES
COVID-19 Outpatient Progress Note  Name: Eladio Ayala      : 1945      MRN: [id number]  Encounter Provider: ANGELLA Quiroz  Encounter Date: 2025   Encounter department: [facility]    Assessment & Plan  COVID-19  Pt with symptom onset and positive at home COVID test on 25, requesting Paxlovid therapy. Lungs clear on exam, normal respiratory effort. Pt meets criteria for Paxlovid and counseling provided. Pt reports only taking tamsulosin 0.4 mg 3 x /week. Advise monitor BP at home and seek immediate medical care for worsening sxs.   Orders:  •  nirmatrelvir & ritonavir (Paxlovid, 300/100,) tablet therapy pack; Take 3 tablets by mouth 2 (two) times a day for 5 days Take 2 nirmatrelvir tablets + 1 ritonavir tablet together per dose  •  benzonatate (TESSALON) 200 MG capsule; Take 1 capsule (200 mg total) by mouth 3 (three) times a day as needed for cough    Disposition:     Discussed symptom directed medication options with patient. Discussed vitamin D, vitamin C, and/or zinc supplementation with patient.     Patient meets criteria for Paxlovid and they have been counseled appropriately regarding risks, benefits, side effects, and alternative treatment options. After discussion, patient agrees to treatment.    Possible side effects of Paxlovid?    Possible side effects of Paxlovid are:  - Liver Problems. Notify us right away if you start to experience loss of appetite, yellowing of your skin and the whites of eyes (jaundice), dark-colored urine, pale colored stools and itchy skin, stomach area (abdominal) pain.  - Resistance to HIV Medicines. If you have untreated HIV infection, Paxlovid may lead to some HIV medicines not working as well in the future.  - Other possible side effects include: altered sense of taste, diarrhea, high blood pressure, or muscle aches.    I have spent a total time of 5 minutes on the day of the encounter for this patient including  "instructions for management, patient and family education, documenting in the medical record and reviewing/ordering tests, medicine, procedures.          Encounter provider: ANGELLA Quiroz     Provider located at: [facility]  [address] [id number]     Recent Visits  No visits were found meeting these conditions.  Showing recent visits within past 7 days and meeting all other requirements  Today's Visits  Date Type Provider Dept   01/08/25 Office Visit ANGELLA Quiroz [facility]   Showing today's visits and meeting all other requirements  Future Appointments  No visits were found meeting these conditions.  Showing future appointments within next 150 days and meeting all other requirements    History of Present Illness      Subjective:   Eladio Ayala is a 79 y.o. male who is concerned about COVID-19. Patient's symptoms include nasal congestion, cough, myalgias and headache. Patient denies fever, chills, shortness of breath and chest tightness.     - Date of symptom onset: 1/7/2025      COVID-19 vaccination status: Fully vaccinated with booster    Lab Results   Component Value Date    SARSCOV2 NOT DETECTED 09/16/2021       Review of Systems   Constitutional:  Negative for chills and fever.   HENT:  Positive for congestion.    Respiratory:  Positive for cough. Negative for chest tightness, shortness of breath and wheezing.    Cardiovascular:  Negative for chest pain.   Musculoskeletal:  Positive for myalgias.   Skin:  Negative for color change.   Neurological:  Positive for headaches.     Objective   /68 (BP Location: Right arm, Patient Position: Sitting, Cuff Size: Standard)   Pulse 92   Temp 99.1 °F (37.3 °C) (Temporal)   Ht 5' 10\" (1.778 m)   Wt 78.5 kg (173 lb)   SpO2 96%   BMI 24.82 kg/m²     Physical Exam  Vitals and nursing note reviewed.   Constitutional:       General: He is not in acute distress.     " Appearance: Normal appearance. He is not ill-appearing, toxic-appearing or diaphoretic.   HENT:      Head: Normocephalic and atraumatic.      Mouth/Throat:      Mouth: Mucous membranes are moist.      Pharynx: Oropharynx is clear. No oropharyngeal exudate or posterior oropharyngeal erythema.   Eyes:      General: No scleral icterus.     Conjunctiva/sclera: Conjunctivae normal.      Pupils: Pupils are equal, round, and reactive to light.   Cardiovascular:      Rate and Rhythm: Normal rate and regular rhythm.      Pulses: Normal pulses.      Heart sounds: Normal heart sounds.   Pulmonary:      Effort: Pulmonary effort is normal. No respiratory distress.      Breath sounds: Normal breath sounds.   Abdominal:      Palpations: Abdomen is soft.   Skin:     General: Skin is warm.      Capillary Refill: Capillary refill takes less than 2 seconds.   Neurological:      Mental Status: He is alert and oriented to person, place, and time.   Psychiatric:         Mood and Affect: Mood normal.         Behavior: Behavior normal.

## 2025-02-07 ENCOUNTER — OFFICE VISIT (OUTPATIENT)
Dept: FAMILY MEDICINE CLINIC | Facility: CLINIC | Age: 80
End: 2025-02-07
Payer: MEDICARE

## 2025-02-07 ENCOUNTER — APPOINTMENT (OUTPATIENT)
Dept: RADIOLOGY | Age: 80
End: 2025-02-07
Payer: MEDICARE

## 2025-02-07 VITALS
HEIGHT: 70 IN | DIASTOLIC BLOOD PRESSURE: 73 MMHG | SYSTOLIC BLOOD PRESSURE: 128 MMHG | HEART RATE: 81 BPM | OXYGEN SATURATION: 98 % | TEMPERATURE: 96.1 F | RESPIRATION RATE: 16 BRPM | BODY MASS INDEX: 25.68 KG/M2 | WEIGHT: 179.4 LBS

## 2025-02-07 DIAGNOSIS — R06.09 POST-COVID-19 SYNDROME MANIFESTING AS CHRONIC DYSPNEA: ICD-10-CM

## 2025-02-07 DIAGNOSIS — R09.82 POST-NASAL DRIP: ICD-10-CM

## 2025-02-07 DIAGNOSIS — U09.9 POST-COVID-19 SYNDROME MANIFESTING AS CHRONIC DYSPNEA: Primary | ICD-10-CM

## 2025-02-07 DIAGNOSIS — Z23 ENCOUNTER FOR IMMUNIZATION: ICD-10-CM

## 2025-02-07 DIAGNOSIS — R41.89 BRAIN FOG: ICD-10-CM

## 2025-02-07 DIAGNOSIS — R06.09 POST-COVID-19 SYNDROME MANIFESTING AS CHRONIC DYSPNEA: Primary | ICD-10-CM

## 2025-02-07 DIAGNOSIS — U09.9 POST-COVID-19 SYNDROME MANIFESTING AS CHRONIC DYSPNEA: ICD-10-CM

## 2025-02-07 PROCEDURE — 90662 IIV NO PRSV INCREASED AG IM: CPT

## 2025-02-07 PROCEDURE — G2211 COMPLEX E/M VISIT ADD ON: HCPCS

## 2025-02-07 PROCEDURE — 71046 X-RAY EXAM CHEST 2 VIEWS: CPT

## 2025-02-07 PROCEDURE — G0008 ADMIN INFLUENZA VIRUS VAC: HCPCS

## 2025-02-07 PROCEDURE — 99214 OFFICE O/P EST MOD 30 MIN: CPT

## 2025-02-07 RX ORDER — ALBUTEROL SULFATE 90 UG/1
2 INHALANT RESPIRATORY (INHALATION) EVERY 6 HOURS PRN
Qty: 8.5 G | Refills: 1 | Status: SHIPPED | OUTPATIENT
Start: 2025-02-07

## 2025-02-07 RX ORDER — MOMETASONE FUROATE MONOHYDRATE 50 UG/1
2 SPRAY, METERED NASAL DAILY
Qty: 17 G | Refills: 1 | Status: SHIPPED | OUTPATIENT
Start: 2025-02-07

## 2025-02-07 NOTE — PROGRESS NOTES
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 Pt denies chest pain, fever, has intermittent nonproductive cough. Lungs clear to auscultation, SPO2 98% on Ra. Will check CXR, start albuterol inhaler as prescribed and advised seek immediate care for worsening sxs.  Orders:  •  albuterol (Ventolin HFA) 90 mcg/act inhaler; Inhale 2 puffs every 6 (six) hours as needed for wheezing  •  XR chest pa and lateral; Future    Post-nasal drip  Pt reports PND and persistent no productive cough s/p COVID infection 25. Pt denies fever or chest pain, lungs clear to auscultation, posterior oropharynx with cobblestone appearance. Start nasal steroid spray as prescribed, pt has benzonatate capsules at home will take as needed.  Orders:  •  mometasone (NASONEX) 50 mcg/act nasal spray; 2 sprays into each nostril daily    Encounter for immunization    Orders:  •  influenza vaccine, high-dose, PF 0.5 mL (Fluzone High Dose)    Brain fog  S/p COVID infection 25, educate symptom of long COVID. Pt denies memory loss, will monitor and f/u for worsening symptoms.                History of Present Illness   Pt c/o worsening SOB with activity, PND with intermittent non productive cough and brain fog since COVID infection at the beginning of January. Pt denies fever or chest pain      Review of Systems   Constitutional:  Positive for fatigue. Negative for activity change, appetite change, chills, diaphoresis, fever and unexpected weight change.   HENT:  Negative for congestion, dental problem, drooling, ear discharge, ear pain, facial swelling, hearing loss, mouth sores, nosebleeds, postnasal drip, rhinorrhea, sinus pressure, sinus  "pain, sneezing, sore throat, tinnitus, trouble swallowing and voice change.    Eyes:  Negative for photophobia, pain, discharge, redness, itching and visual disturbance.   Respiratory:  Positive for chest tightness and shortness of breath. Negative for apnea, cough, choking, wheezing and stridor.    Cardiovascular:  Negative for chest pain, palpitations and leg swelling.   Gastrointestinal:  Negative for abdominal distention, abdominal pain, anal bleeding, blood in stool, constipation, diarrhea, nausea and vomiting.   Endocrine: Negative for cold intolerance, heat intolerance, polydipsia, polyphagia and polyuria.   Genitourinary:  Negative for decreased urine volume, difficulty urinating, dysuria, flank pain, frequency, hematuria, penile discharge, scrotal swelling, testicular pain and urgency.   Musculoskeletal:  Negative for arthralgias, back pain, gait problem, joint swelling, myalgias, neck pain and neck stiffness.   Skin:  Negative for color change, rash and wound.   Allergic/Immunologic: Negative for environmental allergies, food allergies and immunocompromised state.   Neurological:  Negative for dizziness, tremors, seizures, syncope, facial asymmetry, weakness, light-headedness, numbness and headaches.   Hematological:  Negative for adenopathy. Does not bruise/bleed easily.   Psychiatric/Behavioral:  Positive for decreased concentration. Negative for agitation, behavioral problems, confusion, dysphoric mood, hallucinations, self-injury, sleep disturbance and suicidal ideas. The patient is not nervous/anxious and is not hyperactive.    All other systems reviewed and are negative.      Objective   /73 (BP Location: Left arm, Patient Position: Sitting, Cuff Size: Adult)   Pulse 81   Temp (!) 96.1 °F (35.6 °C) (Temporal)   Resp 16   Ht 5' 10\" (1.778 m)   Wt 81.4 kg (179 lb 6.4 oz)   SpO2 98%   BMI 25.74 kg/m²      Physical Exam  Vitals and nursing note reviewed.   Constitutional:       General: He " is not in acute distress.     Appearance: Normal appearance. He is not ill-appearing, toxic-appearing or diaphoretic.   HENT:      Head: Normocephalic and atraumatic.      Right Ear: Tympanic membrane, ear canal and external ear normal. There is no impacted cerumen.      Left Ear: Tympanic membrane, ear canal and external ear normal. There is no impacted cerumen.      Nose: Nose normal. No congestion or rhinorrhea.      Mouth/Throat:      Mouth: Mucous membranes are moist.      Pharynx: No oropharyngeal exudate or posterior oropharyngeal erythema.   Eyes:      General: No scleral icterus.        Right eye: No discharge.         Left eye: No discharge.      Extraocular Movements: Extraocular movements intact.      Conjunctiva/sclera: Conjunctivae normal.      Pupils: Pupils are equal, round, and reactive to light.   Neck:      Vascular: No carotid bruit.   Cardiovascular:      Rate and Rhythm: Normal rate and regular rhythm.      Pulses: Normal pulses.      Heart sounds: Normal heart sounds. No murmur heard.  Pulmonary:      Effort: Pulmonary effort is normal. No respiratory distress.      Breath sounds: Normal breath sounds. No stridor. No wheezing, rhonchi or rales.   Chest:      Chest wall: No tenderness.   Abdominal:      General: Bowel sounds are normal. There is no distension.      Palpations: Abdomen is soft. There is no mass.      Tenderness: There is no abdominal tenderness. There is no right CVA tenderness, left CVA tenderness, guarding or rebound.      Hernia: No hernia is present.   Musculoskeletal:         General: No swelling, tenderness, deformity or signs of injury. Normal range of motion.      Cervical back: Normal range of motion and neck supple. No rigidity or tenderness.      Right lower leg: No edema.      Left lower leg: No edema.   Lymphadenopathy:      Cervical: No cervical adenopathy.   Skin:     General: Skin is warm.      Capillary Refill: Capillary refill takes less than 2 seconds.       Coloration: Skin is not jaundiced or pale.      Findings: No bruising, erythema, lesion or rash.   Neurological:      General: No focal deficit present.      Mental Status: He is alert and oriented to person, place, and time.      Cranial Nerves: No cranial nerve deficit.      Sensory: No sensory deficit.      Motor: No weakness.      Coordination: Coordination normal.      Gait: Gait normal.      Deep Tendon Reflexes: Reflexes normal.   Psychiatric:         Attention and Perception: Attention and perception normal.         Mood and Affect: Mood and affect normal. Mood is not anxious. Affect is not inappropriate.         Speech: Speech normal.         Behavior: Behavior normal. Behavior is not agitated or aggressive. Behavior is cooperative.         Thought Content: Thought content normal.         Cognition and Memory: Cognition and memory normal. Cognition is not impaired. Memory is not impaired.         Judgment: Judgment normal. Judgment is not impulsive or inappropriate.       Post covid

## 2025-02-14 ENCOUNTER — RESULTS FOLLOW-UP (OUTPATIENT)
Dept: FAMILY MEDICINE CLINIC | Facility: CLINIC | Age: 80
End: 2025-02-14

## 2025-02-27 ENCOUNTER — OFFICE VISIT (OUTPATIENT)
Dept: FAMILY MEDICINE CLINIC | Facility: CLINIC | Age: 80
End: 2025-02-27
Payer: MEDICARE

## 2025-02-27 VITALS
WEIGHT: 184.4 LBS | DIASTOLIC BLOOD PRESSURE: 82 MMHG | TEMPERATURE: 99.7 F | SYSTOLIC BLOOD PRESSURE: 134 MMHG | BODY MASS INDEX: 26.4 KG/M2 | HEART RATE: 68 BPM | HEIGHT: 70 IN | OXYGEN SATURATION: 98 %

## 2025-02-27 DIAGNOSIS — G89.29 CHRONIC MIDLINE LOW BACK PAIN WITHOUT SCIATICA: Primary | ICD-10-CM

## 2025-02-27 DIAGNOSIS — M54.50 CHRONIC MIDLINE LOW BACK PAIN WITHOUT SCIATICA: Primary | ICD-10-CM

## 2025-02-27 PROCEDURE — G2211 COMPLEX E/M VISIT ADD ON: HCPCS

## 2025-02-27 PROCEDURE — 99213 OFFICE O/P EST LOW 20 MIN: CPT

## 2025-02-27 RX ORDER — TIZANIDINE 2 MG/1
2 TABLET ORAL
Qty: 20 TABLET | Refills: 0 | Status: SHIPPED | OUTPATIENT
Start: 2025-02-27

## 2025-02-27 RX ORDER — METHYLPREDNISOLONE 4 MG/1
TABLET ORAL
Qty: 21 EACH | Refills: 0 | Status: SHIPPED | OUTPATIENT
Start: 2025-02-27

## 2025-02-27 NOTE — PROGRESS NOTES
Name: Eladio Ayala      : 1945      MRN: [id number]  Encounter Provider: ANGELLA Quiroz  Encounter Date: 2025   Encounter department: [facility]  :  Assessment & Plan  Chronic midline low back pain without sciatica  Pt reports hx of chronic low back pain and stenosis with recent  aggravation after playing golf. Pt denies changes in bowel or bladder function, pain radiates to R lower back. On exam normal gait, tenderness in low mid back with decreased ROM.   Orders:  •  methylPREDNISolone 4 MG tablet therapy pack; Use as directed on package  •  tiZANidine (ZANAFLEX) 2 mg tablet; Take 1 tablet (2 mg total) by mouth daily at bedtime           History of Present Illness   Pt reports history of chronic low back pain and stenosis, was playing golf recently and aggravated low back. Pt has been experiencing low midline back pain radiates to  R lower back. Does not radiate into  lower extremities denies changes in bowel or bladder function. Has been taking tylenol and ibuprofen without effectiveness, and has spasms with ambulation.      Review of Systems   Constitutional:  Negative for chills, fatigue and fever.   Gastrointestinal:  Negative for abdominal distention, abdominal pain, diarrhea, nausea and vomiting.   Genitourinary:  Negative for difficulty urinating, flank pain, penile discharge, scrotal swelling and testicular pain.   Musculoskeletal:  Positive for back pain. Negative for arthralgias, gait problem, joint swelling, myalgias, neck pain and neck stiffness.   Skin:  Negative for color change and rash.   Neurological:  Negative for tremors and weakness.   Hematological:  Negative for adenopathy. Does not bruise/bleed easily.   Psychiatric/Behavioral:  Negative for agitation and decreased concentration.        Objective   /82 (BP Location: Right arm, Patient Position: Sitting, Cuff Size: Standard)   Pulse 68   Temp 99.7 °F (37.6 °C) (Tympanic)   Ht 5'  "10\" (1.778 m)   Wt 83.6 kg (184 lb 6.4 oz)   SpO2 98%   BMI 26.46 kg/m²      Physical Exam  Vitals and nursing note reviewed.   Constitutional:       General: He is not in acute distress.     Appearance: Normal appearance. He is not ill-appearing, toxic-appearing or diaphoretic.   HENT:      Head: Normocephalic and atraumatic.   Eyes:      Conjunctiva/sclera: Conjunctivae normal.      Pupils: Pupils are equal, round, and reactive to light.   Cardiovascular:      Rate and Rhythm: Normal rate and regular rhythm.      Pulses: Normal pulses.      Heart sounds: Normal heart sounds.   Pulmonary:      Effort: Pulmonary effort is normal. No respiratory distress.      Breath sounds: Normal breath sounds.   Abdominal:      General: Abdomen is flat. Bowel sounds are normal.   Musculoskeletal:         General: Tenderness present. No swelling, deformity or signs of injury.      Lumbar back: Spasms, tenderness and bony tenderness present. No swelling, edema, deformity or signs of trauma. Decreased range of motion. Negative right straight leg raise test and negative left straight leg raise test. No scoliosis.      Right lower leg: No edema.      Left lower leg: No edema.   Skin:     General: Skin is warm.      Capillary Refill: Capillary refill takes less than 2 seconds.   Neurological:      Mental Status: He is alert and oriented to person, place, and time.      Sensory: No sensory deficit.      Motor: No weakness.      Coordination: Coordination normal.      Gait: Gait normal.   Psychiatric:         Mood and Affect: Mood normal.         Behavior: Behavior normal.         Thought Content: Thought content normal.         "

## 2025-03-09 DIAGNOSIS — N40.1 BENIGN LOCALIZED PROSTATIC HYPERPLASIA WITH LOWER URINARY TRACT SYMPTOMS (LUTS): ICD-10-CM

## 2025-03-10 RX ORDER — TAMSULOSIN HYDROCHLORIDE 0.4 MG/1
0.4 CAPSULE ORAL
Qty: 100 CAPSULE | Refills: 0 | Status: SHIPPED | OUTPATIENT
Start: 2025-03-10

## 2025-05-07 ENCOUNTER — OFFICE VISIT (OUTPATIENT)
Dept: INTERNAL MEDICINE CLINIC | Facility: CLINIC | Age: 80
End: 2025-05-07
Payer: MEDICARE

## 2025-05-07 VITALS
HEIGHT: 70 IN | BODY MASS INDEX: 24.97 KG/M2 | SYSTOLIC BLOOD PRESSURE: 124 MMHG | OXYGEN SATURATION: 97 % | DIASTOLIC BLOOD PRESSURE: 62 MMHG | WEIGHT: 174.4 LBS | HEART RATE: 76 BPM

## 2025-05-07 DIAGNOSIS — K21.00 GASTROESOPHAGEAL REFLUX DISEASE WITH ESOPHAGITIS WITHOUT HEMORRHAGE: Primary | ICD-10-CM

## 2025-05-07 DIAGNOSIS — Z13.6 SCREENING FOR CARDIOVASCULAR CONDITION: ICD-10-CM

## 2025-05-07 DIAGNOSIS — N40.1 BENIGN LOCALIZED PROSTATIC HYPERPLASIA WITH LOWER URINARY TRACT SYMPTOMS (LUTS): ICD-10-CM

## 2025-05-07 DIAGNOSIS — R01.1 MURMUR, CARDIAC: ICD-10-CM

## 2025-05-07 DIAGNOSIS — R73.01 IFG (IMPAIRED FASTING GLUCOSE): ICD-10-CM

## 2025-05-07 DIAGNOSIS — L08.9 SKIN PUSTULE: ICD-10-CM

## 2025-05-07 PROCEDURE — 99214 OFFICE O/P EST MOD 30 MIN: CPT | Performed by: INTERNAL MEDICINE

## 2025-05-07 PROCEDURE — G2211 COMPLEX E/M VISIT ADD ON: HCPCS | Performed by: INTERNAL MEDICINE

## 2025-05-07 RX ORDER — MUPIROCIN 20 MG/G
OINTMENT TOPICAL 3 TIMES DAILY
Qty: 15 G | Refills: 0 | Status: SHIPPED | OUTPATIENT
Start: 2025-05-07

## 2025-05-07 NOTE — ASSESSMENT & PLAN NOTE
Known history of hiatal hernia reports only intermittent mild GERD triggered by sauce has had a GI workup in the past including EGD symptoms are stable he reports me that Pepto-Bismol as needed is effective we will continue to monitor, ulcer free diet

## 2025-05-07 NOTE — PROGRESS NOTES
Name: Eladio Ayala      : 1945      MRN: [id number]  Encounter Provider: Kiran Claudio DO  Encounter Date: 2025   Enc[facility] department: [facility]  :  Assessment & Plan  Gastroesophageal reflux disease with esophagitis without hemorrhage  Known history of hiatal hernia reports only intermittent mild GERD triggered by sauce has had a GI workup in the past including EGD symptoms are stable he reports me that Pepto-Bismol as needed is effective we will continue to monitor, ulcer free diet       Benign localized prostatic hyperplasia with lower urinary tract symptoms (LUTS)  Clinically stable and doing well continue the current medical regiment will continue monitor.  Working with urology clinic [facility] PSA is mildly elevated most recent PSA 6.058 previous 7.38, previous MRI of the prostate low category 2 mild BPH 42 cc       Murmur, cardiac  On examination the patient does have a systolic ejection murmur 1 out of 6 would like to check echocardiogram he reports me he has not been told having a murmur in the past no chest pain no palpitation no racing heart  Orders:  •  Echo complete w/ contrast if indicated; Future    IFG (impaired fasting glucose)  I have counselled the pt to follow a healthy and balanced diet ,and recommend routine exercise.  Reduce carbohydrates sweets check fasting blood sugar and hemoglobin A1c  Orders:  •  Comprehensive metabolic panel; Future  •  Hemoglobin A1C; Future    Screening for cardiovascular condition    Orders:  •  Lipid Panel with Direct LDL reflex; Future    Skin pustule  Left nostril skin pustule nodular in nature he has a history of cystic acne Rx for Bactroban 2% ointment apply topically 3 times a day for 7 days if this does not resolve he has been told to follow-up with his usual dermatologist he reports me it is improving at this point  Orders:  •  mupirocin (BACTROBAN) 2 % ointment; Apply topically 3 (three) times a day  I have  spent a total time of 45 minutes in caring for this patient on the day of the visit/encounter including Diagnostic results, Risk factor reductions, Impressions, Counseling / Coordination of care, Documenting in the medical record, Reviewing/placing orders in the medical record (including tests, medications, and/or procedures), and Obtaining or reviewing history  .   RTO in 6 months call if any problems       History of Present Illness   HPI 80-year old male new patient to the practice GERD, BPH, elevated PSA, murmur, IFG and skin pustule left nostril; the patient reports me overall is doing very well is very active playing golf and trying his best to follow a healthy and balanced diet he does report to me over years intermittent GERD which is mild in nature he has a history of hiatal hernia; he reports me that Pepto-Bismol is effective to control his symptoms he reports me that foods will trigger his GERD such as sauce.  He has had upper endoscopy and found to have a hiatal hernia his symptoms are stable mild and intermittent.  No dysphagia.  Patient is also noticed a pustule developed over the last week on the left side of his nose has a known history of cystic acne.  He reports seeing most recently it is starting to improve.  He does see dermatology routinely.  Patient also has elevation of PSA is undergone MRI of the prostate in the past which was low risk for malignancy showing enlarged prostate he has been working with urology [facility] and has follow-up in the near future.  There is been no injuries no illnesses we did review his laboratories his test and medical condition over the last year.  Overall he is stable and well no chest pain no palpitation no racing heart commend  Review of Systems   Constitutional:  Negative for activity change, appetite change and unexpected weight change.   HENT:  Negative for congestion and postnasal drip.    Eyes:  Negative for visual disturbance.   Respiratory:  Negative  "for cough and shortness of breath.    Cardiovascular:  Negative for chest pain.   Gastrointestinal:  Negative for abdominal pain, diarrhea, nausea and vomiting.        Intermittent mild GERD   Neurological:  Negative for dizziness, light-headedness and headaches.   Hematological:  Negative for adenopathy.       Objective   /62 (BP Location: Left arm, Patient Position: Sitting, Cuff Size: Standard)   Pulse 76   Ht 5' 10\" (1.778 m)   Wt 79.1 kg (174 lb 6.4 oz)   SpO2 97%   BMI 25.02 kg/m²   Left nostril nodule  pustule  Physical Exam  Vitals and nursing note reviewed.   Constitutional:       General: He is not in acute distress.     Appearance: Normal appearance. He is well-developed. He is not ill-appearing, toxic-appearing or diaphoretic.   HENT:      Head: Normocephalic and atraumatic.      Right Ear: External ear normal.      Left Ear: External ear normal.      Nose: Nose normal.   Eyes:      Pupils: Pupils are equal, round, and reactive to light.   Cardiovascular:      Rate and Rhythm: Normal rate and regular rhythm.      Heart sounds: Murmur heard.   Pulmonary:      Effort: Pulmonary effort is normal.      Breath sounds: Normal breath sounds.   Abdominal:      General: There is no distension.      Palpations: Abdomen is soft.      Tenderness: There is no abdominal tenderness. There is no guarding.     Negative edema, distal pulses 2/2    "

## 2025-05-07 NOTE — ASSESSMENT & PLAN NOTE
Clinically stable and doing well continue the current medical regiment will continue monitor.  Working with urology clinic [facility] PSA is mildly elevated most recent PSA 6.058 previous 7.38, previous MRI of the prostate low category 2 mild BPH 42 cc

## 2025-05-17 ENCOUNTER — APPOINTMENT (OUTPATIENT)
Dept: LAB | Age: 80
End: 2025-05-17
Attending: INTERNAL MEDICINE
Payer: MEDICARE

## 2025-05-17 DIAGNOSIS — Z13.6 SCREENING FOR CARDIOVASCULAR CONDITION: ICD-10-CM

## 2025-05-17 DIAGNOSIS — R73.01 IFG (IMPAIRED FASTING GLUCOSE): ICD-10-CM

## 2025-05-17 LAB
ALBUMIN SERPL BCG-MCNC: 3.9 G/DL (ref 3.5–5)
ALP SERPL-CCNC: 59 U/L (ref 34–104)
ALT SERPL W P-5'-P-CCNC: 14 U/L (ref 7–52)
ANION GAP SERPL CALCULATED.3IONS-SCNC: 5 MMOL/L (ref 4–13)
AST SERPL W P-5'-P-CCNC: 16 U/L (ref 13–39)
BILIRUB SERPL-MCNC: 0.46 MG/DL (ref 0.2–1)
BUN SERPL-MCNC: 18 MG/DL (ref 5–25)
CALCIUM SERPL-MCNC: 9.7 MG/DL (ref 8.4–10.2)
CHLORIDE SERPL-SCNC: 108 MMOL/L (ref 96–108)
CHOLEST SERPL-MCNC: 158 MG/DL (ref ?–200)
CO2 SERPL-SCNC: 29 MMOL/L (ref 21–32)
CREAT SERPL-MCNC: 0.95 MG/DL (ref 0.6–1.3)
EST. AVERAGE GLUCOSE BLD GHB EST-MCNC: 108 MG/DL
GFR SERPL CREATININE-BSD FRML MDRD: 76 ML/MIN/1.73SQ M
GLUCOSE P FAST SERPL-MCNC: 99 MG/DL (ref 65–99)
HBA1C MFR BLD: 5.4 %
HDLC SERPL-MCNC: 50 MG/DL
LDLC SERPL CALC-MCNC: 89 MG/DL (ref 0–100)
POTASSIUM SERPL-SCNC: 3.8 MMOL/L (ref 3.5–5.3)
PROT SERPL-MCNC: 6.2 G/DL (ref 6.4–8.4)
SODIUM SERPL-SCNC: 142 MMOL/L (ref 135–147)
TRIGL SERPL-MCNC: 95 MG/DL (ref ?–150)

## 2025-05-17 PROCEDURE — 36415 COLL VENOUS BLD VENIPUNCTURE: CPT

## 2025-05-17 PROCEDURE — 83036 HEMOGLOBIN GLYCOSYLATED A1C: CPT

## 2025-05-17 PROCEDURE — 80061 LIPID PANEL: CPT

## 2025-05-17 PROCEDURE — 80053 COMPREHEN METABOLIC PANEL: CPT

## 2025-05-18 ENCOUNTER — RESULTS FOLLOW-UP (OUTPATIENT)
Dept: INTERNAL MEDICINE CLINIC | Facility: CLINIC | Age: 80
End: 2025-05-18

## 2025-06-10 ENCOUNTER — HOSPITAL ENCOUNTER (OUTPATIENT)
Dept: NON INVASIVE DIAGNOSTICS | Facility: HOSPITAL | Age: 80
Discharge: HOME | End: 2025-06-10
Attending: INTERNAL MEDICINE
Payer: MEDICARE

## 2025-06-10 VITALS
SYSTOLIC BLOOD PRESSURE: 124 MMHG | DIASTOLIC BLOOD PRESSURE: 62 MMHG | BODY MASS INDEX: 24.91 KG/M2 | WEIGHT: 174 LBS | HEART RATE: 76 BPM | HEIGHT: 70 IN

## 2025-06-10 DIAGNOSIS — R01.1 MURMUR, CARDIAC: ICD-10-CM

## 2025-06-10 LAB
AORTIC ROOT: 3.8 CM
ASCENDING AORTA: 3.2 CM
BSA FOR ECHO PROCEDURE: 1.97 M2
E WAVE DECELERATION TIME: 263 MS
E/A RATIO: 0.59
FRACTIONAL SHORTENING: 36 (ref 28–44)
INTERVENTRICULAR SEPTUM IN DIASTOLE (PARASTERNAL SHORT AXIS VIEW): 1.2 CM
INTERVENTRICULAR SEPTUM: 1.2 CM (ref 0.6–1.1)
LAAS-AP2: 22.8 CM2
LAAS-AP4: 17 CM2
LEFT ATRIUM SIZE: 3.8 CM
LEFT ATRIUM VOLUME (MOD BIPLANE): 58 ML
LEFT ATRIUM VOLUME INDEX (MOD BIPLANE): 29.4 ML/M2
LEFT INTERNAL DIMENSION IN SYSTOLE: 3.2 CM (ref 2.1–4)
LEFT VENTRICULAR INTERNAL DIMENSION IN DIASTOLE: 5 CM (ref 3.5–6)
LEFT VENTRICULAR POSTERIOR WALL IN END DIASTOLE: 0.9 CM
LEFT VENTRICULAR STROKE VOLUME: 78 ML
LV EF US.2D.A4C+ESTIMATED: 57 %
LVSV (TEICH): 78 ML
MV E'TISSUE VEL-LAT: 8 CM/S
MV E'TISSUE VEL-SEP: 5 CM/S
MV PEAK A VEL: 0.73 M/S
MV PEAK E VEL: 43 CM/S
MV STENOSIS PRESSURE HALF TIME: 76 MS
MV VALVE AREA P 1/2 METHOD: 2.89
RIGHT ATRIUM AREA SYSTOLE A4C: 16.5 CM2
RIGHT VENTRICLE ID DIMENSION: 3.5 CM
SL CV LEFT ATRIUM LENGTH A2C: 5.6 CM
SL CV LV EF: 65
SL CV PED ECHO LEFT VENTRICLE DIASTOLIC VOLUME (MOD BIPLANE) 2D: 120 ML
SL CV PED ECHO LEFT VENTRICLE SYSTOLIC VOLUME (MOD BIPLANE) 2D: 42 ML
TRICUSPID ANNULAR PLANE SYSTOLIC EXCURSION: 2.2 CM

## 2025-06-10 PROCEDURE — 93306 TTE W/DOPPLER COMPLETE: CPT

## 2025-06-10 PROCEDURE — 93306 TTE W/DOPPLER COMPLETE: CPT | Performed by: INTERNAL MEDICINE

## (undated) DEVICE — SKN PRP WNG SPNGE PVP SCRB STR: Brand: MEDLINE INDUSTRIES, INC.

## (undated) DEVICE — GLOVE INDICATOR PI UNDERGLOVE SZ 7.5 BLUE

## (undated) DEVICE — VAPR COOLPULSE 90 ELECTRODE 90 DEGREES SUCTION WITH INTEGRATED HANDPIECE: Brand: VAPR COOLPULSE

## (undated) DEVICE — PACK PBDS SHOULDER ARTHROSCOPY RF

## (undated) DEVICE — TUBING SUCTION 5MM X 12 FT

## (undated) DEVICE — THREADED CLEAR CANNULA WITH OBTURATOR 8.5MM X 75MM

## (undated) DEVICE — INTENDED FOR TISSUE SEPARATION, AND OTHER PROCEDURES THAT REQUIRE A SHARP SURGICAL BLADE TO PUNCTURE OR CUT.: Brand: BARD-PARKER ® CARBON RIB-BACK BLADES

## (undated) DEVICE — SUT MONOCRYL 4-0 PS-2 27 IN Y426H

## (undated) DEVICE — GLOVE SRG BIOGEL 7.5

## (undated) DEVICE — EXPRESSEW III SUTURE NEEDLE FOR USE WITH EXPRESSEW II OR III SUTURE PASSER: Brand: EXPRESSEW

## (undated) DEVICE — SHOULDER SUSPENSION KIT 6 PER BOX

## (undated) DEVICE — 3M™ STERI-STRIP™ REINFORCED ADHESIVE SKIN CLOSURES, R1542, 1/4 IN X 1-1/2 IN (6 MM X 38 MM), 6 STRIPS/ENVELOPE: Brand: 3M™ STERI-STRIP™

## (undated) DEVICE — BLADE SHAVER DISSECTOR 4MM 13CM COOLCUT

## (undated) DEVICE — BLADE SHAVER DISSECTOR 3.5MM 13CM COOLCUT

## (undated) DEVICE — 3M™ STERI-STRIP™ BLEND TONE SKIN CLOSURES, B1557, TAN, 1/2 IN X 4 IN (12MM X 100MM), 6 STRIPS/ENVELOPE: Brand: 3M™ STERI-STRIP™

## (undated) DEVICE — GAUZE SPONGES,16 PLY: Brand: CURITY

## (undated) DEVICE — THREADED CLEAR CANNULA WITH OBTURATOR 7MM X 75MM

## (undated) DEVICE — 3M™ IOBAN™ 2 ANTIMICROBIAL INCISE DRAPE 6650EZ: Brand: IOBAN™ 2

## (undated) DEVICE — GLOVE SRG BIOGEL ECLIPSE 7

## (undated) DEVICE — DRESSING MEPILEX AG BORDER 4 X 4 IN